# Patient Record
Sex: FEMALE | Race: ASIAN | Employment: FULL TIME | URBAN - METROPOLITAN AREA
[De-identification: names, ages, dates, MRNs, and addresses within clinical notes are randomized per-mention and may not be internally consistent; named-entity substitution may affect disease eponyms.]

---

## 2017-01-04 ENCOUNTER — TELEPHONE (OUTPATIENT)
Dept: FAMILY MEDICINE CLINIC | Facility: CLINIC | Age: 37
End: 2017-01-04

## 2017-01-04 NOTE — TELEPHONE ENCOUNTER
Tc to pt. She made appt for L and R arm pain on mychart. She reports 2 days ago noticed shooting pains in both of her arms. She states she feels like it was in her bones. More in elbow and shoulder area. Shoulder and neck soreness. Denies injury.  Denies a

## 2017-01-18 ENCOUNTER — OFFICE VISIT (OUTPATIENT)
Dept: FAMILY MEDICINE CLINIC | Facility: CLINIC | Age: 37
End: 2017-01-18

## 2017-01-18 VITALS
RESPIRATION RATE: 14 BRPM | SYSTOLIC BLOOD PRESSURE: 120 MMHG | HEART RATE: 68 BPM | DIASTOLIC BLOOD PRESSURE: 70 MMHG | WEIGHT: 129 LBS | BODY MASS INDEX: 22.02 KG/M2 | HEIGHT: 64 IN

## 2017-01-18 DIAGNOSIS — M54.9 CHRONIC BILATERAL BACK PAIN, UNSPECIFIED BACK LOCATION: Primary | ICD-10-CM

## 2017-01-18 DIAGNOSIS — M79.601 BILATERAL ARM PAIN: ICD-10-CM

## 2017-01-18 DIAGNOSIS — G89.29 CHRONIC BILATERAL BACK PAIN, UNSPECIFIED BACK LOCATION: Primary | ICD-10-CM

## 2017-01-18 DIAGNOSIS — M79.602 BILATERAL ARM PAIN: ICD-10-CM

## 2017-01-18 PROCEDURE — 99214 OFFICE O/P EST MOD 30 MIN: CPT | Performed by: FAMILY MEDICINE

## 2017-01-18 RX ORDER — PREDNISONE 20 MG/1
20 TABLET ORAL 2 TIMES DAILY
Qty: 10 TABLET | Refills: 1 | Status: SHIPPED | OUTPATIENT
Start: 2017-01-18 | End: 2017-01-30

## 2017-01-18 NOTE — PROGRESS NOTES
Glendy Olivia is a 43year old female. HPI:   Patient complains of bilateral arm discomfort on and off for the past 2 weeks. She states that the pain is sharp, needlelike that goes down her arm.   She states the pain feels that it is deep in her arm and GLUCOSE (URINE DIPSTICK) 0 Negative mg/dL   BILIRUBIN 0 Negative   KETONES (URINE DIPSTICK) 0 Negative mg/dL   SPECIFIC GRAVITY 1.015 1.005 - 1.030   OCCULT BLOOD small Negative   PH, URINE 7.0 4.5 - 8.0   PROTEIN (URINE DIPSTICK) 0 Negative/Trace mg/dL bilaterally    ASSESSMENT AND PLAN:   Chronic bilateral back pain, unspecified back location  (primary encounter diagnosis)  Bilateral arm pain    No orders of the defined types were placed in this encounter.        Meds & Refills for this Visit:  Signed Pr

## 2017-03-13 ENCOUNTER — OFFICE VISIT (OUTPATIENT)
Dept: FAMILY MEDICINE CLINIC | Facility: CLINIC | Age: 37
End: 2017-03-13

## 2017-03-13 VITALS
HEIGHT: 64 IN | DIASTOLIC BLOOD PRESSURE: 70 MMHG | SYSTOLIC BLOOD PRESSURE: 100 MMHG | HEART RATE: 78 BPM | WEIGHT: 127 LBS | BODY MASS INDEX: 21.68 KG/M2 | RESPIRATION RATE: 14 BRPM

## 2017-03-13 DIAGNOSIS — M24.20: ICD-10-CM

## 2017-03-13 DIAGNOSIS — G89.29 CHRONIC MIDLINE LOW BACK PAIN WITHOUT SCIATICA: Primary | ICD-10-CM

## 2017-03-13 DIAGNOSIS — M54.50 CHRONIC MIDLINE LOW BACK PAIN WITHOUT SCIATICA: Primary | ICD-10-CM

## 2017-03-13 DIAGNOSIS — M47.816 FACET HYPERTROPHY OF LUMBAR REGION: ICD-10-CM

## 2017-03-13 PROCEDURE — 99214 OFFICE O/P EST MOD 30 MIN: CPT | Performed by: FAMILY MEDICINE

## 2017-03-13 RX ORDER — TRAMADOL HYDROCHLORIDE 50 MG/1
100 TABLET ORAL 2 TIMES DAILY PRN
Qty: 120 TABLET | Refills: 1 | Status: SHIPPED | OUTPATIENT
Start: 2017-03-13 | End: 2017-06-13

## 2017-03-13 NOTE — PROGRESS NOTES
Иван Macias is a 43year old female. HPI:   Patient is here to follow-up on her back pain. She states she saw orthopedic which showed ligamentous hypertrophy and mild facet hypertrophy on her MRI at the level of L4-L5 and L5-S1.   Patient does in the s Result Value Ref Range   HPV Source Cervix    HPV DNA Probe, Thinprep Negative Negative   -URINE CULTURE, ROUTINE   Result Value Ref Range   Urine Culture 60,000 CFU/ML Gram positive aki        REVIEW OF SYSTEMS:   GENERAL: feels well otherwise  SKIN:

## 2017-04-25 ENCOUNTER — OFFICE VISIT (OUTPATIENT)
Dept: FAMILY MEDICINE CLINIC | Facility: CLINIC | Age: 37
End: 2017-04-25

## 2017-04-25 VITALS
WEIGHT: 126 LBS | HEART RATE: 84 BPM | SYSTOLIC BLOOD PRESSURE: 100 MMHG | HEIGHT: 64 IN | RESPIRATION RATE: 14 BRPM | DIASTOLIC BLOOD PRESSURE: 60 MMHG | BODY MASS INDEX: 21.51 KG/M2

## 2017-04-25 DIAGNOSIS — M25.511 ACUTE PAIN OF RIGHT SHOULDER: Primary | ICD-10-CM

## 2017-04-25 DIAGNOSIS — R00.2 HEART PALPITATIONS: ICD-10-CM

## 2017-04-25 DIAGNOSIS — Z86.79 HISTORY OF RHEUMATIC FEVER: ICD-10-CM

## 2017-04-25 DIAGNOSIS — M79.2 NEUROPATHIC PAIN: ICD-10-CM

## 2017-04-25 PROCEDURE — 99214 OFFICE O/P EST MOD 30 MIN: CPT | Performed by: FAMILY MEDICINE

## 2017-04-25 RX ORDER — HYDROCODONE BITARTRATE AND ACETAMINOPHEN 10; 325 MG/1; MG/1
1 TABLET ORAL 2 TIMES DAILY PRN
Qty: 60 TABLET | Refills: 0 | Status: SHIPPED | OUTPATIENT
Start: 2017-04-25 | End: 2017-11-01 | Stop reason: ALTCHOICE

## 2017-04-25 RX ORDER — DULOXETIN HYDROCHLORIDE 20 MG/1
20 CAPSULE, DELAYED RELEASE ORAL DAILY
Qty: 30 CAPSULE | Refills: 2 | Status: SHIPPED | OUTPATIENT
Start: 2017-04-25 | End: 2017-11-01 | Stop reason: ALTCHOICE

## 2017-04-25 NOTE — PROGRESS NOTES
Minal Ruvalcaba is a 43year old female. HPI:   Patient is here to follow-up on her back pain. She states she saw orthopedic which showed ligamentous hypertrophy and mild facet hypertrophy on her MRI at the level of L4-L5 and L5-S1.   Patient does in the s for prescription of oral contraceptives    • Routine Papanicolaou smear       Social History:    Smoking Status: Never Smoker                      Smokeless Status: Never Used                        Alcohol Use: No                   Results for orders plac normal limits    ASSESSMENT AND PLAN:   Acute pain of right shoulder  (primary encounter diagnosis)  Neuropathic pain  History of rheumatic fever  Heart palpitations    No orders of the defined types were placed in this encounter.        Meds & Refills for

## 2017-05-28 PROCEDURE — 87086 URINE CULTURE/COLONY COUNT: CPT | Performed by: EMERGENCY MEDICINE

## 2017-06-02 ENCOUNTER — TELEPHONE (OUTPATIENT)
Dept: FAMILY MEDICINE CLINIC | Facility: CLINIC | Age: 37
End: 2017-06-02

## 2017-06-02 ENCOUNTER — OFFICE VISIT (OUTPATIENT)
Dept: FAMILY MEDICINE CLINIC | Facility: CLINIC | Age: 37
End: 2017-06-02

## 2017-06-02 ENCOUNTER — HOSPITAL ENCOUNTER (OUTPATIENT)
Dept: CT IMAGING | Age: 37
Discharge: HOME | End: 2017-06-02
Attending: FAMILY MEDICINE
Payer: COMMERCIAL

## 2017-06-02 VITALS
RESPIRATION RATE: 16 BRPM | HEART RATE: 84 BPM | SYSTOLIC BLOOD PRESSURE: 110 MMHG | WEIGHT: 125 LBS | TEMPERATURE: 98 F | DIASTOLIC BLOOD PRESSURE: 70 MMHG | BODY MASS INDEX: 21.34 KG/M2 | HEIGHT: 64 IN

## 2017-06-02 DIAGNOSIS — M54.50 CHRONIC BILATERAL LOW BACK PAIN WITHOUT SCIATICA: ICD-10-CM

## 2017-06-02 DIAGNOSIS — N30.01 ACUTE CYSTITIS WITH HEMATURIA: Primary | ICD-10-CM

## 2017-06-02 DIAGNOSIS — R53.83 FATIGUE, UNSPECIFIED TYPE: ICD-10-CM

## 2017-06-02 DIAGNOSIS — R11.2 INTRACTABLE VOMITING WITH NAUSEA, UNSPECIFIED VOMITING TYPE: ICD-10-CM

## 2017-06-02 DIAGNOSIS — R10.31 RIGHT LOWER QUADRANT ABDOMINAL PAIN: ICD-10-CM

## 2017-06-02 DIAGNOSIS — R00.2 HEART PALPITATIONS: ICD-10-CM

## 2017-06-02 DIAGNOSIS — R53.1 WEAKNESS: ICD-10-CM

## 2017-06-02 DIAGNOSIS — R79.89 ABNORMAL CBC: ICD-10-CM

## 2017-06-02 DIAGNOSIS — G89.29 CHRONIC BILATERAL LOW BACK PAIN WITHOUT SCIATICA: ICD-10-CM

## 2017-06-02 DIAGNOSIS — Z86.79 HISTORY OF RHEUMATIC FEVER: ICD-10-CM

## 2017-06-02 PROCEDURE — 99215 OFFICE O/P EST HI 40 MIN: CPT | Performed by: FAMILY MEDICINE

## 2017-06-02 PROCEDURE — 74176 CT ABD & PELVIS W/O CONTRAST: CPT | Performed by: FAMILY MEDICINE

## 2017-06-02 RX ORDER — LEVOFLOXACIN 500 MG/1
500 TABLET, FILM COATED ORAL DAILY
Qty: 10 TABLET | Refills: 0 | Status: SHIPPED | OUTPATIENT
Start: 2017-06-02 | End: 2017-06-12 | Stop reason: ALTCHOICE

## 2017-06-02 NOTE — PROGRESS NOTES
Bessie Snow is a 43year old female. HPI:   Pt. Is here for follow up from immediate care. Patient states that she has not been feeling well for the past 2 weeks.   She started off with the pain on the right side of her head and it shot up into her tem Alcohol Use: No                   Results for orders placed or performed in visit on 05/28/17  -URINE MICROSCOPIC ONLY   Result Value Ref Range   WBC, UA 5-10/HPF (A) None seen - Rare   RBC UA 5-10/HPF (A) None seen - Rare   Epithelial abdominal pain, heartburn; N/V  : UTI  MUSCULOSKELETAL:  back pain  EXTREMITIES:  No pain or numbness    EXAM:   /70 mmHg  Pulse 84  Temp(Src) 98 °F (36.7 °C) (Oral)  Resp 16  Ht 64\"  Wt 125 lb  BMI 21.45 kg/m2  LMP 04/27/2017  GENERAL: well devel

## 2017-06-02 NOTE — TELEPHONE ENCOUNTER
The CT scan results were received from radiology. There is no acute appendicitis. There was mild patchy airspace opacity left lower lobe which could represent pneumonia. No acute process in the abdomen.   Patient recently was diagnosed with acute cystiti

## 2017-06-02 NOTE — TELEPHONE ENCOUNTER
Called to pt and notified of findings and dr recommendations. Pt voiced surprise and understanding and agreed to plan.

## 2017-06-04 DIAGNOSIS — R93.89 ABNORMAL CT SCAN: Primary | ICD-10-CM

## 2017-06-04 DIAGNOSIS — R53.1 WEAKNESS: ICD-10-CM

## 2017-06-06 ENCOUNTER — APPOINTMENT (OUTPATIENT)
Dept: LAB | Age: 37
End: 2017-06-06
Attending: FAMILY MEDICINE
Payer: COMMERCIAL

## 2017-06-06 ENCOUNTER — HOSPITAL ENCOUNTER (OUTPATIENT)
Dept: GENERAL RADIOLOGY | Age: 37
Discharge: HOME | End: 2017-06-06
Attending: FAMILY MEDICINE
Payer: COMMERCIAL

## 2017-06-06 DIAGNOSIS — R53.1 WEAKNESS: ICD-10-CM

## 2017-06-06 DIAGNOSIS — Z86.79 HISTORY OF RHEUMATIC FEVER: ICD-10-CM

## 2017-06-06 DIAGNOSIS — R79.89 ABNORMAL CBC: ICD-10-CM

## 2017-06-06 DIAGNOSIS — R93.89 ABNORMAL CT SCAN: ICD-10-CM

## 2017-06-06 DIAGNOSIS — R00.2 HEART PALPITATIONS: ICD-10-CM

## 2017-06-06 DIAGNOSIS — N30.01 ACUTE CYSTITIS WITH HEMATURIA: ICD-10-CM

## 2017-06-06 DIAGNOSIS — R11.2 INTRACTABLE VOMITING WITH NAUSEA, UNSPECIFIED VOMITING TYPE: ICD-10-CM

## 2017-06-06 PROCEDURE — 83013 H PYLORI (C-13) BREATH: CPT | Performed by: FAMILY MEDICINE

## 2017-06-06 PROCEDURE — 86665 EPSTEIN-BARR CAPSID VCA: CPT | Performed by: FAMILY MEDICINE

## 2017-06-06 PROCEDURE — 71020 XR CHEST PA + LAT CHEST (CPT=71020): CPT | Performed by: FAMILY MEDICINE

## 2017-06-06 PROCEDURE — 80074 ACUTE HEPATITIS PANEL: CPT | Performed by: FAMILY MEDICINE

## 2017-06-06 PROCEDURE — 36415 COLL VENOUS BLD VENIPUNCTURE: CPT | Performed by: FAMILY MEDICINE

## 2017-06-06 PROCEDURE — 86645 CMV ANTIBODY IGM: CPT | Performed by: FAMILY MEDICINE

## 2017-06-06 PROCEDURE — 86664 EPSTEIN-BARR NUCLEAR ANTIGEN: CPT | Performed by: FAMILY MEDICINE

## 2017-06-06 PROCEDURE — 86644 CMV ANTIBODY: CPT | Performed by: FAMILY MEDICINE

## 2017-06-12 ENCOUNTER — OFFICE VISIT (OUTPATIENT)
Dept: FAMILY MEDICINE CLINIC | Facility: CLINIC | Age: 37
End: 2017-06-12

## 2017-06-12 VITALS
DIASTOLIC BLOOD PRESSURE: 70 MMHG | TEMPERATURE: 98 F | RESPIRATION RATE: 14 BRPM | HEIGHT: 64 IN | HEART RATE: 72 BPM | WEIGHT: 125 LBS | SYSTOLIC BLOOD PRESSURE: 102 MMHG | BODY MASS INDEX: 21.34 KG/M2

## 2017-06-12 DIAGNOSIS — S46.911A RIGHT SHOULDER STRAIN, INITIAL ENCOUNTER: Primary | ICD-10-CM

## 2017-06-12 PROCEDURE — 99213 OFFICE O/P EST LOW 20 MIN: CPT | Performed by: FAMILY MEDICINE

## 2017-06-12 RX ORDER — DICLOFENAC SODIUM 75 MG/1
75 TABLET, DELAYED RELEASE ORAL 2 TIMES DAILY
Qty: 60 TABLET | Refills: 1 | Status: SHIPPED | OUTPATIENT
Start: 2017-06-12 | End: 2017-11-01 | Stop reason: ALTCHOICE

## 2017-06-12 NOTE — PROGRESS NOTES
Susan Dennis is a 43year old female. HPI:   Pt. States that her right shoulder was bothering her for the past 2-3 weeks. It did not bother her for some time and it flared recently. Pt. Would like something for pain. Pain is 6-7/10. Meds reviewed. Breath Test Negative Negative       REVIEW OF SYSTEMS:   GENERAL: feels well otherwise  SKIN: denies any unusual skin lesions  LUNGS: denies shortness of breath with exertion  CARDIOVASCULAR: denies chest pain on exertion  GI: denies abdominal pain,denies

## 2017-06-13 NOTE — TELEPHONE ENCOUNTER
From: Susan Dennis  To: Lonine Trujillo DO  Sent: 6/12/2017 8:16 PM CDT  Subject: Medication Renewal Request    Original authorizing provider: DO Susan Rebolledo would like a refill of the following medications:  TraMADol HCl 50 MG Oral Ta

## 2017-06-14 RX ORDER — TRAMADOL HYDROCHLORIDE 50 MG/1
TABLET ORAL
Qty: 90 TABLET | Refills: 1 | Status: SHIPPED | OUTPATIENT
Start: 2017-06-14 | End: 2017-07-28

## 2017-06-15 ENCOUNTER — PATIENT MESSAGE (OUTPATIENT)
Dept: FAMILY MEDICINE CLINIC | Facility: CLINIC | Age: 37
End: 2017-06-15

## 2017-06-16 NOTE — TELEPHONE ENCOUNTER
Outgoing call to patient concerning MyChart message. She states she felt very dizzy, weak, fatigued and had chills on yesterday, today has mild dizziness, feels much better than yesterday.   Please advise

## 2017-06-16 NOTE — TELEPHONE ENCOUNTER
Unfortunately, patient was recently diagnosed with  CMV and EBV which both can give her the symptoms she is dealing with. The key is to rest and drink plenty of fluids.   If she feels it is so bad that she cannot tolerate it, she needs to go to the [employer]

## 2017-06-16 NOTE — TELEPHONE ENCOUNTER
Outgoing call to patient, information and recommendations given to patient, understanding verbalized.

## 2017-06-20 ENCOUNTER — PATIENT MESSAGE (OUTPATIENT)
Dept: FAMILY MEDICINE CLINIC | Facility: CLINIC | Age: 37
End: 2017-06-20

## 2017-06-20 NOTE — TELEPHONE ENCOUNTER
From: Rona Kearns  To: Cecilia Santana DO  Sent: 6/20/2017 6:33 AM CDT  Subject: Visit Follow-up Question    Hi Dr Bhupinder Burnette,    Could you please write a note for my employer about my symptoms? It is hard to focus at times. Appreciate your help.      Cielo Fair

## 2017-07-01 ENCOUNTER — HOSPITAL ENCOUNTER (OUTPATIENT)
Dept: GENERAL RADIOLOGY | Age: 37
Discharge: HOME | End: 2017-07-01
Attending: FAMILY MEDICINE
Payer: COMMERCIAL

## 2017-07-01 DIAGNOSIS — M25.511 ACUTE PAIN OF RIGHT SHOULDER: ICD-10-CM

## 2017-07-01 PROCEDURE — 73030 X-RAY EXAM OF SHOULDER: CPT | Performed by: FAMILY MEDICINE

## 2017-08-02 ENCOUNTER — PATIENT MESSAGE (OUTPATIENT)
Dept: FAMILY MEDICINE CLINIC | Facility: CLINIC | Age: 37
End: 2017-08-02

## 2017-08-02 ENCOUNTER — OFFICE VISIT (OUTPATIENT)
Dept: FAMILY MEDICINE CLINIC | Facility: CLINIC | Age: 37
End: 2017-08-02

## 2017-08-02 ENCOUNTER — TELEPHONE (OUTPATIENT)
Dept: FAMILY MEDICINE CLINIC | Facility: CLINIC | Age: 37
End: 2017-08-02

## 2017-08-02 VITALS
BODY MASS INDEX: 21 KG/M2 | HEART RATE: 64 BPM | WEIGHT: 123 LBS | RESPIRATION RATE: 14 BRPM | SYSTOLIC BLOOD PRESSURE: 120 MMHG | DIASTOLIC BLOOD PRESSURE: 80 MMHG | HEIGHT: 64 IN

## 2017-08-02 DIAGNOSIS — B25.9 CYTOMEGALOVIRUS INFECTION, UNSPECIFIED CYTOMEGALOVIRAL INFECTION TYPE (HCC): Primary | ICD-10-CM

## 2017-08-02 DIAGNOSIS — M54.50 CHRONIC MIDLINE LOW BACK PAIN WITHOUT SCIATICA: ICD-10-CM

## 2017-08-02 DIAGNOSIS — G89.29 CHRONIC MIDLINE LOW BACK PAIN WITHOUT SCIATICA: ICD-10-CM

## 2017-08-02 DIAGNOSIS — G89.29 CHRONIC BILATERAL LOW BACK PAIN WITHOUT SCIATICA: Primary | ICD-10-CM

## 2017-08-02 DIAGNOSIS — R42 HEAD SPINNING: ICD-10-CM

## 2017-08-02 DIAGNOSIS — M54.50 CHRONIC BILATERAL LOW BACK PAIN WITHOUT SCIATICA: Primary | ICD-10-CM

## 2017-08-02 DIAGNOSIS — Z79.899 MEDICATION MANAGEMENT: ICD-10-CM

## 2017-08-02 DIAGNOSIS — R53.1 WEAKNESS: ICD-10-CM

## 2017-08-02 DIAGNOSIS — B27.90 EBV INFECTION: ICD-10-CM

## 2017-08-02 PROCEDURE — 99214 OFFICE O/P EST MOD 30 MIN: CPT | Performed by: FAMILY MEDICINE

## 2017-08-02 RX ORDER — TRAMADOL HYDROCHLORIDE 50 MG/1
TABLET ORAL
Qty: 60 TABLET | Refills: 0 | Status: CANCELLED
Start: 2017-08-02

## 2017-08-02 RX ORDER — TRAMADOL HYDROCHLORIDE 50 MG/1
50 TABLET ORAL DAILY PRN
Qty: 30 TABLET | Refills: 0
Start: 2017-08-02 | End: 2017-08-04 | Stop reason: SDUPTHER

## 2017-08-02 NOTE — PROGRESS NOTES
Rona Kearns is a 43year old female. HPI:   Pt. Complains of vertigo on and off sicne diagnosed with CMV and EBV and it comes all day when it does come. Last episodes were 2,5,6 days ago. Has to lay down. She cannot focus at work.  Feels it is affect Nonreactive    Hepatitis B Core Ab, IgM Nonreactive  Nonreactive    Hepatitis C Virus Nonreactive  Nonreactive    Hepatitis B Surface Antigen Nonreactive  Nonreactive    -HELICOBACTER PYLORI BREATH TEST, ADULT (>17)   Result Value Ref Range   H. pylori Caryl plan.  The patient is asked to return in 1-2 months.

## 2017-08-03 NOTE — TELEPHONE ENCOUNTER
Please call her [facility] and confirm if the patient has picked up 180 tablets of tramadol since June 14. Patient states she received 60 tablets and had no refills according to her pharmacy.   Patient states that she had asked for refill a couple day

## 2017-08-03 NOTE — TELEPHONE ENCOUNTER
S/w aramis at [employer].   She reports pt picked up #90 6/14 (orig rx was 90 plus 1)  Picked up the refill of #90 7/14  She states based on directions this is a 23 day supply     TraMADol HCl 50 MG Oral Tab (Discontinued) 90 tablet 1 6/14/2017 7/28/2017   Sig :

## 2017-08-03 NOTE — TELEPHONE ENCOUNTER
From: Иван Macias  To: Bartolome Morgan DO  Sent: 8/2/2017 7:01 PM CDT  Subject: Prescription Question    Hi,    I just contacted the pharmacy and they say there's no more refills left on tramadol. I'd appreciate if you can send another prescription.  I do

## 2017-08-04 ENCOUNTER — TELEPHONE (OUTPATIENT)
Dept: FAMILY MEDICINE CLINIC | Facility: CLINIC | Age: 37
End: 2017-08-04

## 2017-08-04 PROBLEM — G89.29 CHRONIC BILATERAL LOW BACK PAIN WITHOUT SCIATICA: Status: ACTIVE | Noted: 2017-08-04

## 2017-08-04 PROBLEM — M54.50 CHRONIC BILATERAL LOW BACK PAIN WITHOUT SCIATICA: Status: ACTIVE | Noted: 2017-08-04

## 2017-08-04 RX ORDER — TRAMADOL HYDROCHLORIDE 50 MG/1
50 TABLET ORAL DAILY PRN
Qty: 30 TABLET | Refills: 0 | Status: SHIPPED | OUTPATIENT
Start: 2017-08-04 | End: 2017-11-01 | Stop reason: ALTCHOICE

## 2017-08-04 NOTE — TELEPHONE ENCOUNTER
Pt states she was in to see Dr. Brittany Ortiz this past Wed for dizziness and sick-like symptoms and was told to call Dr. Brittany Ortiz if she was still experiencing symptoms.  Pt states that Wed into Thurs she has had a return of dizziness and last night it was really

## 2017-08-04 NOTE — TELEPHONE ENCOUNTER
Pt reports info as noted below. sts dizziness and nausea wed and all day thurs were worse than at time of ofc appt. Describes the \"fit\" she had as \"symptoms were just so bad, couldn't fall asleep, just couldn't take it any more\".   sts has no dizziness

## 2017-10-18 ENCOUNTER — MED REC SCAN ONLY (OUTPATIENT)
Dept: FAMILY MEDICINE CLINIC | Facility: CLINIC | Age: 37
End: 2017-10-18

## 2017-10-24 ENCOUNTER — OFFICE VISIT (OUTPATIENT)
Dept: FAMILY MEDICINE CLINIC | Facility: CLINIC | Age: 37
End: 2017-10-24

## 2017-10-24 ENCOUNTER — TELEPHONE (OUTPATIENT)
Dept: SURGERY | Facility: CLINIC | Age: 37
End: 2017-10-24

## 2017-10-24 VITALS
WEIGHT: 130 LBS | HEART RATE: 66 BPM | HEIGHT: 64 IN | SYSTOLIC BLOOD PRESSURE: 110 MMHG | BODY MASS INDEX: 22.2 KG/M2 | DIASTOLIC BLOOD PRESSURE: 70 MMHG | RESPIRATION RATE: 14 BRPM

## 2017-10-24 DIAGNOSIS — E55.9 VITAMIN D DEFICIENCY: ICD-10-CM

## 2017-10-24 DIAGNOSIS — Z23 NEED FOR VACCINATION: ICD-10-CM

## 2017-10-24 DIAGNOSIS — Z00.00 ROUTINE GENERAL MEDICAL EXAMINATION AT A HEALTH CARE FACILITY: Primary | ICD-10-CM

## 2017-10-24 DIAGNOSIS — B96.89 BV (BACTERIAL VAGINOSIS): ICD-10-CM

## 2017-10-24 DIAGNOSIS — M54.50 CHRONIC MIDLINE LOW BACK PAIN WITHOUT SCIATICA: ICD-10-CM

## 2017-10-24 DIAGNOSIS — R00.0 TACHYCARDIA: ICD-10-CM

## 2017-10-24 DIAGNOSIS — Z12.4 SCREENING FOR CERVICAL CANCER: ICD-10-CM

## 2017-10-24 DIAGNOSIS — N76.0 BV (BACTERIAL VAGINOSIS): ICD-10-CM

## 2017-10-24 DIAGNOSIS — G89.29 CHRONIC MIDLINE LOW BACK PAIN WITHOUT SCIATICA: ICD-10-CM

## 2017-10-24 DIAGNOSIS — Z00.00 LABORATORY EXAMINATION ORDERED AS PART OF A ROUTINE GENERAL MEDICAL EXAMINATION: ICD-10-CM

## 2017-10-24 DIAGNOSIS — Z13.89 SCREENING FOR GENITOURINARY CONDITION: ICD-10-CM

## 2017-10-24 PROCEDURE — 90686 IIV4 VACC NO PRSV 0.5 ML IM: CPT | Performed by: FAMILY MEDICINE

## 2017-10-24 PROCEDURE — 99395 PREV VISIT EST AGE 18-39: CPT | Performed by: FAMILY MEDICINE

## 2017-10-24 PROCEDURE — 88175 CYTOPATH C/V AUTO FLUID REDO: CPT | Performed by: FAMILY MEDICINE

## 2017-10-24 PROCEDURE — 87624 HPV HI-RISK TYP POOLED RSLT: CPT | Performed by: FAMILY MEDICINE

## 2017-10-24 PROCEDURE — 90471 IMMUNIZATION ADMIN: CPT | Performed by: FAMILY MEDICINE

## 2017-10-24 PROCEDURE — 81003 URINALYSIS AUTO W/O SCOPE: CPT | Performed by: FAMILY MEDICINE

## 2017-10-24 RX ORDER — IBUPROFEN 600 MG/1
600 TABLET ORAL EVERY 8 HOURS PRN
Qty: 90 TABLET | Refills: 0 | Status: SHIPPED | OUTPATIENT
Start: 2017-10-24 | End: 2017-11-01 | Stop reason: ALTCHOICE

## 2017-10-24 RX ORDER — METRONIDAZOLE 7.5 MG/G
1 GEL VAGINAL NIGHTLY
Qty: 1 TUBE | Refills: 0 | Status: SHIPPED | OUTPATIENT
Start: 2017-10-24 | End: 2017-12-13

## 2017-10-24 NOTE — H&P
CC: Annual Physical Exam    HPI:   Anna Fuentes is a 42year old female who presents for a complete physical exam. Symptoms: periods are regular. Patient complains of back pain recently -- ibuprofen gives her heartburn. Pain can go up to 6-7/ 10.   Lasts Disp: 30 capsule Rfl: 2   HYDROcodone-acetaminophen (NORCO)  MG Oral Tab Take 1 tablet by mouth 2 (two) times daily as needed for Pain.  Disp: 60 tablet Rfl: 0      No Known Allergies   Past Medical History:   Diagnosis Date   • General counseling for thyromegaly  CHEST: no chest tenderness  BREAST: no dominant or suspicious mass, no nipple discharge  LUNGS: clear to auscultation  CARDIO: RRR without murmur  GI: good BS's,no masses, HSM or tenderness  :introitus is normal,scant discharge,cervix is pin

## 2017-11-01 ENCOUNTER — TELEPHONE (OUTPATIENT)
Dept: FAMILY MEDICINE CLINIC | Facility: CLINIC | Age: 37
End: 2017-11-01

## 2017-11-01 ENCOUNTER — PATIENT MESSAGE (OUTPATIENT)
Dept: FAMILY MEDICINE CLINIC | Facility: CLINIC | Age: 37
End: 2017-11-01

## 2017-11-01 ENCOUNTER — OFFICE VISIT (OUTPATIENT)
Dept: SURGERY | Facility: CLINIC | Age: 37
End: 2017-11-01

## 2017-11-01 VITALS
WEIGHT: 127 LBS | DIASTOLIC BLOOD PRESSURE: 72 MMHG | BODY MASS INDEX: 21.68 KG/M2 | HEART RATE: 72 BPM | HEIGHT: 64 IN | SYSTOLIC BLOOD PRESSURE: 128 MMHG

## 2017-11-01 DIAGNOSIS — R31.9 HEMATURIA, UNSPECIFIED TYPE: Primary | ICD-10-CM

## 2017-11-01 DIAGNOSIS — M47.816 LUMBAR FACET ARTHROPATHY: Primary | ICD-10-CM

## 2017-11-01 PROCEDURE — 99204 OFFICE O/P NEW MOD 45 MIN: CPT | Performed by: ANESTHESIOLOGY

## 2017-11-01 NOTE — PATIENT INSTRUCTIONS
Refill policies:    • Allow 2-3 business days for refills; controlled substances may take longer.   • Contact your pharmacy at least 5 days prior to running out of medication and have them send an electronic request or submit request through the [facility] have a procedure or additional testing performed. [facility]) will contact your insurance carrier to obtain pre-certification or prior authorization.     Unfortunately, [facility] has seen an increase in denial of payment even though the p

## 2017-11-01 NOTE — PROGRESS NOTES
HPI:    Patient ID: Bessie Snow is a 42year old female. HPI    Review of Systems         No current outpatient prescriptions on file.   Allergies:No Known Allergies   PHYSICAL EXAM:   Physical Exam   Constitutional:                  ASSESSMENT/PLAN:

## 2017-11-02 NOTE — TELEPHONE ENCOUNTER
Pt states that she did not have a UA done at her physical 10/24/17. The test result from that day only has small blood, no other values are listed.   Please advise, thank you

## 2017-11-02 NOTE — TELEPHONE ENCOUNTER
From: Myles Allison  To: Fifi Hoang DO  Sent: 11/1/2017 2:03 PM CDT  Subject: Test Results Question    I have a question about Urinalysis w/o scope resulted on 10/24/17, 11:26 AM.    Hi,  No urine test was done on my physical.     Thanks,  FABIORFSAÚL

## 2017-11-02 NOTE — TELEPHONE ENCOUNTER
That is my fault. I did document the patient has chronic hematuria and so it resulted as a urine test results.

## 2017-11-02 NOTE — PROGRESS NOTES
Name: Anna Fuentes   : 1980   DOS: 2017     Chief complaint: Low back pain    History of present illness:  Anna Fuentes is a 42year old female complaining of  pain in the lower back for 7 years.  Pain started suddenly without any injury or fainting, head injury, LOC, weakness, tremor, headaches, seizures, speech disorders, loss of balance or any  other neurologic problems. Genitourinary:  Denies dysuria, hematuria. Musculoskeletal:  Negative for all musculoskeletal problems.   Vascular: Neg sounds positive. Neurologic:  Cranial nerves II through XII are grossly intact. Examination of the lower back: It reveals severe tenderness over the bilateral paravertebral muscle and  sacroiliac joint area.  Flexion of the spine makes the pain bett would be using local ointment over the lumbar paravertebral muscle to see if it makes the pain better. If her pain does not get better in 6 weeks she should come back for bilateral diagnostic lumbar facet joint injection.   Thank you very much for referrin

## 2017-11-06 ENCOUNTER — TELEPHONE (OUTPATIENT)
Dept: FAMILY MEDICINE CLINIC | Facility: CLINIC | Age: 37
End: 2017-11-06

## 2017-11-06 DIAGNOSIS — R00.2 PALPITATIONS: Primary | ICD-10-CM

## 2017-11-06 DIAGNOSIS — R07.9 CHEST PAIN, UNSPECIFIED TYPE: ICD-10-CM

## 2017-11-06 NOTE — TELEPHONE ENCOUNTER
Call from [facility]-sts pt is scheduled for stress treadmill tomorrow 3pm but dx is cystitis.   Record reviewed-no recent info re dx for this test. Advised will update dr Lucien Cardoso with this info and call back with corrected dx   Rj Love v

## 2017-11-06 NOTE — TELEPHONE ENCOUNTER
Call back to [facility]-advised of corrected dx from dr Jordy Carbajal. Micaela Scanlon will need new order placed-she will then link new order to pt's already scheduled testing.

## 2017-11-07 ENCOUNTER — HOSPITAL ENCOUNTER (OUTPATIENT)
Dept: CV DIAGNOSTICS | Facility: HOSPITAL | Age: 37
Discharge: HOME | End: 2017-11-07
Attending: FAMILY MEDICINE
Payer: COMMERCIAL

## 2017-11-07 DIAGNOSIS — R07.9 CHEST PAIN, UNSPECIFIED TYPE: ICD-10-CM

## 2017-11-07 DIAGNOSIS — R00.2 PALPITATIONS: ICD-10-CM

## 2017-11-07 PROCEDURE — 93017 CV STRESS TEST TRACING ONLY: CPT | Performed by: FAMILY MEDICINE

## 2017-11-07 PROCEDURE — 93018 CV STRESS TEST I&R ONLY: CPT | Performed by: FAMILY MEDICINE

## 2017-12-13 ENCOUNTER — OFFICE VISIT (OUTPATIENT)
Dept: FAMILY MEDICINE CLINIC | Facility: CLINIC | Age: 37
End: 2017-12-13

## 2017-12-13 VITALS
HEIGHT: 64 IN | WEIGHT: 133 LBS | HEART RATE: 64 BPM | BODY MASS INDEX: 22.71 KG/M2 | RESPIRATION RATE: 14 BRPM | SYSTOLIC BLOOD PRESSURE: 100 MMHG | DIASTOLIC BLOOD PRESSURE: 60 MMHG | TEMPERATURE: 98 F

## 2017-12-13 DIAGNOSIS — M54.50 CHRONIC MIDLINE LOW BACK PAIN WITHOUT SCIATICA: ICD-10-CM

## 2017-12-13 DIAGNOSIS — Z79.899 MEDICATION MANAGEMENT: ICD-10-CM

## 2017-12-13 DIAGNOSIS — N76.0 BV (BACTERIAL VAGINOSIS): ICD-10-CM

## 2017-12-13 DIAGNOSIS — B96.89 BV (BACTERIAL VAGINOSIS): ICD-10-CM

## 2017-12-13 DIAGNOSIS — G89.29 CHRONIC MIDLINE LOW BACK PAIN WITHOUT SCIATICA: ICD-10-CM

## 2017-12-13 DIAGNOSIS — B00.1 COLD SORE: Primary | ICD-10-CM

## 2017-12-13 PROCEDURE — 99214 OFFICE O/P EST MOD 30 MIN: CPT | Performed by: FAMILY MEDICINE

## 2017-12-13 RX ORDER — VALACYCLOVIR HYDROCHLORIDE 500 MG/1
2000 TABLET, FILM COATED ORAL 2 TIMES DAILY
Qty: 24 TABLET | Refills: 0 | Status: SHIPPED | OUTPATIENT
Start: 2017-12-13 | End: 2018-08-02 | Stop reason: ALTCHOICE

## 2017-12-13 RX ORDER — ACETAMINOPHEN AND CODEINE PHOSPHATE 300; 30 MG/1; MG/1
TABLET ORAL
Refills: 0 | COMMUNITY
Start: 2017-12-05 | End: 2018-02-10

## 2017-12-13 RX ORDER — CHLORHEXIDINE GLUCONATE 0.12 MG/ML
RINSE ORAL
Refills: 0 | COMMUNITY
Start: 2017-12-05 | End: 2018-08-02 | Stop reason: ALTCHOICE

## 2017-12-13 RX ORDER — TRAMADOL HYDROCHLORIDE 50 MG/1
50 TABLET ORAL DAILY PRN
Qty: 30 TABLET | Refills: 2 | Status: SHIPPED | OUTPATIENT
Start: 2017-12-13 | End: 2018-02-05

## 2017-12-13 RX ORDER — METRONIDAZOLE 7.5 MG/G
1 GEL VAGINAL NIGHTLY
Qty: 1 TUBE | Refills: 0 | Status: SHIPPED | OUTPATIENT
Start: 2017-12-13 | End: 2018-03-12

## 2017-12-14 NOTE — PROGRESS NOTES
Isela Barr is a 42year old female. HPI:   Patient complains of her chronic low back pain. She feels that the weather has been contributing to more irritation in her low back.   She is wondering if she can get something to help with the discomfort whe tobacco: Never Used                      Alcohol use:  No                   Results for orders placed or performed in visit on 10/24/17  -URINALYSIS, AUTO, W/O SCOPE   Result Value Ref Range   GLUCOSE (URINE DIPSTICK)  mg/dL   BILIRUBIN  Negative   KETONES negative results.  [facility] REMOVED]    Case Report Gynecologic Cytology                              Case: [id number]                                  Authorizing Provider:  Shaggy Gerard DO          Collected:           10/24/2017 02:19 PM edema    ASSESSMENT AND PLAN:   Cold sore  (primary encounter diagnosis)  Bv (bacterial vaginosis)  Chronic midline low back pain without sciatica  Medication management    No orders of the defined types were placed in this encounter.       Meds & Refills f

## 2018-02-08 RX ORDER — TRAMADOL HYDROCHLORIDE 50 MG/1
50 TABLET ORAL DAILY PRN
Qty: 30 TABLET | Refills: 0 | Status: SHIPPED | OUTPATIENT
Start: 2018-02-08 | End: 2018-02-10 | Stop reason: DRUGHIGH

## 2018-02-08 RX ORDER — TRAMADOL HYDROCHLORIDE 50 MG/1
TABLET ORAL
Qty: 30 TABLET | Refills: 1 | OUTPATIENT
Start: 2018-02-08

## 2018-02-08 NOTE — TELEPHONE ENCOUNTER
From: Rona Kearns  Sent: 2/5/2018 12:10 PM CST  Subject: Medication Renewal Request    Rona Kearns would like a refill of the following medications:     TraMADol HCl 50 MG Oral Tab Sharad Sic, DO]    Preferred pharmacy: [address] [place] [address]

## 2018-02-10 ENCOUNTER — OFFICE VISIT (OUTPATIENT)
Dept: FAMILY MEDICINE CLINIC | Facility: CLINIC | Age: 38
End: 2018-02-10

## 2018-02-10 VITALS
RESPIRATION RATE: 14 BRPM | WEIGHT: 127 LBS | SYSTOLIC BLOOD PRESSURE: 110 MMHG | HEIGHT: 64 IN | DIASTOLIC BLOOD PRESSURE: 70 MMHG | HEART RATE: 88 BPM | BODY MASS INDEX: 21.68 KG/M2

## 2018-02-10 DIAGNOSIS — G89.29 CHRONIC BILATERAL LOW BACK PAIN WITHOUT SCIATICA: Primary | ICD-10-CM

## 2018-02-10 DIAGNOSIS — M54.50 CHRONIC BILATERAL LOW BACK PAIN WITHOUT SCIATICA: Primary | ICD-10-CM

## 2018-02-10 PROCEDURE — 99214 OFFICE O/P EST MOD 30 MIN: CPT | Performed by: FAMILY MEDICINE

## 2018-02-10 RX ORDER — TRAMADOL HYDROCHLORIDE 50 MG/1
50 TABLET ORAL 2 TIMES DAILY PRN
Qty: 60 TABLET | Refills: 2 | Status: SHIPPED | OUTPATIENT
Start: 2018-02-23 | End: 2018-04-21

## 2018-02-10 NOTE — PROGRESS NOTES
Yaneli Millspool is a 42year old female. HPI:   Patient complains of her chronic low back pain. She feels that the weather has been contributing to more irritation in her low back.   She is wondering if she can get something to help with the discomfort w Result Value Ref Range   HPV High Risk Negative Negative   HPV Source Cervical/endocervical    -THINPREP PAP WITH HPV REFLEX REQUEST B   Result Value Ref Range   Thin Prep Pap AP TEST REFLEXED    -THINPREP PAP WITH HPV REFLEX REQUEST   Result Value Ref R REVIEW OF SYSTEMS:   GENERAL: feels well otherwise  SKIN: denies any unusual skin lesions  EYES:denies blurred vision or double vision  HEENT: denies nasal congestion, sinus pain or ST; sores in her mouth  LUNGS: denies shortness of breath with exert federally legal.    The patient indicates understanding of these issues and agrees to the plan. The patient is asked to return in 3 months.

## 2018-02-13 ENCOUNTER — TELEPHONE (OUTPATIENT)
Dept: FAMILY MEDICINE CLINIC | Facility: CLINIC | Age: 38
End: 2018-02-13

## 2018-02-13 NOTE — TELEPHONE ENCOUNTER
Call from [facility] pharmacy. sts pt brought in tramadol 50 mg daily 2/8 order from dr Fleming [place] but sts believes dr increased dose to 2 tablets a day. Asking for clarification.   Review of record shows 2/8 tramadol order as stated and a 2/10/18 ofc visit

## 2018-02-14 ENCOUNTER — TELEPHONE (OUTPATIENT)
Dept: FAMILY MEDICINE CLINIC | Facility: CLINIC | Age: 38
End: 2018-02-14

## 2018-02-14 NOTE — TELEPHONE ENCOUNTER
Received call from [facility]. She was asking if okay to fill pt's script written 2/10/18 with a fill date of 2/23/18 for early refill. Looking at note from 2/13/18 from [facility].   Madelin Cabot spoke with Dr Damion Lr about this issue an

## 2018-03-13 RX ORDER — METRONIDAZOLE 7.5 MG/G
GEL VAGINAL
Qty: 70 G | Refills: 0 | Status: SHIPPED | OUTPATIENT
Start: 2018-03-13 | End: 2018-06-23

## 2018-04-15 RX ORDER — TRAMADOL HYDROCHLORIDE 50 MG/1
50 TABLET ORAL 2 TIMES DAILY PRN
Qty: 60 TABLET | Refills: 2
Start: 2018-04-15

## 2018-04-23 NOTE — TELEPHONE ENCOUNTER
Not protocol medication. LOV : 2/10/18 chronic low back pain  Last labs done :2016  Next appointment :9/24/18   Please see pending medication. Refill if appropriate.    Last refill:    Date:02/23/18  Amount :60 tablets 2 refills   Medication: tramadol hcl

## 2018-04-24 RX ORDER — TRAMADOL HYDROCHLORIDE 50 MG/1
TABLET ORAL
Qty: 60 TABLET | Refills: 1 | Status: SHIPPED | OUTPATIENT
Start: 2018-04-24 | End: 2018-06-19

## 2018-04-26 NOTE — TELEPHONE ENCOUNTER
From: Bessie Snow  Sent: 4/19/2018 8:33 PM CDT  Subject: Medication Renewal Request    [facility] would like a refill of the following medications:     TraMADol HCl 50 MG Oral Tab Steve Horn DO]    Preferred pharmacy: [address] [place] [address]

## 2018-04-27 RX ORDER — TRAMADOL HYDROCHLORIDE 50 MG/1
50 TABLET ORAL 2 TIMES DAILY PRN
Qty: 60 TABLET | Refills: 2
Start: 2018-04-27

## 2018-05-04 ENCOUNTER — OFFICE VISIT (OUTPATIENT)
Dept: FAMILY MEDICINE CLINIC | Facility: CLINIC | Age: 38
End: 2018-05-04

## 2018-05-04 VITALS
OXYGEN SATURATION: 98 % | SYSTOLIC BLOOD PRESSURE: 108 MMHG | WEIGHT: 126 LBS | HEART RATE: 84 BPM | DIASTOLIC BLOOD PRESSURE: 68 MMHG | HEIGHT: 64 IN | RESPIRATION RATE: 18 BRPM | TEMPERATURE: 99 F | BODY MASS INDEX: 21.51 KG/M2

## 2018-05-04 DIAGNOSIS — R14.1 GAS PAIN: ICD-10-CM

## 2018-05-04 DIAGNOSIS — R10.9 ABDOMINAL PAIN, UNSPECIFIED ABDOMINAL LOCATION: ICD-10-CM

## 2018-05-04 DIAGNOSIS — Z86.19 HISTORY OF HERPES SIMPLEX INFECTION: ICD-10-CM

## 2018-05-04 DIAGNOSIS — R25.3 MUSCLE TWITCHING: Primary | ICD-10-CM

## 2018-05-04 PROCEDURE — 99214 OFFICE O/P EST MOD 30 MIN: CPT | Performed by: FAMILY MEDICINE

## 2018-05-04 NOTE — PROGRESS NOTES
Shantel Fang is a 42year old female. HPI:   Pt. complains of on and off twitching in digits 1 and 2 for the past few days. She has had cramping in her hands and toes for the past few months. It last seconds to minutres and resolves.   Pt. Complains of [employer] Expiration Date 8/31/18 Date   -HPV REFLEX PAP NEG NO GENOTYPE   Result Value Ref Range   HPV High Risk Negative Negative   HPV Source Cervical/endocervical    -THINPREP PAP WITH HPV REFLEX REQUEST B   Result Value Ref Range   Thin Prep Pap AP Screening Pap, Cervical/endocervical                                           REVIEW OF SYSTEMS:   GENERAL: feels well otherwise  SKIN: denies any unusual skin lesions  LUNGS: denies shortness of breath with exertion  CARDIOVASCULAR: denies chest pain on

## 2018-06-19 RX ORDER — TRAMADOL HYDROCHLORIDE 50 MG/1
TABLET ORAL
Qty: 60 TABLET | Refills: 1
Start: 2018-06-19

## 2018-06-19 NOTE — TELEPHONE ENCOUNTER
From: Long Medley  Sent: 6/16/2018 9:02 PM CDT  Subject: Medication Renewal Request    Long Medley would like a refill of the following medications:     TRAMADOL HCL 50 MG Oral Tab СВЕТЛАНА Olivia    Preferred pharmacy: [address] [place] [address]

## 2018-06-19 NOTE — TELEPHONE ENCOUNTER
Patient called asking why the original request for this medication was denied. Please advise. Thank you.

## 2018-06-20 RX ORDER — TRAMADOL HYDROCHLORIDE 50 MG/1
TABLET ORAL
Qty: 60 TABLET | Refills: 0 | Status: SHIPPED | OUTPATIENT
Start: 2018-06-26 | End: 2018-07-14

## 2018-06-20 NOTE — TELEPHONE ENCOUNTER
Not protocol medication. LOV :5/04/18 muscle twitching/back pain addressed at appointment. Last labs done :7/20/16  Next appointment :9/24/18  Please see pending medication. Refill if appropriate.    Last refill:    Date:4/24/18  Amount :60 tablets 1 refill   Medication: tramadol hcl 50 mg

## 2018-06-21 RX ORDER — TRAMADOL HYDROCHLORIDE 50 MG/1
TABLET ORAL
Qty: 60 TABLET | Refills: 0 | OUTPATIENT
Start: 2018-06-21

## 2018-06-25 NOTE — TELEPHONE ENCOUNTER
From: Cheyenne Moscoso  Sent: 6/23/2018 9:37 AM CDT  Subject: Medication Renewal Request    Cheyenne Moscoso would like a refill of the following medications:     METRONIDAZOLE 0.75 % Vaginal Gel Cale Butler, DO]    [id number]

## 2018-06-28 ENCOUNTER — OFFICE VISIT (OUTPATIENT)
Dept: FAMILY MEDICINE CLINIC | Facility: CLINIC | Age: 38
End: 2018-06-28

## 2018-06-28 VITALS
HEIGHT: 64 IN | BODY MASS INDEX: 21.17 KG/M2 | WEIGHT: 124 LBS | RESPIRATION RATE: 12 BRPM | HEART RATE: 64 BPM | SYSTOLIC BLOOD PRESSURE: 100 MMHG | DIASTOLIC BLOOD PRESSURE: 60 MMHG

## 2018-06-28 DIAGNOSIS — Z13.228 SCREENING FOR ENDOCRINE, METABOLIC AND IMMUNITY DISORDER: ICD-10-CM

## 2018-06-28 DIAGNOSIS — M25.50 ARTHRALGIA, UNSPECIFIED JOINT: Primary | ICD-10-CM

## 2018-06-28 DIAGNOSIS — Z13.29 SCREENING FOR ENDOCRINE, METABOLIC AND IMMUNITY DISORDER: ICD-10-CM

## 2018-06-28 DIAGNOSIS — Z00.00 LABORATORY EXAM ORDERED AS PART OF ROUTINE GENERAL MEDICAL EXAMINATION: ICD-10-CM

## 2018-06-28 DIAGNOSIS — M47.817 SPONDYLOSIS OF LUMBOSACRAL REGION, UNSPECIFIED SPINAL OSTEOARTHRITIS COMPLICATION STATUS: ICD-10-CM

## 2018-06-28 DIAGNOSIS — M79.2 NEUROPATHIC PAIN: ICD-10-CM

## 2018-06-28 DIAGNOSIS — Z13.0 SCREENING FOR ENDOCRINE, METABOLIC AND IMMUNITY DISORDER: ICD-10-CM

## 2018-06-28 PROCEDURE — 84550 ASSAY OF BLOOD/URIC ACID: CPT | Performed by: FAMILY MEDICINE

## 2018-06-28 PROCEDURE — 85652 RBC SED RATE AUTOMATED: CPT | Performed by: FAMILY MEDICINE

## 2018-06-28 PROCEDURE — 86200 CCP ANTIBODY: CPT | Performed by: FAMILY MEDICINE

## 2018-06-28 PROCEDURE — 86038 ANTINUCLEAR ANTIBODIES: CPT | Performed by: FAMILY MEDICINE

## 2018-06-28 PROCEDURE — 86235 NUCLEAR ANTIGEN ANTIBODY: CPT | Performed by: FAMILY MEDICINE

## 2018-06-28 PROCEDURE — 86431 RHEUMATOID FACTOR QUANT: CPT | Performed by: FAMILY MEDICINE

## 2018-06-28 PROCEDURE — 86140 C-REACTIVE PROTEIN: CPT | Performed by: FAMILY MEDICINE

## 2018-06-28 PROCEDURE — 86225 DNA ANTIBODY NATIVE: CPT | Performed by: FAMILY MEDICINE

## 2018-06-28 PROCEDURE — 86812 HLA TYPING A B OR C: CPT | Performed by: FAMILY MEDICINE

## 2018-06-28 PROCEDURE — 99214 OFFICE O/P EST MOD 30 MIN: CPT | Performed by: FAMILY MEDICINE

## 2018-06-28 RX ORDER — NABUMETONE 500 MG/1
500 TABLET, FILM COATED ORAL 2 TIMES DAILY
Qty: 60 TABLET | Refills: 1 | Status: SHIPPED | OUTPATIENT
Start: 2018-06-28 | End: 2018-08-02 | Stop reason: ALTCHOICE

## 2018-06-28 RX ORDER — PREDNISONE 20 MG/1
20 TABLET ORAL DAILY
Qty: 5 TABLET | Refills: 0 | Status: SHIPPED | OUTPATIENT
Start: 2018-06-28 | End: 2018-07-05

## 2018-06-28 RX ORDER — METRONIDAZOLE 7.5 MG/G
1 GEL VAGINAL NIGHTLY
Qty: 70 G | Refills: 0 | Status: SHIPPED
Start: 2018-06-28 | End: 2018-08-02 | Stop reason: ALTCHOICE

## 2018-06-28 NOTE — PROGRESS NOTES
Simeon Nj is a 42year old female. HPI:   Pt. Is here for follow up. She states she is dealing with chronic low back pain.   Pt. Complains of left shoulder, left elbow and left fingers, knees bilaterally and ankles bilaterally on and off for the past BILIRUBIN  Negative   KETONES (URINE DIPSTICK)  Negative mg/dL   SPECIFIC GRAVITY  1.005 - 1.030   OCCULT BLOOD small Negative   PH, URINE  4.5 - 8.0   PROTEIN (URINE DIPSTICK)  Negative/Trace mg/dL   UROBILINOGEN,SEMI-QN  0.0 - 1.9 mg/dL   NITRITE, URIN 10/24/2017 02:19 PM          Ordering Location:     [place] [address],      Received:            10/25/2017 10:41 AM                                Jessica Willson                                                      First Screen:          [place]- [facility]=Quant]      Rubella (Occitan Measles) Antibodies, IGG [Ed/[place]=Qual/ Quest=Quant]      VARICELLA ZOSTER, IGG [4439] [Q]      Rheumatoid Arthritis Factor      Uric Acid, Serum      Cyclic Citrullinate Peptide (CCP) antibodies      Sed Renetta Ye (

## 2018-07-03 ENCOUNTER — TELEPHONE (OUTPATIENT)
Dept: FAMILY MEDICINE CLINIC | Facility: CLINIC | Age: 38
End: 2018-07-03

## 2018-07-03 RX ORDER — HYDROCODONE BITARTRATE AND ACETAMINOPHEN 5; 325 MG/1; MG/1
1 TABLET ORAL
Qty: 30 TABLET | Refills: 0 | Status: SHIPPED | OUTPATIENT
Start: 2018-07-03 | End: 2018-08-02 | Stop reason: ALTCHOICE

## 2018-07-03 RX ORDER — MELOXICAM 15 MG/1
15 TABLET ORAL DAILY
Qty: 30 TABLET | Refills: 1 | Status: SHIPPED | OUTPATIENT
Start: 2018-07-03 | End: 2018-08-02 | Stop reason: ALTCHOICE

## 2018-07-19 NOTE — TELEPHONE ENCOUNTER
From: Mitra Junior  Sent: 7/14/2018 2:09 AM CDT  Subject: Medication Renewal Request    Mitra Junior would like a refill of the following medications:     TRAMADOL HCL 50 MG Oral Tab Lonnie Orozco DO]    Preferred pharmacy: [address], [address] [place] [address]  [phone] [phone], [phone]    Comment:

## 2018-07-19 NOTE — TELEPHONE ENCOUNTER
Tramadol 50 mg is pending for your approval.    The patient last OV was on 06/28/2018. Her last refill was on 06/26/2018 for 60/0 refills.      Rx is pending for your approval.    Please sign,    Thank you

## 2018-07-20 RX ORDER — TRAMADOL HYDROCHLORIDE 50 MG/1
50 TABLET ORAL 2 TIMES DAILY PRN
Qty: 60 TABLET | Refills: 0 | Status: SHIPPED | OUTPATIENT
Start: 2018-07-25 | End: 2018-08-16 | Stop reason: DRUGHIGH

## 2018-08-02 PROBLEM — R76.8 POSITIVE ANA (ANTINUCLEAR ANTIBODY): Status: ACTIVE | Noted: 2018-08-02

## 2018-08-02 PROCEDURE — 86038 ANTINUCLEAR ANTIBODIES: CPT | Performed by: INTERNAL MEDICINE

## 2018-08-02 PROCEDURE — 84156 ASSAY OF PROTEIN URINE: CPT | Performed by: INTERNAL MEDICINE

## 2018-08-02 PROCEDURE — 86235 NUCLEAR ANTIGEN ANTIBODY: CPT | Performed by: INTERNAL MEDICINE

## 2018-08-02 PROCEDURE — 86160 COMPLEMENT ANTIGEN: CPT | Performed by: INTERNAL MEDICINE

## 2018-08-02 PROCEDURE — 81001 URINALYSIS AUTO W/SCOPE: CPT | Performed by: INTERNAL MEDICINE

## 2018-08-02 PROCEDURE — 82570 ASSAY OF URINE CREATININE: CPT | Performed by: INTERNAL MEDICINE

## 2018-08-04 ENCOUNTER — HOSPITAL ENCOUNTER (OUTPATIENT)
Dept: GENERAL RADIOLOGY | Age: 38
Discharge: HOME | End: 2018-08-04
Attending: INTERNAL MEDICINE
Payer: COMMERCIAL

## 2018-08-04 DIAGNOSIS — R76.8 POSITIVE ANA (ANTINUCLEAR ANTIBODY): ICD-10-CM

## 2018-08-04 DIAGNOSIS — M25.50 POLYARTHRALGIA: ICD-10-CM

## 2018-08-04 DIAGNOSIS — M25.50 JOINT PAIN: ICD-10-CM

## 2018-08-04 PROCEDURE — 73130 X-RAY EXAM OF HAND: CPT | Performed by: INTERNAL MEDICINE

## 2018-08-04 PROCEDURE — 73030 X-RAY EXAM OF SHOULDER: CPT | Performed by: INTERNAL MEDICINE

## 2018-08-04 PROCEDURE — 73120 X-RAY EXAM OF HAND: CPT | Performed by: INTERNAL MEDICINE

## 2018-08-16 ENCOUNTER — OFFICE VISIT (OUTPATIENT)
Dept: FAMILY MEDICINE CLINIC | Facility: CLINIC | Age: 38
End: 2018-08-16
Payer: COMMERCIAL

## 2018-08-16 VITALS
DIASTOLIC BLOOD PRESSURE: 70 MMHG | HEART RATE: 72 BPM | SYSTOLIC BLOOD PRESSURE: 108 MMHG | RESPIRATION RATE: 15 BRPM | WEIGHT: 128 LBS | BODY MASS INDEX: 21.85 KG/M2 | HEIGHT: 64 IN

## 2018-08-16 DIAGNOSIS — G89.29 CHRONIC BILATERAL LOW BACK PAIN WITHOUT SCIATICA: Primary | ICD-10-CM

## 2018-08-16 DIAGNOSIS — R76.8 POSITIVE ANA (ANTINUCLEAR ANTIBODY): ICD-10-CM

## 2018-08-16 DIAGNOSIS — M47.817 SPONDYLOSIS OF LUMBOSACRAL REGION, UNSPECIFIED SPINAL OSTEOARTHRITIS COMPLICATION STATUS: ICD-10-CM

## 2018-08-16 DIAGNOSIS — R53.83 OTHER FATIGUE: ICD-10-CM

## 2018-08-16 DIAGNOSIS — R06.02 SHORTNESS OF BREATH: ICD-10-CM

## 2018-08-16 DIAGNOSIS — Z86.79 HISTORY OF RHEUMATIC FEVER: ICD-10-CM

## 2018-08-16 DIAGNOSIS — M54.50 CHRONIC BILATERAL LOW BACK PAIN WITHOUT SCIATICA: Primary | ICD-10-CM

## 2018-08-16 PROCEDURE — 99214 OFFICE O/P EST MOD 30 MIN: CPT | Performed by: FAMILY MEDICINE

## 2018-08-16 RX ORDER — TRAMADOL HYDROCHLORIDE 50 MG/1
100 TABLET ORAL 2 TIMES DAILY PRN
Qty: 120 TABLET | Refills: 1 | Status: SHIPPED | OUTPATIENT
Start: 2018-08-16 | End: 2018-10-10

## 2018-08-16 NOTE — PROGRESS NOTES
Yaneli Millspool is a 42year old female. HPI:   Pt. Is here for follow up on her chronic pain in her low back, left elbow, left fingers, bilateral knees and ankles.   Pt. Complains of more SOB when moving around and really noting when wanting to go up the s 91 units/mL   Anti-SSB 14 0 - 73 units/mL   Anti-Centromere 0 0 - 100 units/mL   Anti-Scl-70 2 0 - 32 units/mL   Anti_Chromatin 78 0 - 99 units/mL   -COMPLEMENT C3, SERUM   Result Value Ref Range   Complement C3 111.0 90.0 - 180.0 mg/dL   -COMPLEMENT C4, S (FABIO) ANTIBODY, IGG   Result Value Ref Range   Scleroderma (Scl-70) (FABIO) Antibody, IgG 0 0 - 40 AU/mL   -SMITH (FABIO) ANTIBODY, IGG   Result Value Ref Range   Pinedo (FABIO) Antibody, IgG 0 0 - 40 AU/mL   -RIBONUCLEIC PROTEIN (FABIO) ANTIBODY, IGG   Result Valu suspicious lesions  LUNGS: clear to auscultation  CARDIO: RRR without murmur  GI: soft, good BS's  EXTREMITIES: no cyanosis, clubbing or edema    ASSESSMENT AND PLAN:   Spondylosis of lumbosacral region, unspecified spinal osteoarthritis complication statu

## 2018-08-18 ENCOUNTER — APPOINTMENT (OUTPATIENT)
Dept: LAB | Age: 38
End: 2018-08-18
Attending: FAMILY MEDICINE
Payer: COMMERCIAL

## 2018-08-18 DIAGNOSIS — Z13.0 SCREENING FOR ENDOCRINE, METABOLIC AND IMMUNITY DISORDER: ICD-10-CM

## 2018-08-18 DIAGNOSIS — Z13.228 SCREENING FOR ENDOCRINE, METABOLIC AND IMMUNITY DISORDER: ICD-10-CM

## 2018-08-18 DIAGNOSIS — Z13.29 SCREENING FOR ENDOCRINE, METABOLIC AND IMMUNITY DISORDER: ICD-10-CM

## 2018-08-18 DIAGNOSIS — Z00.00 LABORATORY EXAM ORDERED AS PART OF ROUTINE GENERAL MEDICAL EXAMINATION: ICD-10-CM

## 2018-08-18 LAB
CHOLEST SMN-MCNC: 155 MG/DL (ref ?–200)
HDLC SERPL-MCNC: 54 MG/DL (ref 40–59)
LDLC SERPL CALC-MCNC: 83 MG/DL (ref ?–100)
NONHDLC SERPL-MCNC: 101 MG/DL (ref ?–130)
RUBV IGG SER QL: POSITIVE
RUBV IGG SER-ACNC: 301.3 IU/ML (ref 10–?)
TRIGL SERPL-MCNC: 92 MG/DL (ref 30–149)
TSI SER-ACNC: 2.44 MIU/ML (ref 0.35–5.5)
VLDLC SERPL CALC-MCNC: 18 MG/DL (ref 0–30)

## 2018-08-18 PROCEDURE — 84443 ASSAY THYROID STIM HORMONE: CPT

## 2018-08-18 PROCEDURE — 86765 RUBEOLA ANTIBODY: CPT

## 2018-08-18 PROCEDURE — 86762 RUBELLA ANTIBODY: CPT

## 2018-08-18 PROCEDURE — 80061 LIPID PANEL: CPT

## 2018-08-18 PROCEDURE — 86787 VARICELLA-ZOSTER ANTIBODY: CPT

## 2018-08-18 PROCEDURE — 86735 MUMPS ANTIBODY: CPT

## 2018-08-22 LAB
MEV IGG SER IA-ACNC: POSITIVE
MUV IGG SER IA-ACNC: POSITIVE
VZV IGG SER IA-ACNC: NEGATIVE

## 2018-08-23 PROCEDURE — 86225 DNA ANTIBODY NATIVE: CPT | Performed by: INTERNAL MEDICINE

## 2018-08-23 PROCEDURE — 36415 COLL VENOUS BLD VENIPUNCTURE: CPT | Performed by: INTERNAL MEDICINE

## 2018-08-31 ENCOUNTER — OFFICE VISIT (OUTPATIENT)
Dept: FAMILY MEDICINE CLINIC | Facility: CLINIC | Age: 38
End: 2018-08-31
Payer: COMMERCIAL

## 2018-08-31 VITALS
BODY MASS INDEX: 21.85 KG/M2 | SYSTOLIC BLOOD PRESSURE: 110 MMHG | HEIGHT: 64 IN | WEIGHT: 128 LBS | RESPIRATION RATE: 12 BRPM | HEART RATE: 72 BPM | DIASTOLIC BLOOD PRESSURE: 70 MMHG

## 2018-08-31 DIAGNOSIS — Z00.00 ROUTINE GENERAL MEDICAL EXAMINATION AT A HEALTH CARE FACILITY: Primary | ICD-10-CM

## 2018-08-31 DIAGNOSIS — Z12.4 SCREENING FOR CERVICAL CANCER: ICD-10-CM

## 2018-08-31 DIAGNOSIS — Z23 NEED FOR CHICKENPOX VACCINATION: ICD-10-CM

## 2018-08-31 PROCEDURE — 99395 PREV VISIT EST AGE 18-39: CPT | Performed by: FAMILY MEDICINE

## 2018-08-31 PROCEDURE — 90716 VAR VACCINE LIVE SUBQ: CPT | Performed by: FAMILY MEDICINE

## 2018-08-31 PROCEDURE — 90471 IMMUNIZATION ADMIN: CPT | Performed by: FAMILY MEDICINE

## 2018-08-31 PROCEDURE — 87624 HPV HI-RISK TYP POOLED RSLT: CPT | Performed by: FAMILY MEDICINE

## 2018-08-31 RX ORDER — HYDROCODONE BITARTRATE AND ACETAMINOPHEN 10; 325 MG/1; MG/1
1 TABLET ORAL DAILY PRN
Qty: 30 TABLET | Refills: 0 | Status: SHIPPED | OUTPATIENT
Start: 2018-08-31 | End: 2018-09-17

## 2018-08-31 NOTE — H&P
CC: Annual Physical Exam    HPI:   Jere Casey is a 42year old female who presents for a complete physical exam. Symptoms: periods are regular. Patient complains of back pain recently -- ibuprofen gives her heartburn. Pain can go up to 6-7/ 10.   Lasts Alcohol use: No              Occ: /student. : y. Children: 3.    Exercise: minimal.  Diet: watches fats closely, watches sugar closely and watches calories closely     REVIEW OF SYSTEMS:   GENERAL: feels well otherwise  SKIN: denies a hematuria. Last dental -- 2 months ago  Last eye exam -- 8/18  Given varicella today. Pt' s weight is Body mass index is 21.97 kg/m². , recommended low fat diet and aerobic exercise 30 minutes three times weekly.     The patient indicates understanding o

## 2018-09-06 LAB
HPV I/H RISK 1 DNA SPEC QL NAA+PROBE: NEGATIVE
LAST PAP RESULT: NORMAL
PAP HISTORY (OTHER THAN LAST PAP): NORMAL

## 2018-10-11 ENCOUNTER — TELEPHONE (OUTPATIENT)
Dept: FAMILY MEDICINE CLINIC | Facility: CLINIC | Age: 38
End: 2018-10-11

## 2018-10-11 NOTE — TELEPHONE ENCOUNTER
Patient received varicella vaccine 8/31/18. According to the CDC 2 doses of varicella vaccine 4-8 weeks apart. Please have patient schedule nurse visit to receive 2nd varicella vaccine.

## 2018-10-11 NOTE — TELEPHONE ENCOUNTER
Pt calling to schedule second varicella shot. She was unsure when to come back. Please advise. Thank you.

## 2018-10-11 NOTE — TELEPHONE ENCOUNTER
Please call pt to schedule Nurse visit for her 2 nd varicella. See note below from [facility]. Thanks.

## 2018-10-12 RX ORDER — TRAMADOL HYDROCHLORIDE 50 MG/1
TABLET ORAL
Qty: 120 TABLET | Refills: 0 | Status: SHIPPED | OUTPATIENT
Start: 2018-10-12 | End: 2018-11-02

## 2018-10-15 ENCOUNTER — NURSE ONLY (OUTPATIENT)
Dept: FAMILY MEDICINE CLINIC | Facility: CLINIC | Age: 38
End: 2018-10-15
Payer: COMMERCIAL

## 2018-10-15 DIAGNOSIS — Z23 NEED FOR CHICKENPOX VACCINATION: Primary | ICD-10-CM

## 2018-10-15 DIAGNOSIS — Z23 NEED FOR VACCINATION: ICD-10-CM

## 2018-10-15 PROCEDURE — 90472 IMMUNIZATION ADMIN EACH ADD: CPT | Performed by: FAMILY MEDICINE

## 2018-10-15 PROCEDURE — 90686 IIV4 VACC NO PRSV 0.5 ML IM: CPT | Performed by: FAMILY MEDICINE

## 2018-10-15 PROCEDURE — 90716 VAR VACCINE LIVE SUBQ: CPT | Performed by: FAMILY MEDICINE

## 2018-10-15 PROCEDURE — 90471 IMMUNIZATION ADMIN: CPT | Performed by: FAMILY MEDICINE

## 2018-10-18 RX ORDER — TRAMADOL HYDROCHLORIDE 50 MG/1
100 TABLET ORAL 2 TIMES DAILY PRN
Qty: 120 TABLET | Refills: 1 | OUTPATIENT
Start: 2018-10-18

## 2018-10-18 RX ORDER — TRAMADOL HYDROCHLORIDE 50 MG/1
TABLET ORAL
Qty: 120 TABLET | Refills: 0 | OUTPATIENT
Start: 2018-10-18

## 2018-11-02 ENCOUNTER — OFFICE VISIT (OUTPATIENT)
Dept: FAMILY MEDICINE CLINIC | Facility: CLINIC | Age: 38
End: 2018-11-02
Payer: COMMERCIAL

## 2018-11-02 VITALS
RESPIRATION RATE: 16 BRPM | TEMPERATURE: 98 F | HEIGHT: 64 IN | HEART RATE: 64 BPM | DIASTOLIC BLOOD PRESSURE: 74 MMHG | SYSTOLIC BLOOD PRESSURE: 106 MMHG | BODY MASS INDEX: 21.94 KG/M2 | WEIGHT: 128.5 LBS

## 2018-11-02 DIAGNOSIS — R76.8 POSITIVE ANA (ANTINUCLEAR ANTIBODY): ICD-10-CM

## 2018-11-02 DIAGNOSIS — M79.602 PAIN IN BOTH UPPER EXTREMITIES: ICD-10-CM

## 2018-11-02 DIAGNOSIS — M54.9 UPPER BACK PAIN: ICD-10-CM

## 2018-11-02 DIAGNOSIS — M25.50 PAIN IN JOINT, MULTIPLE SITES: ICD-10-CM

## 2018-11-02 DIAGNOSIS — M79.605 PAIN IN BOTH LOWER EXTREMITIES: ICD-10-CM

## 2018-11-02 DIAGNOSIS — M54.2 NECK PAIN: Primary | ICD-10-CM

## 2018-11-02 DIAGNOSIS — M79.604 PAIN IN BOTH LOWER EXTREMITIES: ICD-10-CM

## 2018-11-02 DIAGNOSIS — Z86.79 HISTORY OF RHEUMATIC FEVER: ICD-10-CM

## 2018-11-02 DIAGNOSIS — M79.601 PAIN IN BOTH UPPER EXTREMITIES: ICD-10-CM

## 2018-11-02 PROCEDURE — 99214 OFFICE O/P EST MOD 30 MIN: CPT | Performed by: FAMILY MEDICINE

## 2018-11-02 RX ORDER — TRAMADOL HYDROCHLORIDE 50 MG/1
TABLET ORAL
Qty: 120 TABLET | Refills: 1 | Status: SHIPPED | OUTPATIENT
Start: 2018-11-09 | End: 2019-01-02

## 2018-11-02 RX ORDER — BACLOFEN 10 MG/1
10 TABLET ORAL 3 TIMES DAILY PRN
Qty: 30 TABLET | Refills: 0 | Status: SHIPPED | OUTPATIENT
Start: 2018-11-02 | End: 2018-12-26 | Stop reason: ALTCHOICE

## 2018-11-02 NOTE — PROGRESS NOTES
Toyin Hussein is a 34year old female. HPI:   Pt. Is seeing rheumatology. Tramadol may help. Feels it mostly in her left arm and right calf recently. She feels it in her knuckles and toes as well.   The pain is always in her left shoulder joint and may by the [address] [place] [address] and a cytotechnologist.      Embedded Images      Procedure       Monolayers:  1      Clinical Information       Z12.4 Screening For Cervical Cancer.         Reason for testing Screening     LMP 8/24/18     Last PAP Result Nor bruits  LUNGS: clear to auscultation  CARDIO: RRR without murmur  GI: soft, good BS's  EXTREMITIES: no cyanosis, clubbing or edema  MUSCULOSKELETAL: Tight posterior neck muscles into her supraspinatal muscles–right side worse than left; no pain with palpat

## 2018-12-27 ENCOUNTER — OFFICE VISIT (OUTPATIENT)
Dept: FAMILY MEDICINE CLINIC | Facility: CLINIC | Age: 38
End: 2018-12-27
Payer: COMMERCIAL

## 2018-12-27 VITALS
OXYGEN SATURATION: 97 % | HEART RATE: 88 BPM | SYSTOLIC BLOOD PRESSURE: 110 MMHG | WEIGHT: 126 LBS | BODY MASS INDEX: 21.51 KG/M2 | RESPIRATION RATE: 16 BRPM | DIASTOLIC BLOOD PRESSURE: 68 MMHG | HEIGHT: 64 IN | TEMPERATURE: 98 F

## 2018-12-27 DIAGNOSIS — J22 ACUTE LOWER RESPIRATORY INFECTION: Primary | ICD-10-CM

## 2018-12-27 PROCEDURE — 99214 OFFICE O/P EST MOD 30 MIN: CPT | Performed by: FAMILY MEDICINE

## 2018-12-27 RX ORDER — AZITHROMYCIN 250 MG/1
TABLET, FILM COATED ORAL
Qty: 6 TABLET | Refills: 0 | Status: SHIPPED | OUTPATIENT
Start: 2018-12-27 | End: 2019-01-15

## 2018-12-27 NOTE — PROGRESS NOTES
HPI:   Minal Ruvalcaba is a 34year old female who presents for upper respiratory symptoms for  7  days. Patient reports congestion, dry cough, OTC cold meds have not been helping, denies fever, denies sinus pain.       Current Outpatient Medications:  Ergoc female who presents with Acute Lower Respiratory Infection. PLAN: Z-logan, take as directed. Saline Rinse. Tylenol or motrin as needed. Antihistamine prn. Fluids. Probiotics advised. Take abx with food. Side effects discussed.   The patient indicates u

## 2019-01-03 RX ORDER — TRAMADOL HYDROCHLORIDE 50 MG/1
TABLET ORAL
Qty: 120 TABLET | Refills: 0 | OUTPATIENT
Start: 2019-01-03

## 2019-01-03 RX ORDER — TRAMADOL HYDROCHLORIDE 50 MG/1
TABLET ORAL
Qty: 120 TABLET | Refills: 0 | Status: SHIPPED | OUTPATIENT
Start: 2019-01-03 | End: 2019-01-28

## 2019-01-03 NOTE — TELEPHONE ENCOUNTER
PLease see pended medication for approval.  Last ov 11/2/8, last RF 11/9/18 for 120 with 1 RF, next appt is scheduled for 1/28/19

## 2019-01-07 PROCEDURE — 86160 COMPLEMENT ANTIGEN: CPT | Performed by: INTERNAL MEDICINE

## 2019-01-07 PROCEDURE — 86225 DNA ANTIBODY NATIVE: CPT | Performed by: INTERNAL MEDICINE

## 2019-01-15 ENCOUNTER — OFFICE VISIT (OUTPATIENT)
Dept: OBGYN CLINIC | Facility: CLINIC | Age: 39
End: 2019-01-15
Payer: COMMERCIAL

## 2019-01-15 VITALS
WEIGHT: 128 LBS | SYSTOLIC BLOOD PRESSURE: 114 MMHG | HEART RATE: 96 BPM | BODY MASS INDEX: 21.85 KG/M2 | DIASTOLIC BLOOD PRESSURE: 66 MMHG | HEIGHT: 64 IN

## 2019-01-15 DIAGNOSIS — N83.292 COMPLEX CYST OF LEFT OVARY: Primary | ICD-10-CM

## 2019-01-15 PROCEDURE — 99212 OFFICE O/P EST SF 10 MIN: CPT | Performed by: OBSTETRICS & GYNECOLOGY

## 2019-01-15 NOTE — PROGRESS NOTES
She had an MRI for another reason is not having any pain. She is dates that it was a 3.8 cm complex cyst on one ovary. She is not having any pain she has the IUD. Probably benign findings. We will repeat her ultrasound in 2-3 months.

## 2019-01-28 ENCOUNTER — OFFICE VISIT (OUTPATIENT)
Dept: FAMILY MEDICINE CLINIC | Facility: CLINIC | Age: 39
End: 2019-01-28
Payer: COMMERCIAL

## 2019-01-28 VITALS
WEIGHT: 124 LBS | HEART RATE: 64 BPM | HEIGHT: 64 IN | BODY MASS INDEX: 21.17 KG/M2 | TEMPERATURE: 99 F | RESPIRATION RATE: 18 BRPM | SYSTOLIC BLOOD PRESSURE: 120 MMHG | DIASTOLIC BLOOD PRESSURE: 68 MMHG

## 2019-01-28 DIAGNOSIS — N83.201 CYSTS OF BOTH OVARIES: ICD-10-CM

## 2019-01-28 DIAGNOSIS — G89.29 CHRONIC SI JOINT PAIN: Primary | ICD-10-CM

## 2019-01-28 DIAGNOSIS — M25.50 PAIN IN JOINT, MULTIPLE SITES: ICD-10-CM

## 2019-01-28 DIAGNOSIS — M79.602 PAIN IN BOTH UPPER EXTREMITIES: ICD-10-CM

## 2019-01-28 DIAGNOSIS — N92.3 SPOTTING BETWEEN MENSES: ICD-10-CM

## 2019-01-28 DIAGNOSIS — M79.601 PAIN IN BOTH UPPER EXTREMITIES: ICD-10-CM

## 2019-01-28 DIAGNOSIS — M53.3 CHRONIC SI JOINT PAIN: Primary | ICD-10-CM

## 2019-01-28 DIAGNOSIS — N83.202 CYSTS OF BOTH OVARIES: ICD-10-CM

## 2019-01-28 DIAGNOSIS — Z12.31 ENCOUNTER FOR SCREENING MAMMOGRAM FOR MALIGNANT NEOPLASM OF BREAST: ICD-10-CM

## 2019-01-28 DIAGNOSIS — M79.605 PAIN IN BOTH LOWER EXTREMITIES: ICD-10-CM

## 2019-01-28 DIAGNOSIS — M54.50 CHRONIC BILATERAL LOW BACK PAIN WITHOUT SCIATICA: ICD-10-CM

## 2019-01-28 DIAGNOSIS — M79.604 PAIN IN BOTH LOWER EXTREMITIES: ICD-10-CM

## 2019-01-28 DIAGNOSIS — G89.29 CHRONIC BILATERAL LOW BACK PAIN WITHOUT SCIATICA: ICD-10-CM

## 2019-01-28 PROCEDURE — 99214 OFFICE O/P EST MOD 30 MIN: CPT | Performed by: FAMILY MEDICINE

## 2019-01-28 RX ORDER — TRAMADOL HYDROCHLORIDE 50 MG/1
TABLET ORAL
Qty: 120 TABLET | Refills: 2 | Status: SHIPPED | OUTPATIENT
Start: 2019-01-28 | End: 2019-04-17 | Stop reason: SDUPTHER

## 2019-01-28 RX ORDER — METRONIDAZOLE 7.5 MG/G
1 GEL VAGINAL
Qty: 1 TUBE | Refills: 5 | Status: SHIPPED | OUTPATIENT
Start: 2019-01-28 | End: 2019-02-27

## 2019-01-28 RX ORDER — HYDROCODONE BITARTRATE AND ACETAMINOPHEN 10; 325 MG/1; MG/1
1 TABLET ORAL DAILY PRN
Qty: 30 TABLET | Refills: 0 | Status: SHIPPED | OUTPATIENT
Start: 2019-01-28 | End: 2019-03-05

## 2019-01-28 NOTE — PROGRESS NOTES
Selwyn Hansen is a 34year old female. HPI:   Pt. Is seeing rheumatology. Found to have long head of biceps subluxation on the left and states the norco helps with the pain. She would like a refill. She wonders if she alternate with tramadol.   Testing Fasting? No     Glucose 116 (H) 70 - 99 mg/dL    Blood Urea Nitrogen 12 8 - 20 mg/dL    Creatinine 1.02 0.55 - 1.02 mg/dL    Sodium 139 136 - 144 mmol/L    Potassium 3.80 3.60 - 5.10 mmol/L    Chloride 102 101 - 111 mmol/L    Carbon Dioxide 29.3 22.0 - 32. GENERAL: well developed, well nourished,in no apparent distress  SKIN: no rashes,no suspicious lesions  NECK: supple,no adenopathy,no bruits  LUNGS: clear to auscultation  CARDIO: RRR without murmur  GI: soft, good BS's  EXTREMITIES: no cyanosis, clubbin

## 2019-02-01 ENCOUNTER — TELEPHONE (OUTPATIENT)
Dept: SURGERY | Facility: CLINIC | Age: 39
End: 2019-02-01

## 2019-03-05 RX ORDER — METRONIDAZOLE 7.5 MG/G
GEL VAGINAL NIGHTLY
COMMUNITY
End: 2020-02-11 | Stop reason: ALTCHOICE

## 2019-03-14 ENCOUNTER — ANESTHESIA EVENT (OUTPATIENT)
Dept: SURGERY | Facility: HOSPITAL | Age: 39
End: 2019-03-14
Payer: COMMERCIAL

## 2019-03-15 ENCOUNTER — ANESTHESIA (OUTPATIENT)
Dept: SURGERY | Facility: HOSPITAL | Age: 39
End: 2019-03-15
Payer: COMMERCIAL

## 2019-03-15 ENCOUNTER — HOSPITAL ENCOUNTER (OUTPATIENT)
Facility: HOSPITAL | Age: 39
Setting detail: HOSPITAL OUTPATIENT SURGERY
Discharge: HOME | End: 2019-03-15
Attending: ORTHOPAEDIC SURGERY | Admitting: ORTHOPAEDIC SURGERY
Payer: COMMERCIAL

## 2019-03-15 VITALS
HEART RATE: 94 BPM | DIASTOLIC BLOOD PRESSURE: 89 MMHG | RESPIRATION RATE: 16 BRPM | OXYGEN SATURATION: 100 % | HEIGHT: 64 IN | SYSTOLIC BLOOD PRESSURE: 116 MMHG | WEIGHT: 126 LBS | TEMPERATURE: 98 F | BODY MASS INDEX: 21.51 KG/M2

## 2019-03-15 DIAGNOSIS — S46.812A PARTIAL TEAR OF LEFT SUBSCAPULARIS TENDON, INITIAL ENCOUNTER: ICD-10-CM

## 2019-03-15 DIAGNOSIS — S46.119A SUBLUXATION OF TENDON OF LONG HEAD OF BICEPS: ICD-10-CM

## 2019-03-15 LAB
POCT LOT NUMBER: NORMAL
POCT URINE PREGNANCY: NEGATIVE

## 2019-03-15 PROCEDURE — 0LS44ZZ REPOSITION LEFT UPPER ARM TENDON, PERCUTANEOUS ENDOSCOPIC APPROACH: ICD-10-PCS | Performed by: ORTHOPAEDIC SURGERY

## 2019-03-15 PROCEDURE — 0LQ24ZZ REPAIR LEFT SHOULDER TENDON, PERCUTANEOUS ENDOSCOPIC APPROACH: ICD-10-PCS | Performed by: ORTHOPAEDIC SURGERY

## 2019-03-15 PROCEDURE — 81025 URINE PREGNANCY TEST: CPT | Performed by: ORTHOPAEDIC SURGERY

## 2019-03-15 PROCEDURE — 76942 ECHO GUIDE FOR BIOPSY: CPT | Performed by: ORTHOPAEDIC SURGERY

## 2019-03-15 PROCEDURE — 3E0T3BZ INTRODUCTION OF ANESTHETIC AGENT INTO PERIPHERAL NERVES AND PLEXI, PERCUTANEOUS APPROACH: ICD-10-PCS | Performed by: ANESTHESIOLOGY

## 2019-03-15 PROCEDURE — 0RNK4ZZ RELEASE LEFT SHOULDER JOINT, PERCUTANEOUS ENDOSCOPIC APPROACH: ICD-10-PCS | Performed by: ORTHOPAEDIC SURGERY

## 2019-03-15 DEVICE — ANCHOR SWIVELOCK AR-2324BCC: Type: IMPLANTABLE DEVICE | Site: SHOULDER | Status: FUNCTIONAL

## 2019-03-15 RX ORDER — CEFAZOLIN SODIUM/WATER 2 G/20 ML
SYRINGE (ML) INTRAVENOUS
Status: DISCONTINUED | OUTPATIENT
Start: 2019-03-15 | End: 2019-03-15 | Stop reason: HOSPADM

## 2019-03-15 RX ORDER — ONDANSETRON 2 MG/ML
4 INJECTION INTRAMUSCULAR; INTRAVENOUS AS NEEDED
Status: DISCONTINUED | OUTPATIENT
Start: 2019-03-15 | End: 2019-03-15

## 2019-03-15 RX ORDER — HYDROCODONE BITARTRATE AND ACETAMINOPHEN 5; 325 MG/1; MG/1
2 TABLET ORAL AS NEEDED
Status: DISCONTINUED | OUTPATIENT
Start: 2019-03-15 | End: 2019-03-15

## 2019-03-15 RX ORDER — HYDROCODONE BITARTRATE AND ACETAMINOPHEN 5; 325 MG/1; MG/1
1 TABLET ORAL AS NEEDED
Status: DISCONTINUED | OUTPATIENT
Start: 2019-03-15 | End: 2019-03-15

## 2019-03-15 RX ORDER — ACETAMINOPHEN 500 MG
1000 TABLET ORAL ONCE
Status: DISCONTINUED | OUTPATIENT
Start: 2019-03-15 | End: 2019-03-15 | Stop reason: HOSPADM

## 2019-03-15 RX ORDER — NALOXONE HYDROCHLORIDE 0.4 MG/ML
80 INJECTION, SOLUTION INTRAMUSCULAR; INTRAVENOUS; SUBCUTANEOUS AS NEEDED
Status: DISCONTINUED | OUTPATIENT
Start: 2019-03-15 | End: 2019-03-15

## 2019-03-15 RX ORDER — DEXAMETHASONE SODIUM PHOSPHATE 4 MG/ML
8 VIAL (ML) INJECTION AS NEEDED
Status: DISCONTINUED | OUTPATIENT
Start: 2019-03-15 | End: 2019-03-15

## 2019-03-15 RX ORDER — MIDAZOLAM HYDROCHLORIDE 1 MG/ML
1 INJECTION INTRAMUSCULAR; INTRAVENOUS EVERY 5 MIN PRN
Status: DISCONTINUED | OUTPATIENT
Start: 2019-03-15 | End: 2019-03-15

## 2019-03-15 RX ORDER — SODIUM CHLORIDE, SODIUM LACTATE, POTASSIUM CHLORIDE, CALCIUM CHLORIDE 600; 310; 30; 20 MG/100ML; MG/100ML; MG/100ML; MG/100ML
INJECTION, SOLUTION INTRAVENOUS CONTINUOUS
Status: DISCONTINUED | OUTPATIENT
Start: 2019-03-15 | End: 2019-03-15

## 2019-03-15 RX ORDER — MEPERIDINE HYDROCHLORIDE 25 MG/ML
INJECTION INTRAMUSCULAR; INTRAVENOUS; SUBCUTANEOUS
Status: COMPLETED
Start: 2019-03-15 | End: 2019-03-15

## 2019-03-15 RX ORDER — MAGNESIUM HYDROXIDE 1200 MG/15ML
LIQUID ORAL CONTINUOUS PRN
Status: COMPLETED | OUTPATIENT
Start: 2019-03-15 | End: 2019-03-15

## 2019-03-15 RX ORDER — MEPERIDINE HYDROCHLORIDE 25 MG/ML
12.5 INJECTION INTRAMUSCULAR; INTRAVENOUS; SUBCUTANEOUS AS NEEDED
Status: COMPLETED | OUTPATIENT
Start: 2019-03-15 | End: 2019-03-15

## 2019-03-15 RX ORDER — METOCLOPRAMIDE HYDROCHLORIDE 5 MG/ML
10 INJECTION INTRAMUSCULAR; INTRAVENOUS AS NEEDED
Status: DISCONTINUED | OUTPATIENT
Start: 2019-03-15 | End: 2019-03-15

## 2019-03-15 RX ORDER — HYDROMORPHONE HYDROCHLORIDE 1 MG/ML
0.4 INJECTION, SOLUTION INTRAMUSCULAR; INTRAVENOUS; SUBCUTANEOUS EVERY 5 MIN PRN
Status: DISCONTINUED | OUTPATIENT
Start: 2019-03-15 | End: 2019-03-15

## 2019-03-15 NOTE — BRIEF OP NOTE
Pre-Operative Diagnosis: Subluxation of tendon of long head of biceps [S46.119A]  Partial tear of left subscapularis tendon, initial encounter [S43.82XA]     Post-Operative Diagnosis: Subluxation of tendon of long head of biceps [S46.119A]Partial tear of l

## 2019-03-15 NOTE — H&P
HPI (3/6/19): Long Medley presents for preoperative evaluation for her left shoulder. Still has primarily anterior shoulder pain worse with certain ranges of motion.   Has failed an intra-articular injection and medication.           Past medical histo sort of inflammatory or autoimmune component of her pain in the shoulder. In terms of the left shoulder surgery, I discussed the biceps tendon subluxing is a mechanical issue which needs to be addressed.   I discussed the risks of surgery including but not

## 2019-03-15 NOTE — ANESTHESIA PREPROCEDURE EVALUATION
PRE-OP EVALUATION    Patient Name: Adalgisa Thornton    Pre-op Diagnosis: Subluxation of tendon of long head of biceps [N89.194E]  Partial tear of left subscapularis tendon, initial encounter [H09.31IG]    Procedure(s):  LEFT SHOULDER ARTHROSCOPY, BICEPS TENO lumbosacral region     Positive SHANA (antinuclear antibody)          Past Surgical History:   Procedure Laterality Date   • COLONOSCOPY     • OTHER SURGICAL HISTORY  04/08/2014    cystoscopy- Dr. Capri Hooper History    Tobacco Use      Smoking status: Ne intrinsic risks including failed block, neuropraxia, hematoma, anesthetic toxicity and other risks as described. All questions were answered.    A desire to proceed with regional anesthesia for post operative analgesia has been demonstrated with an Carolyn

## 2019-03-18 NOTE — OPERATIVE REPORT
[facility]    PATIENT'S NAME: Ericka Ovalles   ATTENDING PHYSICIAN: Rashaad Zelaya MD   OPERATING PHYSICIAN: Rashaad Zelaya MD   PATIENT ACCOUNT#:   [id number]    LOCATION:  [address] ED[address] 10  MEDICAL RECORD #:   [id number]       DATE OF ALVIN persistent pain, Vitaliy deformity or biceps cramping, need for further surgery, blood clots, stiffness, and problems with anesthesia.   I also discussed the postoperative rehab process that would change if rotator cuff debridement versus repair was performe partial-thickness tear of the subscapularis, which was peeled up partially off of the lesser tuberosity. There was also an intrasubstance tear.   Due to the nature of this tear, which likely contributed to the biceps instability, we decided that this warra from the posterior portal.  There was a significant amount of subacromial bursitis that was noted. We did perform a lateral portal using spinal needle localization and performed a soft tissue bursectomy.   There was a very small, downsloping tear of the an general anesthesia without complications, taken to the PACU in stable condition.     Dictated By Radha Kent MD  d: 03/17/2019 19:38:46  t: 03/18/2019 01:51:12  [facility] [id number]  FF/

## 2019-03-19 PROBLEM — Z47.89 ORTHOPEDIC AFTERCARE: Status: ACTIVE | Noted: 2019-03-19

## 2019-04-17 ENCOUNTER — TELEPHONE (OUTPATIENT)
Dept: FAMILY MEDICINE CLINIC | Facility: CLINIC | Age: 39
End: 2019-04-17

## 2019-04-17 PROBLEM — M25.512 CHRONIC LEFT SHOULDER PAIN: Status: ACTIVE | Noted: 2019-04-17

## 2019-04-17 PROBLEM — G89.29 CHRONIC LEFT SHOULDER PAIN: Status: ACTIVE | Noted: 2019-04-17

## 2019-04-17 RX ORDER — TRAMADOL HYDROCHLORIDE 50 MG/1
100 TABLET ORAL 2 TIMES DAILY PRN
Qty: 120 TABLET | Refills: 2 | Status: SHIPPED | OUTPATIENT
Start: 2019-04-17 | End: 2019-06-18

## 2019-05-13 NOTE — ANESTHESIA POSTPROCEDURE EVALUATION
Alisha Ayon 117 Patient Status:  Hospital Outpatient Surgery   Age/Gender 34year old female MRN [id number]   [facility] SURGERY Attending Wendi Canchola MD   Hosp Day # 0 PCP Erica Hicks DO       Anesthesia Post-op Note DATE OF DISCHARGE:     05/13/2019



DATE OF DISCHARGE:  05/13/2019.



ADMITTING DIAGNOSES:

1.  Abdominal distention secondary to ileus and fecal retention.

2.  History of chronic constipation with previous admissions for ileus.



SECONDARY DIAGNOSES:  Include history of acid reflux disease, history of

dysphagia with previous PEG placement, history of arthritis, history of

osteoporosis, profound mental disability.



DISCHARGE DIAGNOSES:  

Abdominal distention secondary to ileus with fecal stasis - clinically improved.



CONSULTANTS:  Dr. Lee, GI.



MAJOR PROCEDURES:  On 05/10/2019, the patient underwent a CT of the pelvis

showing severe distention of small bowel and large bowel loops with air fluid

levels. Moderate amount of fecal content throughout the colon.  The colon was

severely dilated.  Gastrostomy tube is noted in the stomach.  

KUB done on 05/10/2019 shows severe generalized gaseous distended loops of bowel
, 

which may be due to severe ileus.  Moderate stool is noted, greatest within the 
right

colon and transverse colon.  

Repeat KUB on 05/13/2019 shows distended stool filled rectum consistent with 
changes of fecal impaction.



BRIEF HOSPITAL COURSE:  This is a 33-year-old male with history significant for

chronic constipation, ileus, who has been admitted to this facility in the past

for fecal retention and impaction, presented to the ER with a 2-day history of

constipation and discomfort.  A KUB was done on an outpatient basis showing

ileus and fecal stasis.  Therefore, the patient was referred to the ER for

further management and care.  The patient was admitted to the Medical Surgical

floor, placed on a bowel regimen including oil enema twice a day (GI recs).   

GI consult was also asked for further management and care.  The patient received

initially a Fleet enema and was placed on stool softeners and was made

n.p.o. initially.  The patient had a large bowel movement by hospital day #2 and

again another bowel movement a day later with improvement of his distention. 

Initially, his abdomen was mildly distended, becoming less distended. 
Objectively, 

the patient has remained stable throughout his hospital stay including vital 
signs 

and labs. Patient was started on a soft/puree diet and his regular medications

were resumed via his G tube (GT is use for medication administration purpses).

Both diet and meds have been tolerated well.



DISCHARGE MEDICATIONS:  Mineral oil enema 30 mL via G-tube b.i.d.,

Tylenol 650 q. 4 hours p.r.n. for pain, Fosamax 70 mg every Saturday, ascorbic

acid 500 mg every day, calcium carbonate 500 mg daily, Culturelle via G-tube

daily, milk of magnesia p.r.n., meloxicam 7.5 b.i.d., multivitamins with

minerals daily, nitroglycerin p.r.n. for chest pain, Protonix 40 mg b.i.d.,

MiraLax 17 grams b.i.d., Dulcolax suppository 10 mg every day p.r.n. for

constipation, docusate sodium 200 mg q. 24 hours, fleet enema every day p.r.n.

for severe constipation.



DISPOSITION:  The patient was discharged back to [facility] under

the care of his primary care doctor (Dr. Mc).





DD: 05/13/2019 09:35

DT: 05/13/2019 16:38

JOB# [id number]  [id number]

MTDD

## 2019-06-10 ENCOUNTER — OFFICE VISIT (OUTPATIENT)
Dept: FAMILY MEDICINE CLINIC | Facility: CLINIC | Age: 39
End: 2019-06-10
Payer: COMMERCIAL

## 2019-06-10 VITALS
HEIGHT: 64 IN | WEIGHT: 128 LBS | BODY MASS INDEX: 21.85 KG/M2 | HEART RATE: 78 BPM | DIASTOLIC BLOOD PRESSURE: 70 MMHG | RESPIRATION RATE: 14 BRPM | SYSTOLIC BLOOD PRESSURE: 120 MMHG

## 2019-06-10 DIAGNOSIS — G89.29 CHRONIC BILATERAL LOW BACK PAIN WITHOUT SCIATICA: Primary | ICD-10-CM

## 2019-06-10 DIAGNOSIS — G89.29 CHRONIC LEFT SHOULDER PAIN: ICD-10-CM

## 2019-06-10 DIAGNOSIS — M54.50 CHRONIC BILATERAL LOW BACK PAIN WITHOUT SCIATICA: Primary | ICD-10-CM

## 2019-06-10 DIAGNOSIS — Z98.890 S/P SHOULDER SURGERY: ICD-10-CM

## 2019-06-10 DIAGNOSIS — R76.8 POSITIVE ANA (ANTINUCLEAR ANTIBODY): ICD-10-CM

## 2019-06-10 DIAGNOSIS — M25.512 CHRONIC LEFT SHOULDER PAIN: ICD-10-CM

## 2019-06-10 PROCEDURE — 99213 OFFICE O/P EST LOW 20 MIN: CPT | Performed by: FAMILY MEDICINE

## 2019-06-10 RX ORDER — TRAMADOL HYDROCHLORIDE 50 MG/1
100 TABLET ORAL 3 TIMES DAILY PRN
Qty: 180 TABLET | Refills: 0 | Status: SHIPPED | OUTPATIENT
Start: 2019-06-14 | End: 2019-07-03

## 2019-06-10 NOTE — PROGRESS NOTES
Liyah Souza is a 34year old female. HPI:   Pt. Complains of left shoulder and upper back pain. Dr. Eleni Deal found it unusualy that she still is in pain for the past 3 months. Sx was March 15.   Dry needling was on Giselle 3 and it radiated pain to L3-4 in h URINE   Result Value Ref Range    POCT urine pregnancy Negative     POCT LOT NUMBER [id number]     Procedure Control OK     EXPIRATION DATE 2,020/5        REVIEW OF SYSTEMS:   GENERAL: feels well otherwise  SKIN: denies any unusual skin lesions  EYES:denies understanding of these issues and agrees to the plan. Return in about 3 months (around 9/10/2019) for physical.  .

## 2019-06-19 ENCOUNTER — APPOINTMENT (OUTPATIENT)
Dept: OCCUPATIONAL MEDICINE | Age: 39
End: 2019-06-19

## 2019-06-19 ENCOUNTER — OFFICE VISIT (OUTPATIENT)
Dept: OCCUPATIONAL MEDICINE | Age: 39
End: 2019-06-19

## 2019-06-19 VITALS
HEART RATE: 80 BPM | HEIGHT: 64 IN | SYSTOLIC BLOOD PRESSURE: 122 MMHG | DIASTOLIC BLOOD PRESSURE: 64 MMHG | WEIGHT: 127 LBS | BODY MASS INDEX: 21.68 KG/M2 | RESPIRATION RATE: 14 BRPM

## 2019-06-19 DIAGNOSIS — Z02.89 VISIT FOR OCCUPATIONAL HEALTH EXAMINATION: ICD-10-CM

## 2019-06-19 DIAGNOSIS — Z02.89 ENCOUNTER FOR OCCUPATIONAL HEALTH EXAMINATION: Primary | ICD-10-CM

## 2019-06-19 PROCEDURE — OH044 DRUG SCREEN HAIR COLLECTION ONLY: Performed by: PHYSICIAN ASSISTANT

## 2019-06-19 PROCEDURE — 36415 COLL VENOUS BLD VENIPUNCTURE: CPT | Performed by: PHYSICIAN ASSISTANT

## 2019-06-19 PROCEDURE — OH021 PRE PLACEMENT PHYSICAL EXAM: Performed by: PHYSICIAN ASSISTANT

## 2019-06-19 PROCEDURE — OH138 COMPREHENSIVE EYE EXAM: Performed by: PHYSICIAN ASSISTANT

## 2019-06-26 ENCOUNTER — HOSPITAL ENCOUNTER (OUTPATIENT)
Dept: ULTRASOUND IMAGING | Facility: HOSPITAL | Age: 39
Discharge: HOME | End: 2019-06-26
Attending: ORTHOPAEDIC SURGERY
Payer: COMMERCIAL

## 2019-06-26 ENCOUNTER — TELEPHONE (OUTPATIENT)
Dept: OCCUPATIONAL MEDICINE | Age: 39
End: 2019-06-26

## 2019-06-26 ENCOUNTER — HOSPITAL ENCOUNTER (OUTPATIENT)
Dept: GENERAL RADIOLOGY | Facility: HOSPITAL | Age: 39
Discharge: HOME | End: 2019-06-26
Attending: ORTHOPAEDIC SURGERY
Payer: COMMERCIAL

## 2019-06-26 DIAGNOSIS — R52 PAIN: ICD-10-CM

## 2019-06-26 DIAGNOSIS — M75.02 SECONDARY ADHESIVE CAPSULITIS OF SHOULDER, LEFT: ICD-10-CM

## 2019-06-26 DIAGNOSIS — S46.812D PARTIAL TEAR OF LEFT SUBSCAPULARIS TENDON, SUBSEQUENT ENCOUNTER: ICD-10-CM

## 2019-06-26 DIAGNOSIS — S46.812D PARTIAL TEAR OF SUBSCAPULARIS TENDON, LEFT, SUBSEQUENT ENCOUNTER: ICD-10-CM

## 2019-06-26 PROCEDURE — 77002 NEEDLE LOCALIZATION BY XRAY: CPT | Performed by: ORTHOPAEDIC SURGERY

## 2019-06-26 PROCEDURE — 20610 DRAIN/INJ JOINT/BURSA W/O US: CPT | Performed by: ORTHOPAEDIC SURGERY

## 2019-06-26 RX ORDER — METHYLPREDNISOLONE ACETATE 80 MG/ML
INJECTION, SUSPENSION INTRA-ARTICULAR; INTRALESIONAL; INTRAMUSCULAR; SOFT TISSUE
Status: COMPLETED
Start: 2019-06-26 | End: 2019-06-26

## 2019-06-26 NOTE — PROCEDURES
PROCEDURE: XR ASPIR/INJ MAJOR JOINT W/FLUORO LT(ZVC=53238/61436)    COMPARISON: None. INDICATIONS: Shoulder pain, clinical concern for adhesive capsulitis. Prior history of rotator cuff surgery and biceps tenodesis.     PATIENT STATED HISTORY: (As trans

## 2019-06-28 ENCOUNTER — HOSPITAL ENCOUNTER (OUTPATIENT)
Dept: ULTRASOUND IMAGING | Facility: HOSPITAL | Age: 39
Discharge: HOME | End: 2019-06-28
Attending: INTERNAL MEDICINE
Payer: COMMERCIAL

## 2019-06-28 DIAGNOSIS — M25.522 LEFT ELBOW PAIN: ICD-10-CM

## 2019-06-28 DIAGNOSIS — M79.642 LEFT HAND PAIN: ICD-10-CM

## 2019-06-28 DIAGNOSIS — M79.642 PAIN OF LEFT HAND: ICD-10-CM

## 2019-06-28 PROCEDURE — 76882 US LMTD JT/FCL EVL NVASC XTR: CPT | Performed by: INTERNAL MEDICINE

## 2019-06-28 PROCEDURE — 76881 US COMPL JOINT R-T W/IMG: CPT | Performed by: INTERNAL MEDICINE

## 2019-07-02 NOTE — TELEPHONE ENCOUNTER
Spoke to patient. She is at work now. I asked if she could go to private area and she did. I explained to her that the symptoms she is mentioning to the other staff members could and most likely are withdrawal symptoms. She was asking how could this be.  I Chonodrocutaneous Helical Advancement Flap Text: The defect edges were debeveled with a #15 scalpel blade.  Given the location of the defect and the proximity to free margins a chondrocutaneous helical advancement flap was deemed most appropriate.  Using a sterile surgical marker, the appropriate advancement flap was drawn incorporating the defect and placing the expected incisions within the relaxed skin tension lines where possible.    The area thus outlined was incised deep to adipose tissue with a #15 scalpel blade.  The skin margins were undermined to an appropriate distance in all directions utilizing iris scissors.

## 2019-07-03 ENCOUNTER — OFFICE VISIT (OUTPATIENT)
Dept: FAMILY MEDICINE CLINIC | Facility: CLINIC | Age: 39
End: 2019-07-03
Payer: COMMERCIAL

## 2019-07-03 ENCOUNTER — TELEPHONE (OUTPATIENT)
Dept: FAMILY MEDICINE CLINIC | Facility: CLINIC | Age: 39
End: 2019-07-03

## 2019-07-03 VITALS
HEIGHT: 64 IN | DIASTOLIC BLOOD PRESSURE: 68 MMHG | WEIGHT: 128 LBS | RESPIRATION RATE: 18 BRPM | HEART RATE: 88 BPM | BODY MASS INDEX: 21.85 KG/M2 | SYSTOLIC BLOOD PRESSURE: 110 MMHG

## 2019-07-03 DIAGNOSIS — M54.50 CHRONIC BILATERAL LOW BACK PAIN WITHOUT SCIATICA: Primary | ICD-10-CM

## 2019-07-03 DIAGNOSIS — Z98.890 HISTORY OF SHOULDER SURGERY: ICD-10-CM

## 2019-07-03 DIAGNOSIS — M25.512 CHRONIC LEFT SHOULDER PAIN: ICD-10-CM

## 2019-07-03 DIAGNOSIS — G89.29 CHRONIC LEFT SHOULDER PAIN: ICD-10-CM

## 2019-07-03 DIAGNOSIS — M99.04 SOMATIC DYSFUNCTION OF SPINE, SACRAL: ICD-10-CM

## 2019-07-03 DIAGNOSIS — M25.50 ARTHRALGIA, UNSPECIFIED JOINT: ICD-10-CM

## 2019-07-03 DIAGNOSIS — G89.29 CHRONIC BILATERAL LOW BACK PAIN WITHOUT SCIATICA: Primary | ICD-10-CM

## 2019-07-03 PROCEDURE — 99214 OFFICE O/P EST MOD 30 MIN: CPT | Performed by: FAMILY MEDICINE

## 2019-07-03 RX ORDER — TRAMADOL HYDROCHLORIDE 50 MG/1
100 TABLET ORAL 3 TIMES DAILY PRN
Qty: 180 TABLET | Refills: 1 | Status: SHIPPED | OUTPATIENT
Start: 2019-07-03 | End: 2019-08-28

## 2019-07-03 RX ORDER — CYCLOBENZAPRINE HCL 10 MG
10 TABLET ORAL 3 TIMES DAILY
Qty: 30 TABLET | Refills: 0 | Status: SHIPPED | OUTPATIENT
Start: 2019-07-03 | End: 2019-07-23

## 2019-07-03 RX ORDER — HYDROCODONE BITARTRATE AND ACETAMINOPHEN 10; 325 MG/1; MG/1
1 TABLET ORAL DAILY PRN
Qty: 30 TABLET | Refills: 0 | Status: SHIPPED | OUTPATIENT
Start: 2019-07-03 | End: 2019-07-30 | Stop reason: ALTCHOICE

## 2019-07-03 NOTE — PROGRESS NOTES
Marlyn Anthony is a 34year old female. HPI:   Patient wakes up with low back pain daily but states that she continues to move throughout the day it goes away but recently she woke up yesterday morning and the pain has been constant.   Patient states that placed or performed during the hospital encounter of 03/15/19   POCT PREGNANCY, URINE   Result Value Ref Range    POCT urine pregnancy Negative     POCT LOT NUMBER [id number]     Procedure Control OK     EXPIRATION DATE 2,020/5        REVIEW OF SYSTEMS:   G mouth 3 (three) times daily as needed for Pain. Imaging & Consults:  OP REFERRAL TO [facility]  RHEUMATOLOGY - INTERNAL  ORTHOPEDIC - INTERNAL  XR LUMBAR SPINE (MIN 2 VIEWS) (CPT=72100)   Advised stretches. Heat. Monitor.   Tanna Zhou

## 2019-07-03 NOTE — TELEPHONE ENCOUNTER
Received request for a PA to be completed for pt's Hydrocodone-Accetaminophen 10-325mg. Form completed and faxed to plan.

## 2019-07-03 NOTE — TELEPHONE ENCOUNTER
Pt states that she has had continuously back pain since yesterday, no injury to it. Pt was offered appt today but stated she could not come in at the available times. Wanted to talk to a nurse. Please advise.

## 2019-07-18 NOTE — TELEPHONE ENCOUNTER
Received letter of determination from [employer], pt's Hydrocodone-Acetaminophen 10-325mg granted 7/3/19-10/3/19 up to 30 tabs per month. Case #: [id number]    Called to pharmacy, not open yet.   Left message on vm for pharmacist with information an

## 2019-07-22 ENCOUNTER — MED REC SCAN ONLY (OUTPATIENT)
Dept: FAMILY MEDICINE CLINIC | Facility: CLINIC | Age: 39
End: 2019-07-22

## 2019-07-30 ENCOUNTER — TELEPHONE (OUTPATIENT)
Dept: FAMILY MEDICINE CLINIC | Facility: CLINIC | Age: 39
End: 2019-07-30

## 2019-07-30 DIAGNOSIS — M75.02 ADHESIVE CAPSULITIS OF LEFT SHOULDER: ICD-10-CM

## 2019-07-30 DIAGNOSIS — S46.812A PARTIAL TEAR OF LEFT SUBSCAPULARIS TENDON, INITIAL ENCOUNTER: Primary | ICD-10-CM

## 2019-07-30 NOTE — TELEPHONE ENCOUNTER
Referral to [facility] (EXTERNAL)    Reason for the order/referral: [facility]   PCP:  Dr. Nurys Tanner   Refer to Provide:  [facility]   Specialty: Physical Therapy   Patient Insurance: Payor: [facility] AGATHA/ELIESER / Plan

## 2019-07-31 RX ORDER — VALACYCLOVIR HYDROCHLORIDE 1 G/1
2 TABLET, FILM COATED ORAL EVERY 12 HOURS SCHEDULED
Qty: 4 TABLET | Refills: 1 | Status: SHIPPED | OUTPATIENT
Start: 2019-07-31 | End: 2019-08-01

## 2019-08-06 NOTE — TELEPHONE ENCOUNTER
Message   Received: Yesterday   Message Contents   [place], [address] [facility]; P [facility] Central Referral Pool             PATIENT IS ASSIGNED TO [place] [id number] AND NEEDS TO RECEIVE SERVICES WITHIN THE [facility].

## 2019-08-15 ENCOUNTER — OFFICE VISIT (OUTPATIENT)
Dept: FAMILY MEDICINE CLINIC | Facility: CLINIC | Age: 39
End: 2019-08-15

## 2019-08-15 ENCOUNTER — TELEPHONE (OUTPATIENT)
Dept: FAMILY MEDICINE CLINIC | Facility: CLINIC | Age: 39
End: 2019-08-15

## 2019-08-15 VITALS
RESPIRATION RATE: 14 BRPM | WEIGHT: 127 LBS | HEIGHT: 64 IN | BODY MASS INDEX: 21.68 KG/M2 | DIASTOLIC BLOOD PRESSURE: 60 MMHG | SYSTOLIC BLOOD PRESSURE: 104 MMHG | HEART RATE: 66 BPM

## 2019-08-15 DIAGNOSIS — M99.04 SOMATIC DYSFUNCTION OF SPINE, SACRAL: ICD-10-CM

## 2019-08-15 DIAGNOSIS — M99.03 SOMATIC DYSFUNCTION OF SPINE, LUMBAR: ICD-10-CM

## 2019-08-15 DIAGNOSIS — M54.50 ACUTE MIDLINE LOW BACK PAIN WITHOUT SCIATICA: Primary | ICD-10-CM

## 2019-08-15 PROCEDURE — 99213 OFFICE O/P EST LOW 20 MIN: CPT | Performed by: FAMILY MEDICINE

## 2019-08-15 PROCEDURE — 98925 OSTEOPATH MANJ 1-2 REGIONS: CPT | Performed by: FAMILY MEDICINE

## 2019-08-15 RX ORDER — METHYLPREDNISOLONE 4 MG/1
TABLET ORAL
Qty: 1 KIT | Refills: 0 | Status: SHIPPED | OUTPATIENT
Start: 2019-08-15 | End: 2019-09-12 | Stop reason: ALTCHOICE

## 2019-08-15 RX ORDER — HYDROCODONE BITARTRATE AND ACETAMINOPHEN 10; 325 MG/1; MG/1
1 TABLET ORAL DAILY PRN
Qty: 30 TABLET | Refills: 0 | Status: SHIPPED | OUTPATIENT
Start: 2019-08-15 | End: 2019-09-12 | Stop reason: ALTCHOICE

## 2019-08-15 NOTE — TELEPHONE ENCOUNTER
Pt called. She states Dr Luli Garces is aware of her low back pain that is intermittent. However pt states that for the last 3 days her back pain has been continuous and it's making it difficult for the pt to walk. Please advise.

## 2019-08-15 NOTE — PROGRESS NOTES
Yaneli Millspool is a 34year old female. HPI:   PT. Complains of low back pain since Monday. She states that on Tuesday she had to drive 3 hours to [place] to go to a meeting.   Unfortunate the pain was very uncomfortable and made it difficult for her to POCT PREGNANCY, URINE   Result Value Ref Range    POCT urine pregnancy Negative     POCT LOT NUMBER [id number]     Procedure Control OK     EXPIRATION DATE 2,020/5        REVIEW OF SYSTEMS:   GENERAL: feels well otherwise  LUNGS: denies shortness of breat

## 2019-08-17 ENCOUNTER — LAB ENCOUNTER (OUTPATIENT)
Dept: LAB | Age: 39
End: 2019-08-17
Attending: INTERNAL MEDICINE
Payer: COMMERCIAL

## 2019-08-17 DIAGNOSIS — M35.01 SJOGREN'S SYNDROME WITH KERATOCONJUNCTIVITIS SICCA (HCC): ICD-10-CM

## 2019-08-17 LAB
ALBUMIN SERPL-MCNC: 3.7 G/DL (ref 3.4–5)
ALBUMIN/GLOB SERPL: 0.9 {RATIO} (ref 1–2)
ALP LIVER SERPL-CCNC: 59 U/L (ref 37–98)
ALT SERPL-CCNC: 17 U/L (ref 13–56)
ANION GAP SERPL CALC-SCNC: 4 MMOL/L (ref 0–18)
AST SERPL-CCNC: 21 U/L (ref 15–37)
BASOPHILS # BLD AUTO: 0.06 X10(3) UL (ref 0–0.2)
BASOPHILS NFR BLD AUTO: 0.8 %
BILIRUB SERPL-MCNC: 0.3 MG/DL (ref 0.1–2)
BUN BLD-MCNC: 7 MG/DL (ref 7–18)
BUN/CREAT SERPL: 6.4 (ref 10–20)
CALCIUM BLD-MCNC: 9.1 MG/DL (ref 8.5–10.1)
CHLORIDE SERPL-SCNC: 102 MMOL/L (ref 98–112)
CK SERPL-CCNC: 74 U/L (ref 26–192)
CO2 SERPL-SCNC: 31 MMOL/L (ref 21–32)
CREAT BLD-MCNC: 1.09 MG/DL (ref 0.55–1.02)
CRP SERPL-MCNC: 2.43 MG/DL (ref ?–0.3)
DEPRECATED RDW RBC AUTO: 40.2 FL (ref 35.1–46.3)
EOSINOPHIL # BLD AUTO: 0.57 X10(3) UL (ref 0–0.7)
EOSINOPHIL NFR BLD AUTO: 7.2 %
ERYTHROCYTE [DISTWIDTH] IN BLOOD BY AUTOMATED COUNT: 12.3 % (ref 11–15)
GLOBULIN PLAS-MCNC: 4.3 G/DL (ref 2.8–4.4)
GLUCOSE BLD-MCNC: 79 MG/DL (ref 70–99)
HCT VFR BLD AUTO: 41.3 % (ref 35–48)
HGB BLD-MCNC: 13.6 G/DL (ref 12–16)
IMM GRANULOCYTES # BLD AUTO: 0.02 X10(3) UL (ref 0–1)
IMM GRANULOCYTES NFR BLD: 0.3 %
LYMPHOCYTES # BLD AUTO: 1 X10(3) UL (ref 1–4)
LYMPHOCYTES NFR BLD AUTO: 12.6 %
M PROTEIN MFR SERPL ELPH: 8 G/DL (ref 6.4–8.2)
MCH RBC QN AUTO: 29.6 PG (ref 26–34)
MCHC RBC AUTO-ENTMCNC: 32.9 G/DL (ref 31–37)
MCV RBC AUTO: 89.8 FL (ref 80–100)
MONOCYTES # BLD AUTO: 0.87 X10(3) UL (ref 0.1–1)
MONOCYTES NFR BLD AUTO: 10.9 %
NEUTROPHILS # BLD AUTO: 5.44 X10 (3) UL (ref 1.5–7.7)
NEUTROPHILS # BLD AUTO: 5.44 X10(3) UL (ref 1.5–7.7)
NEUTROPHILS NFR BLD AUTO: 68.2 %
OSMOLALITY SERPL CALC.SUM OF ELEC: 281 MOSM/KG (ref 275–295)
PLATELET # BLD AUTO: 292 10(3)UL (ref 150–450)
POTASSIUM SERPL-SCNC: 3.8 MMOL/L (ref 3.5–5.1)
RBC # BLD AUTO: 4.6 X10(6)UL (ref 3.8–5.3)
SED RATE-ML: 22 MM/HR (ref 0–25)
SODIUM SERPL-SCNC: 137 MMOL/L (ref 136–145)
WBC # BLD AUTO: 8 X10(3) UL (ref 4–11)

## 2019-08-17 PROCEDURE — 86140 C-REACTIVE PROTEIN: CPT

## 2019-08-17 PROCEDURE — 85652 RBC SED RATE AUTOMATED: CPT

## 2019-08-17 PROCEDURE — 85025 COMPLETE CBC W/AUTO DIFF WBC: CPT

## 2019-08-17 PROCEDURE — 36415 COLL VENOUS BLD VENIPUNCTURE: CPT

## 2019-08-17 PROCEDURE — 80053 COMPREHEN METABOLIC PANEL: CPT

## 2019-08-17 PROCEDURE — 82550 ASSAY OF CK (CPK): CPT

## 2019-08-28 ENCOUNTER — OFFICE VISIT (OUTPATIENT)
Dept: FAMILY MEDICINE CLINIC | Facility: CLINIC | Age: 39
End: 2019-08-28

## 2019-08-28 VITALS
SYSTOLIC BLOOD PRESSURE: 110 MMHG | BODY MASS INDEX: 21.68 KG/M2 | WEIGHT: 127 LBS | HEIGHT: 64 IN | HEART RATE: 76 BPM | DIASTOLIC BLOOD PRESSURE: 80 MMHG | RESPIRATION RATE: 16 BRPM

## 2019-08-28 DIAGNOSIS — G89.29 CHRONIC NECK PAIN: ICD-10-CM

## 2019-08-28 DIAGNOSIS — Z00.00 LABORATORY EXAMINATION ORDERED AS PART OF A ROUTINE GENERAL MEDICAL EXAMINATION: ICD-10-CM

## 2019-08-28 DIAGNOSIS — Z12.31 ENCOUNTER FOR SCREENING MAMMOGRAM FOR MALIGNANT NEOPLASM OF BREAST: ICD-10-CM

## 2019-08-28 DIAGNOSIS — G89.29 CHRONIC LEFT SHOULDER PAIN: ICD-10-CM

## 2019-08-28 DIAGNOSIS — M79.601 RIGHT ARM PAIN: Primary | ICD-10-CM

## 2019-08-28 DIAGNOSIS — R76.8 POSITIVE ANA (ANTINUCLEAR ANTIBODY): ICD-10-CM

## 2019-08-28 DIAGNOSIS — M54.2 CHRONIC NECK PAIN: ICD-10-CM

## 2019-08-28 DIAGNOSIS — M25.512 CHRONIC LEFT SHOULDER PAIN: ICD-10-CM

## 2019-08-28 DIAGNOSIS — M54.50 CHRONIC BILATERAL LOW BACK PAIN WITHOUT SCIATICA: ICD-10-CM

## 2019-08-28 DIAGNOSIS — G89.29 CHRONIC BILATERAL LOW BACK PAIN WITHOUT SCIATICA: ICD-10-CM

## 2019-08-28 PROCEDURE — 99214 OFFICE O/P EST MOD 30 MIN: CPT | Performed by: FAMILY MEDICINE

## 2019-08-28 RX ORDER — TRAMADOL HYDROCHLORIDE 50 MG/1
100 TABLET ORAL 3 TIMES DAILY PRN
Qty: 180 TABLET | Refills: 2 | Status: SHIPPED | OUTPATIENT
Start: 2019-08-28 | End: 2019-11-12 | Stop reason: ALTCHOICE

## 2019-08-28 RX ORDER — DULOXETIN HYDROCHLORIDE 20 MG/1
20 CAPSULE, DELAYED RELEASE ORAL DAILY
Qty: 90 CAPSULE | Refills: 0 | Status: SHIPPED | OUTPATIENT
Start: 2019-08-28 | End: 2020-02-11 | Stop reason: ALTCHOICE

## 2019-08-28 NOTE — PROGRESS NOTES
Myrna Newby is a 34year old female. HPI:   Patient is here to follow-up on her back pain. She states she has better from her recent flare but continues to have problems in her low back. She states she feels the pain over the spinal region.   She also Past Medical History:   Diagnosis Date   • Encounter for insertion of ParaGard IUD 11/27/2015    Lot# [id number] Exp 01/2022   • General counseling for prescription of oral contraceptives    • Other screening mammogram    • Routine Papanicolaou smear       S Absolute 1.00 1.00 - 4.00 x10(3) uL    Monocyte Absolute 0.87 0.10 - 1.00 x10(3) uL    Eosinophil Absolute 0.57 0.00 - 0.70 x10(3) uL    Basophil Absolute 0.06 0.00 - 0.20 x10(3) uL    Immature Granulocyte Absolute 0.02 0.00 - 1.00 x10(3) uL    Neutrophil capsule (20 mg total) by mouth daily. • traMADol HCl 50 MG Oral Tab 180 tablet 2     Sig: Take 2 tablets (100 mg total) by mouth 3 (three) times daily as needed for Pain.        Imaging & Consults:  OP REFERRAL TO [facility]  DANIELLE TAN

## 2019-08-29 ENCOUNTER — HOSPITAL ENCOUNTER (OUTPATIENT)
Dept: PHYSICAL THERAPY | Facility: HOSPITAL | Age: 39
Setting detail: THERAPIES SERIES
Discharge: HOME | End: 2019-08-29
Attending: FAMILY MEDICINE
Payer: COMMERCIAL

## 2019-08-29 DIAGNOSIS — Z98.890 HISTORY OF SHOULDER SURGERY: ICD-10-CM

## 2019-08-29 DIAGNOSIS — M54.50 CHRONIC BILATERAL LOW BACK PAIN WITHOUT SCIATICA: ICD-10-CM

## 2019-08-29 DIAGNOSIS — G89.29 CHRONIC BILATERAL LOW BACK PAIN WITHOUT SCIATICA: ICD-10-CM

## 2019-08-29 DIAGNOSIS — M25.512 CHRONIC LEFT SHOULDER PAIN: ICD-10-CM

## 2019-08-29 DIAGNOSIS — M75.02 ADHESIVE CAPSULITIS OF LEFT SHOULDER: ICD-10-CM

## 2019-08-29 DIAGNOSIS — S46.812A PARTIAL TEAR OF LEFT SUBSCAPULARIS TENDON, INITIAL ENCOUNTER: ICD-10-CM

## 2019-08-29 DIAGNOSIS — G89.29 CHRONIC LEFT SHOULDER PAIN: ICD-10-CM

## 2019-08-29 PROCEDURE — 97110 THERAPEUTIC EXERCISES: CPT | Performed by: PHYSICAL THERAPIST

## 2019-08-29 PROCEDURE — 97162 PT EVAL MOD COMPLEX 30 MIN: CPT | Performed by: PHYSICAL THERAPIST

## 2019-08-29 NOTE — PROGRESS NOTES
SHOULDER EVALUATION:   Referring Physician: Dr. Lenn Buerger  Diagnosis: Chronic left shoulder pain (M25.512,G89.29)  Chronic neck pain (M54.2,G89.29)           Date of Service: 8/29/2019     PATIENT SUMMARY   Lilia Booker is a 34year old female who present tender to the touch and functionally limited with her left UE. Patelrichie [place] The results of the objective tests and measures show sig. loss of functional ROM , strength, .   Functional deficits include but are not limited to limited with most ADL, michael with reaching, t recommendations for activity modifications, possible soreness after evaluation, modalities as needed [ice/heat], postural corrections, pain science education , detrimental fear avoidance behaviors and importance of remaining active.   Patient was instructed instruction and Modalities to include: Electrical stimulation (unattended)    Education or treatment limitation: None  Rehab Potential:good    FOTO: 43/100    Patient/Family/Caregiver was advised of these findings, precautions, and treatment options and ha

## 2019-09-03 ENCOUNTER — HOSPITAL ENCOUNTER (OUTPATIENT)
Dept: PHYSICAL THERAPY | Facility: HOSPITAL | Age: 39
Setting detail: THERAPIES SERIES
Discharge: HOME | End: 2019-09-03
Attending: FAMILY MEDICINE
Payer: COMMERCIAL

## 2019-09-03 PROCEDURE — 97140 MANUAL THERAPY 1/> REGIONS: CPT | Performed by: PHYSICAL THERAPIST

## 2019-09-03 PROCEDURE — 97014 ELECTRIC STIMULATION THERAPY: CPT | Performed by: PHYSICAL THERAPIST

## 2019-09-03 PROCEDURE — 97110 THERAPEUTIC EXERCISES: CPT | Performed by: PHYSICAL THERAPIST

## 2019-09-03 NOTE — PROGRESS NOTES
Dx: Right arm pain (M79.601)  Chronic bilateral low back pain without sciatica (M54.5,G89.29)  Chronic left shoulder pain (M25.512,G89.29)  Chronic neck pain (M54.2,G89.29)         Insurance (Authorized # of Visits):  6           Authorizing Physician:

## 2019-09-05 ENCOUNTER — HOSPITAL ENCOUNTER (OUTPATIENT)
Dept: PHYSICAL THERAPY | Facility: HOSPITAL | Age: 39
Setting detail: THERAPIES SERIES
Discharge: HOME | End: 2019-09-05
Attending: FAMILY MEDICINE
Payer: COMMERCIAL

## 2019-09-05 PROCEDURE — 97140 MANUAL THERAPY 1/> REGIONS: CPT | Performed by: PHYSICAL THERAPIST

## 2019-09-05 PROCEDURE — 97014 ELECTRIC STIMULATION THERAPY: CPT | Performed by: PHYSICAL THERAPIST

## 2019-09-05 PROCEDURE — 97110 THERAPEUTIC EXERCISES: CPT | Performed by: PHYSICAL THERAPIST

## 2019-09-07 ENCOUNTER — LAB ENCOUNTER (OUTPATIENT)
Dept: LAB | Age: 39
End: 2019-09-07
Attending: FAMILY MEDICINE
Payer: COMMERCIAL

## 2019-09-07 DIAGNOSIS — M25.522 LEFT ELBOW PAIN: ICD-10-CM

## 2019-09-07 DIAGNOSIS — M35.01 SJOGREN'S SYNDROME WITH KERATOCONJUNCTIVITIS SICCA (HCC): ICD-10-CM

## 2019-09-07 DIAGNOSIS — M79.642 PAIN OF LEFT HAND: ICD-10-CM

## 2019-09-07 DIAGNOSIS — Z00.00 LABORATORY EXAMINATION ORDERED AS PART OF A ROUTINE GENERAL MEDICAL EXAMINATION: ICD-10-CM

## 2019-09-07 LAB
BILIRUB UR QL STRIP.AUTO: NEGATIVE
CHOLEST SMN-MCNC: 155 MG/DL (ref ?–200)
CK SERPL-CCNC: 90 U/L (ref 26–192)
CLARITY UR REFRACT.AUTO: CLEAR
COLOR UR AUTO: YELLOW
CREAT UR-SCNC: 82.6 MG/DL
CRP SERPL-MCNC: <0.29 MG/DL (ref ?–0.3)
GLUCOSE UR STRIP.AUTO-MCNC: NEGATIVE MG/DL
HDLC SERPL-MCNC: 52 MG/DL (ref 40–59)
KETONES UR STRIP.AUTO-MCNC: NEGATIVE MG/DL
LDLC SERPL CALC-MCNC: 82 MG/DL (ref ?–100)
LEUKOCYTE ESTERASE UR QL STRIP.AUTO: NEGATIVE
NITRITE UR QL STRIP.AUTO: NEGATIVE
NONHDLC SERPL-MCNC: 103 MG/DL (ref ?–130)
PH UR STRIP.AUTO: 6 [PH] (ref 4.5–8)
PROT UR STRIP.AUTO-MCNC: NEGATIVE MG/DL
PROT UR-MCNC: 10.1 MG/DL
SED RATE-ML: 20 MM/HR (ref 0–25)
SP GR UR STRIP.AUTO: 1.01 (ref 1–1.03)
TRIGL SERPL-MCNC: 105 MG/DL (ref 30–149)
TSI SER-ACNC: 1.96 MIU/ML (ref 0.36–3.74)
UROBILINOGEN UR STRIP.AUTO-MCNC: <2 MG/DL
VIT D+METAB SERPL-MCNC: 29.7 NG/ML (ref 30–100)
VLDLC SERPL CALC-MCNC: 21 MG/DL (ref 0–30)

## 2019-09-07 PROCEDURE — 85652 RBC SED RATE AUTOMATED: CPT

## 2019-09-07 PROCEDURE — 84156 ASSAY OF PROTEIN URINE: CPT

## 2019-09-07 PROCEDURE — 82306 VITAMIN D 25 HYDROXY: CPT

## 2019-09-07 PROCEDURE — 81001 URINALYSIS AUTO W/SCOPE: CPT

## 2019-09-07 PROCEDURE — 86140 C-REACTIVE PROTEIN: CPT

## 2019-09-07 PROCEDURE — 82570 ASSAY OF URINE CREATININE: CPT

## 2019-09-07 PROCEDURE — 84443 ASSAY THYROID STIM HORMONE: CPT

## 2019-09-07 PROCEDURE — 82550 ASSAY OF CK (CPK): CPT

## 2019-09-07 PROCEDURE — 36415 COLL VENOUS BLD VENIPUNCTURE: CPT

## 2019-09-07 PROCEDURE — 80061 LIPID PANEL: CPT

## 2019-09-09 ENCOUNTER — HOSPITAL ENCOUNTER (OUTPATIENT)
Dept: PHYSICAL THERAPY | Facility: HOSPITAL | Age: 39
Setting detail: THERAPIES SERIES
Discharge: HOME | End: 2019-09-09
Attending: FAMILY MEDICINE
Payer: COMMERCIAL

## 2019-09-09 PROCEDURE — 97140 MANUAL THERAPY 1/> REGIONS: CPT | Performed by: PHYSICAL THERAPIST

## 2019-09-09 PROCEDURE — 97110 THERAPEUTIC EXERCISES: CPT | Performed by: PHYSICAL THERAPIST

## 2019-09-09 PROCEDURE — 97014 ELECTRIC STIMULATION THERAPY: CPT | Performed by: PHYSICAL THERAPIST

## 2019-09-10 PROBLEM — M67.922 BICEPS TENDINOPATHY, LEFT: Status: ACTIVE | Noted: 2019-09-10

## 2019-09-12 ENCOUNTER — OFFICE VISIT (OUTPATIENT)
Dept: FAMILY MEDICINE CLINIC | Facility: CLINIC | Age: 39
End: 2019-09-12

## 2019-09-12 ENCOUNTER — HOSPITAL ENCOUNTER (OUTPATIENT)
Dept: PHYSICAL THERAPY | Facility: HOSPITAL | Age: 39
Setting detail: THERAPIES SERIES
Discharge: HOME | End: 2019-09-12
Attending: FAMILY MEDICINE
Payer: COMMERCIAL

## 2019-09-12 VITALS
DIASTOLIC BLOOD PRESSURE: 60 MMHG | HEIGHT: 64 IN | HEART RATE: 82 BPM | RESPIRATION RATE: 14 BRPM | BODY MASS INDEX: 22.02 KG/M2 | SYSTOLIC BLOOD PRESSURE: 100 MMHG | WEIGHT: 129 LBS

## 2019-09-12 DIAGNOSIS — Z23 NEED FOR VACCINATION: ICD-10-CM

## 2019-09-12 DIAGNOSIS — Z00.00 ANNUAL PHYSICAL EXAM: ICD-10-CM

## 2019-09-12 DIAGNOSIS — R31.9 HEMATURIA, UNSPECIFIED TYPE: ICD-10-CM

## 2019-09-12 DIAGNOSIS — Z12.4 SCREENING FOR CERVICAL CANCER: Primary | ICD-10-CM

## 2019-09-12 DIAGNOSIS — R82.90 ABNORMAL URINE: ICD-10-CM

## 2019-09-12 LAB
APPEARANCE: CLEAR
BILIRUB UR QL STRIP.AUTO: NEGATIVE
CLARITY UR REFRACT.AUTO: CLEAR
COLOR UR AUTO: YELLOW
GLUCOSE UR STRIP.AUTO-MCNC: NEGATIVE MG/DL
KETONES UR STRIP.AUTO-MCNC: NEGATIVE MG/DL
MULTISTIX LOT#: NORMAL NUMERIC
NITRITE UR QL STRIP.AUTO: NEGATIVE
PH UR STRIP.AUTO: 7 [PH] (ref 4.5–8)
PH, URINE: 7 (ref 4.5–8)
PROT UR STRIP.AUTO-MCNC: NEGATIVE MG/DL
SP GR UR STRIP.AUTO: 1.01 (ref 1–1.03)
SPECIFIC GRAVITY: 1.01 (ref 1–1.03)
URINE-COLOR: YELLOW
UROBILINOGEN UR STRIP.AUTO-MCNC: <2 MG/DL
UROBILINOGEN,SEMI-QN: 0.2 MG/DL (ref 0–1.9)

## 2019-09-12 PROCEDURE — 87624 HPV HI-RISK TYP POOLED RSLT: CPT | Performed by: FAMILY MEDICINE

## 2019-09-12 PROCEDURE — 88175 CYTOPATH C/V AUTO FLUID REDO: CPT | Performed by: FAMILY MEDICINE

## 2019-09-12 PROCEDURE — 99396 PREV VISIT EST AGE 40-64: CPT | Performed by: FAMILY MEDICINE

## 2019-09-12 PROCEDURE — 90472 IMMUNIZATION ADMIN EACH ADD: CPT | Performed by: FAMILY MEDICINE

## 2019-09-12 PROCEDURE — 97140 MANUAL THERAPY 1/> REGIONS: CPT | Performed by: PHYSICAL THERAPIST

## 2019-09-12 PROCEDURE — 81001 URINALYSIS AUTO W/SCOPE: CPT | Performed by: FAMILY MEDICINE

## 2019-09-12 PROCEDURE — 97035 APP MDLTY 1+ULTRASOUND EA 15: CPT | Performed by: PHYSICAL THERAPIST

## 2019-09-12 PROCEDURE — 90715 TDAP VACCINE 7 YRS/> IM: CPT | Performed by: FAMILY MEDICINE

## 2019-09-12 PROCEDURE — 81003 URINALYSIS AUTO W/O SCOPE: CPT | Performed by: FAMILY MEDICINE

## 2019-09-12 PROCEDURE — 97110 THERAPEUTIC EXERCISES: CPT | Performed by: PHYSICAL THERAPIST

## 2019-09-12 PROCEDURE — 97014 ELECTRIC STIMULATION THERAPY: CPT | Performed by: PHYSICAL THERAPIST

## 2019-09-12 PROCEDURE — 90471 IMMUNIZATION ADMIN: CPT | Performed by: FAMILY MEDICINE

## 2019-09-12 PROCEDURE — 90686 IIV4 VACC NO PRSV 0.5 ML IM: CPT | Performed by: FAMILY MEDICINE

## 2019-09-12 RX ORDER — HYDROCODONE BITARTRATE AND ACETAMINOPHEN 10; 325 MG/1; MG/1
1 TABLET ORAL DAILY PRN
Qty: 30 TABLET | Refills: 0 | Status: SHIPPED | OUTPATIENT
Start: 2019-09-12 | End: 2019-11-01

## 2019-09-12 NOTE — H&P
CC: Annual Physical Exam    HPI:   Adalgisa Thornton is a 34year old female who presents for a complete physical exam. Symptoms: periods are regular. Patient complains of left shoulder and back pain.   Would like norco before and after when does PT for her le Other screening mammogram    • Routine Papanicolaou smear       Past Surgical History:   Procedure Laterality Date   • COLONOSCOPY     • OTHER SURGICAL HISTORY  04/08/2014    cystoscopy- Dr. Isis Walter   • SHOULDER ARTHROSCOPY ROTATOR CUFF REPAIR Left 3/15/2019 HSM or tenderness  :introitus is normal,scant discharge,cervix is pink,no adnexal masses or tenderness, PAP was done ; IUD in place  MUSCULOSKELETAL: back is not tender,FROM of the back  EXTREMITIES: no cyanosis, clubbing or edema  NEURO: Oriented times

## 2019-09-12 NOTE — PROGRESS NOTES
Dx: Right arm pain (M79.601)  Chronic bilateral low back pain without sciatica (M54.5,G89.29)  Chronic left shoulder pain (M25.512,G89.29)  Chronic neck pain (M54.2,G89.29)         Insurance (Authorized # of Visits):  6           Authorizing Physician:  1 manual 1 ifc 1US       Total Timed Treatment: 40 min  Total Treatment Time: 60 min

## 2019-09-16 ENCOUNTER — HOSPITAL ENCOUNTER (OUTPATIENT)
Dept: PHYSICAL THERAPY | Facility: HOSPITAL | Age: 39
Setting detail: THERAPIES SERIES
Discharge: HOME | End: 2019-09-16
Attending: FAMILY MEDICINE
Payer: COMMERCIAL

## 2019-09-16 PROCEDURE — 97110 THERAPEUTIC EXERCISES: CPT | Performed by: PHYSICAL THERAPIST

## 2019-09-16 PROCEDURE — 97035 APP MDLTY 1+ULTRASOUND EA 15: CPT | Performed by: PHYSICAL THERAPIST

## 2019-09-16 PROCEDURE — 97140 MANUAL THERAPY 1/> REGIONS: CPT | Performed by: PHYSICAL THERAPIST

## 2019-09-16 NOTE — PROGRESS NOTES
Dx: Right arm pain (M79.601)  Chronic bilateral low back pain without sciatica (M54.5,G89.29)  Chronic left shoulder pain (M25.512,G89.29)  Chronic neck pain (M54.2,G89.29)         Insurance (Authorized # of Visits):  6           Authorizing Physician:  9/9/19    TX#: 4/8 Date:         9/12/19      TX#: 5/8 Date:    Tx#: 6/   UBE x4' (2,2)    UBE x5' (2,3) UBE x5' (2,3) UBE x5' (2,3) UBE x5' (2,3)   TB:  Red   Rows  x20  Ext x15  IR x15  ER  x10 * pain TB:  Red   Rows  x20  Ext x15  IR x15  ER  x10 * pain

## 2019-09-18 ENCOUNTER — HOSPITAL ENCOUNTER (OUTPATIENT)
Dept: ULTRASOUND IMAGING | Facility: HOSPITAL | Age: 39
Discharge: HOME | End: 2019-09-18
Attending: ORTHOPAEDIC SURGERY
Payer: COMMERCIAL

## 2019-09-18 DIAGNOSIS — M67.922 BICEPS TENDINOPATHY, LEFT: ICD-10-CM

## 2019-09-18 LAB — HPV I/H RISK 1 DNA SPEC QL NAA+PROBE: NEGATIVE

## 2019-09-18 PROCEDURE — 20611 DRAIN/INJ JOINT/BURSA W/US: CPT | Performed by: ORTHOPAEDIC SURGERY

## 2019-09-18 RX ORDER — METHYLPREDNISOLONE ACETATE 80 MG/ML
INJECTION, SUSPENSION INTRA-ARTICULAR; INTRALESIONAL; INTRAMUSCULAR; SOFT TISSUE
Status: DISPENSED
Start: 2019-09-18 | End: 2019-09-18

## 2019-09-19 ENCOUNTER — APPOINTMENT (OUTPATIENT)
Dept: PHYSICAL THERAPY | Facility: HOSPITAL | Age: 39
End: 2019-09-19
Payer: COMMERCIAL

## 2019-09-21 ENCOUNTER — HOSPITAL ENCOUNTER (OUTPATIENT)
Dept: MAMMOGRAPHY | Age: 39
Discharge: HOME | End: 2019-09-21
Attending: FAMILY MEDICINE
Payer: COMMERCIAL

## 2019-09-21 DIAGNOSIS — Z12.31 ENCOUNTER FOR SCREENING MAMMOGRAM FOR MALIGNANT NEOPLASM OF BREAST: ICD-10-CM

## 2019-09-21 PROCEDURE — 77067 SCR MAMMO BI INCL CAD: CPT | Performed by: FAMILY MEDICINE

## 2019-09-21 PROCEDURE — 77063 BREAST TOMOSYNTHESIS BI: CPT | Performed by: FAMILY MEDICINE

## 2019-09-25 ENCOUNTER — APPOINTMENT (OUTPATIENT)
Dept: PHYSICAL THERAPY | Facility: HOSPITAL | Age: 39
End: 2019-09-25
Attending: FAMILY MEDICINE
Payer: COMMERCIAL

## 2019-10-02 ENCOUNTER — APPOINTMENT (OUTPATIENT)
Dept: PHYSICAL THERAPY | Facility: HOSPITAL | Age: 39
End: 2019-10-02
Attending: FAMILY MEDICINE
Payer: COMMERCIAL

## 2019-10-02 PROBLEM — M75.52 BURSITIS OF SHOULDER, LEFT: Status: ACTIVE | Noted: 2019-10-02

## 2019-10-14 ENCOUNTER — APPOINTMENT (OUTPATIENT)
Dept: PHYSICAL THERAPY | Facility: HOSPITAL | Age: 39
End: 2019-10-14
Attending: FAMILY MEDICINE
Payer: COMMERCIAL

## 2019-10-17 ENCOUNTER — APPOINTMENT (OUTPATIENT)
Dept: PHYSICAL THERAPY | Facility: HOSPITAL | Age: 39
End: 2019-10-17
Attending: INTERNAL MEDICINE
Payer: COMMERCIAL

## 2019-10-30 ENCOUNTER — OFFICE VISIT (OUTPATIENT)
Dept: NEPHROLOGY | Facility: CLINIC | Age: 39
End: 2019-10-30

## 2019-10-30 VITALS — DIASTOLIC BLOOD PRESSURE: 78 MMHG | BODY MASS INDEX: 22 KG/M2 | SYSTOLIC BLOOD PRESSURE: 120 MMHG | WEIGHT: 127 LBS

## 2019-10-30 DIAGNOSIS — R31.29 MICROSCOPIC HEMATURIA: Primary | ICD-10-CM

## 2019-10-30 PROCEDURE — 99243 OFF/OP CNSLTJ NEW/EST LOW 30: CPT | Performed by: INTERNAL MEDICINE

## 2019-10-30 NOTE — PROGRESS NOTES
Nephrology Consult Note    REASON FOR CONSULT: Microscopic hematuria    ASSESSMENT/PLAN:        1) Microscopic hematuria- per pt has been dipstick positive for at least 10 years; interestingly, only the last several urinalyses include a microscopic analysi living in [facility], she was thought to have rheumatic fever and was on antibiotics for some time. Denies any history of acute or chronic renal failure gross hematuria proteinuria kidney stones or infections.   No history of cardiac pulmonary or hepatic History    Socioeconomic History      Marital status:       Spouse name: Not on file      Number of children: Not on file      Years of education: Not on file      Highest education level: Not on file    Tobacco Use      Smoking status: Never Smoker

## 2019-11-12 ENCOUNTER — TELEPHONE (OUTPATIENT)
Dept: FAMILY MEDICINE CLINIC | Facility: CLINIC | Age: 39
End: 2019-11-12

## 2019-11-12 ENCOUNTER — OFFICE VISIT (OUTPATIENT)
Dept: FAMILY MEDICINE CLINIC | Facility: CLINIC | Age: 39
End: 2019-11-12

## 2019-11-12 VITALS
HEART RATE: 76 BPM | SYSTOLIC BLOOD PRESSURE: 120 MMHG | BODY MASS INDEX: 22.23 KG/M2 | RESPIRATION RATE: 16 BRPM | DIASTOLIC BLOOD PRESSURE: 70 MMHG | WEIGHT: 127 LBS | OXYGEN SATURATION: 97 % | HEIGHT: 63.5 IN | TEMPERATURE: 98 F

## 2019-11-12 DIAGNOSIS — G89.29 CHRONIC NECK PAIN: Primary | ICD-10-CM

## 2019-11-12 DIAGNOSIS — M54.2 NECK PAIN: ICD-10-CM

## 2019-11-12 DIAGNOSIS — G89.29 CHRONIC BILATERAL LOW BACK PAIN WITHOUT SCIATICA: ICD-10-CM

## 2019-11-12 DIAGNOSIS — Z79.899 MEDICATION MANAGEMENT: ICD-10-CM

## 2019-11-12 DIAGNOSIS — G89.29 CHRONIC LEFT SHOULDER PAIN: ICD-10-CM

## 2019-11-12 DIAGNOSIS — R76.8 POSITIVE ANA (ANTINUCLEAR ANTIBODY): ICD-10-CM

## 2019-11-12 DIAGNOSIS — M25.512 CHRONIC LEFT SHOULDER PAIN: ICD-10-CM

## 2019-11-12 DIAGNOSIS — M54.2 CHRONIC NECK PAIN: Primary | ICD-10-CM

## 2019-11-12 DIAGNOSIS — M54.50 CHRONIC BILATERAL LOW BACK PAIN WITHOUT SCIATICA: ICD-10-CM

## 2019-11-12 PROCEDURE — 99214 OFFICE O/P EST MOD 30 MIN: CPT | Performed by: FAMILY MEDICINE

## 2019-11-12 RX ORDER — HYDROCODONE BITARTRATE AND ACETAMINOPHEN 10; 325 MG/1; MG/1
1 TABLET ORAL DAILY PRN
Qty: 30 TABLET | Refills: 0 | Status: SHIPPED | OUTPATIENT
Start: 2019-11-12 | End: 2020-01-08 | Stop reason: DRUGHIGH

## 2019-11-12 RX ORDER — TRAMADOL HYDROCHLORIDE 50 MG/1
100 TABLET ORAL EVERY 8 HOURS PRN
Qty: 180 TABLET | Refills: 2 | Status: SHIPPED | OUTPATIENT
Start: 2019-11-28 | End: 2020-02-21

## 2019-11-12 RX ORDER — TRAMADOL HYDROCHLORIDE 100 MG/1
1 CAPSULE ORAL DAILY PRN
Qty: 30 CAPSULE | Refills: 2 | Status: SHIPPED | OUTPATIENT
Start: 2019-11-28 | End: 2019-11-12

## 2019-11-12 NOTE — TELEPHONE ENCOUNTER
Insurance will not cover traMADol HCl  MG Oral Capsule SR 24 Hr. They will cover regular Tramadol. Please send.

## 2019-11-12 NOTE — PROGRESS NOTES
Isela Barr is a 37year old female. HPI:   Patient is here to follow-up on her back pain and left shoulder pain. Dr. Marlon Varela wants to attaches the tendon in a different spot and see if that will help her pain. Pain is 7/10.   Today she notes pain in contraceptives    • Other screening mammogram    • Routine Papanicolaou smear       Social History:  Social History    Tobacco Use      Smoking status: Never Smoker      Smokeless tobacco: Never Used    Alcohol use: No    Drug use: No       Results for ord intraepithelial lesion or malignancy Negative for intraepithelial lesion or malignancy    Specimen Adequacy       Satisfactory for evaluation.  Endocervical or metaplastic cells present    General Categorization Negative for intraepithelial lesion or malign SpO2 97%   BMI 22.14 kg/m²   GENERAL: well developed, well nourished,in no apparent distress  SKIN: no rashes,no suspicious lesions  HEENT: atraumatic, normocephalic,ears and throat are clear  NECK: supple,no adenopathy,no bruits  LUNGS: clear to auscultat

## 2019-11-14 ENCOUNTER — APPOINTMENT (OUTPATIENT)
Dept: LAB | Age: 39
End: 2019-11-14
Attending: INTERNAL MEDICINE
Payer: COMMERCIAL

## 2019-11-14 ENCOUNTER — HOSPITAL ENCOUNTER (OUTPATIENT)
Dept: GENERAL RADIOLOGY | Age: 39
Discharge: HOME | End: 2019-11-14
Attending: FAMILY MEDICINE
Payer: COMMERCIAL

## 2019-11-14 DIAGNOSIS — R31.21 ASYMPTOMATIC MICROSCOPIC HEMATURIA: ICD-10-CM

## 2019-11-14 DIAGNOSIS — M54.2 NECK PAIN: ICD-10-CM

## 2019-11-14 PROCEDURE — 82565 ASSAY OF CREATININE: CPT

## 2019-11-14 PROCEDURE — 72050 X-RAY EXAM NECK SPINE 4/5VWS: CPT | Performed by: FAMILY MEDICINE

## 2019-11-14 PROCEDURE — 36415 COLL VENOUS BLD VENIPUNCTURE: CPT

## 2019-11-19 ENCOUNTER — HOSPITAL ENCOUNTER (OUTPATIENT)
Dept: ULTRASOUND IMAGING | Age: 39
Discharge: HOME | End: 2019-11-19
Attending: OBSTETRICS & GYNECOLOGY
Payer: COMMERCIAL

## 2019-11-19 DIAGNOSIS — N83.292 COMPLEX CYST OF LEFT OVARY: ICD-10-CM

## 2019-11-19 PROCEDURE — 76856 US EXAM PELVIC COMPLETE: CPT | Performed by: OBSTETRICS & GYNECOLOGY

## 2019-11-19 PROCEDURE — 76830 TRANSVAGINAL US NON-OB: CPT | Performed by: OBSTETRICS & GYNECOLOGY

## 2019-11-21 DIAGNOSIS — N83.201 CYST OF RIGHT OVARY: Primary | ICD-10-CM

## 2019-11-21 NOTE — PROGRESS NOTES
Patient aware of ultrasound results and recommendation to repeat in 2 months. Order placed. Patient may call central scheduling at [phone] at her convenience. Understanding verbalized.

## 2020-01-13 ENCOUNTER — OFFICE VISIT (OUTPATIENT)
Dept: FAMILY MEDICINE CLINIC | Facility: CLINIC | Age: 40
End: 2020-01-13

## 2020-01-13 VITALS
RESPIRATION RATE: 14 BRPM | HEIGHT: 64 IN | WEIGHT: 125 LBS | HEART RATE: 72 BPM | DIASTOLIC BLOOD PRESSURE: 60 MMHG | BODY MASS INDEX: 21.34 KG/M2 | SYSTOLIC BLOOD PRESSURE: 100 MMHG

## 2020-01-13 DIAGNOSIS — M54.2 NECK PAIN: Primary | ICD-10-CM

## 2020-01-13 DIAGNOSIS — M62.838 MUSCLE SPASM: ICD-10-CM

## 2020-01-13 DIAGNOSIS — M79.2 NEURALGIA: ICD-10-CM

## 2020-01-13 PROCEDURE — 99213 OFFICE O/P EST LOW 20 MIN: CPT | Performed by: FAMILY MEDICINE

## 2020-01-13 RX ORDER — METAXALONE 800 MG/1
800 TABLET ORAL 3 TIMES DAILY
Qty: 30 TABLET | Refills: 1 | Status: SHIPPED | OUTPATIENT
Start: 2020-01-13 | End: 2020-02-02

## 2020-01-13 NOTE — PROGRESS NOTES
Myrna Newby is a 37year old female. HPI:   Patient complains of neck pain for the past several months.   She states that since having left shoulder surgery she has noted more discomfort in the left side of her neck but she was at a conference this week tobacco: Never Used    Alcohol use: No    Drug use: No       Results for orders placed or performed in visit on 11/14/19   CREATININE, SERUM   Result Value Ref Range    Creatinine 0.98 0.55 - 1.02 mg/dL    GFR, Non- 72 >=60    GFR, - 2/24/2020) for neck pain.

## 2020-01-16 ENCOUNTER — OFFICE VISIT (OUTPATIENT)
Dept: PHYSICAL THERAPY | Facility: HOSPITAL | Age: 40
End: 2020-01-16
Attending: FAMILY MEDICINE
Payer: COMMERCIAL

## 2020-01-16 DIAGNOSIS — M54.2 NECK PAIN: ICD-10-CM

## 2020-01-16 DIAGNOSIS — M62.838 MUSCLE SPASM: ICD-10-CM

## 2020-01-16 DIAGNOSIS — M79.2 NEURALGIA: ICD-10-CM

## 2020-01-16 PROCEDURE — 97140 MANUAL THERAPY 1/> REGIONS: CPT

## 2020-01-16 PROCEDURE — 97162 PT EVAL MOD COMPLEX 30 MIN: CPT

## 2020-01-17 ENCOUNTER — HOSPITAL ENCOUNTER (OUTPATIENT)
Dept: ULTRASOUND IMAGING | Facility: HOSPITAL | Age: 40
Discharge: HOME | End: 2020-01-17
Attending: OBSTETRICS & GYNECOLOGY
Payer: COMMERCIAL

## 2020-01-17 DIAGNOSIS — N83.201 CYST OF RIGHT OVARY: ICD-10-CM

## 2020-01-17 PROCEDURE — 76856 US EXAM PELVIC COMPLETE: CPT | Performed by: OBSTETRICS & GYNECOLOGY

## 2020-01-17 PROCEDURE — 76830 TRANSVAGINAL US NON-OB: CPT | Performed by: OBSTETRICS & GYNECOLOGY

## 2020-01-17 NOTE — PROGRESS NOTES
POST-OP SHOULDER EVALUATION:   Referring Physician: Dr. Raman Herrera  Diagnosis: Neck Pain, S/P biceps tenodesis revision 12/5/2019     Date of Service: 1/16/2020     PATIENT SUMMARY   Jillian Naranjo is a 37year old female who presents to therapy today s/p L history of adhesive capsulitis. Pt goals include to be pain free. Past medical history was reviewed with [id number].  Significant findings include neuropathic pain, carpal tunnel syndrome, eczema, lateral epicondylitis, chronic bilateral low back pain without lateral shoulder, no radiating paresthesias into the upper extremity    AROM: (* denotes performed with pain)  Shoulder (elbow flexed on L) Elbow   Flexion: R 165; L 110*  Abduction: R 165; L 90*  ER: R 80; L 65*  IR: R L4; L L4 Flexion: R full; L full  Ex changing pain levels.   PLAN OF CARE:    Goals: (To be met in 8 visits)   · Pt will report improved ability to sleep without waking due to shoulder pain  · Pt will improve shoulder flexion AROM to >120 degrees to be able to reach into overhead cabinets with

## 2020-01-22 ENCOUNTER — OFFICE VISIT (OUTPATIENT)
Dept: PHYSICAL THERAPY | Facility: HOSPITAL | Age: 40
End: 2020-01-22
Attending: ORTHOPAEDIC SURGERY
Payer: COMMERCIAL

## 2020-01-22 PROCEDURE — 97110 THERAPEUTIC EXERCISES: CPT

## 2020-01-22 PROCEDURE — 97140 MANUAL THERAPY 1/> REGIONS: CPT

## 2020-01-22 NOTE — PROGRESS NOTES
Dx: Neck Pain, S/P biceps tenodesis revision 12/5/2019         Insurance (Authorized # of Visits):  8 (HMO)  Authorizing Physician: Dr. Rahat Frost  Next MD visit: none scheduled  Fall Risk: standard         Precautions: n/a             Subjective:  The patient desensitization  Date: 1/22/2020  TX#: 2/8 Date:                 TX#: 3/ Date:                 TX#: 4/ Date:                 TX#: 5/ Date:    Tx#: 6/   There ex  Shoulder AAROM into flexion hands together 10x  Swiss ball rolls on table 10x for shoulder flex

## 2020-01-24 ENCOUNTER — OFFICE VISIT (OUTPATIENT)
Dept: PHYSICAL THERAPY | Facility: HOSPITAL | Age: 40
End: 2020-01-24
Attending: ORTHOPAEDIC SURGERY
Payer: COMMERCIAL

## 2020-01-24 PROCEDURE — 97140 MANUAL THERAPY 1/> REGIONS: CPT

## 2020-01-24 PROCEDURE — 97110 THERAPEUTIC EXERCISES: CPT

## 2020-01-24 NOTE — PROGRESS NOTES
Dx: Neck Pain, S/P biceps tenodesis revision 12/5/2019         Insurance (Authorized # of Visits):  8 (HMO)  Authorizing Physician: Dr. Dionicio Minor  Next MD visit: none scheduled  Fall Risk: standard         Precautions: n/a             Subjective:  The patient TX#: 5/ Date:    Tx#: 6/   There ex  Shoulder AAROM into flexion hands together 10x  Swiss ball rolls on table 10x for shoulder flexion  Shoulder stretching ER with dowel 2x5  Levator scapulae stretching 3x10 sec There ex  Pulleys flexion/scaption/ER 1

## 2020-01-27 ENCOUNTER — TELEPHONE (OUTPATIENT)
Dept: OBGYN CLINIC | Facility: CLINIC | Age: 40
End: 2020-01-27

## 2020-01-27 DIAGNOSIS — N83.201 CYST OF RIGHT OVARY: Primary | ICD-10-CM

## 2020-01-27 NOTE — TELEPHONE ENCOUNTER
Patient called in requesting to speak to nurse regarding US results from 1/17/20. Please call back to discuss.

## 2020-01-28 ENCOUNTER — LAB ENCOUNTER (OUTPATIENT)
Dept: LAB | Age: 40
End: 2020-01-28
Attending: OBSTETRICS & GYNECOLOGY
Payer: COMMERCIAL

## 2020-01-28 DIAGNOSIS — N83.201 CYST OF RIGHT OVARY: ICD-10-CM

## 2020-01-28 PROBLEM — M54.2 NECK PAIN: Status: ACTIVE | Noted: 2020-01-28

## 2020-01-28 LAB — CANCER AG125 SERPL-ACNC: 49.8 U/ML (ref ?–35)

## 2020-01-28 PROCEDURE — 36415 COLL VENOUS BLD VENIPUNCTURE: CPT

## 2020-01-28 PROCEDURE — 86304 IMMUNOASSAY TUMOR CA 125: CPT

## 2020-01-28 NOTE — PROGRESS NOTES
Patient aware of US results and recommendations. Order for blood work placed. Appointment schedule.  Patient verbalizes understanding

## 2020-01-29 ENCOUNTER — OFFICE VISIT (OUTPATIENT)
Dept: PHYSICAL THERAPY | Facility: HOSPITAL | Age: 40
End: 2020-01-29
Attending: ORTHOPAEDIC SURGERY
Payer: COMMERCIAL

## 2020-01-29 PROCEDURE — 97140 MANUAL THERAPY 1/> REGIONS: CPT

## 2020-01-29 PROCEDURE — 97110 THERAPEUTIC EXERCISES: CPT

## 2020-01-29 NOTE — PROGRESS NOTES
Dx: Neck Pain, S/P biceps tenodesis revision 12/5/2019         Insurance (Authorized # of Visits):  8 (HMO)  Authorizing Physician: Dr. Kilpatrick Gamaliel  Next MD visit: End of Feb for ortho, Feb 10 for physiatry  Fall Risk: standard         Precautions: MD Order: 2 or restriction  · Pt will improve shoulder abduction AROM to >120 degrees to improve ability to don deodorant, don/doff shirts, and wash hair   · Pt will improve shoulder strength throughout to > 4/5 to improve function with ADLs including household chores

## 2020-01-30 ENCOUNTER — OFFICE VISIT (OUTPATIENT)
Dept: OBGYN CLINIC | Facility: CLINIC | Age: 40
End: 2020-01-30

## 2020-01-30 VITALS
WEIGHT: 127 LBS | HEART RATE: 89 BPM | DIASTOLIC BLOOD PRESSURE: 70 MMHG | RESPIRATION RATE: 16 BRPM | TEMPERATURE: 98 F | BODY MASS INDEX: 21.68 KG/M2 | HEIGHT: 64 IN | SYSTOLIC BLOOD PRESSURE: 110 MMHG

## 2020-01-30 DIAGNOSIS — N83.209 CYST OF OVARY, UNSPECIFIED LATERALITY: Primary | ICD-10-CM

## 2020-01-30 PROCEDURE — 99213 OFFICE O/P EST LOW 20 MIN: CPT | Performed by: OBSTETRICS & GYNECOLOGY

## 2020-01-30 NOTE — PROGRESS NOTES
Patient and her  were in the room. Ultrasound is no decrease in the size of the complex cyst after 2 months. Her Ca1 25 was elevated. She is having pain. She is having nausea. 3 children is not considering another one at this time.   Laparoscopy

## 2020-01-31 ENCOUNTER — OFFICE VISIT (OUTPATIENT)
Dept: PHYSICAL THERAPY | Facility: HOSPITAL | Age: 40
End: 2020-01-31
Attending: ORTHOPAEDIC SURGERY
Payer: COMMERCIAL

## 2020-01-31 DIAGNOSIS — N83.201 CYST OF RIGHT OVARY: Primary | ICD-10-CM

## 2020-01-31 PROCEDURE — 97110 THERAPEUTIC EXERCISES: CPT

## 2020-01-31 PROCEDURE — 97140 MANUAL THERAPY 1/> REGIONS: CPT

## 2020-01-31 NOTE — PROGRESS NOTES
Patient aware of results, informed during appointment with Dr. Jenna Beach. Surgery schedule on 2/19/2020 at 0730 am. Patient aware and verbalizes understanding.

## 2020-01-31 NOTE — PROGRESS NOTES
Dx: Neck Pain, S/P biceps tenodesis revision 12/5/2019         Insurance (Authorized # of Visits):  8 (HMO)  Authorizing Physician: Dr. Bernarda Fortune  Next MD visit: End of Feb for ortho, Feb 10 for physiatry  Fall Risk: standard         Precautions: MD Order: 2 cooking meals  · Pt will be independent and compliant with comprehensive HEP to maintain progress achieved in PT     Plan: progress strength/ROM as able  Date: 1/22/2020  TX#: 2/8 Date: 1/24/2020             TX#: 3/8 Date: 1/29/2020             TX#: 4/8 Da tucks, s/l ER 2#, 2x8-12    Charges: man there x2, there ex x1       Total Timed Treatment: 45 min  Total Treatment Time: 45 min

## 2020-02-03 ENCOUNTER — OFFICE VISIT (OUTPATIENT)
Dept: PHYSICAL THERAPY | Facility: HOSPITAL | Age: 40
End: 2020-02-03
Attending: ORTHOPAEDIC SURGERY
Payer: COMMERCIAL

## 2020-02-03 PROCEDURE — 97110 THERAPEUTIC EXERCISES: CPT

## 2020-02-03 PROCEDURE — 97140 MANUAL THERAPY 1/> REGIONS: CPT

## 2020-02-03 NOTE — PROGRESS NOTES
Dx: Neck Pain, S/P biceps tenodesis revision 12/5/2019         Insurance (Authorized # of Visits):  8 (HMO)  Authorizing Physician: Dr. Vipin Montano  Next MD visit: End of Feb for ortho, Feb 10 for physiatry  Fall Risk: standard         Precautions: MD Order: 2 flexion AROM to >120 degrees to be able to reach into overhead cabinets without pain or restriction  · Pt will improve shoulder abduction AROM to >120 degrees to improve ability to don deodorant, don/doff shirts, and wash hair   · Pt will improve shoulder min  Shoulder PROM into flexion, scaption, ER 12 min  Desensitization to distal aspect of upper arm, 4 min Man there  P-A mobs grade II-III in upper thoracic spine 7 min  Shoulder PROM into flexion, scaption, ER 12 min  Desensitization to distal aspect of

## 2020-02-06 ENCOUNTER — OFFICE VISIT (OUTPATIENT)
Dept: PHYSICAL THERAPY | Facility: HOSPITAL | Age: 40
End: 2020-02-06
Attending: ORTHOPAEDIC SURGERY
Payer: COMMERCIAL

## 2020-02-06 PROCEDURE — 97110 THERAPEUTIC EXERCISES: CPT

## 2020-02-06 PROCEDURE — 97140 MANUAL THERAPY 1/> REGIONS: CPT

## 2020-02-06 NOTE — PROGRESS NOTES
Progress Summary  Pt has attended 7 visits in Physical Therapy.      Dx: Neck Pain, S/P biceps tenodesis revision 12/5/2019         Insurance (Authorized # of Visits):  8 (HMO)  Authorizing Physician: Dr. Georgi Higgins  Next MD visit: End of Feb for ortho, Feb 1 throughout to > 4+/5 to improve function with ADLs including household chores and cooking meals PROGRESSING  · Pt will be independent and compliant with comprehensive HEP to maintain progress achieved in PT PROGRESSING    FOTO: 44    Plan: Continue skilled TB ER, 12  Red TB, 12  scaption 1#, 10  2#, 3x6    Supine red TB horizontal abduction, 2x10  S/l shoulder flexion w/ wand and scapular assist, 20 There ex  Yellow TB ER, 2x10  Yellow TB IR, 2x10  Biceps curl yellow TB 2x12  Shoulder scaption 2# weight 5x,

## 2020-02-12 ENCOUNTER — APPOINTMENT (OUTPATIENT)
Dept: PHYSICAL THERAPY | Facility: HOSPITAL | Age: 40
End: 2020-02-12
Attending: ORTHOPAEDIC SURGERY
Payer: COMMERCIAL

## 2020-02-14 ENCOUNTER — OFFICE VISIT (OUTPATIENT)
Dept: PHYSICAL THERAPY | Facility: HOSPITAL | Age: 40
End: 2020-02-14
Attending: ORTHOPAEDIC SURGERY
Payer: COMMERCIAL

## 2020-02-14 PROCEDURE — 97140 MANUAL THERAPY 1/> REGIONS: CPT

## 2020-02-14 PROCEDURE — 97110 THERAPEUTIC EXERCISES: CPT

## 2020-02-14 NOTE — PROGRESS NOTES
Dx: Neck Pain, S/P biceps tenodesis revision 12/5/2019         Insurance (Authorized # of Visits):  12 through 3/31 [facility])  Authorizing Physician: Dr. Fatimah Lazo  Next MD visit: End of Feb for ortho, Feb 10 for physiatry  Fall Risk: standard         Precaution deodorant, don/doff shirts, and wash hair MET  · Pt will improve shoulder strength throughout to > 4+/5 to improve function with ADLs including household chores and cooking meals PROGRESSING  · Pt will be independent and compliant with comprehensive HEP to 2# weight 2x8 There ex  UBE 2 min forward, 2 min retro, lv 3  Wall walks, 3 laps  W/ yellow TB, 3 laps  Yellow TB ER, 12  Red TB, 12  scaption 1#, 10  2#, 3x6    Supine red TB horizontal abduction, 2x10  S/l shoulder flexion w/ wand and scapular assist, 20 min  Total Treatment Time: 45 min

## 2020-02-15 ENCOUNTER — APPOINTMENT (OUTPATIENT)
Dept: LAB | Age: 40
End: 2020-02-15
Payer: COMMERCIAL

## 2020-02-15 DIAGNOSIS — N28.1 CYST OF RIGHT KIDNEY: ICD-10-CM

## 2020-02-15 LAB
ANION GAP SERPL CALC-SCNC: 3 MMOL/L (ref 0–18)
BUN BLD-MCNC: 8 MG/DL (ref 7–18)
BUN/CREAT SERPL: 8.4 (ref 10–20)
CALCIUM BLD-MCNC: 8.8 MG/DL (ref 8.5–10.1)
CHLORIDE SERPL-SCNC: 106 MMOL/L (ref 98–112)
CO2 SERPL-SCNC: 30 MMOL/L (ref 21–32)
CREAT BLD-MCNC: 0.95 MG/DL (ref 0.55–1.02)
DEPRECATED RDW RBC AUTO: 38.6 FL (ref 35.1–46.3)
ERYTHROCYTE [DISTWIDTH] IN BLOOD BY AUTOMATED COUNT: 11.6 % (ref 11–15)
GLUCOSE BLD-MCNC: 97 MG/DL (ref 70–99)
HCT VFR BLD AUTO: 39.3 % (ref 35–48)
HGB BLD-MCNC: 13 G/DL (ref 12–16)
MCH RBC QN AUTO: 30.1 PG (ref 26–34)
MCHC RBC AUTO-ENTMCNC: 33.1 G/DL (ref 31–37)
MCV RBC AUTO: 91 FL (ref 80–100)
OSMOLALITY SERPL CALC.SUM OF ELEC: 286 MOSM/KG (ref 275–295)
PLATELET # BLD AUTO: 267 10(3)UL (ref 150–450)
POTASSIUM SERPL-SCNC: 3.8 MMOL/L (ref 3.5–5.1)
RBC # BLD AUTO: 4.32 X10(6)UL (ref 3.8–5.3)
SODIUM SERPL-SCNC: 139 MMOL/L (ref 136–145)
WBC # BLD AUTO: 6.9 X10(3) UL (ref 4–11)

## 2020-02-15 PROCEDURE — 80048 BASIC METABOLIC PNL TOTAL CA: CPT

## 2020-02-15 PROCEDURE — 36415 COLL VENOUS BLD VENIPUNCTURE: CPT

## 2020-02-15 PROCEDURE — 85027 COMPLETE CBC AUTOMATED: CPT

## 2020-02-19 ENCOUNTER — ANESTHESIA (OUTPATIENT)
Dept: SURGERY | Facility: HOSPITAL | Age: 40
End: 2020-02-19
Payer: COMMERCIAL

## 2020-02-19 ENCOUNTER — HOSPITAL ENCOUNTER (OUTPATIENT)
Facility: HOSPITAL | Age: 40
Setting detail: HOSPITAL OUTPATIENT SURGERY
Discharge: HOME | End: 2020-02-19
Attending: OBSTETRICS & GYNECOLOGY | Admitting: OBSTETRICS & GYNECOLOGY
Payer: COMMERCIAL

## 2020-02-19 ENCOUNTER — ANESTHESIA EVENT (OUTPATIENT)
Dept: SURGERY | Facility: HOSPITAL | Age: 40
End: 2020-02-19
Payer: COMMERCIAL

## 2020-02-19 VITALS
OXYGEN SATURATION: 99 % | BODY MASS INDEX: 21.68 KG/M2 | DIASTOLIC BLOOD PRESSURE: 73 MMHG | TEMPERATURE: 98 F | SYSTOLIC BLOOD PRESSURE: 101 MMHG | RESPIRATION RATE: 16 BRPM | WEIGHT: 127 LBS | HEART RATE: 85 BPM | HEIGHT: 64 IN

## 2020-02-19 DIAGNOSIS — N28.1 CYST OF RIGHT KIDNEY: Primary | ICD-10-CM

## 2020-02-19 DIAGNOSIS — N83.201 CYST OF RIGHT OVARY: ICD-10-CM

## 2020-02-19 DIAGNOSIS — N80.1 CHOCOLATE CYST OF OVARY: ICD-10-CM

## 2020-02-19 LAB
POCT LOT NUMBER: NORMAL
POCT URINE PREGNANCY: NEGATIVE

## 2020-02-19 PROCEDURE — 0UT04ZZ RESECTION OF RIGHT OVARY, PERCUTANEOUS ENDOSCOPIC APPROACH: ICD-10-PCS | Performed by: OBSTETRICS & GYNECOLOGY

## 2020-02-19 PROCEDURE — 58661 LAPAROSCOPY REMOVE ADNEXA: CPT | Performed by: OBSTETRICS & GYNECOLOGY

## 2020-02-19 PROCEDURE — 0UT54ZZ RESECTION OF RIGHT FALLOPIAN TUBE, PERCUTANEOUS ENDOSCOPIC APPROACH: ICD-10-PCS | Performed by: OBSTETRICS & GYNECOLOGY

## 2020-02-19 RX ORDER — MIDAZOLAM HYDROCHLORIDE 1 MG/ML
1 INJECTION INTRAMUSCULAR; INTRAVENOUS EVERY 5 MIN PRN
Status: DISCONTINUED | OUTPATIENT
Start: 2020-02-19 | End: 2020-02-19

## 2020-02-19 RX ORDER — ACETAMINOPHEN 500 MG
1000 TABLET ORAL ONCE
COMMUNITY
End: 2020-05-06

## 2020-02-19 RX ORDER — ONDANSETRON 2 MG/ML
4 INJECTION INTRAMUSCULAR; INTRAVENOUS AS NEEDED
Status: DISCONTINUED | OUTPATIENT
Start: 2020-02-19 | End: 2020-02-19

## 2020-02-19 RX ORDER — SODIUM CHLORIDE, SODIUM LACTATE, POTASSIUM CHLORIDE, CALCIUM CHLORIDE 600; 310; 30; 20 MG/100ML; MG/100ML; MG/100ML; MG/100ML
INJECTION, SOLUTION INTRAVENOUS CONTINUOUS
Status: DISCONTINUED | OUTPATIENT
Start: 2020-02-19 | End: 2020-02-19

## 2020-02-19 RX ORDER — CEFAZOLIN SODIUM/WATER 2 G/20 ML
2 SYRINGE (ML) INTRAVENOUS ONCE
Status: COMPLETED | OUTPATIENT
Start: 2020-02-19 | End: 2020-02-19

## 2020-02-19 RX ORDER — DEXAMETHASONE SODIUM PHOSPHATE 4 MG/ML
VIAL (ML) INJECTION AS NEEDED
Status: DISCONTINUED | OUTPATIENT
Start: 2020-02-19 | End: 2020-02-19 | Stop reason: SURG

## 2020-02-19 RX ORDER — ACETAMINOPHEN 500 MG
1000 TABLET ORAL ONCE
Status: DISCONTINUED | OUTPATIENT
Start: 2020-02-19 | End: 2020-02-19 | Stop reason: HOSPADM

## 2020-02-19 RX ORDER — MEPERIDINE HYDROCHLORIDE 25 MG/ML
12.5 INJECTION INTRAMUSCULAR; INTRAVENOUS; SUBCUTANEOUS AS NEEDED
Status: COMPLETED | OUTPATIENT
Start: 2020-02-19 | End: 2020-02-19

## 2020-02-19 RX ORDER — KETOROLAC TROMETHAMINE 30 MG/ML
INJECTION, SOLUTION INTRAMUSCULAR; INTRAVENOUS AS NEEDED
Status: DISCONTINUED | OUTPATIENT
Start: 2020-02-19 | End: 2020-02-19 | Stop reason: SURG

## 2020-02-19 RX ORDER — ACETAMINOPHEN 500 MG
1000 TABLET ORAL ONCE AS NEEDED
Status: DISCONTINUED | OUTPATIENT
Start: 2020-02-19 | End: 2020-02-19

## 2020-02-19 RX ORDER — HYDROCODONE BITARTRATE AND ACETAMINOPHEN 5; 325 MG/1; MG/1
1 TABLET ORAL AS NEEDED
Status: COMPLETED | OUTPATIENT
Start: 2020-02-19 | End: 2020-02-19

## 2020-02-19 RX ORDER — MEPERIDINE HYDROCHLORIDE 25 MG/ML
INJECTION INTRAMUSCULAR; INTRAVENOUS; SUBCUTANEOUS
Status: COMPLETED
Start: 2020-02-19 | End: 2020-02-19

## 2020-02-19 RX ORDER — ROCURONIUM BROMIDE 10 MG/ML
INJECTION, SOLUTION INTRAVENOUS AS NEEDED
Status: DISCONTINUED | OUTPATIENT
Start: 2020-02-19 | End: 2020-02-19 | Stop reason: SURG

## 2020-02-19 RX ORDER — BUPIVACAINE HYDROCHLORIDE 5 MG/ML
INJECTION, SOLUTION EPIDURAL; INTRACAUDAL AS NEEDED
Status: DISCONTINUED | OUTPATIENT
Start: 2020-02-19 | End: 2020-02-19 | Stop reason: HOSPADM

## 2020-02-19 RX ORDER — HYDROMORPHONE HYDROCHLORIDE 1 MG/ML
0.4 INJECTION, SOLUTION INTRAMUSCULAR; INTRAVENOUS; SUBCUTANEOUS EVERY 5 MIN PRN
Status: DISCONTINUED | OUTPATIENT
Start: 2020-02-19 | End: 2020-02-19

## 2020-02-19 RX ORDER — HYDROCODONE BITARTRATE AND ACETAMINOPHEN 5; 325 MG/1; MG/1
2 TABLET ORAL AS NEEDED
Status: COMPLETED | OUTPATIENT
Start: 2020-02-19 | End: 2020-02-19

## 2020-02-19 RX ORDER — HYDROCODONE BITARTRATE AND ACETAMINOPHEN 5; 325 MG/1; MG/1
1-2 TABLET ORAL EVERY 4 HOURS PRN
Qty: 20 TABLET | Refills: 0 | Status: SHIPPED | OUTPATIENT
Start: 2020-02-19 | End: 2020-02-24

## 2020-02-19 RX ORDER — NALOXONE HYDROCHLORIDE 0.4 MG/ML
80 INJECTION, SOLUTION INTRAMUSCULAR; INTRAVENOUS; SUBCUTANEOUS AS NEEDED
Status: DISCONTINUED | OUTPATIENT
Start: 2020-02-19 | End: 2020-02-19

## 2020-02-19 RX ORDER — ONDANSETRON 2 MG/ML
INJECTION INTRAMUSCULAR; INTRAVENOUS AS NEEDED
Status: DISCONTINUED | OUTPATIENT
Start: 2020-02-19 | End: 2020-02-19 | Stop reason: SURG

## 2020-02-19 RX ORDER — NEOSTIGMINE METHYLSULFATE 1 MG/ML
INJECTION INTRAVENOUS AS NEEDED
Status: DISCONTINUED | OUTPATIENT
Start: 2020-02-19 | End: 2020-02-19 | Stop reason: SURG

## 2020-02-19 RX ORDER — GLYCOPYRROLATE 0.2 MG/ML
INJECTION, SOLUTION INTRAMUSCULAR; INTRAVENOUS AS NEEDED
Status: DISCONTINUED | OUTPATIENT
Start: 2020-02-19 | End: 2020-02-19 | Stop reason: SURG

## 2020-02-19 RX ADMIN — KETOROLAC TROMETHAMINE 30 MG: 30 INJECTION, SOLUTION INTRAMUSCULAR; INTRAVENOUS at 08:32:00

## 2020-02-19 RX ADMIN — DEXAMETHASONE SODIUM PHOSPHATE 8 MG: 4 MG/ML VIAL (ML) INJECTION at 07:40:00

## 2020-02-19 RX ADMIN — SODIUM CHLORIDE, SODIUM LACTATE, POTASSIUM CHLORIDE, CALCIUM CHLORIDE: 600; 310; 30; 20 INJECTION, SOLUTION INTRAVENOUS at 08:50:00

## 2020-02-19 RX ADMIN — ROCURONIUM BROMIDE 5 MG: 10 INJECTION, SOLUTION INTRAVENOUS at 08:13:00

## 2020-02-19 RX ADMIN — CEFAZOLIN SODIUM/WATER 2 G: 2 G/20 ML SYRINGE (ML) INTRAVENOUS at 07:45:00

## 2020-02-19 RX ADMIN — ROCURONIUM BROMIDE 30 MG: 10 INJECTION, SOLUTION INTRAVENOUS at 07:37:00

## 2020-02-19 RX ADMIN — ONDANSETRON 4 MG: 2 INJECTION INTRAMUSCULAR; INTRAVENOUS at 08:32:00

## 2020-02-19 RX ADMIN — NEOSTIGMINE METHYLSULFATE 3.5 MG: 1 INJECTION INTRAVENOUS at 08:32:00

## 2020-02-19 RX ADMIN — SODIUM CHLORIDE, SODIUM LACTATE, POTASSIUM CHLORIDE, CALCIUM CHLORIDE: 600; 310; 30; 20 INJECTION, SOLUTION INTRAVENOUS at 07:33:00

## 2020-02-19 RX ADMIN — GLYCOPYRROLATE 0.4 MG: 0.2 INJECTION, SOLUTION INTRAMUSCULAR; INTRAVENOUS at 08:32:00

## 2020-02-19 NOTE — ANESTHESIA POSTPROCEDURE EVALUATION
Alisha Ayon 117 Patient Status:  Hospital Outpatient Surgery   Age/Gender 37year old female MRN [id number]   Location [address] Attending Jerel Shelton MD   Hosp Day # 0 PCP DO Jeaneth Unger

## 2020-02-19 NOTE — PROGRESS NOTES
[facility]  Brief Op Note    [facility] Location: OR   [facility] [id number] MRN [id number]   Admission Date 2/19/2020 Operation Date 2/19/2020   Attending Physician Oliverio Harvey MD Operating Physician Gm Lim MD     Pre-Operative Diagnosis: Cyst of

## 2020-02-19 NOTE — OPERATIVE REPORT
[address]    PATIENT'S NAME: Adnatasha Zena   ATTENDING PHYSICIAN: Yony Rivas M.D. OPERATING PHYSICIAN: Yony Rivas M.D.    PATIENT ACCOUNT#:   [de-identified]    LOCATION:  OR  OR [place] ROOMS 2 EDWP 10  MEDICAL RECORD #:   [id number]       DATE

## 2020-02-19 NOTE — H&P
[address]    PATIENT'S NAME: Maxime Anshul   ATTENDING PHYSICIAN: Rere Escobar M.D.    PATIENT ACCOUNT#:   [de-identified]    [facility]  MEDICAL RECORD #:   [id number]       YOB: 1980  ADMISSION DATE:

## 2020-02-19 NOTE — ANESTHESIA PROCEDURE NOTES
Airway  Urgency: elective      General Information and Staff    Patient location during procedure: OR  Anesthesiologist: Lucille Cortes MD  Resident/CRNA: Bernard Cardenas CRNA  Performed: CRNA     Indications and Patient Condition  Indications for airwa

## 2020-02-20 ENCOUNTER — TELEPHONE (OUTPATIENT)
Dept: FAMILY MEDICINE CLINIC | Facility: CLINIC | Age: 40
End: 2020-02-20

## 2020-02-20 DIAGNOSIS — Z79.899 LONG-TERM USE OF PLAQUENIL: Primary | ICD-10-CM

## 2020-02-20 RX ORDER — TRAMADOL HYDROCHLORIDE 50 MG/1
100 TABLET ORAL EVERY 8 HOURS PRN
Qty: 180 TABLET | Refills: 2 | OUTPATIENT
Start: 2020-02-20

## 2020-02-20 NOTE — TELEPHONE ENCOUNTER
Per pt    Rheum wants an eye check since   On Plaquenil (long term now)   No sx, just for monitoring  Since HMO  Referral needs to come from PCP    Order pended     Pls send back to triage   Thank you

## 2020-02-20 NOTE — TELEPHONE ENCOUNTER
Patient saw her Rheumatologist.  They recommended that she get a referral for an Opthamologist.  Please advise. Verified Mobile number on file. Thank you.

## 2020-02-21 RX ORDER — TRAMADOL HYDROCHLORIDE 50 MG/1
100 TABLET ORAL EVERY 8 HOURS PRN
Qty: 180 TABLET | Refills: 0 | OUTPATIENT
Start: 2020-02-21

## 2020-02-21 RX ORDER — TRAMADOL HYDROCHLORIDE 50 MG/1
TABLET ORAL
Qty: 180 TABLET | Refills: 0 | Status: SHIPPED | OUTPATIENT
Start: 2020-02-21 | End: 2020-03-13

## 2020-02-21 NOTE — TELEPHONE ENCOUNTER
LOV: 1/13/2020 neck pain  Next appt: 2/24/2020    Tramadol  Last refill: 11/28/2019, 180 tabs 2 refills    Please advise correct sig? Says 2 tabs every 8 hours and 2 tabs daily    Please refill if appropriate. Thank you.

## 2020-02-22 ENCOUNTER — TELEPHONE (OUTPATIENT)
Dept: OBGYN CLINIC | Facility: CLINIC | Age: 40
End: 2020-02-22

## 2020-02-22 NOTE — TELEPHONE ENCOUNTER
Patient had VH last week. Having pain 4/10. This is normal for recent post op. Bleeding more today however not heavy. Like the start of a menses. To E/R if heavy or pain not relief by pain meds.  No fever

## 2020-02-24 ENCOUNTER — TELEPHONE (OUTPATIENT)
Dept: FAMILY MEDICINE CLINIC | Facility: CLINIC | Age: 40
End: 2020-02-24

## 2020-02-24 ENCOUNTER — OFFICE VISIT (OUTPATIENT)
Dept: FAMILY MEDICINE CLINIC | Facility: CLINIC | Age: 40
End: 2020-02-24

## 2020-02-24 VITALS
SYSTOLIC BLOOD PRESSURE: 110 MMHG | BODY MASS INDEX: 21.85 KG/M2 | HEIGHT: 64 IN | RESPIRATION RATE: 16 BRPM | WEIGHT: 128 LBS | DIASTOLIC BLOOD PRESSURE: 70 MMHG | HEART RATE: 80 BPM

## 2020-02-24 DIAGNOSIS — R20.2 PARESTHESIA: ICD-10-CM

## 2020-02-24 DIAGNOSIS — T88.7XXA MEDICATION SIDE EFFECT: ICD-10-CM

## 2020-02-24 DIAGNOSIS — M54.2 NECK PAIN: Primary | ICD-10-CM

## 2020-02-24 DIAGNOSIS — G89.29 CHRONIC LEFT SHOULDER PAIN: ICD-10-CM

## 2020-02-24 DIAGNOSIS — M25.512 CHRONIC LEFT SHOULDER PAIN: ICD-10-CM

## 2020-02-24 DIAGNOSIS — G89.4 CHRONIC PAIN SYNDROME: ICD-10-CM

## 2020-02-24 PROCEDURE — 99214 OFFICE O/P EST MOD 30 MIN: CPT | Performed by: FAMILY MEDICINE

## 2020-02-24 RX ORDER — HYDROCODONE BITARTRATE AND ACETAMINOPHEN 10; 325 MG/1; MG/1
1 TABLET ORAL 2 TIMES DAILY PRN
Qty: 60 TABLET | Refills: 0 | Status: SHIPPED | OUTPATIENT
Start: 2020-02-24 | End: 2020-05-06

## 2020-02-24 RX ORDER — HYDROCODONE BITARTRATE AND ACETAMINOPHEN 5; 325 MG/1; MG/1
1 TABLET ORAL 2 TIMES DAILY PRN
Qty: 60 TABLET | Refills: 0 | Status: SHIPPED | OUTPATIENT
Start: 2020-02-24 | End: 2020-03-03

## 2020-02-24 NOTE — TELEPHONE ENCOUNTER
I called [facility] [place] and instructed them to cancel the norco prescription for the 5-325 mg. They were informed a new prescription was sent over or the Norco  mg instead. Pharm tech verbalized understanding.

## 2020-02-24 NOTE — TELEPHONE ENCOUNTER
Patient states that she requested  of Hydrocodone not HYDROcodone-acetaminophen 5-325 MG Oral Tab. Please advise. Thank you.

## 2020-02-24 NOTE — TELEPHONE ENCOUNTER
Pt requested the 10/325 mg of norco  and the 5/325 mg was sent to the pharmacy.   She would like to see if you are willing to change the prescription to the stronger dose as that is what she took in the past.

## 2020-02-24 NOTE — PROGRESS NOTES
Shantel Fang is a 37year old female. HPI:   PT. Is here for follow up. Saw physiatrist and taking gabapentin. She thinks she has complex regional pain syndrome. She wants her to see a pain Dr. [place] Zacarias. Her neck pain is really bad today. Other screening mammogram    • Routine Papanicolaou smear    • Visual impairment     glasses      Social History:  Social History    Tobacco Use      Smoking status: Never Smoker      Smokeless tobacco: Never Used    Alcohol use: No    Drug use: No       R cysts are smooth-walled and filled with thin, tan, and translucent to hemorrhagic material. The inner lining of the disrupted cyst is red-brown. No excrescences are identified.   The fallopian tube has a tan-purple and intact external surface with an ident Monitor. Cont. With Dr. Lucrecia Jacques. Advised MRI to r/o neurological issue. Meds refilled. Advised of risk of being on norco and tramadol. Discussed side effects of gabapentin.     The patient indicates understanding of these issues and agrees to th

## 2020-02-26 ENCOUNTER — TELEPHONE (OUTPATIENT)
Dept: OBGYN CLINIC | Facility: CLINIC | Age: 40
End: 2020-02-26

## 2020-02-26 NOTE — TELEPHONE ENCOUNTER
Spoke with patient about Dr Nandini Coelho recommendation for [facility] and pathology results. Went over side effects. Patient verbalized understanding.

## 2020-02-26 NOTE — PROGRESS NOTES
Patient aware of results and recommendations. Dimas Curry ordered and routed. Patient verbalized understanding.

## 2020-02-28 ENCOUNTER — HOSPITAL ENCOUNTER (OUTPATIENT)
Dept: MRI IMAGING | Age: 40
Discharge: HOME | End: 2020-02-28
Attending: FAMILY MEDICINE
Payer: COMMERCIAL

## 2020-02-28 ENCOUNTER — TELEPHONE (OUTPATIENT)
Dept: OBGYN CLINIC | Facility: CLINIC | Age: 40
End: 2020-02-28

## 2020-02-28 DIAGNOSIS — R20.2 PARESTHESIA: ICD-10-CM

## 2020-02-28 DIAGNOSIS — M54.2 NECK PAIN: ICD-10-CM

## 2020-02-28 DIAGNOSIS — G89.4 CHRONIC PAIN SYNDROME: ICD-10-CM

## 2020-02-28 PROCEDURE — 72141 MRI NECK SPINE W/O DYE: CPT | Performed by: FAMILY MEDICINE

## 2020-03-02 NOTE — TELEPHONE ENCOUNTER
Patient has been experiencing off and on spotting since her surgery. She states her stomach is bloated and is having cramping. Still taking norco for pain, bowel movements regular but hard. Instructed to drink more fluids.  No frequency or urgency with urin

## 2020-03-04 ENCOUNTER — OFFICE VISIT (OUTPATIENT)
Dept: OBGYN CLINIC | Facility: CLINIC | Age: 40
End: 2020-03-04

## 2020-03-04 ENCOUNTER — TELEPHONE (OUTPATIENT)
Dept: FAMILY MEDICINE CLINIC | Facility: CLINIC | Age: 40
End: 2020-03-04

## 2020-03-04 VITALS
WEIGHT: 130 LBS | HEART RATE: 90 BPM | DIASTOLIC BLOOD PRESSURE: 80 MMHG | BODY MASS INDEX: 22.2 KG/M2 | TEMPERATURE: 98 F | SYSTOLIC BLOOD PRESSURE: 100 MMHG | HEIGHT: 64 IN

## 2020-03-04 DIAGNOSIS — G89.29 CHRONIC LEFT SHOULDER PAIN: ICD-10-CM

## 2020-03-04 DIAGNOSIS — M79.601 RIGHT ARM PAIN: ICD-10-CM

## 2020-03-04 DIAGNOSIS — M25.50 PAIN IN JOINT, MULTIPLE SITES: ICD-10-CM

## 2020-03-04 DIAGNOSIS — M79.2 NEURALGIA: ICD-10-CM

## 2020-03-04 DIAGNOSIS — N39.0 URINARY TRACT INFECTION WITHOUT HEMATURIA, SITE UNSPECIFIED: Primary | ICD-10-CM

## 2020-03-04 DIAGNOSIS — R76.8 POSITIVE ANA (ANTINUCLEAR ANTIBODY): ICD-10-CM

## 2020-03-04 DIAGNOSIS — M25.512 CHRONIC LEFT SHOULDER PAIN: ICD-10-CM

## 2020-03-04 DIAGNOSIS — M54.2 NECK PAIN: Primary | ICD-10-CM

## 2020-03-04 PROCEDURE — 99024 POSTOP FOLLOW-UP VISIT: CPT | Performed by: OBSTETRICS & GYNECOLOGY

## 2020-03-04 NOTE — PROGRESS NOTES
She had a repeat surgery on February 19 she is eating well occasionally has trouble urinating bowel movements are okay no fever she feels bloating. Endometriosis was discussed options were discussed and she wishes to try Orilissa 200.   Incisions are heali

## 2020-03-04 NOTE — TELEPHONE ENCOUNTER
Referral to Ximena Jay Rheumatologist (INTERNAL)    Reason for the order/referral:RHEUMATOLOGY/F/UP   PCP: Bonita Estrada   Refer to Provider:  Sunitha Zavala   Patient Insurance: Payor: I[employer] BL/ADVCAP Plan: [id number] / Product Type: HMO

## 2020-03-05 ENCOUNTER — TELEPHONE (OUTPATIENT)
Dept: OBGYN CLINIC | Facility: CLINIC | Age: 40
End: 2020-03-05

## 2020-03-05 NOTE — TELEPHONE ENCOUNTER
Spoke to [place] at (phone # [phone]) number I was provided to call  from conversation  this morning  regarding clarification of order for Orilissa. Informed that Dr Adela Luque was consulted and patient is to take 200 mg daily. Verbalized understanding and

## 2020-03-05 NOTE — TELEPHONE ENCOUNTER
PC with [employer], spoke orin Mai. He stated that the dosage for the medication Joanna Sene that was sent yesterday is not correct. Usually for 200 MG it is 2 tablets daily, it was sent for 1 tablet daily. Please advise.

## 2020-03-06 ENCOUNTER — TELEPHONE (OUTPATIENT)
Dept: OBGYN CLINIC | Facility: CLINIC | Age: 40
End: 2020-03-06

## 2020-03-06 NOTE — TELEPHONE ENCOUNTER
----- Message from Natty Barrios sent at 3/6/2020  1:37 PM CST -----  Nesha Cason 24 calling back in regards to medication for orlissa, and would like to confirm amount to take per day.  Please call at [phone]

## 2020-03-06 NOTE — TELEPHONE ENCOUNTER
Pharmacy called and told that we received a call yesterday wanting confirmation of dosage for Orillisa. .  Dr Pj Hannah was consulted on 3/5/20 and wants dose to be 200 gm daily not twice a day.  Verbalized understanding

## 2020-03-09 PROBLEM — M79.18 MYOFASCIAL PAIN: Status: ACTIVE | Noted: 2020-03-09

## 2020-03-10 ENCOUNTER — TELEPHONE (OUTPATIENT)
Dept: OBGYN CLINIC | Facility: CLINIC | Age: 40
End: 2020-03-10

## 2020-03-12 NOTE — TELEPHONE ENCOUNTER
LOV: 2/24/2020 neck pain  Next appt: 5/6/2020    Tramadol  Last refill: 2/21/2020 180 tabs    Please refill if appropriate. Thank you.

## 2020-03-13 RX ORDER — TRAMADOL HYDROCHLORIDE 50 MG/1
TABLET ORAL
Qty: 180 TABLET | Refills: 0 | OUTPATIENT
Start: 2020-03-13

## 2020-03-14 RX ORDER — TRAMADOL HYDROCHLORIDE 50 MG/1
TABLET ORAL
Qty: 180 TABLET | Refills: 0 | OUTPATIENT
Start: 2020-03-14

## 2020-03-14 RX ORDER — TRAMADOL HYDROCHLORIDE 50 MG/1
100 TABLET ORAL EVERY 8 HOURS PRN
Qty: 180 TABLET | Refills: 0 | Status: SHIPPED | OUTPATIENT
Start: 2020-03-20 | End: 2020-04-13

## 2020-03-20 ENCOUNTER — TELEPHONE (OUTPATIENT)
Dept: OBGYN CLINIC | Facility: CLINIC | Age: 40
End: 2020-03-20

## 2020-03-26 ENCOUNTER — TELEPHONE (OUTPATIENT)
Dept: OBGYN CLINIC | Facility: CLINIC | Age: 40
End: 2020-03-26

## 2020-03-26 NOTE — TELEPHONE ENCOUNTER
Patient informed that PA for [place]le [place] was denied. [address] [facility] to be filled out but some of the questions relate to recent labs ordered but not done yet.  Patient states that she will wait to go have them drawn when \"stay order\" is

## 2020-03-27 ENCOUNTER — TELEPHONE (OUTPATIENT)
Dept: FAMILY MEDICINE CLINIC | Facility: CLINIC | Age: 40
End: 2020-03-27

## 2020-03-27 NOTE — TELEPHONE ENCOUNTER
Called to pt who stated that she is not taking on a regular basis, but she would like the refill for a \"just incase\". Did advise pt about [employer] website and coupons.   Advised that I would do a PA for the medication but if it is not covered that this cou

## 2020-03-27 NOTE — TELEPHONE ENCOUNTER
Received request for a PA to be completed for pt's Metaxalone. The prescription states in the directions from last fill on 1/13/2020 that pt was to take it for 20 days. Please advise if pt is still taking and should PA be completed?   Thank you

## 2020-03-30 ENCOUNTER — TELEPHONE (OUTPATIENT)
Dept: OBGYN CLINIC | Facility: CLINIC | Age: 40
End: 2020-03-30

## 2020-03-30 DIAGNOSIS — N89.8 VAGINAL ODOR: ICD-10-CM

## 2020-03-30 DIAGNOSIS — N89.8 VAGINAL DISCHARGE: Primary | ICD-10-CM

## 2020-03-30 RX ORDER — METRONIDAZOLE 500 MG/1
500 TABLET ORAL 2 TIMES DAILY
Qty: 14 TABLET | Refills: 0 | Status: SHIPPED | OUTPATIENT
Start: 2020-03-30 | End: 2020-05-06

## 2020-03-30 RX ORDER — CYCLOBENZAPRINE HCL 10 MG
10 TABLET ORAL DAILY PRN
Qty: 30 TABLET | Refills: 0 | Status: SHIPPED | OUTPATIENT
Start: 2020-03-30 | End: 2020-04-19

## 2020-03-30 NOTE — TELEPHONE ENCOUNTER
Patient had a laparoscopic ovarian cystectomy on 2/19/20. Has been seen since then but yesterday she started her second period for this month. The discharge is blood mixed with discharge that smells like old blood. Having cramping, no fever.

## 2020-03-30 NOTE — TELEPHONE ENCOUNTER
After speaking with Dr Gonzalo Piña instructed patient that Ovarian cystectomies can definitely cause issues with abnormal bleeds - more frequent bleeds. That is ok. Patient is concerned with discharge so  Flagyl 500 mg PO BID x 7 days.  Patient verbalized u

## 2020-03-30 NOTE — TELEPHONE ENCOUNTER
Letter of determination received from [employer], Metaxalone 800 mg was denied. Pt has to have tried and failed at least 2 formulary alternatives. Alternatives are Cyclobenzaprine tablets, methocarbamol tablets, or baclofen tablets.     Please adv

## 2020-04-13 RX ORDER — TRAMADOL HYDROCHLORIDE 50 MG/1
100 TABLET ORAL EVERY 8 HOURS PRN
Qty: 180 TABLET | Refills: 1 | Status: SHIPPED | OUTPATIENT
Start: 2020-04-13 | End: 2020-06-10

## 2020-05-06 ENCOUNTER — VIRTUAL PHONE E/M (OUTPATIENT)
Dept: FAMILY MEDICINE CLINIC | Facility: CLINIC | Age: 40
End: 2020-05-06

## 2020-05-06 DIAGNOSIS — M25.512 CHRONIC LEFT SHOULDER PAIN: Primary | ICD-10-CM

## 2020-05-06 DIAGNOSIS — G89.29 CHRONIC RIGHT SHOULDER PAIN: ICD-10-CM

## 2020-05-06 DIAGNOSIS — Z98.890 HISTORY OF SHOULDER SURGERY: ICD-10-CM

## 2020-05-06 DIAGNOSIS — M54.50 CHRONIC BILATERAL LOW BACK PAIN WITHOUT SCIATICA: ICD-10-CM

## 2020-05-06 DIAGNOSIS — M79.2 NEURALGIA: ICD-10-CM

## 2020-05-06 DIAGNOSIS — M54.2 CHRONIC NECK PAIN: ICD-10-CM

## 2020-05-06 DIAGNOSIS — Z79.891 LONG TERM (CURRENT) USE OF OPIATE ANALGESIC: ICD-10-CM

## 2020-05-06 DIAGNOSIS — G89.29 CHRONIC LEFT SHOULDER PAIN: Primary | ICD-10-CM

## 2020-05-06 DIAGNOSIS — G89.29 CHRONIC BILATERAL LOW BACK PAIN WITHOUT SCIATICA: ICD-10-CM

## 2020-05-06 DIAGNOSIS — Z79.899 MEDICATION MANAGEMENT: ICD-10-CM

## 2020-05-06 DIAGNOSIS — M25.511 CHRONIC RIGHT SHOULDER PAIN: ICD-10-CM

## 2020-05-06 DIAGNOSIS — G89.29 CHRONIC NECK PAIN: ICD-10-CM

## 2020-05-06 PROCEDURE — 99214 OFFICE O/P EST MOD 30 MIN: CPT | Performed by: FAMILY MEDICINE

## 2020-05-06 RX ORDER — HYDROCODONE BITARTRATE AND ACETAMINOPHEN 10; 325 MG/1; MG/1
1 TABLET ORAL 2 TIMES DAILY PRN
Qty: 60 TABLET | Refills: 0 | Status: SHIPPED | OUTPATIENT
Start: 2020-05-06 | End: 2020-06-23 | Stop reason: ALTCHOICE

## 2020-05-06 NOTE — PROGRESS NOTES
Virtual Telephone Check-In    Selwyn Hansen verbally consents to a Virtual/Telephone Check-In visit on 05/06/20. Patient understands and accepts financial responsibility for any deductible, co-insurance and/or co-pays associated with this service.     D Allergies   Past Medical History:   Diagnosis Date   • Encounter for insertion of ParaGard IUD 11/27/2015    Lot# [id number] Exp 01/2022   • General counseling for prescription of oral contraceptives    • Other screening mammogram    • Routine Papanicolaou sme and focally hemorrhagic. Sectioning reveals a 2.5 x 2.0 x 1.5 cm cyst cavity that opens into the previously described defect, and multiple cysts ranging from 0.5 to 0.6 cm in greatest dimension.   The cysts are smooth-walled and filled with thin, tan, and tramadol. Imaging & Consults:  None  The patient indicates understanding of these issues and agrees to the plan. Please note that the following visit was completed using 2 way real-time interactive telephone communication.   This has been done in g

## 2020-06-10 RX ORDER — TRAMADOL HYDROCHLORIDE 50 MG/1
100 TABLET ORAL EVERY 8 HOURS PRN
Qty: 180 TABLET | Refills: 1 | Status: SHIPPED | OUTPATIENT
Start: 2020-06-10 | End: 2020-08-04

## 2020-08-04 ENCOUNTER — OFFICE VISIT (OUTPATIENT)
Dept: FAMILY MEDICINE CLINIC | Facility: CLINIC | Age: 40
End: 2020-08-04

## 2020-08-04 VITALS
HEIGHT: 64 IN | TEMPERATURE: 99 F | HEART RATE: 72 BPM | SYSTOLIC BLOOD PRESSURE: 99 MMHG | DIASTOLIC BLOOD PRESSURE: 58 MMHG | BODY MASS INDEX: 22.36 KG/M2 | WEIGHT: 131 LBS | RESPIRATION RATE: 18 BRPM

## 2020-08-04 DIAGNOSIS — M25.511 CHRONIC RIGHT SHOULDER PAIN: ICD-10-CM

## 2020-08-04 DIAGNOSIS — N80.9 ENDOMETRIOSIS: ICD-10-CM

## 2020-08-04 DIAGNOSIS — N64.4 BREAST PAIN: ICD-10-CM

## 2020-08-04 DIAGNOSIS — Z79.899 MEDICATION MANAGEMENT: ICD-10-CM

## 2020-08-04 DIAGNOSIS — G89.29 CHRONIC BILATERAL LOW BACK PAIN WITHOUT SCIATICA: ICD-10-CM

## 2020-08-04 DIAGNOSIS — M54.50 CHRONIC BILATERAL LOW BACK PAIN WITHOUT SCIATICA: ICD-10-CM

## 2020-08-04 DIAGNOSIS — M25.512 CHRONIC LEFT SHOULDER PAIN: ICD-10-CM

## 2020-08-04 DIAGNOSIS — G89.29 CHRONIC LEFT SHOULDER PAIN: ICD-10-CM

## 2020-08-04 DIAGNOSIS — Z90.721 HISTORY OF RIGHT OOPHORECTOMY: ICD-10-CM

## 2020-08-04 DIAGNOSIS — Z79.891 LONG TERM (CURRENT) USE OF OPIATE ANALGESIC: ICD-10-CM

## 2020-08-04 DIAGNOSIS — R10.2 PELVIC PAIN: ICD-10-CM

## 2020-08-04 DIAGNOSIS — Z00.00 LABORATORY EXAMINATION ORDERED AS PART OF A ROUTINE GENERAL MEDICAL EXAMINATION: ICD-10-CM

## 2020-08-04 DIAGNOSIS — S96.911A: ICD-10-CM

## 2020-08-04 DIAGNOSIS — R63.5 WEIGHT GAIN: ICD-10-CM

## 2020-08-04 DIAGNOSIS — Z87.42 HISTORY OF OVARIAN CYSTECTOMY: Primary | ICD-10-CM

## 2020-08-04 DIAGNOSIS — M79.2 NEURALGIA: ICD-10-CM

## 2020-08-04 DIAGNOSIS — G89.29 CHRONIC RIGHT SHOULDER PAIN: ICD-10-CM

## 2020-08-04 DIAGNOSIS — E55.9 VITAMIN D DEFICIENCY: ICD-10-CM

## 2020-08-04 DIAGNOSIS — G89.4 CHRONIC PAIN SYNDROME: ICD-10-CM

## 2020-08-04 DIAGNOSIS — Z79.899 LONG-TERM USE OF PLAQUENIL: ICD-10-CM

## 2020-08-04 DIAGNOSIS — Z98.890 HISTORY OF OVARIAN CYSTECTOMY: Primary | ICD-10-CM

## 2020-08-04 PROCEDURE — 3074F SYST BP LT 130 MM HG: CPT | Performed by: FAMILY MEDICINE

## 2020-08-04 PROCEDURE — 3078F DIAST BP <80 MM HG: CPT | Performed by: FAMILY MEDICINE

## 2020-08-04 PROCEDURE — 99214 OFFICE O/P EST MOD 30 MIN: CPT | Performed by: FAMILY MEDICINE

## 2020-08-04 PROCEDURE — 3008F BODY MASS INDEX DOCD: CPT | Performed by: FAMILY MEDICINE

## 2020-08-04 RX ORDER — TRAMADOL HYDROCHLORIDE 50 MG/1
100 TABLET ORAL EVERY 8 HOURS PRN
Qty: 180 TABLET | Refills: 2 | Status: SHIPPED | OUTPATIENT
Start: 2020-08-04 | End: 2020-10-26

## 2020-08-04 RX ORDER — HYDROCODONE BITARTRATE AND ACETAMINOPHEN 10; 325 MG/1; MG/1
1 TABLET ORAL 2 TIMES DAILY PRN
Qty: 60 TABLET | Refills: 0 | Status: SHIPPED | OUTPATIENT
Start: 2020-08-04 | End: 2020-12-02

## 2020-08-04 NOTE — PROGRESS NOTES
Long Medley is a 37year old female. HPI:   Pt.is here for follow up on her chronic back pain and right and  left shoulder pain. Pt. Continues with joint pain. Has not seen Dr. Mic Rivers recently.   She did EMG and found nerve damage -- joint is still placed or performed during the hospital encounter of 02/19/20   POCT PREGNANCY, URINE   Result Value Ref Range    POCT urine pregnancy Negative     POCT LOT NUMBER [id number]     Procedure Control ok     EXPIRATION DATE 7/31/21    SURGICAL PATHOLOGY TISSUE end.  No paratubal cysts are identified, and sectioning demonstrates a lumen diameter of 0.3 cm. No masses or lesions are identified.     Representative sections are submitted as follows:    A1-A4 - representative ovary with cysts  A5 - representative fallo Signed Prescriptions Disp Refills   • traMADol HCl 50 MG Oral Tab 180 tablet 2     Sig: Take 2 tablets (100 mg total) by mouth every 8 (eight) hours as needed for Pain.    • HYDROcodone-acetaminophen (NORCO)  MG Oral Tab 60 tablet 0     Sig: Take

## 2020-08-20 ENCOUNTER — TELEPHONE (OUTPATIENT)
Dept: FAMILY MEDICINE CLINIC | Facility: CLINIC | Age: 40
End: 2020-08-20

## 2020-08-20 ENCOUNTER — LAB ENCOUNTER (OUTPATIENT)
Dept: LAB | Age: 40
End: 2020-08-20
Attending: FAMILY MEDICINE
Payer: COMMERCIAL

## 2020-08-20 ENCOUNTER — HOSPITAL ENCOUNTER (OUTPATIENT)
Dept: ULTRASOUND IMAGING | Age: 40
Discharge: HOME | End: 2020-08-20
Attending: FAMILY MEDICINE
Payer: COMMERCIAL

## 2020-08-20 DIAGNOSIS — Z90.721 HISTORY OF RIGHT OOPHORECTOMY: ICD-10-CM

## 2020-08-20 DIAGNOSIS — M35.01 SJOGREN'S SYNDROME WITH KERATOCONJUNCTIVITIS SICCA (HCC): ICD-10-CM

## 2020-08-20 DIAGNOSIS — R10.2 PELVIC PAIN: ICD-10-CM

## 2020-08-20 DIAGNOSIS — Z98.890 HISTORY OF OVARIAN CYSTECTOMY: Primary | ICD-10-CM

## 2020-08-20 DIAGNOSIS — G89.29 CHRONIC RIGHT SHOULDER PAIN: ICD-10-CM

## 2020-08-20 DIAGNOSIS — Z00.00 LABORATORY EXAMINATION ORDERED AS PART OF A ROUTINE GENERAL MEDICAL EXAMINATION: ICD-10-CM

## 2020-08-20 DIAGNOSIS — E55.9 VITAMIN D DEFICIENCY: ICD-10-CM

## 2020-08-20 DIAGNOSIS — Z98.890 HISTORY OF OVARIAN CYSTECTOMY: ICD-10-CM

## 2020-08-20 DIAGNOSIS — Z87.42 HISTORY OF OVARIAN CYSTECTOMY: ICD-10-CM

## 2020-08-20 DIAGNOSIS — M25.511 CHRONIC RIGHT SHOULDER PAIN: ICD-10-CM

## 2020-08-20 DIAGNOSIS — Z87.42 HISTORY OF OVARIAN CYSTECTOMY: Primary | ICD-10-CM

## 2020-08-20 LAB
ALBUMIN SERPL-MCNC: 4 G/DL (ref 3.4–5)
ALBUMIN/GLOB SERPL: 0.9 {RATIO} (ref 1–2)
ALP LIVER SERPL-CCNC: 48 U/L (ref 37–98)
ALT SERPL-CCNC: 21 U/L (ref 13–56)
ANION GAP SERPL CALC-SCNC: 5 MMOL/L (ref 0–18)
AST SERPL-CCNC: 26 U/L (ref 15–37)
BASOPHILS # BLD AUTO: 0.07 X10(3) UL (ref 0–0.2)
BASOPHILS NFR BLD AUTO: 0.7 %
BILIRUB SERPL-MCNC: 0.3 MG/DL (ref 0.1–2)
BILIRUB UR QL STRIP.AUTO: NEGATIVE
BUN BLD-MCNC: 9 MG/DL (ref 7–18)
BUN/CREAT SERPL: 8.7 (ref 10–20)
C3 SERPL-MCNC: 120 MG/DL (ref 90–180)
C4 SERPL-MCNC: 34.3 MG/DL (ref 10–40)
CALCIUM BLD-MCNC: 9.2 MG/DL (ref 8.5–10.1)
CHLORIDE SERPL-SCNC: 105 MMOL/L (ref 98–112)
CHOLEST SMN-MCNC: 156 MG/DL (ref ?–200)
CO2 SERPL-SCNC: 26 MMOL/L (ref 21–32)
COLOR UR AUTO: YELLOW
CREAT BLD-MCNC: 1.04 MG/DL (ref 0.55–1.02)
CREAT UR-SCNC: 196 MG/DL
CRP SERPL-MCNC: <0.29 MG/DL (ref ?–0.3)
DEPRECATED RDW RBC AUTO: 40.8 FL (ref 35.1–46.3)
EOSINOPHIL # BLD AUTO: 0.44 X10(3) UL (ref 0–0.7)
EOSINOPHIL NFR BLD AUTO: 4.7 %
ERYTHROCYTE [DISTWIDTH] IN BLOOD BY AUTOMATED COUNT: 12.2 % (ref 11–15)
GLOBULIN PLAS-MCNC: 4.4 G/DL (ref 2.8–4.4)
GLUCOSE BLD-MCNC: 101 MG/DL (ref 70–99)
GLUCOSE UR STRIP.AUTO-MCNC: NEGATIVE MG/DL
HCT VFR BLD AUTO: 44.9 % (ref 35–48)
HDLC SERPL-MCNC: 57 MG/DL (ref 40–59)
HGB BLD-MCNC: 14.5 G/DL (ref 12–16)
IMM GRANULOCYTES # BLD AUTO: 0.02 X10(3) UL (ref 0–1)
IMM GRANULOCYTES NFR BLD: 0.2 %
KETONES UR STRIP.AUTO-MCNC: NEGATIVE MG/DL
LDLC SERPL CALC-MCNC: 71 MG/DL (ref ?–100)
LYMPHOCYTES # BLD AUTO: 2.28 X10(3) UL (ref 1–4)
LYMPHOCYTES NFR BLD AUTO: 24.3 %
M PROTEIN MFR SERPL ELPH: 8.4 G/DL (ref 6.4–8.2)
MCH RBC QN AUTO: 29.5 PG (ref 26–34)
MCHC RBC AUTO-ENTMCNC: 32.3 G/DL (ref 31–37)
MCV RBC AUTO: 91.4 FL (ref 80–100)
MONOCYTES # BLD AUTO: 0.75 X10(3) UL (ref 0.1–1)
MONOCYTES NFR BLD AUTO: 8 %
NEUTROPHILS # BLD AUTO: 5.83 X10 (3) UL (ref 1.5–7.7)
NEUTROPHILS # BLD AUTO: 5.83 X10(3) UL (ref 1.5–7.7)
NEUTROPHILS NFR BLD AUTO: 62.1 %
NITRITE UR QL STRIP.AUTO: NEGATIVE
NONHDLC SERPL-MCNC: 99 MG/DL (ref ?–130)
OSMOLALITY SERPL CALC.SUM OF ELEC: 281 MOSM/KG (ref 275–295)
PATIENT FASTING Y/N/NP: YES
PATIENT FASTING Y/N/NP: YES
PH UR STRIP.AUTO: 6 [PH] (ref 4.5–8)
PLATELET # BLD AUTO: 322 10(3)UL (ref 150–450)
POTASSIUM SERPL-SCNC: 3.9 MMOL/L (ref 3.5–5.1)
PROT UR STRIP.AUTO-MCNC: NEGATIVE MG/DL
PROT UR-MCNC: 20.5 MG/DL
RBC # BLD AUTO: 4.91 X10(6)UL (ref 3.8–5.3)
RBC #/AREA URNS AUTO: >10 /HPF
SED RATE-ML: 9 MM/HR (ref 0–25)
SODIUM SERPL-SCNC: 136 MMOL/L (ref 136–145)
SP GR UR STRIP.AUTO: 1.02 (ref 1–1.03)
TRIGL SERPL-MCNC: 142 MG/DL (ref 30–149)
TSI SER-ACNC: 2.2 MIU/ML (ref 0.36–3.74)
UROBILINOGEN UR STRIP.AUTO-MCNC: <2 MG/DL
VIT D+METAB SERPL-MCNC: 63 NG/ML (ref 30–100)
VLDLC SERPL CALC-MCNC: 28 MG/DL (ref 0–30)
WBC # BLD AUTO: 9.4 X10(3) UL (ref 4–11)

## 2020-08-20 PROCEDURE — 85652 RBC SED RATE AUTOMATED: CPT

## 2020-08-20 PROCEDURE — 85025 COMPLETE CBC W/AUTO DIFF WBC: CPT

## 2020-08-20 PROCEDURE — 80053 COMPREHEN METABOLIC PANEL: CPT

## 2020-08-20 PROCEDURE — 82306 VITAMIN D 25 HYDROXY: CPT

## 2020-08-20 PROCEDURE — 80061 LIPID PANEL: CPT

## 2020-08-20 PROCEDURE — 86160 COMPLEMENT ANTIGEN: CPT

## 2020-08-20 PROCEDURE — 86140 C-REACTIVE PROTEIN: CPT

## 2020-08-20 PROCEDURE — 76856 US EXAM PELVIC COMPLETE: CPT | Performed by: FAMILY MEDICINE

## 2020-08-20 PROCEDURE — 76830 TRANSVAGINAL US NON-OB: CPT | Performed by: FAMILY MEDICINE

## 2020-08-20 PROCEDURE — 84156 ASSAY OF PROTEIN URINE: CPT

## 2020-08-20 PROCEDURE — 82570 ASSAY OF URINE CREATININE: CPT

## 2020-08-20 PROCEDURE — 84443 ASSAY THYROID STIM HORMONE: CPT

## 2020-08-20 PROCEDURE — 81001 URINALYSIS AUTO W/SCOPE: CPT

## 2020-08-20 PROCEDURE — 36415 COLL VENOUS BLD VENIPUNCTURE: CPT

## 2020-08-27 ENCOUNTER — HOSPITAL ENCOUNTER (OUTPATIENT)
Dept: CT IMAGING | Age: 40
Discharge: HOME | End: 2020-08-27
Attending: FAMILY MEDICINE
Payer: COMMERCIAL

## 2020-08-27 DIAGNOSIS — R10.2 PELVIC PAIN: ICD-10-CM

## 2020-08-27 DIAGNOSIS — R93.89 ABNORMAL FINDINGS ON IMAGING TEST: ICD-10-CM

## 2020-08-27 PROCEDURE — 72193 CT PELVIS W/DYE: CPT | Performed by: FAMILY MEDICINE

## 2020-09-28 ENCOUNTER — OFFICE VISIT (OUTPATIENT)
Dept: FAMILY MEDICINE CLINIC | Facility: CLINIC | Age: 40
End: 2020-09-28

## 2020-09-28 ENCOUNTER — LAB ENCOUNTER (OUTPATIENT)
Dept: LAB | Age: 40
End: 2020-09-28
Attending: FAMILY MEDICINE
Payer: COMMERCIAL

## 2020-09-28 VITALS
RESPIRATION RATE: 20 BRPM | OXYGEN SATURATION: 100 % | SYSTOLIC BLOOD PRESSURE: 120 MMHG | WEIGHT: 129 LBS | HEART RATE: 93 BPM | BODY MASS INDEX: 22.02 KG/M2 | DIASTOLIC BLOOD PRESSURE: 72 MMHG | TEMPERATURE: 98 F | HEIGHT: 64 IN

## 2020-09-28 DIAGNOSIS — R06.02 SHORTNESS OF BREATH: ICD-10-CM

## 2020-09-28 DIAGNOSIS — G89.4 CHRONIC PAIN SYNDROME: Primary | ICD-10-CM

## 2020-09-28 DIAGNOSIS — M79.2 NEUROPATHIC PAIN: ICD-10-CM

## 2020-09-28 DIAGNOSIS — R10.2 PELVIC PAIN: ICD-10-CM

## 2020-09-28 DIAGNOSIS — Z12.31 ENCOUNTER FOR SCREENING MAMMOGRAM FOR MALIGNANT NEOPLASM OF BREAST: ICD-10-CM

## 2020-09-28 DIAGNOSIS — Z23 NEED FOR VACCINATION: ICD-10-CM

## 2020-09-28 LAB — D-DIMER: 0.29 UG/ML FEU (ref ?–0.5)

## 2020-09-28 PROCEDURE — 93000 ELECTROCARDIOGRAM COMPLETE: CPT | Performed by: FAMILY MEDICINE

## 2020-09-28 PROCEDURE — 36415 COLL VENOUS BLD VENIPUNCTURE: CPT

## 2020-09-28 PROCEDURE — 3074F SYST BP LT 130 MM HG: CPT | Performed by: FAMILY MEDICINE

## 2020-09-28 PROCEDURE — 3008F BODY MASS INDEX DOCD: CPT | Performed by: FAMILY MEDICINE

## 2020-09-28 PROCEDURE — 90686 IIV4 VACC NO PRSV 0.5 ML IM: CPT | Performed by: FAMILY MEDICINE

## 2020-09-28 PROCEDURE — 99215 OFFICE O/P EST HI 40 MIN: CPT | Performed by: FAMILY MEDICINE

## 2020-09-28 PROCEDURE — 90471 IMMUNIZATION ADMIN: CPT | Performed by: FAMILY MEDICINE

## 2020-09-28 PROCEDURE — 3078F DIAST BP <80 MM HG: CPT | Performed by: FAMILY MEDICINE

## 2020-09-28 PROCEDURE — 85379 FIBRIN DEGRADATION QUANT: CPT

## 2020-09-28 RX ORDER — HYDROCODONE BITARTRATE AND ACETAMINOPHEN 10; 325 MG/1; MG/1
1 TABLET ORAL 2 TIMES DAILY
Qty: 60 TABLET | Refills: 0 | Status: SHIPPED | OUTPATIENT
Start: 2020-09-28 | End: 2020-11-02

## 2020-09-28 NOTE — PROGRESS NOTES
Jere Casey is a 41year old female. HPI:   Patient complains of mild shortness of breath that she has noted since yesterday. She states she has had 2 episodes yesterday that last between 15 to 30 minutes.   She states she has one episode currently whi status: Never Smoker      Smokeless tobacco: Never Used    Alcohol use: No    Drug use: No       Results for orders placed or performed in visit on 08/20/20   TSH W REFLEX TO FREE T4   Result Value Ref Range    TSH 2.200 0.358 - 3.740 mIU/mL   COMP METABOL Negative    WBC Urine 1-4 <5 /HPF    RBC URINE >10 (A) 0 - 2 /HPF    Bacteria Urine None Seen None Seen    Squamous Epi.  Cells Large (A) Small /LPF    Renal Tubular Epithelial Cells None Seen Small /LPF    Transitional Cells None Seen Small /LPF    Mucous Location: Left arm, Patient Position: Sitting, Cuff Size: adult)   Pulse 93   Temp 98 °F (36.7 °C) (Skin)   Resp 20   Ht 64\"   Wt 129 lb (58.5 kg)   LMP 09/15/2020 (Exact Date)   SpO2 100%   Breastfeeding No   BMI 22.14 kg/m²   GENERAL: well developed, we

## 2020-09-29 ENCOUNTER — MED REC SCAN ONLY (OUTPATIENT)
Dept: FAMILY MEDICINE CLINIC | Facility: CLINIC | Age: 40
End: 2020-09-29

## 2020-10-26 RX ORDER — TRAMADOL HYDROCHLORIDE 50 MG/1
100 TABLET ORAL EVERY 8 HOURS PRN
Qty: 180 TABLET | Refills: 2 | Status: SHIPPED | OUTPATIENT
Start: 2020-10-26 | End: 2021-01-12 | Stop reason: SDUPTHER

## 2020-10-26 NOTE — TELEPHONE ENCOUNTER
Last refill: 8/4/2020 180 tabs 2 refills  Next appt: 11/2/2020    Please refill if appropriate. Thank you.

## 2020-11-02 ENCOUNTER — OFFICE VISIT (OUTPATIENT)
Dept: FAMILY MEDICINE CLINIC | Facility: CLINIC | Age: 40
End: 2020-11-02

## 2020-11-02 VITALS
TEMPERATURE: 97 F | WEIGHT: 129 LBS | SYSTOLIC BLOOD PRESSURE: 120 MMHG | DIASTOLIC BLOOD PRESSURE: 70 MMHG | HEART RATE: 78 BPM | HEIGHT: 64 IN | BODY MASS INDEX: 22.02 KG/M2

## 2020-11-02 DIAGNOSIS — G89.29 CHRONIC BILATERAL LOW BACK PAIN WITHOUT SCIATICA: ICD-10-CM

## 2020-11-02 DIAGNOSIS — M54.50 CHRONIC BILATERAL LOW BACK PAIN WITHOUT SCIATICA: ICD-10-CM

## 2020-11-02 DIAGNOSIS — B96.89 BV (BACTERIAL VAGINOSIS): Primary | ICD-10-CM

## 2020-11-02 DIAGNOSIS — R10.2 PELVIC PAIN: ICD-10-CM

## 2020-11-02 DIAGNOSIS — M99.04 SOMATIC DYSFUNCTION OF SPINE, SACRAL: ICD-10-CM

## 2020-11-02 DIAGNOSIS — G89.4 CHRONIC PAIN SYNDROME: ICD-10-CM

## 2020-11-02 DIAGNOSIS — N80.9 ENDOMETRIOSIS: ICD-10-CM

## 2020-11-02 DIAGNOSIS — N76.0 BV (BACTERIAL VAGINOSIS): Primary | ICD-10-CM

## 2020-11-02 DIAGNOSIS — M99.03 SOMATIC DYSFUNCTION OF SPINE, LUMBAR: ICD-10-CM

## 2020-11-02 DIAGNOSIS — M99.02 SOMATIC DYSFUNCTION OF SPINE, THORACIC: ICD-10-CM

## 2020-11-02 DIAGNOSIS — M26.622 TMJ TENDERNESS, LEFT: ICD-10-CM

## 2020-11-02 PROCEDURE — 3078F DIAST BP <80 MM HG: CPT | Performed by: FAMILY MEDICINE

## 2020-11-02 PROCEDURE — 3074F SYST BP LT 130 MM HG: CPT | Performed by: FAMILY MEDICINE

## 2020-11-02 PROCEDURE — 99214 OFFICE O/P EST MOD 30 MIN: CPT | Performed by: FAMILY MEDICINE

## 2020-11-02 PROCEDURE — 98926 OSTEOPATH MANJ 3-4 REGIONS: CPT | Performed by: FAMILY MEDICINE

## 2020-11-02 PROCEDURE — 3008F BODY MASS INDEX DOCD: CPT | Performed by: FAMILY MEDICINE

## 2020-11-02 RX ORDER — HYDROCODONE BITARTRATE AND ACETAMINOPHEN 10; 325 MG/1; MG/1
1 TABLET ORAL 2 TIMES DAILY
Qty: 60 TABLET | Refills: 0 | Status: SHIPPED | OUTPATIENT
Start: 2020-11-02 | End: 2021-01-05

## 2020-11-02 RX ORDER — METRONIDAZOLE 7.5 MG/G
1 GEL VAGINAL
Qty: 70 G | Refills: 2 | Status: SHIPPED | OUTPATIENT
Start: 2020-11-02 | End: 2021-03-03 | Stop reason: CLARIF

## 2020-11-02 NOTE — PROGRESS NOTES
Veronica Nova is a 41year old female. HPI:   Pt. Complains of back pain. She is bed most of the time which makes it worse. Today is a good day. Had massage with Derrick Harmon on Friday. She did some accupuncture. Patient complains of left jaw pain.   Patient well otherwise  SKIN: denies any unusual skin lesions  HEENT: left jaw pain  LUNGS: denies shortness of breath with exertion  CARDIOVASCULAR: denies chest pain on exertion  GI: denies abdominal pain,denies heartburn  MUSCULOSKELETAL:  back pain  EXTREMITIE home physical therapy does not work. Manip x 3 . Advised to continue with massage and accupuncture. Advised urogynecology regarding endometriosis/pelvic pain/chronic BV.   Advised to speak to a dentist regarding possible mouthguard because the patient d

## 2020-11-27 ENCOUNTER — HOSPITAL ENCOUNTER (OUTPATIENT)
Dept: MAMMOGRAPHY | Age: 40
Discharge: HOME | End: 2020-11-27
Attending: FAMILY MEDICINE
Payer: COMMERCIAL

## 2020-11-27 DIAGNOSIS — Z12.31 ENCOUNTER FOR SCREENING MAMMOGRAM FOR MALIGNANT NEOPLASM OF BREAST: ICD-10-CM

## 2020-11-27 PROCEDURE — 77063 BREAST TOMOSYNTHESIS BI: CPT | Performed by: FAMILY MEDICINE

## 2020-11-27 PROCEDURE — 77067 SCR MAMMO BI INCL CAD: CPT | Performed by: FAMILY MEDICINE

## 2020-12-02 ENCOUNTER — OFFICE VISIT (OUTPATIENT)
Dept: FAMILY MEDICINE CLINIC | Facility: CLINIC | Age: 40
End: 2020-12-02

## 2020-12-02 VITALS
HEIGHT: 64 IN | HEART RATE: 72 BPM | DIASTOLIC BLOOD PRESSURE: 70 MMHG | BODY MASS INDEX: 22.02 KG/M2 | RESPIRATION RATE: 16 BRPM | SYSTOLIC BLOOD PRESSURE: 120 MMHG | WEIGHT: 129 LBS

## 2020-12-02 DIAGNOSIS — Z00.00 LABORATORY EXAMINATION ORDERED AS PART OF A ROUTINE GENERAL MEDICAL EXAMINATION: ICD-10-CM

## 2020-12-02 DIAGNOSIS — Z00.00 ROUTINE GENERAL MEDICAL EXAMINATION AT A HEALTH CARE FACILITY: Primary | ICD-10-CM

## 2020-12-02 DIAGNOSIS — G89.29 CHRONIC PAIN OF BOTH SHOULDERS: ICD-10-CM

## 2020-12-02 DIAGNOSIS — Z12.4 SCREENING FOR CERVICAL CANCER: ICD-10-CM

## 2020-12-02 DIAGNOSIS — M54.2 NECK PAIN: ICD-10-CM

## 2020-12-02 DIAGNOSIS — M25.511 CHRONIC PAIN OF BOTH SHOULDERS: ICD-10-CM

## 2020-12-02 DIAGNOSIS — M25.512 CHRONIC PAIN OF BOTH SHOULDERS: ICD-10-CM

## 2020-12-02 PROCEDURE — 88175 CYTOPATH C/V AUTO FLUID REDO: CPT | Performed by: FAMILY MEDICINE

## 2020-12-02 PROCEDURE — 3008F BODY MASS INDEX DOCD: CPT | Performed by: FAMILY MEDICINE

## 2020-12-02 PROCEDURE — 99396 PREV VISIT EST AGE 40-64: CPT | Performed by: FAMILY MEDICINE

## 2020-12-02 PROCEDURE — 3078F DIAST BP <80 MM HG: CPT | Performed by: FAMILY MEDICINE

## 2020-12-02 PROCEDURE — 3074F SYST BP LT 130 MM HG: CPT | Performed by: FAMILY MEDICINE

## 2020-12-02 RX ORDER — HYDROCODONE BITARTRATE AND ACETAMINOPHEN 10; 325 MG/1; MG/1
1 TABLET ORAL 2 TIMES DAILY PRN
Qty: 60 TABLET | Refills: 0 | Status: SHIPPED | OUTPATIENT
Start: 2020-12-02 | End: 2021-01-05

## 2020-12-02 NOTE — H&P
CC: Annual Physical Exam    HPI:   Ahmet Cody is a 41year old female who presents for a complete physical exam. Symptoms: periods are regular. Patient complains of right shoulder pain for at least 6 months. Did PT for it.   Pt. Complains of neck pain a by Tony Yadav MD at [facility] MAIN OR   • OTHER SURGICAL HISTORY  04/08/2014    cystoscopy- Dr. Adri River   • SHOULDER ARTHROSCOPY Left 12/05/2019    Dr Enzo Jacobo   • Olya Dadds Left 3/15/2019    Performed by Dustin Freeman MD at [facility] MAIN breast  LUNGS: clear to auscultation  CARDIO: RRR without murmur  GI: good BS's,no masses, HSM or tenderness  :introitus is normal,scant discharge,cervix is pink,no adnexal masses or tenderness, PAP was done ; IUD in place  MUSCULOSKELETAL: back is not t pain.

## 2020-12-04 ENCOUNTER — TELEPHONE (OUTPATIENT)
Dept: FAMILY MEDICINE CLINIC | Facility: CLINIC | Age: 40
End: 2020-12-04

## 2020-12-04 DIAGNOSIS — N80.9 ENDOMETRIOSIS: Primary | ICD-10-CM

## 2020-12-07 ENCOUNTER — TELEMEDICINE (OUTPATIENT)
Dept: FAMILY MEDICINE CLINIC | Facility: CLINIC | Age: 40
End: 2020-12-07

## 2020-12-07 ENCOUNTER — LAB ENCOUNTER (OUTPATIENT)
Dept: LAB | Age: 40
End: 2020-12-07
Attending: FAMILY MEDICINE
Payer: COMMERCIAL

## 2020-12-07 ENCOUNTER — TELEPHONE (OUTPATIENT)
Dept: FAMILY MEDICINE CLINIC | Facility: CLINIC | Age: 40
End: 2020-12-07

## 2020-12-07 DIAGNOSIS — Z20.822 EXPOSURE TO COVID-19 VIRUS: ICD-10-CM

## 2020-12-07 DIAGNOSIS — R51.9 ACUTE NONINTRACTABLE HEADACHE, UNSPECIFIED HEADACHE TYPE: ICD-10-CM

## 2020-12-07 DIAGNOSIS — J02.9 SORE THROAT: ICD-10-CM

## 2020-12-07 DIAGNOSIS — Z20.822 EXPOSURE TO COVID-19 VIRUS: Primary | ICD-10-CM

## 2020-12-07 PROCEDURE — 99213 OFFICE O/P EST LOW 20 MIN: CPT | Performed by: FAMILY MEDICINE

## 2020-12-07 NOTE — PROGRESS NOTES
Maria Luz Gandy consents to a virtual check in-service on 12/7/2020. Patient understands and accepts the financial responsibility for any deductible, coinsurance, and/or co-pays associated with the service.     Lucien Conner is a 41year old female for intraepithelial lesion or malignancy Negative for intraepithelial lesion or malignancy    Specimen Adequacy       Satisfactory for evaluation.  Endocervical or metaplastic cells present    General Categorization Negative for intraepithelial lesion or ma assess vitals during video visit    GENERAL: AAO x 3; pleasant; NAD; well developed; well nourished    ASSESSMENT AND PLAN:   Acute nonintractable headache, unspecified headache type  Sore throat  Exposure to covid-19 virus  (primary encounter diagnosis)

## 2020-12-07 NOTE — TELEPHONE ENCOUNTER
Pt child tested positive for COVID so pt wuld like to be tested she does have headache and sore throat. Thank you.

## 2020-12-11 ENCOUNTER — TELEPHONE (OUTPATIENT)
Dept: FAMILY MEDICINE CLINIC | Facility: CLINIC | Age: 40
End: 2020-12-11

## 2020-12-11 NOTE — TELEPHONE ENCOUNTER
· How are you feeling? · About the same   · Running any fever? No  · Any continuing cough? None  · Any SOB or trouble breathing (with exertion or movement)?  Feels fatigued, mild SOB sats 98% HR 70-80, denies chest pain nor tightness  · Do you feel you ar

## 2020-12-14 RX ORDER — ONDANSETRON 4 MG/1
4 TABLET, FILM COATED ORAL EVERY 8 HOURS PRN
Qty: 21 TABLET | Refills: 0 | Status: SHIPPED | OUTPATIENT
Start: 2020-12-14 | End: 2021-01-05

## 2020-12-14 NOTE — TELEPHONE ENCOUNTER
Call to pt's cell reaches identified voice mail. Per hipaa consent, left vmm req call back to triage nurse asap today for new dr instructions. Provided ofc phone hours.

## 2020-12-14 NOTE — TELEPHONE ENCOUNTER
[facility]. Call with update in 2 days.

## 2020-12-14 NOTE — TELEPHONE ENCOUNTER
Call to pt for coivd monitoring. Tested covid+ 12/7/2020-reported symptom onset 12/5. Condition update as follows:      • How are you feeling? Getting better  • Fever? No  • Headache?  Intermittent-max 7/10-relieved w tylenol alternating w ibuprofen  • Body

## 2020-12-16 NOTE — TELEPHONE ENCOUNTER
• How are you feeling? sts \"feeling OK, feeling I'm getting better\"  • Running any fever? no  • Any continuing cough? no  • Any SOB or trouble breathing (with exertion or movement)? no  • Do you feel you are improving?  yes  • Are you self-isolating and s

## 2020-12-28 ENCOUNTER — HOSPITAL ENCOUNTER (OUTPATIENT)
Dept: MRI IMAGING | Age: 40
Discharge: HOME | End: 2020-12-28
Attending: INTERNAL MEDICINE
Payer: COMMERCIAL

## 2020-12-28 DIAGNOSIS — M25.511 CHRONIC RIGHT SHOULDER PAIN: ICD-10-CM

## 2020-12-28 DIAGNOSIS — G89.29 CHRONIC RIGHT SHOULDER PAIN: ICD-10-CM

## 2020-12-28 PROCEDURE — A9575 INJ GADOTERATE MEGLUMI 0.1ML: HCPCS | Performed by: INTERNAL MEDICINE

## 2020-12-28 PROCEDURE — 73223 MRI JOINT UPR EXTR W/O&W/DYE: CPT | Performed by: INTERNAL MEDICINE

## 2021-01-06 ENCOUNTER — TELEPHONE (OUTPATIENT)
Dept: FAMILY MEDICINE CLINIC | Facility: CLINIC | Age: 41
End: 2021-01-06

## 2021-01-06 NOTE — TELEPHONE ENCOUNTER
Record shows shoulder MRI done 12/28/2020 ordered by dr Mateo Rodriguez. Record also shows pt had appt w dr Mateo Rodriguez yesterday-see those recommendations. Call to pt-sts updated dr Mateo Rodriguez re increased shoulder pain.  Has started new anti-inflammatory med and plan is or

## 2021-01-06 NOTE — TELEPHONE ENCOUNTER
Patient states she saw her shoulder MRI results are abnormal and she increased shoulder pain. Please call pt back.

## 2021-01-07 NOTE — TELEPHONE ENCOUNTER
pls schedule appt to discuss since there is no clear cut answer to her chronic condition or we can speak on 2/1/ 2021

## 2021-01-07 NOTE — TELEPHONE ENCOUNTER
I called pt.she was scheduled to come in and see Dr. Richard Amato on 01/12/2021 to discuss results further.

## 2021-01-12 ENCOUNTER — OFFICE VISIT (OUTPATIENT)
Dept: FAMILY MEDICINE CLINIC | Facility: CLINIC | Age: 41
End: 2021-01-12

## 2021-01-12 VITALS
HEART RATE: 72 BPM | BODY MASS INDEX: 22.36 KG/M2 | RESPIRATION RATE: 18 BRPM | WEIGHT: 131 LBS | SYSTOLIC BLOOD PRESSURE: 120 MMHG | DIASTOLIC BLOOD PRESSURE: 72 MMHG | HEIGHT: 64 IN

## 2021-01-12 DIAGNOSIS — R93.89 ABNORMAL MRI: ICD-10-CM

## 2021-01-12 DIAGNOSIS — R10.2 PELVIC PAIN: ICD-10-CM

## 2021-01-12 DIAGNOSIS — M25.511 CHRONIC RIGHT SHOULDER PAIN: Primary | ICD-10-CM

## 2021-01-12 DIAGNOSIS — Z79.899 MEDICATION MANAGEMENT: ICD-10-CM

## 2021-01-12 DIAGNOSIS — G89.29 CHRONIC RIGHT SHOULDER PAIN: Primary | ICD-10-CM

## 2021-01-12 DIAGNOSIS — N80.9 ENDOMETRIOSIS: ICD-10-CM

## 2021-01-12 PROCEDURE — 3008F BODY MASS INDEX DOCD: CPT | Performed by: FAMILY MEDICINE

## 2021-01-12 PROCEDURE — 3078F DIAST BP <80 MM HG: CPT | Performed by: FAMILY MEDICINE

## 2021-01-12 PROCEDURE — 3074F SYST BP LT 130 MM HG: CPT | Performed by: FAMILY MEDICINE

## 2021-01-12 PROCEDURE — 99214 OFFICE O/P EST MOD 30 MIN: CPT | Performed by: FAMILY MEDICINE

## 2021-01-12 RX ORDER — HYDROCODONE BITARTRATE AND ACETAMINOPHEN 10; 325 MG/1; MG/1
1 TABLET ORAL 2 TIMES DAILY PRN
Qty: 60 TABLET | Refills: 0 | Status: SHIPPED | OUTPATIENT
Start: 2021-01-12 | End: 2021-02-11

## 2021-01-12 RX ORDER — TRAMADOL HYDROCHLORIDE 50 MG/1
100 TABLET ORAL 2 TIMES DAILY PRN
Qty: 180 TABLET | Refills: 2 | Status: SHIPPED | OUTPATIENT
Start: 2021-01-26 | End: 2021-02-16 | Stop reason: ALTCHOICE

## 2021-01-12 NOTE — PROGRESS NOTES
Adalgisa Thornton is a 41year old female. HPI:   Patient is here for follow-up on the MRI of her right shoulder. She recently saw Dr. Dorcas Munoz who suggested that she see Ortho for her shoulder pain.   Patient did physical therapy for her right shoulder last yea (COVID-19) Detected (A) Not Detected       REVIEW OF SYSTEMS:   GENERAL: feels well otherwise  SKIN: denies any unusual skin lesions  LUNGS: denies shortness of breath with exertion  CARDIOVASCULAR: denies chest pain on exertion  GI: denies abdominal pain,

## 2021-01-18 ENCOUNTER — OFFICE VISIT (OUTPATIENT)
Dept: ORTHOPEDICS CLINIC | Facility: CLINIC | Age: 41
End: 2021-01-18

## 2021-01-18 ENCOUNTER — OFFICE VISIT (OUTPATIENT)
Dept: OBGYN CLINIC | Facility: CLINIC | Age: 41
End: 2021-01-18

## 2021-01-18 VITALS
DIASTOLIC BLOOD PRESSURE: 66 MMHG | HEART RATE: 79 BPM | WEIGHT: 135.38 LBS | HEIGHT: 64 IN | BODY MASS INDEX: 23.11 KG/M2 | SYSTOLIC BLOOD PRESSURE: 116 MMHG

## 2021-01-18 VITALS — HEART RATE: 82 BPM | OXYGEN SATURATION: 100 %

## 2021-01-18 DIAGNOSIS — M75.41 SUBACROMIAL IMPINGEMENT OF RIGHT SHOULDER: ICD-10-CM

## 2021-01-18 DIAGNOSIS — M67.922 BICEPS TENDINOPATHY, LEFT: Primary | ICD-10-CM

## 2021-01-18 DIAGNOSIS — R10.2 PELVIC PAIN: Primary | ICD-10-CM

## 2021-01-18 PROCEDURE — 99213 OFFICE O/P EST LOW 20 MIN: CPT | Performed by: OBSTETRICS & GYNECOLOGY

## 2021-01-18 PROCEDURE — 99215 OFFICE O/P EST HI 40 MIN: CPT | Performed by: ORTHOPAEDIC SURGERY

## 2021-01-18 PROCEDURE — 3074F SYST BP LT 130 MM HG: CPT | Performed by: OBSTETRICS & GYNECOLOGY

## 2021-01-18 PROCEDURE — 3078F DIAST BP <80 MM HG: CPT | Performed by: OBSTETRICS & GYNECOLOGY

## 2021-01-18 PROCEDURE — 20610 DRAIN/INJ JOINT/BURSA W/O US: CPT | Performed by: ORTHOPAEDIC SURGERY

## 2021-01-18 PROCEDURE — 3008F BODY MASS INDEX DOCD: CPT | Performed by: OBSTETRICS & GYNECOLOGY

## 2021-01-18 RX ORDER — KETOROLAC TROMETHAMINE 30 MG/ML
30 INJECTION, SOLUTION INTRAMUSCULAR; INTRAVENOUS ONCE
Status: COMPLETED | OUTPATIENT
Start: 2021-01-18 | End: 2021-01-18

## 2021-01-18 RX ORDER — TRIAMCINOLONE ACETONIDE 40 MG/ML
40 INJECTION, SUSPENSION INTRA-ARTICULAR; INTRAMUSCULAR ONCE
Status: COMPLETED | OUTPATIENT
Start: 2021-01-18 | End: 2021-01-18

## 2021-01-18 RX ADMIN — TRIAMCINOLONE ACETONIDE 40 MG: 40 INJECTION, SUSPENSION INTRA-ARTICULAR; INTRAMUSCULAR at 16:20:00

## 2021-01-18 RX ADMIN — KETOROLAC TROMETHAMINE 30 MG: 30 INJECTION, SOLUTION INTRAMUSCULAR; INTRAVENOUS at 16:20:00

## 2021-01-18 NOTE — PROGRESS NOTES
Patient is 42 y/o ,  x 3  No problems getting pregnant  S/P right oophorectomy , (Dr. Pj Hannah)  She has always dysmenorrhea. But her pelvic pain started about two years ago. Using Paragard.   Lower abdominal pain, has some irregular light bleedin

## 2021-01-18 NOTE — PROCEDURES
Right Shoulder Subacromial Space Injection    Name: Ritu Mendenhall   MRN: [id number]  Date: 1/18/2021     Clinical Indications:   Subacromial impingement with symptoms refractory to conservative measures.      After informed consent, the injection site was

## 2021-01-18 NOTE — H&P
[facility]  [facility] [address] [id number]  [phone]     NEW PATIENT VISIT - HISTORY AND PHYSICAL EXAMINATION      Name: Sofia Scruggs   MRN: [id number]  Date: 1/18/2021     CC: Lisa The left shoulder had an interesting story which underwent initial subscapularis rotator cuff repair with arthroscopic top of the groove biceps tenodesis in March 2019 with Dr. Nomi Rivas.   Subsequently, she had persistent biceps pain and was evaluated by • HYDROcodone-acetaminophen  MG Oral Tab Take 1 tablet by mouth 2 (two) times daily as needed for Pain. 60 tablet 0   • [START ON 1/26/2021] traMADol HCl 50 MG Oral Tab Take 2 tablets (100 mg total) by mouth 2 (two) times daily as needed for Pain.  25 Physical Exam  Constitutional:       Appearance: Normal appearance. HENT:      Head: Normocephalic and atraumatic. Eyes:      Extraocular Movements: Extraocular movements intact. Neck:      Musculoskeletal: Normal range of motion and neck supple.    C Additionally, there is sharp tenderness in the biceps groove and a strongly positive [place] sign of the left upper extremity.     Radiographic Examination/Diagnostics:    MRI of the right shoulder personally viewed, independently interpreted and radiology PROCEDURE:  MRI SHOULDER (W+WO), RIGHT (CPT=73223)  COMPARISON:  REY VASQUEZ, XR SHOULDER, COMPLETE (MIN 2 VIEWS), RIGHT (CPT=73030), 7/01/2017, 12:34 PM.  INDICATIONS:  G89.29 Chronic right shoulder pain M25.511 Chronic right shoulder pain  TECHNIQUE: appreciated. CONCLUSION:  1. There is no MR evidence of inflammatory arthropathy or osteoarthritis of the glenohumeral joint. No marginal erosive changes are noted. No joint effusion or synovitis appreciated.  2. There is however mild AC joint I discussed with her in detail that she will likely benefit from subacromial injection to the right shoulder to help alleviate her symptomology and pain, I would like to further investigate her left shoulder pathology with a repeat MRI and discuss bo

## 2021-01-19 ENCOUNTER — TELEPHONE (OUTPATIENT)
Dept: ORTHOPEDICS CLINIC | Facility: CLINIC | Age: 41
End: 2021-01-19

## 2021-01-19 NOTE — TELEPHONE ENCOUNTER
Patient had an injection in her right shoulder yesterday and states she had an allergic reaction to the medication. She had a swollen right eye.  Please advise

## 2021-01-19 NOTE — TELEPHONE ENCOUNTER
Scot Hansen at [facility] took a call from patient to scheduled her left shoulder MRI. The MRI order is not in Epic. Please put in order and let Scot Hansen know so she can call patient back. Thanks.

## 2021-01-20 NOTE — TELEPHONE ENCOUNTER
I had a detailed discussion with her that this was a transient reaction that resolved with Benadryl.   She has no other further concerns but wanted to notify the team that this may have been a reaction to either the steroid or the ketorolac and the injectio

## 2021-01-22 ENCOUNTER — HOSPITAL ENCOUNTER (OUTPATIENT)
Dept: MRI IMAGING | Facility: HOSPITAL | Age: 41
Discharge: HOME | End: 2021-01-22
Attending: ORTHOPAEDIC SURGERY
Payer: COMMERCIAL

## 2021-01-22 DIAGNOSIS — M67.922 BICEPS TENDINOPATHY, LEFT: ICD-10-CM

## 2021-01-22 PROCEDURE — 73221 MRI JOINT UPR EXTREM W/O DYE: CPT | Performed by: ORTHOPAEDIC SURGERY

## 2021-02-01 NOTE — PROGRESS NOTES
Nikia Lucia is a 41year old female. HPI:   Pt. States that the right shoulder is doing better after steroid injection 1/18/2021. Now he is taking a look at her left shoulder.   Period start 1/18 and started with spotting, became a regular and the resu Never Smoker      Smokeless tobacco: Never Used    Alcohol use: No    Drug use: No       Results for orders placed or performed in visit on 12/07/20   SARS-COV-2 BY PCR ()    Specimen: Nares;  Other   Result Value Ref Range    SARS-CoV-2 (COVID-19) Det visit/chartin minutes      The patient indicates understanding of these issues and agrees to the plan. Return in about 5 weeks (around 3/8/2021) for med wean off.

## 2021-02-02 ENCOUNTER — OFFICE VISIT (OUTPATIENT)
Dept: ORTHOPEDICS CLINIC | Facility: CLINIC | Age: 41
End: 2021-02-02

## 2021-02-02 VITALS — OXYGEN SATURATION: 99 % | HEIGHT: 64 IN | RESPIRATION RATE: 16 BRPM | BODY MASS INDEX: 23 KG/M2 | HEART RATE: 70 BPM

## 2021-02-02 DIAGNOSIS — M75.21 BICEPS TENDINITIS OF RIGHT UPPER EXTREMITY: ICD-10-CM

## 2021-02-02 DIAGNOSIS — M75.02 ADHESIVE CAPSULITIS OF LEFT SHOULDER: Primary | ICD-10-CM

## 2021-02-02 PROCEDURE — 99214 OFFICE O/P EST MOD 30 MIN: CPT | Performed by: ORTHOPAEDIC SURGERY

## 2021-02-02 PROCEDURE — 20610 DRAIN/INJ JOINT/BURSA W/O US: CPT | Performed by: ORTHOPAEDIC SURGERY

## 2021-02-02 RX ORDER — TRIAMCINOLONE ACETONIDE 40 MG/ML
40 INJECTION, SUSPENSION INTRA-ARTICULAR; INTRAMUSCULAR ONCE
Status: COMPLETED | OUTPATIENT
Start: 2021-02-02 | End: 2021-02-02

## 2021-02-02 RX ADMIN — TRIAMCINOLONE ACETONIDE 40 MG: 40 INJECTION, SUSPENSION INTRA-ARTICULAR; INTRAMUSCULAR at 15:55:00

## 2021-02-02 NOTE — PROGRESS NOTES
[facility]  [address] [place] [address]       Name: Michelle Peralta   MRN: [id number]  Date: 2/2/2021     REASON FOR VISIT: Follow-up evaluation after obtaining a left shoulder Examination of the left shoulder demonstrates limited terminal range of motion the final 10 to 15 degrees in all planes. Good rotator cuff strength    Pain suggestive of adhesive capsulitis.     The contralateral upper extremity is without limitation in ra PROCEDURE:  MRI SHOULDER, LEFT (CPT=73221)  COMPARISON:  None. INDICATIONS:  Patient presents with a history of chronic left shoulder pain. Prior history of rotator cuff disease and biceps tendinopathy with prior rotator cuff repair and biceps tenodesis. CONCLUSION:  1. Patient is status post rotator cuff repair and biceps tenodesis. There is mild rotator cuff tendinosis primarily along the distal insertional fibers of the rotator cuff. No high-grade tendinopathy or rotator cuff tear is identified.  2. No

## 2021-02-02 NOTE — PROCEDURES
Bilateral Shoulder Glenohumeral Joint Injection    Name: Judson Carmona   MRN: [id number]  Date: 2/2/2021     Clinical Indications:   Adhesive Capsulitis. On left and biceps tendinitis on the right.     After informed consent, the injection site was marked

## 2021-02-16 ENCOUNTER — TELEPHONE (OUTPATIENT)
Dept: FAMILY MEDICINE CLINIC | Facility: CLINIC | Age: 41
End: 2021-02-16

## 2021-02-16 ENCOUNTER — MED REC SCAN ONLY (OUTPATIENT)
Dept: FAMILY MEDICINE CLINIC | Facility: CLINIC | Age: 41
End: 2021-02-16

## 2021-02-16 ENCOUNTER — OFFICE VISIT (OUTPATIENT)
Dept: FAMILY MEDICINE CLINIC | Facility: CLINIC | Age: 41
End: 2021-02-16

## 2021-02-16 VITALS
BODY MASS INDEX: 21.85 KG/M2 | RESPIRATION RATE: 18 BRPM | DIASTOLIC BLOOD PRESSURE: 76 MMHG | HEART RATE: 88 BPM | SYSTOLIC BLOOD PRESSURE: 108 MMHG | WEIGHT: 128 LBS | HEIGHT: 64 IN

## 2021-02-16 DIAGNOSIS — N83.202 CYST OF LEFT OVARY: ICD-10-CM

## 2021-02-16 DIAGNOSIS — R93.5 ABNORMAL CT OF THE ABDOMEN: ICD-10-CM

## 2021-02-16 DIAGNOSIS — K52.9 COLITIS: Primary | ICD-10-CM

## 2021-02-16 DIAGNOSIS — Z09 HOSPITAL DISCHARGE FOLLOW-UP: ICD-10-CM

## 2021-02-16 PROCEDURE — 3008F BODY MASS INDEX DOCD: CPT | Performed by: FAMILY MEDICINE

## 2021-02-16 PROCEDURE — 3078F DIAST BP <80 MM HG: CPT | Performed by: FAMILY MEDICINE

## 2021-02-16 PROCEDURE — 3074F SYST BP LT 130 MM HG: CPT | Performed by: FAMILY MEDICINE

## 2021-02-16 PROCEDURE — 99214 OFFICE O/P EST MOD 30 MIN: CPT | Performed by: FAMILY MEDICINE

## 2021-02-16 RX ORDER — CIPROFLOXACIN 250 MG/1
TABLET, FILM COATED ORAL
Status: ON HOLD | COMMUNITY
Start: 2021-02-14 | End: 2021-03-02

## 2021-02-16 RX ORDER — HYDROCODONE BITARTRATE AND ACETAMINOPHEN 5; 325 MG/1; MG/1
TABLET ORAL
COMMUNITY
Start: 2021-02-14 | End: 2021-02-17

## 2021-02-16 RX ORDER — DICYCLOMINE HYDROCHLORIDE 10 MG/1
CAPSULE ORAL
Status: ON HOLD | COMMUNITY
Start: 2021-02-14 | End: 2021-03-02

## 2021-02-16 RX ORDER — HYDROCODONE BITARTRATE AND ACETAMINOPHEN 10; 325 MG/1; MG/1
1 TABLET ORAL EVERY 8 HOURS PRN
Qty: 60 TABLET | Refills: 0 | Status: ON HOLD | OUTPATIENT
Start: 2021-02-16 | End: 2021-03-02

## 2021-02-16 RX ORDER — BIOTIN 10000 MCG
CAPSULE ORAL
COMMUNITY

## 2021-02-16 NOTE — TELEPHONE ENCOUNTER
Can we try to call Dr. Yvette Beltran office and find out if we can get her in sooner because she was hospitalized for colitis and she needs follow-up.

## 2021-02-16 NOTE — TELEPHONE ENCOUNTER
Pt was referred to gastro today. They can not see her until April. She would like to know if we can refer her to someone else. Please advise.  Thank you

## 2021-02-16 NOTE — PROGRESS NOTES
Liliam Nunez is a 41year old female. HPI:   Pt. Here for follow up on colitis. She was there for 4 days. Pain was 20/10 when went to ER. Pain  Today 5/10. Last vomit was on Sunday at home.   Start on clear liquid diet and then started with solid an placed or performed in visit on 12/07/20   SARS-COV-2 BY PCR ()    Specimen: Nares;  Other   Result Value Ref Range    SARS-CoV-2 (COVID-19) Detected (A) Not Detected       REVIEW OF SYSTEMS:   GENERAL: feels well otherwise  SKIN: denies any unusual sk

## 2021-02-18 PROBLEM — Z01.818 PRE-OP TESTING: Status: ACTIVE | Noted: 2021-02-18

## 2021-02-21 ENCOUNTER — IMMUNIZATION (OUTPATIENT)
Dept: LAB | Age: 41
End: 2021-02-21

## 2021-02-21 DIAGNOSIS — Z23 NEED FOR VACCINATION: Primary | ICD-10-CM

## 2021-02-21 PROCEDURE — 91300 COVID 19 PFIZER-BIONTECH: CPT

## 2021-02-21 PROCEDURE — 0001A COVID 19 PFIZER-BIONTECH: CPT

## 2021-02-26 ENCOUNTER — HOSPITAL ENCOUNTER (OUTPATIENT)
Facility: HOSPITAL | Age: 41
Setting detail: OBSERVATION
Discharge: HOME | End: 2021-03-02
Attending: EMERGENCY MEDICINE | Admitting: HOSPITALIST
Payer: COMMERCIAL

## 2021-02-26 DIAGNOSIS — R10.9 ABDOMINAL PAIN, ACUTE: Primary | ICD-10-CM

## 2021-02-26 DIAGNOSIS — R10.13 EPIGASTRIC PAIN: ICD-10-CM

## 2021-02-26 DIAGNOSIS — K52.9 COLITIS: ICD-10-CM

## 2021-02-26 DIAGNOSIS — R11.2 NAUSEA AND VOMITING IN ADULT: ICD-10-CM

## 2021-02-26 DIAGNOSIS — K52.9 ENTERITIS: ICD-10-CM

## 2021-02-26 LAB
ALBUMIN SERPL-MCNC: 3.6 G/DL (ref 3.4–5)
ALBUMIN/GLOB SERPL: 0.7 {RATIO} (ref 1–2)
ALP LIVER SERPL-CCNC: 73 U/L
ALT SERPL-CCNC: 25 U/L
ANION GAP SERPL CALC-SCNC: 4 MMOL/L (ref 0–18)
AST SERPL-CCNC: 14 U/L (ref 15–37)
BASOPHILS # BLD AUTO: 0.07 X10(3) UL (ref 0–0.2)
BASOPHILS NFR BLD AUTO: 0.6 %
BILIRUB SERPL-MCNC: 0.2 MG/DL (ref 0.1–2)
BUN BLD-MCNC: 7 MG/DL (ref 7–18)
BUN/CREAT SERPL: 9.7 (ref 10–20)
CALCIUM BLD-MCNC: 9.5 MG/DL (ref 8.5–10.1)
CHLORIDE SERPL-SCNC: 105 MMOL/L (ref 98–112)
CO2 SERPL-SCNC: 30 MMOL/L (ref 21–32)
CREAT BLD-MCNC: 0.72 MG/DL
DEPRECATED RDW RBC AUTO: 41.2 FL (ref 35.1–46.3)
EOSINOPHIL # BLD AUTO: 0.1 X10(3) UL (ref 0–0.7)
EOSINOPHIL NFR BLD AUTO: 0.8 %
ERYTHROCYTE [DISTWIDTH] IN BLOOD BY AUTOMATED COUNT: 12.6 % (ref 11–15)
GLOBULIN PLAS-MCNC: 5 G/DL (ref 2.8–4.4)
GLUCOSE BLD-MCNC: 128 MG/DL (ref 70–99)
HCT VFR BLD AUTO: 43 %
HGB BLD-MCNC: 14.6 G/DL
IMM GRANULOCYTES # BLD AUTO: 0.04 X10(3) UL (ref 0–1)
IMM GRANULOCYTES NFR BLD: 0.3 %
LIPASE SERPL-CCNC: 263 U/L (ref 73–393)
LYMPHOCYTES # BLD AUTO: 2.31 X10(3) UL (ref 1–4)
LYMPHOCYTES NFR BLD AUTO: 19.3 %
M PROTEIN MFR SERPL ELPH: 8.6 G/DL (ref 6.4–8.2)
MCH RBC QN AUTO: 30.3 PG (ref 26–34)
MCHC RBC AUTO-ENTMCNC: 34 G/DL (ref 31–37)
MCV RBC AUTO: 89.2 FL
MONOCYTES # BLD AUTO: 0.64 X10(3) UL (ref 0.1–1)
MONOCYTES NFR BLD AUTO: 5.4 %
NEUTROPHILS # BLD AUTO: 8.79 X10 (3) UL (ref 1.5–7.7)
NEUTROPHILS # BLD AUTO: 8.79 X10(3) UL (ref 1.5–7.7)
NEUTROPHILS NFR BLD AUTO: 73.6 %
OSMOLALITY SERPL CALC.SUM OF ELEC: 288 MOSM/KG (ref 275–295)
PLATELET # BLD AUTO: 376 10(3)UL (ref 150–450)
POTASSIUM SERPL-SCNC: 3.5 MMOL/L (ref 3.5–5.1)
RBC # BLD AUTO: 4.82 X10(6)UL
SODIUM SERPL-SCNC: 139 MMOL/L (ref 136–145)
WBC # BLD AUTO: 12 X10(3) UL (ref 4–11)

## 2021-02-26 RX ORDER — FAMOTIDINE 10 MG/ML
20 INJECTION, SOLUTION INTRAVENOUS ONCE
Status: COMPLETED | OUTPATIENT
Start: 2021-02-26 | End: 2021-02-26

## 2021-02-26 RX ORDER — ONDANSETRON 2 MG/ML
4 INJECTION INTRAMUSCULAR; INTRAVENOUS ONCE
Status: COMPLETED | OUTPATIENT
Start: 2021-02-26 | End: 2021-02-26

## 2021-02-26 RX ORDER — DICYCLOMINE HYDROCHLORIDE 10 MG/ML
20 INJECTION INTRAMUSCULAR ONCE
Status: COMPLETED | OUTPATIENT
Start: 2021-02-26 | End: 2021-02-26

## 2021-02-26 RX ORDER — HYDROMORPHONE HYDROCHLORIDE 1 MG/ML
0.5 INJECTION, SOLUTION INTRAMUSCULAR; INTRAVENOUS; SUBCUTANEOUS ONCE
Status: COMPLETED | OUTPATIENT
Start: 2021-02-26 | End: 2021-02-26

## 2021-02-27 ENCOUNTER — APPOINTMENT (OUTPATIENT)
Dept: CT IMAGING | Facility: HOSPITAL | Age: 41
End: 2021-02-27
Attending: EMERGENCY MEDICINE
Payer: COMMERCIAL

## 2021-02-27 PROBLEM — K52.9 ENTERITIS: Status: ACTIVE | Noted: 2021-02-27

## 2021-02-27 PROBLEM — R10.9 ABDOMINAL PAIN, ACUTE: Status: ACTIVE | Noted: 2021-02-27

## 2021-02-27 PROBLEM — R11.2 NAUSEA AND VOMITING IN ADULT: Status: ACTIVE | Noted: 2021-02-27

## 2021-02-27 LAB
ADENOVIRUS F 40/41 PCR: NEGATIVE
ASTROVIRUS PCR: NEGATIVE
BILIRUB UR QL STRIP.AUTO: NEGATIVE
C CAYETANENSIS DNA SPEC QL NAA+PROBE: NEGATIVE
CAMPY SP DNA.DIARRHEA STL QL NAA+PROBE: NEGATIVE
COLOR UR AUTO: YELLOW
CRP SERPL-MCNC: 0.73 MG/DL (ref ?–0.3)
CRYPTOSP DNA SPEC QL NAA+PROBE: NEGATIVE
EAEC PAA PLAS AGGR+AATA ST NAA+NON-PRB: NEGATIVE
EC STX1+STX2 + H7 FLIC SPEC NAA+PROBE: NEGATIVE
ENTAMOEBA HISTOLYTICA PCR: NEGATIVE
EPEC EAE GENE STL QL NAA+NON-PROBE: NEGATIVE
ETEC LTA+ST1A+ST1B TOX ST NAA+NON-PROBE: NEGATIVE
GIARDIA LAMBLIA PCR: NEGATIVE
GLUCOSE UR STRIP.AUTO-MCNC: NEGATIVE MG/DL
KETONES UR STRIP.AUTO-MCNC: NEGATIVE MG/DL
LACTATE SERPL-SCNC: 1.2 MMOL/L (ref 0.4–2)
LEUKOCYTE ESTERASE UR QL STRIP.AUTO: NEGATIVE
NITRITE UR QL STRIP.AUTO: NEGATIVE
NOROVIRUS GI/GII PCR: NEGATIVE
P SHIGELLOIDES DNA STL QL NAA+PROBE: NEGATIVE
PH UR STRIP.AUTO: 6 [PH] (ref 5–8)
POCT LOT NUMBER: NORMAL
POCT URINE PREGNANCY: NEGATIVE
PROT UR STRIP.AUTO-MCNC: NEGATIVE MG/DL
RBC #/AREA URNS AUTO: >10 /HPF
ROTAVIRUS A PCR: NEGATIVE
SALMONELLA DNA SPEC QL NAA+PROBE: NEGATIVE
SAPOVIRUS PCR: NEGATIVE
SARS-COV-2 RNA RESP QL NAA+PROBE: NOT DETECTED
SED RATE-ML: 24 MM/HR
SHIGELLA SP+EIEC IPAH ST NAA+NON-PROBE: NEGATIVE
SP GR UR STRIP.AUTO: 1.02 (ref 1–1.03)
UROBILINOGEN UR STRIP.AUTO-MCNC: <2 MG/DL
V CHOLERAE DNA SPEC QL NAA+PROBE: NEGATIVE
VIBRIO DNA SPEC NAA+PROBE: NEGATIVE
YERSINIA DNA SPEC NAA+PROBE: NEGATIVE

## 2021-02-27 PROCEDURE — 74177 CT ABD & PELVIS W/CONTRAST: CPT | Performed by: EMERGENCY MEDICINE

## 2021-02-27 PROCEDURE — 99220 INITIAL OBSERVATION CARE,LEVL III: CPT | Performed by: HOSPITALIST

## 2021-02-27 RX ORDER — HYDROMORPHONE HYDROCHLORIDE 1 MG/ML
0.4 INJECTION, SOLUTION INTRAMUSCULAR; INTRAVENOUS; SUBCUTANEOUS EVERY 2 HOUR PRN
Status: DISCONTINUED | OUTPATIENT
Start: 2021-02-27 | End: 2021-03-02

## 2021-02-27 RX ORDER — HYDROMORPHONE HYDROCHLORIDE 1 MG/ML
0.2 INJECTION, SOLUTION INTRAMUSCULAR; INTRAVENOUS; SUBCUTANEOUS EVERY 2 HOUR PRN
Status: DISCONTINUED | OUTPATIENT
Start: 2021-02-27 | End: 2021-03-02

## 2021-02-27 RX ORDER — PANTOPRAZOLE SODIUM 40 MG/1
40 TABLET, DELAYED RELEASE ORAL
Refills: 0 | Status: DISCONTINUED | OUTPATIENT
Start: 2021-02-27 | End: 2021-02-27

## 2021-02-27 RX ORDER — HYDROCODONE BITARTRATE AND ACETAMINOPHEN 10; 325 MG/1; MG/1
1 TABLET ORAL EVERY 6 HOURS PRN
Status: DISCONTINUED | OUTPATIENT
Start: 2021-02-27 | End: 2021-03-02

## 2021-02-27 RX ORDER — METOCLOPRAMIDE HYDROCHLORIDE 5 MG/ML
10 INJECTION INTRAMUSCULAR; INTRAVENOUS ONCE
Status: COMPLETED | OUTPATIENT
Start: 2021-02-27 | End: 2021-02-27

## 2021-02-27 RX ORDER — DICYCLOMINE HYDROCHLORIDE 10 MG/1
20 CAPSULE ORAL 3 TIMES DAILY
Status: DISCONTINUED | OUTPATIENT
Start: 2021-02-27 | End: 2021-03-02

## 2021-02-27 RX ORDER — ONDANSETRON 2 MG/ML
4 INJECTION INTRAMUSCULAR; INTRAVENOUS EVERY 6 HOURS PRN
Status: DISCONTINUED | OUTPATIENT
Start: 2021-02-27 | End: 2021-03-02

## 2021-02-27 RX ORDER — HYDROMORPHONE HYDROCHLORIDE 1 MG/ML
0.5 INJECTION, SOLUTION INTRAMUSCULAR; INTRAVENOUS; SUBCUTANEOUS ONCE
Status: COMPLETED | OUTPATIENT
Start: 2021-02-27 | End: 2021-02-27

## 2021-02-27 RX ORDER — ACETAMINOPHEN 325 MG/1
650 TABLET ORAL EVERY 6 HOURS PRN
Status: DISCONTINUED | OUTPATIENT
Start: 2021-02-27 | End: 2021-03-02

## 2021-02-27 RX ORDER — SODIUM CHLORIDE 9 MG/ML
INJECTION, SOLUTION INTRAVENOUS CONTINUOUS
Status: DISCONTINUED | OUTPATIENT
Start: 2021-02-27 | End: 2021-03-02

## 2021-02-27 RX ORDER — HYDROMORPHONE HYDROCHLORIDE 1 MG/ML
0.8 INJECTION, SOLUTION INTRAMUSCULAR; INTRAVENOUS; SUBCUTANEOUS EVERY 2 HOUR PRN
Status: DISCONTINUED | OUTPATIENT
Start: 2021-02-27 | End: 2021-03-02

## 2021-02-27 NOTE — PROGRESS NOTES
NURSING ADMISSION NOTE      Patient admitted via Cart  Oriented to room. Safety precautions initiated. Bed in low position. Call light in reach. Patient arrived to the unit, drowsy arouses to voice. C/O intermittent pain. VSS. IVF initiated.  Bed

## 2021-02-27 NOTE — PLAN OF CARE
Patient A&Ox4, c/o intermittent pain prn medication administrated with some relief. IVF started. Patient DTV, stool sample needs to be collected to r/o cdiff, contact isolation placed in the meantime.  Clear lq diet, patient unable to tolerate, IV zofran gi physical deficits and behaviors that affect risk of falls.   - [place] fall precautions as indicated by assessment.  - Educate pt/family on patient safety including physical limitations  - Instruct pt to call for assistance with activity based on assessme

## 2021-02-27 NOTE — ED INITIAL ASSESSMENT (HPI)
PT recently discharged from [facility] for colitis. PT followed up with GI and has a colonoscopy scheduled for March 4th. PT reports having mid abdominal pain and diarrhea starting a few hours ago. Denies fevers.

## 2021-02-27 NOTE — H&P
[facility]  History and Physical     Argelia Lockett Patient Status:  Observation    1980 MRN [id number]   [facility] 3NW-A Attending Antoni Lacey MD   Hosp Day # 0 PCP Jacklyn Vaughn DO     Chief Complaint: Abdominal pain, d Wendi Canchola MD at [facility] MAIN OR       Social History:  reports that she has never smoked. She has never used smokeless tobacco. She reports that she does not drink alcohol or use drugs.     Family History:   Family History   Problem Relation Age of Onset   • No   BMI 23.16 kg/m²   General: No acute distress. Alert and oriented x 3. HEENT: Normocephalic atraumatic. Moist mucous membranes. EOM-I. PERRLA. Anicteric. Neck: No lymphadenopathy. No JVD. No carotid bruits.   Respiratory: Clear to auscultation bilater

## 2021-02-27 NOTE — ED PROVIDER NOTES
Patient Seen in: [facility] Emergency Department      History   Patient presents with:  Abdomen/Flank Pain    Stated Complaint: pt reports abd pain.  reports recent hospitalization for similar symptoms and wa*    HPI/Subjective:   HPI    25-year-old Performed by Lus Bloch, MD at [facility] MAIN OR                Social History    Tobacco Use      Smoking status: Never Smoker      Smokeless tobacco: Never Used    Alcohol use: No    Drug use:  No             Review of Systems    Positive for stated complaint: Notable for the following components:       Result Value    Glucose 128 (*)     BUN/CREA Ratio 9.7 (*)     AST 14 (*)     Total Protein 8.6 (*)     Globulin  5.0 (*)     A/G Ratio 0.7 (*)     All other components within normal limits   URINALYSIS WITH CULT appropriate uterus and ovaries.  -Small amount of pelvic free fluid may be physiologic. No pneumoperitoneum. MDM      40 yo fm with c/o abdominal pain/n/v/d.   Stable arrival patient peers nontoxic on examination abdomen with epigastric abdominal

## 2021-02-27 NOTE — PROGRESS NOTES
[facility]  Progress Note     Isela Barr Patient Status:  Observation    1980 MRN [id number]   [facility] 3NW-A Attending Mortimer Bullocks, DO   Hosp Day # 0 PCP Peggy Sands DO     Chief Complaint: Abdominal pain, diarrhea for input(s): PTP, INR in the last 168 hours. No results for input(s): TROP, CK in the last 168 hours. Imaging: Imaging data reviewed in Epic.     Medications:   • Dicyclomine HCl  20 mg Oral TID   • Pantoprazole Sodium  40 mg Oral QAM AC     ASSESSM

## 2021-02-27 NOTE — CONSULTS
[facility]                       Gastroenterology Consultation-[facility]    Maya Diaz Reid Patient Status:  Observation    1980 MRN [id number]   [facility] 3NW-A Attending Delia Edward,    Hosp D ROTATOR CUFF REPAIR Left 3/15/2019    Performed by Naya Tamez MD at [address] [place] [address] MAIN OR       Medications:   •  [COMPLETED] iohexol (OMNIPAQUE) 350 MG/ML injection 68 mL, 68 mL, Intravenous, ONCE PRN    •  [COMPLETED] Metoclopramide HCl (REGLAN) injection 10 mg, self-limited over the course of 24 hours.     SocHx: Social History    Socioeconomic History      Marital status:       Spouse name: Not on file      Number of children: Not on file      Years of education: Not on file      Highest education level: N Self-Exams: Not Asked    Social History Narrative      Not on file    Family History   Problem Relation Age of Onset   • Diabetes Father    • Hypertension Mother        ROS:  In addition to the pertinent positives described above:             Infectious Dis or rales    Abdomen: Soft, non-tender, non-distended with the presence of bowel sounds; No hepatosplenomegaly; no rebound or guarding;  No ascites is clinically apparent; no tympany to percussion    Ext: No peripheral edema or cyanosis    Skin: Warm and dry

## 2021-02-27 NOTE — PLAN OF CARE
Patient  A/O X 4, VSS, IVF infusing per orders. Medicated with dilaudid PRN for pain. Patient attempted to take in clear liquids this am, but vomited and had increased pain.      Problem: Patient/Family Goals  Goal: Patient/Family Long Term Goal  Descriptio including physical limitations  - Instruct pt to call for assistance with activity based on assessment  - Modify environment to reduce risk of injury  - Provide assistive devices as appropriate  - Consider OT/PT consult to assist with strengthening/mobilit

## 2021-02-28 DIAGNOSIS — R10.13 EPIGASTRIC PAIN: ICD-10-CM

## 2021-02-28 DIAGNOSIS — K52.9 COLITIS: Primary | ICD-10-CM

## 2021-02-28 LAB
BASOPHILS # BLD AUTO: 0.06 X10(3) UL (ref 0–0.2)
BASOPHILS NFR BLD AUTO: 0.7 %
C DIFF TOX B STL QL: NEGATIVE
CALPROTECTIN STL-MCNT: 37 ΜG/G (ref ?–50)
DEPRECATED RDW RBC AUTO: 41.2 FL (ref 35.1–46.3)
EOSINOPHIL # BLD AUTO: 0.61 X10(3) UL (ref 0–0.7)
EOSINOPHIL NFR BLD AUTO: 7 %
ERYTHROCYTE [DISTWIDTH] IN BLOOD BY AUTOMATED COUNT: 12.6 % (ref 11–15)
HCT VFR BLD AUTO: 37 %
HGB BLD-MCNC: 12.4 G/DL
IMM GRANULOCYTES # BLD AUTO: 0.03 X10(3) UL (ref 0–1)
IMM GRANULOCYTES NFR BLD: 0.3 %
LYMPHOCYTES # BLD AUTO: 1.88 X10(3) UL (ref 1–4)
LYMPHOCYTES NFR BLD AUTO: 21.6 %
MCH RBC QN AUTO: 30 PG (ref 26–34)
MCHC RBC AUTO-ENTMCNC: 33.5 G/DL (ref 31–37)
MCV RBC AUTO: 89.4 FL
MONOCYTES # BLD AUTO: 0.5 X10(3) UL (ref 0.1–1)
MONOCYTES NFR BLD AUTO: 5.8 %
NEUTROPHILS # BLD AUTO: 5.61 X10 (3) UL (ref 1.5–7.7)
NEUTROPHILS # BLD AUTO: 5.61 X10(3) UL (ref 1.5–7.7)
NEUTROPHILS NFR BLD AUTO: 64.6 %
PLATELET # BLD AUTO: 299 10(3)UL (ref 150–450)
RBC # BLD AUTO: 4.14 X10(6)UL
WBC # BLD AUTO: 8.7 X10(3) UL (ref 4–11)

## 2021-02-28 PROCEDURE — 99225 SUBSEQUENT OBSERVATION CARE: CPT | Performed by: INTERNAL MEDICINE

## 2021-02-28 NOTE — PLAN OF CARE
Problem: Patient/Family Goals  Goal: Patient/Family Long Term Goal  Description: Patient's Long Term Goal: Discharge     Interventions:  - tolerate diet  - See additional Care Plan goals for specific interventions  Outcome: Progressing  Goal: Patient/Fam appropriate  strengthening/mobility  - Encourage toileting schedule  Outcome: Progressing     Problem: DISCHARGE PLANNING  Goal: Discharge to home or other facility with appropriate resources  Description: INTERVENTIONS:  - Identify barriers to discharge w

## 2021-02-28 NOTE — PROGRESS NOTES
[facility]  Progress Note     Cheyenne Moscoso Patient Status:  Observation    1980 MRN [id number]   [facility] 3NW-A Attending Alcon Gutiérrez DO   Hosp Day # 0 PCP Cale Butler DO     Chief Complaint: Abdominal pain, diarrhea last 168 hours. Imaging: Imaging data reviewed in Epic. Medications:   • Dicyclomine HCl  20 mg Oral TID   • pantoprazole (PROTONIX) IV push  40 mg Intravenous Q12H     ASSESSMENT / PLAN:     1. Enteritis/colitis, infectious vs inflammatory.  Patient

## 2021-02-28 NOTE — PLAN OF CARE
Patient A/O X 4, VSS, IVF infusing per orders, patient receiving IV dilaudid PRN pain, zofran PRN nausea. Having abdominal pain with clear liquids. Ambulating in room.       Problem: Patient/Family Goals  Goal: Patient/Family Long Term Goal  Description: Pa including physical limitations  - Instruct pt to call for assistance with activity based on assessment  - Modify environment to reduce risk of injury  - Provide assistive devices as appropriate  - Consider OT/PT consult to assist with strengthening/mobilit

## 2021-02-28 NOTE — PROGRESS NOTES
[facility] Gastroenterology Progress Note    CC: nausea, vomiting, abd pain, diarrhea     S: Pt with stable abd pain today and no further emesis; diarrhea still presenting    O: /81 (BP Location: Left arm)   Pulse 75   Temp 98.2 °F (36.8 °C) (Or

## 2021-03-01 ENCOUNTER — ANESTHESIA (OUTPATIENT)
Dept: ENDOSCOPY | Facility: HOSPITAL | Age: 41
End: 2021-03-01
Payer: COMMERCIAL

## 2021-03-01 ENCOUNTER — ANESTHESIA EVENT (OUTPATIENT)
Dept: ENDOSCOPY | Facility: HOSPITAL | Age: 41
End: 2021-03-01
Payer: COMMERCIAL

## 2021-03-01 PROCEDURE — 0DB68ZX EXCISION OF STOMACH, VIA NATURAL OR ARTIFICIAL OPENING ENDOSCOPIC, DIAGNOSTIC: ICD-10-PCS | Performed by: STUDENT IN AN ORGANIZED HEALTH CARE EDUCATION/TRAINING PROGRAM

## 2021-03-01 PROCEDURE — 99225 SUBSEQUENT OBSERVATION CARE: CPT | Performed by: INTERNAL MEDICINE

## 2021-03-01 PROCEDURE — 0DB88ZX EXCISION OF SMALL INTESTINE, VIA NATURAL OR ARTIFICIAL OPENING ENDOSCOPIC, DIAGNOSTIC: ICD-10-PCS | Performed by: STUDENT IN AN ORGANIZED HEALTH CARE EDUCATION/TRAINING PROGRAM

## 2021-03-01 PROCEDURE — 0DBB8ZX EXCISION OF ILEUM, VIA NATURAL OR ARTIFICIAL OPENING ENDOSCOPIC, DIAGNOSTIC: ICD-10-PCS | Performed by: STUDENT IN AN ORGANIZED HEALTH CARE EDUCATION/TRAINING PROGRAM

## 2021-03-01 PROCEDURE — 0DB98ZX EXCISION OF DUODENUM, VIA NATURAL OR ARTIFICIAL OPENING ENDOSCOPIC, DIAGNOSTIC: ICD-10-PCS | Performed by: STUDENT IN AN ORGANIZED HEALTH CARE EDUCATION/TRAINING PROGRAM

## 2021-03-01 RX ORDER — MELATONIN
3 NIGHTLY PRN
Status: DISCONTINUED | OUTPATIENT
Start: 2021-03-01 | End: 2021-03-02

## 2021-03-01 RX ORDER — NALOXONE HYDROCHLORIDE 0.4 MG/ML
80 INJECTION, SOLUTION INTRAMUSCULAR; INTRAVENOUS; SUBCUTANEOUS AS NEEDED
Status: CANCELLED | OUTPATIENT
Start: 2021-03-01 | End: 2021-03-01

## 2021-03-01 RX ORDER — SODIUM CHLORIDE, SODIUM LACTATE, POTASSIUM CHLORIDE, CALCIUM CHLORIDE 600; 310; 30; 20 MG/100ML; MG/100ML; MG/100ML; MG/100ML
INJECTION, SOLUTION INTRAVENOUS CONTINUOUS
Status: CANCELLED | OUTPATIENT
Start: 2021-03-01

## 2021-03-01 RX ORDER — LIDOCAINE HYDROCHLORIDE 10 MG/ML
INJECTION, SOLUTION EPIDURAL; INFILTRATION; INTRACAUDAL; PERINEURAL AS NEEDED
Status: DISCONTINUED | OUTPATIENT
Start: 2021-03-01 | End: 2021-03-01 | Stop reason: SURG

## 2021-03-01 RX ADMIN — SODIUM CHLORIDE: 9 INJECTION, SOLUTION INTRAVENOUS at 14:08:00

## 2021-03-01 RX ADMIN — LIDOCAINE HYDROCHLORIDE 50 MG: 10 INJECTION, SOLUTION EPIDURAL; INFILTRATION; INTRACAUDAL; PERINEURAL at 13:31:00

## 2021-03-01 NOTE — OPERATIVE REPORT
EGD Operative Report  Patient Name: Toyin Hussein  YOB: 1980  MRN: [id number]  Procedure: Esophagogastroduodenoscopy (EGD)    Pre-operative Diagnosis & Indication: abdominal pain, diarrhea  Post-operative Diagnosis: normal endoscopic evaluat tip of an Olympus video upper endoscope was inserted through the oropharynx and gently manipulated through the esophagus into the stomach and the second portion of the duodenum.  Upon withdrawal of the endoscope, careful visualization of the mucosa was perf

## 2021-03-01 NOTE — PLAN OF CARE
Pt left SCU via cart. To Endo lab for EGD and colonoscopy. Belongings in closet and bedside table, room door closed.

## 2021-03-01 NOTE — PLAN OF CARE
Problem: Patient/Family Goals  Goal: Patient/Family Long Term Goal  Description: Patient's Long Term Goal: Discharge     Interventions:  - tolerate diet  - See additional Care Plan goals for specific interventions  Outcome: Progressing  Goal: Patient/Fam

## 2021-03-01 NOTE — ANESTHESIA POSTPROCEDURE EVALUATION
Alisha Ayon 117 Patient Status:  Observation   Age/Gender 41year old female MRN [id number]   Location [address] [facility]. Attending Emani Patel, [address] [facility] Day # 0 PCP Charo Rdz DO       Anesthesia Post-op Note    Procedure(s):

## 2021-03-01 NOTE — ANESTHESIA PREPROCEDURE EVALUATION
PRE-OP EVALUATION    Patient Name: Robin Emery    Pre-op Diagnosis: Colitis [K52.9]  Epigastric pain [R10.13]    Procedure(s):  ESOPHAGOGASTRODUODENOSCOPY (EGD), COLONOSCOPY      Surgeon(s) and Role:     Mick Duenas MD - Primary    Pre-op vitals rev injection 0.5 mg, 0.5 mg, Intravenous, Once    •  [COMPLETED] famoTIDine (PEPCID) injection 20 mg, 20 mg, Intravenous, Once    •  [COMPLETED] sodium chloride 0.9% IV bolus 1,000 mL, 1,000 mL, Intravenous, Once        Outpatient Medications:     •  Na Sulfa COLONOSCOPY     • LAPAROSCOPIC OVARIAN CYSTECTOMY N/A 2/19/2020    Performed by Alexys Harvey MD at [facility] MAIN OR   • OTHER SURGICAL HISTORY  04/08/2014    cystoscopy- Dr. Heaven Van   • SHOULDER ARTHROSCOPY Left 12/05/2019    Dr Yg George   • SHOULDER ARTHROSCOPY RO

## 2021-03-01 NOTE — PLAN OF CARE
Pt is back from Endo lab, s/p EGD and colonoscopy. Up to the bathroom and was able to void without issues. A little unsteady and dizzy, this RN remained with pt. VSS once back to bed. Started on water and clear liquids, will advance as tolerated.  After the

## 2021-03-01 NOTE — PROGRESS NOTES
[facility]  Progress Note     Isela Barr Patient Status:  Observation    1980 MRN [id number]   [facility] 3NW-A Attending Mortimer Bullocks, DO   Hosp Day # 0 PCP Peggy Sands DO     Chief Complaint: Abdominal pain, diarrhea Epic.    Medications:   • Dicyclomine HCl  20 mg Oral TID   • pantoprazole (PROTONIX) IV push  40 mg Intravenous Q12H     ASSESSMENT / PLAN:     1. Enteritis/colitis, infectious vs inflammatory. Patient had a course of cipro for her symptoms recently.  Rachel Crane

## 2021-03-01 NOTE — H&P
GI Update Note    Plan for EGD/Colonoscopy. Evaluate abdominal pain, diarrhea. Abnormal CT.   abd soft, NTND. CTAB. RRR.      The planned procedure(s), the explanation of the procedure, its expected benefits, the potential complications and risks and possib

## 2021-03-01 NOTE — PROGRESS NOTES
Pt resting in bed, easy non labored breathing on ra. Vs wnl. Up ad oleksandr. Steady gait. Iv fluids infusing without difficulty. Pt prepping for colonoscopy/egd. Tolerating prep. Voids adequate amount. Bilateral scd's on. Pm meds admin plan of care discussed.  A

## 2021-03-01 NOTE — OPERATIVE REPORT
Colonoscopy Operative Report  Patient Name: Argelia Lockett  YOB: 1980  MRN: [id number]  Procedure: Colonoscopy   Pre-operative Diagnosis & Indication: Diarrhea  Post-operative Diagnosis: Mild ileitis, hemorrhoids;     Attending Endoscopist: Fany the rectum and advanced to the terminal ileum. The appendiceal orifice and ileocecal valve were clearly and distinctly visualized, thus verifying the cecum. The terminal ileum was intubated.   The endoscope was then carefully withdrawn from the patient w reactions. - High fiber diet  - Follow-up pathology results; follow-up colonoscopy given bowel prep, can be completed as an outpatient  - Avoid non-aspirin NSAIDs  - CLD, ADAT    GI will signoff.  Patient will need outpatient GI clinic follow-up in 3-4 we

## 2021-03-02 VITALS
HEIGHT: 64 IN | DIASTOLIC BLOOD PRESSURE: 74 MMHG | OXYGEN SATURATION: 97 % | BODY MASS INDEX: 23.03 KG/M2 | RESPIRATION RATE: 20 BRPM | SYSTOLIC BLOOD PRESSURE: 137 MMHG | WEIGHT: 134.88 LBS | HEART RATE: 68 BPM | TEMPERATURE: 98 F

## 2021-03-02 PROCEDURE — 99217 OBSERVATION CARE DISCHARGE: CPT | Performed by: INTERNAL MEDICINE

## 2021-03-02 RX ORDER — DICYCLOMINE HYDROCHLORIDE 10 MG/1
20 CAPSULE ORAL 3 TIMES DAILY
Qty: 30 CAPSULE | Refills: 0 | Status: SHIPPED | OUTPATIENT
Start: 2021-03-02 | End: 2021-03-04 | Stop reason: ALTCHOICE

## 2021-03-02 NOTE — PROGRESS NOTES
Pt resting in bed, easy non labored breathing on ra. Vs wnl. Up ad oleksandr. Steady gait. Iv fluids infusing without difficulty. voids adequate amount. Pm meds admin. Plan of care discussed. All questions answered. Instructed to call if any needs arise.  Call lig

## 2021-03-02 NOTE — PROGRESS NOTES
[facility]  Progress Note     Minal Ruvalcaba Patient Status:  Observation    1980 MRN [id number]   [facility] 3NW-A Attending Regine Bailey DO   Hosp Day # 0 PCP Esha Green DO     Chief Complaint: Abdominal pain, diarrhea pantoprazole (PROTONIX) IV push  40 mg Intravenous Q12H     ASSESSMENT / PLAN:     1. Enteritis/colitis. Infectious workup negative. Patient also had a course of cipro for her symptoms recently  1. GI on consult  2. PPI daily  3. Pain meds, bentyl  4.  ESR

## 2021-03-02 NOTE — PROGRESS NOTES
[address]  TRANSITIONAL CARE CLINIC      HISTORY   CHIEF COMPLAINT: Abd pain/Enteritis/Colitis  HPI: Lucien Conner is a 41year old female here today for hospital follow up for Abd pain/Enteritis/Colitis.   Lucien Conner was discharged f , Intravenous, Continuous, Rosi Oliva MD, Stopped at 03/02/21 1045    •  acetaminophen (TYLENOL) tab 650 mg, 650 mg, Oral, Q6H PRN, Rosi Oliva MD, 650 mg at 03/01/21 1125    •  HYDROmorphone HCl (DILAUDID) 1 MG/ML injection 0.2 mg, 0.2 mg, Intraven 3/15/2019    Performed by Mike Gtz MD at [facility] MAIN OR      Family History   Problem Relation Age of Onset   • Diabetes Father    • Hypertension Mother       Social History    Tobacco Use      Smoking status: Never Smoker      Smokeless tobacco: Never Us     Collected:           03/01/2021 01:35 PM           Ordering Location:     [facility] Endoscopy  Received:            03/01/2021 03:29 PM           Pathologist:           Lori Cervantes MD                                                               02/03/2010   • TDAP 09/12/2019   • Varicella Vaccine 08/31/2018, 10/15/2018       ROS:  GENERAL: weight stable, energy stable, denies fever, +weakness  SKIN: denies any skin changes, rashes  EYES: denies blurred vision or double vision, eye pain, vision lo cyanosis, clubbing or edema  NEURO: CN II-XII intact, motor and sensory are grossly intact, oriented x3  PSYCHIATRIC: appropriate affect    ASSESSMENT/ PLAN:   1.  Enteritis/Colitis/Abdominal pain, acute  · Soft diet, advance as tolerated  · STOP Bentyl 20 patient was made within 2 business days of discharge on date above   Medical Decision Making- Based on service period of discharge to 30 days:   · Number of Possible Diagnoses and/or Management Options: moderate  · Amount and/or Complexity of Data to Be Re

## 2021-03-02 NOTE — DISCHARGE SUMMARY
[facility] HOSPITALIST  DISCHARGE SUMMARY     Faye Hickman Patient Status:  Observation    1980 MRN [id number]   [facility] 3NW-A Attending Hitesh Layton DO   Hosp Day # 0 PCP Rhett Soares DO     Date of Admission: 2021  Date of D recommended to advance her diet slowly at home. She will be on full liquids today and then advance as tolerated. Patient agreeable with plan.     Lace+ Score: 30  59-90 High Risk  29-58 Medium Risk  0-28   Low Risk  Patient was referred to the [facility] prescription for each of these medications  · Dicyclomine HCl 10 MG Caps         [place] substances prescribed on discharge.     Follow-up appointment:   DO David Hsieh [address] Maryann Whalen.  [phone]    I

## 2021-03-02 NOTE — PLAN OF CARE
A & O x 4. Room Air. Pain in abdomen, Norco given with relief. C/o nausea when swallowing pills; resolved. Encouraged to ambulate more. Passing gas, belching. Tolerating full liquid diet. Abdomen soft, tender.      Reviewed discharge instructions with patie guidelines  Outcome: Adequate for Discharge     Problem: SAFETY ADULT - FALL  Goal: Free from fall injury  Description: INTERVENTIONS:  - Assess pt frequently for physical needs  - Identify cognitive and physical deficits and behaviors that affect risk of

## 2021-03-03 ENCOUNTER — OFFICE VISIT (OUTPATIENT)
Dept: INTERNAL MEDICINE CLINIC | Facility: CLINIC | Age: 41
End: 2021-03-03
Payer: COMMERCIAL

## 2021-03-03 ENCOUNTER — PATIENT OUTREACH (OUTPATIENT)
Dept: CASE MANAGEMENT | Age: 41
End: 2021-03-03

## 2021-03-03 VITALS
TEMPERATURE: 97 F | BODY MASS INDEX: 21.85 KG/M2 | HEIGHT: 64 IN | SYSTOLIC BLOOD PRESSURE: 108 MMHG | WEIGHT: 128 LBS | HEART RATE: 104 BPM | DIASTOLIC BLOOD PRESSURE: 66 MMHG | RESPIRATION RATE: 16 BRPM | OXYGEN SATURATION: 99 %

## 2021-03-03 DIAGNOSIS — Z02.9 ENCOUNTERS FOR UNSPECIFIED ADMINISTRATIVE PURPOSE: ICD-10-CM

## 2021-03-03 DIAGNOSIS — R10.9 ABDOMINAL PAIN, ACUTE: ICD-10-CM

## 2021-03-03 DIAGNOSIS — K52.9 ENTERITIS: Primary | ICD-10-CM

## 2021-03-03 PROCEDURE — 99495 TRANSJ CARE MGMT MOD F2F 14D: CPT | Performed by: NURSE PRACTITIONER

## 2021-03-03 PROCEDURE — 3008F BODY MASS INDEX DOCD: CPT | Performed by: NURSE PRACTITIONER

## 2021-03-03 PROCEDURE — 3074F SYST BP LT 130 MM HG: CPT | Performed by: NURSE PRACTITIONER

## 2021-03-03 PROCEDURE — 3078F DIAST BP <80 MM HG: CPT | Performed by: NURSE PRACTITIONER

## 2021-03-03 RX ORDER — HYOSCYAMINE SULFATE 0.125 MG
125 TABLET ORAL EVERY 8 HOURS PRN
Qty: 42 TABLET | Refills: 0 | Status: SHIPPED | OUTPATIENT
Start: 2021-03-03 | End: 2021-04-12

## 2021-03-03 RX ORDER — ONDANSETRON 8 MG/1
8 TABLET, ORALLY DISINTEGRATING ORAL EVERY 8 HOURS PRN
Qty: 10 TABLET | Refills: 0 | Status: SHIPPED | OUTPATIENT
Start: 2021-03-03 | End: 2021-10-19

## 2021-03-03 NOTE — PROGRESS NOTES
TRANSITIONAL CARE CLINIC PHARMACIST MEDICATION RECONCILIATION        Argelia Lockett MRN [id number]    1980 PCP Giovanna Morales DO       Comments: Medication history completed by the [address] [place] [address] Pharmacist with the patient in the office. Elijah Lambert, PharmD, 3/3/2021, 2:30 PM  [address] [facility]

## 2021-03-03 NOTE — PATIENT INSTRUCTIONS
PATIENT INSTRUCTIONS:    1. Hysoscyamine may not be covered by insurance. The [employer] website has a coupon available for approximately $10.00 copay at [place]. You will need to look it up and give the coupon information to the pharmacy.

## 2021-03-03 NOTE — PROGRESS NOTES
Patient completed Hospital Follow Up appt on 3-3-21 in [facility], which is within 2 business days of discharge. NCM closing encounter.

## 2021-03-04 ENCOUNTER — LAB ENCOUNTER (OUTPATIENT)
Dept: LAB | Age: 41
End: 2021-03-04
Attending: FAMILY MEDICINE
Payer: COMMERCIAL

## 2021-03-04 ENCOUNTER — OFFICE VISIT (OUTPATIENT)
Dept: FAMILY MEDICINE CLINIC | Facility: CLINIC | Age: 41
End: 2021-03-04

## 2021-03-04 VITALS
SYSTOLIC BLOOD PRESSURE: 110 MMHG | WEIGHT: 129 LBS | DIASTOLIC BLOOD PRESSURE: 80 MMHG | RESPIRATION RATE: 12 BRPM | HEIGHT: 64 IN | BODY MASS INDEX: 22.02 KG/M2 | HEART RATE: 72 BPM

## 2021-03-04 DIAGNOSIS — R19.7 DIARRHEA, UNSPECIFIED TYPE: ICD-10-CM

## 2021-03-04 DIAGNOSIS — Z09 HOSPITAL DISCHARGE FOLLOW-UP: Primary | ICD-10-CM

## 2021-03-04 DIAGNOSIS — R06.02 SHORTNESS OF BREATH: ICD-10-CM

## 2021-03-04 DIAGNOSIS — Z09 HOSPITAL DISCHARGE FOLLOW-UP: ICD-10-CM

## 2021-03-04 DIAGNOSIS — R10.84 GENERALIZED ABDOMINAL PAIN: ICD-10-CM

## 2021-03-04 LAB — D-DIMER: 0.57 UG/ML FEU (ref ?–0.5)

## 2021-03-04 PROCEDURE — 36415 COLL VENOUS BLD VENIPUNCTURE: CPT

## 2021-03-04 PROCEDURE — 3079F DIAST BP 80-89 MM HG: CPT | Performed by: FAMILY MEDICINE

## 2021-03-04 PROCEDURE — 3074F SYST BP LT 130 MM HG: CPT | Performed by: FAMILY MEDICINE

## 2021-03-04 PROCEDURE — 3008F BODY MASS INDEX DOCD: CPT | Performed by: FAMILY MEDICINE

## 2021-03-04 PROCEDURE — 99214 OFFICE O/P EST MOD 30 MIN: CPT | Performed by: FAMILY MEDICINE

## 2021-03-04 PROCEDURE — 85379 FIBRIN DEGRADATION QUANT: CPT

## 2021-03-04 RX ORDER — OXYCODONE AND ACETAMINOPHEN 7.5; 325 MG/1; MG/1
1 TABLET ORAL EVERY 8 HOURS PRN
Qty: 60 TABLET | Refills: 0 | Status: SHIPPED | OUTPATIENT
Start: 2021-03-04 | End: 2021-04-06

## 2021-03-04 RX ORDER — ONDANSETRON HYDROCHLORIDE 8 MG/1
8 TABLET, FILM COATED ORAL EVERY 8 HOURS PRN
Qty: 30 TABLET | Refills: 1 | Status: SHIPPED | OUTPATIENT
Start: 2021-03-04 | End: 2021-04-06

## 2021-03-04 NOTE — PROGRESS NOTES
Cheyenne Moscoso is a 41year old female. HPI:   Pt. Was discharged on 3/2/2021. Saw Dr. Yg Montejo and Dr. Joyce Lima. Had endoscopy and colonoscopy while in the hospital.  Saw TCM yesterday. Bentyl was not doing anything and switched to hyoscyamine.   Stomach f mammogram    • Pain in joints    • Routine Papanicolaou smear    • Visual impairment     glasses   • Vomiting       Social History:  Social History    Tobacco Use      Smoking status: Never Smoker      Smokeless tobacco: Never Used    Alcohol use: No    Dr Value Ref Range    Lipase 263 73 - 393 U/L   LACTIC ACID, PLASMA   Result Value Ref Range    Lactic Acid 1.2 0.4 - 2.0 mmol/L   SED RATE, WESTERGREN (AUTOMATED)   Result Value Ref Range    Sed Rate 24 0 - 25 mm/Hr   C-REACTIVE PROTEIN   Result Value Ref Ra pathologic change.  -No evidence of microscopic colitis. Embedded Images      Clinical Information       K52.9 Colitis. R10.13 Epigastric Pain.          Gross Description       A- Labeled with the patient's name and medical record number, small intest Panel Comment:       Please Note: This test no longer includes C.difficile toxin. If clinically indicated order separate test for C.difficile toxin.     Campylobacter Pcr Negative Negative    Plesiomonas Shigelloides Pcr Negative Negative    Salmonella Pcr Monocyte Absolute 0.64 0.10 - 1.00 x10(3) uL    Eosinophil Absolute 0.10 0.00 - 0.70 x10(3) uL    Basophil Absolute 0.07 0.00 - 0.20 x10(3) uL    Immature Granulocyte Absolute 0.04 0.00 - 1.00 x10(3) uL    Neutrophil % 73.6 %    Lymphocyte % 19.3 %    M cyanosis, clubbing or edema    ASSESSMENT AND PLAN:   Shortness of breath  Hospital discharge follow-up  (primary encounter diagnosis)  Diarrhea, unspecified type  Generalized abdominal pain    Orders Placed This Encounter      d-dimer      Meds & Refills

## 2021-03-05 ENCOUNTER — TELEPHONE (OUTPATIENT)
Dept: FAMILY MEDICINE CLINIC | Facility: CLINIC | Age: 41
End: 2021-03-05

## 2021-03-05 ENCOUNTER — HOSPITAL ENCOUNTER (OUTPATIENT)
Dept: CT IMAGING | Facility: HOSPITAL | Age: 41
Discharge: HOME | End: 2021-03-05
Attending: FAMILY MEDICINE
Payer: COMMERCIAL

## 2021-03-05 DIAGNOSIS — R06.00 DYSPNEA ON EXERTION: ICD-10-CM

## 2021-03-05 DIAGNOSIS — R59.1 LAD (LYMPHADENOPATHY): ICD-10-CM

## 2021-03-05 DIAGNOSIS — R06.00 DYSPNEA ON EXERTION: Primary | ICD-10-CM

## 2021-03-05 PROBLEM — R06.09 DYSPNEA ON EXERTION: Status: ACTIVE | Noted: 2021-03-05

## 2021-03-05 PROCEDURE — 71275 CT ANGIOGRAPHY CHEST: CPT | Performed by: FAMILY MEDICINE

## 2021-03-08 ENCOUNTER — TELEPHONE (OUTPATIENT)
Dept: INTERNAL MEDICINE CLINIC | Facility: CLINIC | Age: 41
End: 2021-03-08

## 2021-03-08 NOTE — TELEPHONE ENCOUNTER
Per Tonia Gregory, called pt to see how she is feeling on new medication.     Left VM for patient to call back

## 2021-03-11 NOTE — PROGRESS NOTES
Cirilo Arceo,    You recently had an upper endoscopy (EGD) procedure and colonoscopy completed with biopsies of the stomach and small intestine, and colon. The biopsies of the the stomach show some non-specific changes in the lining of your stomach.  Your

## 2021-03-14 ENCOUNTER — IMMUNIZATION (OUTPATIENT)
Dept: LAB | Age: 41
End: 2021-03-14
Attending: HOSPITALIST

## 2021-03-14 DIAGNOSIS — Z23 NEED FOR VACCINATION: Primary | ICD-10-CM

## 2021-03-14 PROCEDURE — 0002A COVID 19 PFIZER-BIONTECH: CPT

## 2021-03-14 PROCEDURE — 91300 COVID 19 PFIZER-BIONTECH: CPT

## 2021-04-12 ENCOUNTER — OFFICE VISIT (OUTPATIENT)
Dept: FAMILY MEDICINE CLINIC | Facility: CLINIC | Age: 41
End: 2021-04-12

## 2021-04-12 ENCOUNTER — LAB ENCOUNTER (OUTPATIENT)
Dept: LAB | Age: 41
End: 2021-04-12
Attending: FAMILY MEDICINE
Payer: COMMERCIAL

## 2021-04-12 ENCOUNTER — TELEPHONE (OUTPATIENT)
Dept: FAMILY MEDICINE CLINIC | Facility: CLINIC | Age: 41
End: 2021-04-12

## 2021-04-12 VITALS
RESPIRATION RATE: 14 BRPM | BODY MASS INDEX: 22.36 KG/M2 | HEART RATE: 80 BPM | DIASTOLIC BLOOD PRESSURE: 78 MMHG | HEIGHT: 64 IN | OXYGEN SATURATION: 100 % | WEIGHT: 131 LBS | SYSTOLIC BLOOD PRESSURE: 124 MMHG

## 2021-04-12 DIAGNOSIS — R53.83 FATIGUE, UNSPECIFIED TYPE: ICD-10-CM

## 2021-04-12 DIAGNOSIS — R77.9 ELEVATED BLOOD PROTEIN: ICD-10-CM

## 2021-04-12 DIAGNOSIS — Z86.16 HISTORY OF COVID-19: ICD-10-CM

## 2021-04-12 DIAGNOSIS — N91.2 AMENORRHEA: ICD-10-CM

## 2021-04-12 DIAGNOSIS — R06.02 SHORTNESS OF BREATH: ICD-10-CM

## 2021-04-12 DIAGNOSIS — R06.02 SHORTNESS OF BREATH: Primary | ICD-10-CM

## 2021-04-12 PROCEDURE — 85025 COMPLETE CBC W/AUTO DIFF WBC: CPT

## 2021-04-12 PROCEDURE — 36415 COLL VENOUS BLD VENIPUNCTURE: CPT

## 2021-04-12 PROCEDURE — 82306 VITAMIN D 25 HYDROXY: CPT

## 2021-04-12 PROCEDURE — 84165 PROTEIN E-PHORESIS SERUM: CPT

## 2021-04-12 PROCEDURE — 86334 IMMUNOFIX E-PHORESIS SERUM: CPT

## 2021-04-12 PROCEDURE — 83883 ASSAY NEPHELOMETRY NOT SPEC: CPT

## 2021-04-12 PROCEDURE — 3074F SYST BP LT 130 MM HG: CPT | Performed by: FAMILY MEDICINE

## 2021-04-12 PROCEDURE — 82607 VITAMIN B-12: CPT

## 2021-04-12 PROCEDURE — 3008F BODY MASS INDEX DOCD: CPT | Performed by: FAMILY MEDICINE

## 2021-04-12 PROCEDURE — 81025 URINE PREGNANCY TEST: CPT | Performed by: FAMILY MEDICINE

## 2021-04-12 PROCEDURE — 84702 CHORIONIC GONADOTROPIN TEST: CPT

## 2021-04-12 PROCEDURE — 84443 ASSAY THYROID STIM HORMONE: CPT

## 2021-04-12 PROCEDURE — 80053 COMPREHEN METABOLIC PANEL: CPT

## 2021-04-12 PROCEDURE — 3078F DIAST BP <80 MM HG: CPT | Performed by: FAMILY MEDICINE

## 2021-04-12 PROCEDURE — 99214 OFFICE O/P EST MOD 30 MIN: CPT | Performed by: FAMILY MEDICINE

## 2021-04-12 NOTE — TELEPHONE ENCOUNTER
Dr Cheryle Laster at 2318 last night pt scheduled [employer] appt  for today 0930 for shortness of breath. sts pt has Covid+ hx 12/2020 and visit last wk w GI for colitis. Requests call to pt now for additional symptom info/determine if appropriate for ofc.

## 2021-04-12 NOTE — PROGRESS NOTES
Shantel Fang is a 41year old female. HPI:   Pt. Complains of tired when going up stairs and when laying down. Few times on Apple watch 107-111 on rate. Feels very tired. Going up stairs and SOB lasts few minutes and HR NSR on watch.   Laying down a f contraceptives    • Nausea    • Other screening mammogram    • Pain in joints    • Routine Papanicolaou smear    • Visual impairment     glasses   • Vomiting       Social History:  Social History    Tobacco Use      Smoking status: Never Smoker      Smokel exercise slowly. We will check quantitative beta-hCG in the blood. Urine pregnancy is negative currently. For the fatigue will check blood work. Follow-up if symptoms worsen. The patient indicates understanding of these issues and agrees to the plan.

## 2021-04-12 NOTE — TELEPHONE ENCOUNTER
Patient is calling -on her way here. Her apple watch usually shows sinus rhythm, but occasionally it shows tachy. This is when she feels tired and mildly SOB. Told her to continue coming here. Denies any other symptoms.

## 2021-04-18 ENCOUNTER — LAB ENCOUNTER (OUTPATIENT)
Dept: LAB | Facility: HOSPITAL | Age: 41
End: 2021-04-18
Attending: STUDENT IN AN ORGANIZED HEALTH CARE EDUCATION/TRAINING PROGRAM
Payer: COMMERCIAL

## 2021-04-18 DIAGNOSIS — Z01.818 PRE-OP TESTING: ICD-10-CM

## 2021-04-21 PROBLEM — Z12.11 SPECIAL SCREENING FOR MALIGNANT NEOPLASM OF COLON: Status: ACTIVE | Noted: 2021-04-21

## 2021-04-21 PROBLEM — K52.9 CHRONIC COLITIS: Status: ACTIVE | Noted: 2021-04-21

## 2021-04-21 PROCEDURE — 88305 TISSUE EXAM BY PATHOLOGIST: CPT | Performed by: STUDENT IN AN ORGANIZED HEALTH CARE EDUCATION/TRAINING PROGRAM

## 2021-04-26 ENCOUNTER — OFFICE VISIT (OUTPATIENT)
Dept: ORTHOPEDICS CLINIC | Facility: CLINIC | Age: 41
End: 2021-04-26

## 2021-04-26 VITALS — HEART RATE: 80 BPM | OXYGEN SATURATION: 98 %

## 2021-04-26 DIAGNOSIS — M25.512 ACUTE PAIN OF LEFT SHOULDER: Primary | ICD-10-CM

## 2021-04-26 PROCEDURE — 99213 OFFICE O/P EST LOW 20 MIN: CPT | Performed by: ORTHOPAEDIC SURGERY

## 2021-04-26 NOTE — PROGRESS NOTES
[facility] - [facility]OPED[facility]  [address] [facility] [address]       Name: Guanako Burgess   MRN: [id number]  Date: 4/26/2021     REASON FOR VISIT: Follow-up evaluation for left shoulder pain    INTER General: There is no distension. Skin:     General: Skin is warm. Capillary Refill: Capillary refill takes less than 2 seconds. Findings: No bruising. Neurological:      General: No focal deficit present. Mental Status: She is alert.    P changes and repeat pain. PLAN:   I would like to obtain a second opinion from one of my senior colleagues Dr. Verona Martinez or Dr. May Hernadez. I value their opinion and experience that may help to provide a solution or treatment for [facility].      Sh

## 2021-05-07 ENCOUNTER — OFFICE VISIT (OUTPATIENT)
Dept: FAMILY MEDICINE CLINIC | Facility: CLINIC | Age: 41
End: 2021-05-07

## 2021-05-07 VITALS
HEIGHT: 64 IN | RESPIRATION RATE: 16 BRPM | WEIGHT: 128 LBS | DIASTOLIC BLOOD PRESSURE: 74 MMHG | BODY MASS INDEX: 21.85 KG/M2 | HEART RATE: 80 BPM | SYSTOLIC BLOOD PRESSURE: 110 MMHG

## 2021-05-07 DIAGNOSIS — M25.512 CHRONIC LEFT SHOULDER PAIN: ICD-10-CM

## 2021-05-07 DIAGNOSIS — B35.1 TINEA UNGUIUM: Primary | ICD-10-CM

## 2021-05-07 DIAGNOSIS — G89.4 CHRONIC PAIN SYNDROME: ICD-10-CM

## 2021-05-07 DIAGNOSIS — Z79.899 MEDICATION MANAGEMENT: ICD-10-CM

## 2021-05-07 DIAGNOSIS — G89.29 CHRONIC LEFT SHOULDER PAIN: ICD-10-CM

## 2021-05-07 PROCEDURE — 99214 OFFICE O/P EST MOD 30 MIN: CPT | Performed by: FAMILY MEDICINE

## 2021-05-07 PROCEDURE — 3078F DIAST BP <80 MM HG: CPT | Performed by: FAMILY MEDICINE

## 2021-05-07 PROCEDURE — 3074F SYST BP LT 130 MM HG: CPT | Performed by: FAMILY MEDICINE

## 2021-05-07 PROCEDURE — 3008F BODY MASS INDEX DOCD: CPT | Performed by: FAMILY MEDICINE

## 2021-05-07 RX ORDER — TRAMADOL HYDROCHLORIDE 50 MG/1
100 TABLET ORAL 2 TIMES DAILY PRN
COMMUNITY
Start: 2021-04-16 | End: 2021-06-17 | Stop reason: SDUPTHER

## 2021-05-07 RX ORDER — HYDROCODONE BITARTRATE AND ACETAMINOPHEN 10; 325 MG/1; MG/1
1 TABLET ORAL 2 TIMES DAILY PRN
Qty: 60 TABLET | Refills: 0 | Status: SHIPPED | OUTPATIENT
Start: 2021-05-07 | End: 2021-06-02

## 2021-05-07 RX ORDER — TERBINAFINE HYDROCHLORIDE 250 MG/1
250 TABLET ORAL DAILY
Qty: 30 TABLET | Refills: 2 | Status: SHIPPED | OUTPATIENT
Start: 2021-05-07 | End: 2021-06-17 | Stop reason: ALTCHOICE

## 2021-05-07 NOTE — PROGRESS NOTES
Susan Dennis is a 41year old female. HPI:   Pt. States saw ortho for her left shoulder -- had an injection and it came back. Dr. Mayelin Sutherland wants her to see another specialist.  She is feeling ok right now. Her back issue is back.   She is not taking trama Case: [id number]                                   Authorizing Provider:  Clayton Oglesby MD           Collected:           04/21/2021 03:19 PM          Ordering Location:     [facility]          Received:            04/21/2021 03:19 PM arm, Patient Position: Sitting, Cuff Size: adult)   Pulse 80   Resp 16   Ht 5' 4\" (1.626 m)   Wt 128 lb (58.1 kg)   LMP 04/13/2021 (Exact Date)   BMI 21.97 kg/m²   GENERAL: well developed, well nourished,in no apparent distress  SKIN: no rashes,no suspici

## 2021-06-02 RX ORDER — HYDROCODONE BITARTRATE AND ACETAMINOPHEN 10; 325 MG/1; MG/1
1 TABLET ORAL 2 TIMES DAILY PRN
Qty: 60 TABLET | Refills: 0 | Status: SHIPPED | OUTPATIENT
Start: 2021-06-02 | End: 2021-07-02

## 2021-06-14 ENCOUNTER — OFFICE VISIT (OUTPATIENT)
Dept: ORTHOPEDICS CLINIC | Facility: CLINIC | Age: 41
End: 2021-06-14

## 2021-06-14 VITALS — WEIGHT: 128 LBS | BODY MASS INDEX: 21.85 KG/M2 | HEIGHT: 64 IN

## 2021-06-14 DIAGNOSIS — M19.012 PRIMARY OSTEOARTHRITIS OF LEFT SHOULDER: ICD-10-CM

## 2021-06-14 DIAGNOSIS — Z96.9 RETAINED ORTHOPEDIC HARDWARE: ICD-10-CM

## 2021-06-14 DIAGNOSIS — M75.02 ADHESIVE CAPSULITIS OF LEFT SHOULDER: Primary | ICD-10-CM

## 2021-06-14 PROCEDURE — 3008F BODY MASS INDEX DOCD: CPT | Performed by: ORTHOPAEDIC SURGERY

## 2021-06-14 PROCEDURE — 99213 OFFICE O/P EST LOW 20 MIN: CPT | Performed by: ORTHOPAEDIC SURGERY

## 2021-06-14 NOTE — PROGRESS NOTES
[facility] New Patient Note    CC: Patient presents with:  Arm or Hand Injury: left shoulder 2nd opinion      HPI: The patient is a 41year old female who presents today the request of Dr. Rickey Bailey for another opinion regarding a chronically painf Refill   • HYDROcodone-acetaminophen (NORCO)  MG Oral Tab Take 1 tablet by mouth 2 (two) times daily as needed for Pain. 60 tablet 0   • Biotin 10 MG Oral Cap Take by mouth. • Ergocalciferol (VITAMIN D OR) Take 4,000 Units by mouth daily.        • complaint on extension or rotation. Shoulder girdles are symmetrical with no significant tenderness over the superior AC joint capsules, clavicle or lateral acromion.   She is apprehensive through the impingement zone on forward elevation though she can pu bioabsorbable implants in the proximal humerus which typically do not create issues or reaction but in this patient's case, it may be worth monitoring to ensure x-rays remain stable.  All questions were answered and she expressed understanding and appreciat

## 2021-06-17 ENCOUNTER — OFFICE VISIT (OUTPATIENT)
Dept: FAMILY MEDICINE CLINIC | Facility: CLINIC | Age: 41
End: 2021-06-17

## 2021-06-17 VITALS
HEART RATE: 66 BPM | WEIGHT: 135 LBS | DIASTOLIC BLOOD PRESSURE: 80 MMHG | SYSTOLIC BLOOD PRESSURE: 130 MMHG | RESPIRATION RATE: 16 BRPM | HEIGHT: 64 IN | TEMPERATURE: 98 F | BODY MASS INDEX: 23.05 KG/M2

## 2021-06-17 DIAGNOSIS — M25.512 CHRONIC LEFT SHOULDER PAIN: ICD-10-CM

## 2021-06-17 DIAGNOSIS — G89.29 CHRONIC BILATERAL LOW BACK PAIN WITHOUT SCIATICA: ICD-10-CM

## 2021-06-17 DIAGNOSIS — K63.89 SMALL INTESTINAL BACTERIAL OVERGROWTH (SIBO): Primary | ICD-10-CM

## 2021-06-17 DIAGNOSIS — M54.50 CHRONIC BILATERAL LOW BACK PAIN WITHOUT SCIATICA: ICD-10-CM

## 2021-06-17 DIAGNOSIS — G89.4 CHRONIC PAIN SYNDROME: ICD-10-CM

## 2021-06-17 DIAGNOSIS — G89.29 CHRONIC LEFT SHOULDER PAIN: ICD-10-CM

## 2021-06-17 DIAGNOSIS — R10.2 PELVIC PAIN: ICD-10-CM

## 2021-06-17 PROCEDURE — 3075F SYST BP GE 130 - 139MM HG: CPT | Performed by: FAMILY MEDICINE

## 2021-06-17 PROCEDURE — 99214 OFFICE O/P EST MOD 30 MIN: CPT | Performed by: FAMILY MEDICINE

## 2021-06-17 PROCEDURE — 3079F DIAST BP 80-89 MM HG: CPT | Performed by: FAMILY MEDICINE

## 2021-06-17 PROCEDURE — 3008F BODY MASS INDEX DOCD: CPT | Performed by: FAMILY MEDICINE

## 2021-06-17 RX ORDER — RIFAXIMIN 550 MG/1
550 TABLET ORAL 3 TIMES DAILY
COMMUNITY
Start: 2021-06-10 | End: 2021-07-19 | Stop reason: ALTCHOICE

## 2021-06-17 RX ORDER — ONDANSETRON HYDROCHLORIDE 8 MG/1
8 TABLET, FILM COATED ORAL EVERY 8 HOURS PRN
COMMUNITY
Start: 2021-06-03 | End: 2021-06-17

## 2021-06-17 RX ORDER — TRAMADOL HYDROCHLORIDE 50 MG/1
100 TABLET ORAL EVERY 8 HOURS PRN
Qty: 180 TABLET | Refills: 0 | Status: SHIPPED | OUTPATIENT
Start: 2021-06-17 | End: 2021-07-14

## 2021-06-17 NOTE — PROGRESS NOTES
Barbara Flores is a 41year old female. HPI:   Pt. Is seeing dr. Giovana Baeza for second opinion on left shoulder. He feels it may the connective tissue, but does not have a definite answer. Opal Morales it is a good sign if stable. Her back is bothering her.   Saw  screening mammogram    • Pain in joints    • Routine Papanicolaou smear    • Visual impairment     glasses   • Vomiting       Social History:  Social History    Tobacco Use      Smoking status: Never Smoker      Smokeless tobacco: Never Used    Vaping Use pieces of yellow-tan mucosa ranging from 0.2 to 0.3 cm in greatest dimension.   The specimen is submitted in toto in cassette B.   ZQ/tjf           REVIEW OF SYSTEMS:   GENERAL: feels well otherwise  SKIN: denies any unusual skin lesions  LUNGS: denies shor last one was 3 years ago advised PT to eval treat and consider pain management again for further evaluation due to chronic condition    The patient indicates understanding of these issues and agrees to the plan.   Return in about 6 months (around 12/17/2021

## 2021-06-21 ENCOUNTER — HOSPITAL ENCOUNTER (OUTPATIENT)
Dept: GENERAL RADIOLOGY | Age: 41
Discharge: HOME | End: 2021-06-21
Attending: FAMILY MEDICINE
Payer: COMMERCIAL

## 2021-06-21 DIAGNOSIS — M54.50 CHRONIC BILATERAL LOW BACK PAIN WITHOUT SCIATICA: ICD-10-CM

## 2021-06-21 DIAGNOSIS — G89.4 CHRONIC PAIN SYNDROME: ICD-10-CM

## 2021-06-21 DIAGNOSIS — G89.29 CHRONIC BILATERAL LOW BACK PAIN WITHOUT SCIATICA: ICD-10-CM

## 2021-06-21 PROCEDURE — 72110 X-RAY EXAM L-2 SPINE 4/>VWS: CPT | Performed by: FAMILY MEDICINE

## 2021-06-29 ENCOUNTER — TELEPHONE (OUTPATIENT)
Dept: FAMILY MEDICINE CLINIC | Facility: CLINIC | Age: 41
End: 2021-06-29

## 2021-06-29 RX ORDER — VALACYCLOVIR HYDROCHLORIDE 1 G/1
2 TABLET, FILM COATED ORAL DAILY
Qty: 2 TABLET | Refills: 0 | Status: SHIPPED | OUTPATIENT
Start: 2021-06-29 | End: 2021-06-30

## 2021-07-02 RX ORDER — HYDROCODONE BITARTRATE AND ACETAMINOPHEN 10; 325 MG/1; MG/1
1 TABLET ORAL 2 TIMES DAILY PRN
Qty: 60 TABLET | Refills: 0 | Status: SHIPPED | OUTPATIENT
Start: 2021-07-02 | End: 2021-08-02

## 2021-07-05 ENCOUNTER — HOSPITAL ENCOUNTER (OUTPATIENT)
Dept: ULTRASOUND IMAGING | Age: 41
Discharge: HOME | End: 2021-07-05
Attending: FAMILY MEDICINE
Payer: COMMERCIAL

## 2021-07-05 DIAGNOSIS — N83.202 CYST OF LEFT OVARY: ICD-10-CM

## 2021-07-05 DIAGNOSIS — R93.5 ABNORMAL CT OF THE ABDOMEN: ICD-10-CM

## 2021-07-05 PROCEDURE — 76830 TRANSVAGINAL US NON-OB: CPT | Performed by: FAMILY MEDICINE

## 2021-07-05 PROCEDURE — 76775 US EXAM ABDO BACK WALL LIM: CPT | Performed by: FAMILY MEDICINE

## 2021-07-05 PROCEDURE — 76856 US EXAM PELVIC COMPLETE: CPT | Performed by: FAMILY MEDICINE

## 2021-07-12 ENCOUNTER — OFFICE VISIT (OUTPATIENT)
Dept: FAMILY MEDICINE CLINIC | Facility: CLINIC | Age: 41
End: 2021-07-12

## 2021-07-12 VITALS
OXYGEN SATURATION: 99 % | SYSTOLIC BLOOD PRESSURE: 118 MMHG | TEMPERATURE: 98 F | HEART RATE: 100 BPM | RESPIRATION RATE: 18 BRPM | DIASTOLIC BLOOD PRESSURE: 70 MMHG

## 2021-07-12 DIAGNOSIS — T78.2XXA ANAPHYLAXIS, INITIAL ENCOUNTER: Primary | ICD-10-CM

## 2021-07-12 DIAGNOSIS — T78.40XA ALLERGIC REACTION, INITIAL ENCOUNTER: ICD-10-CM

## 2021-07-12 PROCEDURE — 3074F SYST BP LT 130 MM HG: CPT | Performed by: FAMILY MEDICINE

## 2021-07-12 PROCEDURE — 99214 OFFICE O/P EST MOD 30 MIN: CPT | Performed by: FAMILY MEDICINE

## 2021-07-12 PROCEDURE — 3078F DIAST BP <80 MM HG: CPT | Performed by: FAMILY MEDICINE

## 2021-07-12 RX ORDER — METHYLPREDNISOLONE 4 MG/1
TABLET ORAL
Qty: 1 EACH | Refills: 0 | Status: SHIPPED | OUTPATIENT
Start: 2021-07-12 | End: 2021-08-02 | Stop reason: ALTCHOICE

## 2021-07-12 RX ORDER — IBUPROFEN 200 MG
600 TABLET ORAL EVERY 6 HOURS PRN
COMMUNITY
End: 2021-07-19 | Stop reason: ALTCHOICE

## 2021-07-12 RX ORDER — EPINEPHRINE 0.3 MG/.3ML
0.3 INJECTION SUBCUTANEOUS ONCE
Qty: 1 EACH | Refills: 0 | Status: SHIPPED | OUTPATIENT
Start: 2021-07-12 | End: 2021-07-12

## 2021-07-12 NOTE — PROGRESS NOTES
Jillian Naranjo is a 41year old female. HPI:   Patient states her back was really hurting her yesterday and she took Norco earlier in the day and she took ibuprofen 600 mg prior to going to bed.   She woke up in the middle the evening and found her face an over the course of 24 hours.    Past Medical History:   Diagnosis Date   • Abdominal pain    • Arthritis    • Back pain    • Blood in urine    • Encounter for insertion of ParaGard IUD 11/27/2015    Lot# [id number] Exp 01/2022   • General counseling for prescri received in formalin:  Specimen consists of three pieces of yellow-tan mucosa ranging from 0.1 to 0.3 cm in greatest dimension.   The specimen is submitted in toto in cassette A.     B- Labeled with the patient's name, medical record number, random colon bi Had a discussion that anything could have caused the anaphylaxis but there is a concern that her 2 medications, most likely the ibuprofen could be the source of the anaphylaxis. Also discussed that Ltanya Fernando can be the cause as well.   Discussed that this

## 2021-07-13 ENCOUNTER — APPOINTMENT (OUTPATIENT)
Dept: CT IMAGING | Facility: HOSPITAL | Age: 41
End: 2021-07-13
Attending: EMERGENCY MEDICINE
Payer: COMMERCIAL

## 2021-07-13 ENCOUNTER — HOSPITAL ENCOUNTER (EMERGENCY)
Facility: HOSPITAL | Age: 41
Discharge: HOME | End: 2021-07-13
Attending: EMERGENCY MEDICINE
Payer: COMMERCIAL

## 2021-07-13 VITALS
WEIGHT: 128 LBS | DIASTOLIC BLOOD PRESSURE: 74 MMHG | BODY MASS INDEX: 21.85 KG/M2 | HEIGHT: 64 IN | RESPIRATION RATE: 16 BRPM | TEMPERATURE: 98 F | HEART RATE: 97 BPM | OXYGEN SATURATION: 97 % | SYSTOLIC BLOOD PRESSURE: 122 MMHG

## 2021-07-13 DIAGNOSIS — R11.2 NAUSEA VOMITING AND DIARRHEA: ICD-10-CM

## 2021-07-13 DIAGNOSIS — R19.7 NAUSEA VOMITING AND DIARRHEA: ICD-10-CM

## 2021-07-13 DIAGNOSIS — R10.9 ABDOMINAL PAIN OF UNKNOWN ETIOLOGY: Primary | ICD-10-CM

## 2021-07-13 LAB
ALBUMIN SERPL-MCNC: 3.8 G/DL (ref 3.4–5)
ALBUMIN/GLOB SERPL: 0.7 {RATIO} (ref 1–2)
ALP LIVER SERPL-CCNC: 71 U/L
ALT SERPL-CCNC: 34 U/L
ANION GAP SERPL CALC-SCNC: 6 MMOL/L (ref 0–18)
AST SERPL-CCNC: 28 U/L (ref 15–37)
B-HCG SERPL-ACNC: <1 MIU/ML
BASOPHILS # BLD AUTO: 0.05 X10(3) UL (ref 0–0.2)
BASOPHILS NFR BLD AUTO: 0.4 %
BILIRUB SERPL-MCNC: 0.2 MG/DL (ref 0.1–2)
BUN BLD-MCNC: 12 MG/DL (ref 7–18)
BUN/CREAT SERPL: 13 (ref 10–20)
CALCIUM BLD-MCNC: 9.8 MG/DL (ref 8.5–10.1)
CHLORIDE SERPL-SCNC: 104 MMOL/L (ref 98–112)
CO2 SERPL-SCNC: 27 MMOL/L (ref 21–32)
CREAT BLD-MCNC: 0.92 MG/DL
DEPRECATED RDW RBC AUTO: 39.3 FL (ref 35.1–46.3)
EOSINOPHIL # BLD AUTO: 0.01 X10(3) UL (ref 0–0.7)
EOSINOPHIL NFR BLD AUTO: 0.1 %
ERYTHROCYTE [DISTWIDTH] IN BLOOD BY AUTOMATED COUNT: 12.6 % (ref 11–15)
GLOBULIN PLAS-MCNC: 5.4 G/DL (ref 2.8–4.4)
GLUCOSE BLD-MCNC: 120 MG/DL (ref 70–99)
HCT VFR BLD AUTO: 43.4 %
HGB BLD-MCNC: 15 G/DL
IMM GRANULOCYTES # BLD AUTO: 0.06 X10(3) UL (ref 0–1)
IMM GRANULOCYTES NFR BLD: 0.5 %
LIPASE SERPL-CCNC: 159 U/L (ref 73–393)
LYMPHOCYTES # BLD AUTO: 0.99 X10(3) UL (ref 1–4)
LYMPHOCYTES NFR BLD AUTO: 8 %
M PROTEIN MFR SERPL ELPH: 9.2 G/DL (ref 6.4–8.2)
MCH RBC QN AUTO: 29.7 PG (ref 26–34)
MCHC RBC AUTO-ENTMCNC: 34.6 G/DL (ref 31–37)
MCV RBC AUTO: 85.9 FL
MONOCYTES # BLD AUTO: 0.38 X10(3) UL (ref 0.1–1)
MONOCYTES NFR BLD AUTO: 3.1 %
NEUTROPHILS # BLD AUTO: 10.84 X10 (3) UL (ref 1.5–7.7)
NEUTROPHILS # BLD AUTO: 10.84 X10(3) UL (ref 1.5–7.7)
NEUTROPHILS NFR BLD AUTO: 87.9 %
OSMOLALITY SERPL CALC.SUM OF ELEC: 285 MOSM/KG (ref 275–295)
PLATELET # BLD AUTO: 350 10(3)UL (ref 150–450)
POTASSIUM SERPL-SCNC: 3.8 MMOL/L (ref 3.5–5.1)
RBC # BLD AUTO: 5.05 X10(6)UL
SODIUM SERPL-SCNC: 137 MMOL/L (ref 136–145)
WBC # BLD AUTO: 12.3 X10(3) UL (ref 4–11)

## 2021-07-13 PROCEDURE — 93005 ELECTROCARDIOGRAM TRACING: CPT

## 2021-07-13 PROCEDURE — 96374 THER/PROPH/DIAG INJ IV PUSH: CPT

## 2021-07-13 PROCEDURE — 96375 TX/PRO/DX INJ NEW DRUG ADDON: CPT

## 2021-07-13 PROCEDURE — 80053 COMPREHEN METABOLIC PANEL: CPT | Performed by: EMERGENCY MEDICINE

## 2021-07-13 PROCEDURE — 85025 COMPLETE CBC W/AUTO DIFF WBC: CPT | Performed by: EMERGENCY MEDICINE

## 2021-07-13 PROCEDURE — 99285 EMERGENCY DEPT VISIT HI MDM: CPT

## 2021-07-13 PROCEDURE — 93010 ELECTROCARDIOGRAM REPORT: CPT

## 2021-07-13 PROCEDURE — S0028 INJECTION, FAMOTIDINE, 20 MG: HCPCS | Performed by: EMERGENCY MEDICINE

## 2021-07-13 PROCEDURE — 74177 CT ABD & PELVIS W/CONTRAST: CPT | Performed by: EMERGENCY MEDICINE

## 2021-07-13 PROCEDURE — 99284 EMERGENCY DEPT VISIT MOD MDM: CPT

## 2021-07-13 PROCEDURE — 96361 HYDRATE IV INFUSION ADD-ON: CPT

## 2021-07-13 PROCEDURE — 84702 CHORIONIC GONADOTROPIN TEST: CPT | Performed by: EMERGENCY MEDICINE

## 2021-07-13 PROCEDURE — 83690 ASSAY OF LIPASE: CPT | Performed by: EMERGENCY MEDICINE

## 2021-07-13 RX ORDER — ONDANSETRON 4 MG/1
4 TABLET, ORALLY DISINTEGRATING ORAL EVERY 4 HOURS PRN
Qty: 10 TABLET | Refills: 0 | Status: SHIPPED | OUTPATIENT
Start: 2021-07-13 | End: 2021-07-20

## 2021-07-13 RX ORDER — LORAZEPAM 2 MG/ML
1 INJECTION INTRAMUSCULAR ONCE
Status: COMPLETED | OUTPATIENT
Start: 2021-07-13 | End: 2021-07-13

## 2021-07-13 RX ORDER — DICYCLOMINE HCL 20 MG
20 TABLET ORAL 4 TIMES DAILY PRN
Qty: 30 TABLET | Refills: 0 | Status: SHIPPED | OUTPATIENT
Start: 2021-07-13 | End: 2021-08-12

## 2021-07-13 RX ORDER — ONDANSETRON 2 MG/ML
INJECTION INTRAMUSCULAR; INTRAVENOUS
Status: DISCONTINUED
Start: 2021-07-13 | End: 2021-07-13

## 2021-07-13 RX ORDER — DIPHENHYDRAMINE HYDROCHLORIDE 50 MG/ML
25 INJECTION INTRAMUSCULAR; INTRAVENOUS ONCE
Status: COMPLETED | OUTPATIENT
Start: 2021-07-13 | End: 2021-07-13

## 2021-07-13 RX ORDER — ONDANSETRON 4 MG/1
4 TABLET, ORALLY DISINTEGRATING ORAL ONCE
Status: COMPLETED | OUTPATIENT
Start: 2021-07-13 | End: 2021-07-13

## 2021-07-13 RX ORDER — METOCLOPRAMIDE HYDROCHLORIDE 5 MG/ML
10 INJECTION INTRAMUSCULAR; INTRAVENOUS ONCE
Status: COMPLETED | OUTPATIENT
Start: 2021-07-13 | End: 2021-07-13

## 2021-07-13 RX ORDER — MORPHINE SULFATE 4 MG/ML
4 INJECTION, SOLUTION INTRAMUSCULAR; INTRAVENOUS EVERY 30 MIN PRN
Status: DISCONTINUED | OUTPATIENT
Start: 2021-07-13 | End: 2021-07-13

## 2021-07-13 RX ORDER — FAMOTIDINE 10 MG/ML
20 INJECTION, SOLUTION INTRAVENOUS ONCE
Status: COMPLETED | OUTPATIENT
Start: 2021-07-13 | End: 2021-07-13

## 2021-07-13 RX ORDER — ONDANSETRON 2 MG/ML
4 INJECTION INTRAMUSCULAR; INTRAVENOUS ONCE
Status: COMPLETED | OUTPATIENT
Start: 2021-07-13 | End: 2021-07-13

## 2021-07-13 RX ORDER — PANTOPRAZOLE SODIUM 40 MG/1
40 TABLET, DELAYED RELEASE ORAL DAILY
Qty: 30 TABLET | Refills: 0 | Status: SHIPPED | OUTPATIENT
Start: 2021-07-13 | End: 2021-07-20

## 2021-07-13 NOTE — ED PROVIDER NOTES
Patient Seen in: [facility] Emergency Department      History   Patient presents with:  Nausea/Vomiting/Diarrhea    Stated Complaint: vomiting, diarrhea diaphoretic starting today. afebrile. hx colitis.      HPI/Subjective:   HPI  This is a 40-year-o reviewed. All other systems reviewed and negative except as noted above.     Physical Exam     ED Triage Vitals   BP 07/13/21 1426 139/86   Pulse 07/13/21 1426 111   Resp 07/13/21 1426 22   Temp 07/13/21 1426 97.6 °F (36.4 °C)   Temp src 07/13/21 1426 HCG, BETA SUBUNIT (QUANT PREGNANCY TEST) - Normal   CBC WITH DIFFERENTIAL WITH PLATELET    Narrative: The following orders were created for panel order CBC With Differential With Platelet.   Procedure                               Abnormality significant facet arthropathy. There are a few minimal  low thoracic endplate spurs. The IUD is no longer visualized. CONCLUSION:  Stable mild rotoscoliosis.      Dictated by (CST): Shira Morocho MD on 6/21/2021 at 10:51 AM     Finalized by (CST ovary measures 4.8 x 4.3 x 3.8 cm   There is a 2.8 x 2.4 x 2.2 cm complex hemorrhagic cyst in the left ovary. There is a 2.7 x 3.4 x 2.0 cm simple cyst CUL-DE-SAC:  No free fluid in the cul de sac.              CONCLUSION:  1.4 cm left fundal intramural fi scattered fluid in the colon. The overall findings are concerning for nonspecific enterocolitis, with Crohn's disease not excluded. Clinical correlation recommended. No significant free air. ABDOMINAL WALL:  Unremarkable.  PELVIC ORGANS:  Unremarkable ur seeing them  in a short period of time. I discussed with them that there is always a possibility that things can change and a need reevaluation with their primary care physician as soon as possible.   I've also discussed with them that if the pain gets wor

## 2021-07-14 ENCOUNTER — LAB ENCOUNTER (OUTPATIENT)
Dept: LAB | Age: 41
End: 2021-07-14
Attending: EMERGENCY MEDICINE
Payer: COMMERCIAL

## 2021-07-14 DIAGNOSIS — R10.9 ABDOMINAL PAIN OF UNKNOWN ETIOLOGY: ICD-10-CM

## 2021-07-14 PROCEDURE — 87046 STOOL CULTR AEROBIC BACT EA: CPT

## 2021-07-14 PROCEDURE — 87493 C DIFF AMPLIFIED PROBE: CPT

## 2021-07-14 PROCEDURE — 87045 FECES CULTURE AEROBIC BACT: CPT

## 2021-07-14 PROCEDURE — 87427 SHIGA-LIKE TOXIN AG IA: CPT

## 2021-07-14 RX ORDER — TRAMADOL HYDROCHLORIDE 50 MG/1
100 TABLET ORAL EVERY 8 HOURS PRN
Qty: 30 TABLET | Refills: 0 | Status: SHIPPED | OUTPATIENT
Start: 2021-07-14 | End: 2021-07-19 | Stop reason: SDUPTHER

## 2021-07-15 LAB
ATRIAL RATE: 100 BPM
C DIFF TOX B STL QL: NEGATIVE
P AXIS: 52 DEGREES
P-R INTERVAL: 156 MS
Q-T INTERVAL: 358 MS
QRS DURATION: 68 MS
QTC CALCULATION (BEZET): 461 MS
R AXIS: 61 DEGREES
T AXIS: 34 DEGREES
VENTRICULAR RATE: 100 BPM

## 2021-07-19 ENCOUNTER — OFFICE VISIT (OUTPATIENT)
Dept: FAMILY MEDICINE CLINIC | Facility: CLINIC | Age: 41
End: 2021-07-19

## 2021-07-19 VITALS
SYSTOLIC BLOOD PRESSURE: 120 MMHG | HEART RATE: 100 BPM | RESPIRATION RATE: 16 BRPM | DIASTOLIC BLOOD PRESSURE: 70 MMHG | BODY MASS INDEX: 22.53 KG/M2 | OXYGEN SATURATION: 98 % | WEIGHT: 132 LBS | HEIGHT: 64 IN

## 2021-07-19 DIAGNOSIS — Z09 HOSPITAL DISCHARGE FOLLOW-UP: Primary | ICD-10-CM

## 2021-07-19 DIAGNOSIS — R11.0 NAUSEA: ICD-10-CM

## 2021-07-19 DIAGNOSIS — K52.9 ENTEROCOLITIS: ICD-10-CM

## 2021-07-19 DIAGNOSIS — R10.84 GENERALIZED ABDOMINAL PAIN: ICD-10-CM

## 2021-07-19 PROCEDURE — 3074F SYST BP LT 130 MM HG: CPT | Performed by: FAMILY MEDICINE

## 2021-07-19 PROCEDURE — 99214 OFFICE O/P EST MOD 30 MIN: CPT | Performed by: FAMILY MEDICINE

## 2021-07-19 PROCEDURE — 3078F DIAST BP <80 MM HG: CPT | Performed by: FAMILY MEDICINE

## 2021-07-19 PROCEDURE — 3008F BODY MASS INDEX DOCD: CPT | Performed by: FAMILY MEDICINE

## 2021-07-19 RX ORDER — EPINEPHRINE 0.3 MG/.3ML
INJECTION SUBCUTANEOUS
COMMUNITY
Start: 2021-07-12

## 2021-07-19 RX ORDER — TRAMADOL HYDROCHLORIDE 50 MG/1
100 TABLET ORAL EVERY 8 HOURS PRN
Qty: 180 TABLET | Refills: 2 | Status: SHIPPED | OUTPATIENT
Start: 2021-07-19 | End: 2021-10-11

## 2021-07-19 NOTE — PROGRESS NOTES
Veronica Nova is a 41year old female. HPI:   Pt. Is here for follow up from the ER. Stinging when she eats mid abdomen. Pain 6/10. It lasts for 10-15 minutes. Now, no pain. Has not lost weight. Dicyclomine helps but not enough.   Some nausea -- zof Lot# [id number] Exp 01/2022   • General counseling for prescription of oral contraceptives    • Nausea    • Other screening mammogram    • Pain in joints    • Routine Papanicolaou smear    • Visual impairment     glasses   • Vomiting       Social History: Disp Refills   • traMADol HCl 50 MG Oral Tab 180 tablet 2     Sig: Take 2 tablets (100 mg total) by mouth every 8 (eight) hours as needed for Pain. Imaging & Consults:  None     Advised FODMAP diet. Advised to see GI.   Sent message to Dr. Susie Cuevas to be

## 2021-08-02 ENCOUNTER — OFFICE VISIT (OUTPATIENT)
Dept: FAMILY MEDICINE CLINIC | Facility: CLINIC | Age: 41
End: 2021-08-02
Payer: COMMERCIAL

## 2021-08-02 ENCOUNTER — TELEPHONE (OUTPATIENT)
Dept: FAMILY MEDICINE CLINIC | Facility: CLINIC | Age: 41
End: 2021-08-02

## 2021-08-02 VITALS
DIASTOLIC BLOOD PRESSURE: 70 MMHG | WEIGHT: 133 LBS | HEIGHT: 63.75 IN | HEART RATE: 88 BPM | SYSTOLIC BLOOD PRESSURE: 116 MMHG | RESPIRATION RATE: 16 BRPM | BODY MASS INDEX: 22.99 KG/M2

## 2021-08-02 DIAGNOSIS — A09 INFECTIOUS COLITIS: ICD-10-CM

## 2021-08-02 DIAGNOSIS — G89.29 CHRONIC BILATERAL LOW BACK PAIN WITHOUT SCIATICA: ICD-10-CM

## 2021-08-02 DIAGNOSIS — Z12.4 SCREENING FOR CERVICAL CANCER: ICD-10-CM

## 2021-08-02 DIAGNOSIS — R11.2 NAUSEA AND VOMITING IN ADULT: Primary | ICD-10-CM

## 2021-08-02 DIAGNOSIS — N39.3 STRESS INCONTINENCE: ICD-10-CM

## 2021-08-02 DIAGNOSIS — M54.50 CHRONIC BILATERAL LOW BACK PAIN WITHOUT SCIATICA: ICD-10-CM

## 2021-08-02 PROCEDURE — 3008F BODY MASS INDEX DOCD: CPT | Performed by: FAMILY MEDICINE

## 2021-08-02 PROCEDURE — 3078F DIAST BP <80 MM HG: CPT | Performed by: FAMILY MEDICINE

## 2021-08-02 PROCEDURE — 99214 OFFICE O/P EST MOD 30 MIN: CPT | Performed by: FAMILY MEDICINE

## 2021-08-02 PROCEDURE — 3074F SYST BP LT 130 MM HG: CPT | Performed by: FAMILY MEDICINE

## 2021-08-02 RX ORDER — ONDANSETRON 4 MG/1
4 TABLET, FILM COATED ORAL EVERY 8 HOURS PRN
Qty: 60 TABLET | Refills: 2 | Status: SHIPPED | OUTPATIENT
Start: 2021-08-02

## 2021-08-02 RX ORDER — HYDROCODONE BITARTRATE AND ACETAMINOPHEN 10; 325 MG/1; MG/1
1 TABLET ORAL 2 TIMES DAILY PRN
Qty: 60 TABLET | Refills: 0 | Status: SHIPPED | OUTPATIENT
Start: 2021-08-02 | End: 2021-08-03

## 2021-08-02 NOTE — TELEPHONE ENCOUNTER
Star Trejo RN: Zara Dandy with [facility] is NOT filling Rx    Dr. Alexandr Herndon to advise

## 2021-08-02 NOTE — TELEPHONE ENCOUNTER
I called [facility], spoke with Wilder Calov, pharmacist---she states that she did inform the patient. Wilder Calvo states that she has the right, by law, to refuse to fill Rxs.  She states that she did bring issue to , lead states that whatever

## 2021-08-02 NOTE — PROGRESS NOTES
Shantel Fang is a 41year old female. HPI:   PT. Is here for follow up on back pain. Most days it is bothering her -- 6/10. PT does not help in the past in 2019. Marek Wakefield --tried acupuncture with minimal relief. It helped her shoulder more.   Chiro self-limited over the course of 24 hours.    Past Medical History:   Diagnosis Date   • Abdominal pain    • Arthritis    • Back pain    • Blood in urine    • Encounter for insertion of ParaGard IUD 11/27/2015    Lot# [id number] Exp 01/2022   • General counseli colitis  Chronic bilateral low back pain without sciatica  Stress incontinence    No orders of the defined types were placed in this encounter.       Meds & Refills for this Visit:  Requested Prescriptions     Signed Prescriptions Disp Refills   • [email or web address]

## 2021-08-02 NOTE — TELEPHONE ENCOUNTER
Benoit Minaya at . [address] calling very concerned that patient may have addiction issue.    7/15/21 Tramadol #30 pr , then 7/20/21 #180 + 2  Last refill of Norco 7/2/21 #60  Today Norco Rx sent and she is not comfortable filling as this and Tramadol are b

## 2021-08-03 DIAGNOSIS — M25.512 CHRONIC LEFT SHOULDER PAIN: ICD-10-CM

## 2021-08-03 DIAGNOSIS — G89.29 CHRONIC BILATERAL LOW BACK PAIN WITHOUT SCIATICA: ICD-10-CM

## 2021-08-03 DIAGNOSIS — G89.29 CHRONIC LEFT SHOULDER PAIN: ICD-10-CM

## 2021-08-03 DIAGNOSIS — M25.50 PAIN IN JOINT, MULTIPLE SITES: Primary | ICD-10-CM

## 2021-08-03 DIAGNOSIS — M54.50 CHRONIC BILATERAL LOW BACK PAIN WITHOUT SCIATICA: ICD-10-CM

## 2021-08-03 RX ORDER — HYDROCODONE BITARTRATE AND ACETAMINOPHEN 10; 325 MG/1; MG/1
1 TABLET ORAL 2 TIMES DAILY PRN
Qty: 60 TABLET | Refills: 0 | Status: SHIPPED | OUTPATIENT
Start: 2021-08-03 | End: 2021-09-08

## 2021-08-03 NOTE — TELEPHONE ENCOUNTER
PT WOULD LIKE REFILL OF HYDROcodone-acetaminophen (NORCO)  MG Oral Tab SENT TO   [id number] - [place], [place] - [address] AT [address] [facility], [phone], [phone]    PLEASE ADVISE.  Ghassan Collins

## 2021-08-03 NOTE — TELEPHONE ENCOUNTER
Please remind the patient that she should not be taking 2 Norco in  6 tramadol per day simultaneously and she really needs to try to wean herself off the Norco.  I am getting concerned that she is on too many narcotics and we have had this discussion in th

## 2021-08-03 NOTE — TELEPHONE ENCOUNTER
S/w Mb[facility] pharmacist.  [facility] script will not be filled at [facility] r/t concerns of addiction. Advised to cancel script. Pt requesting refill be sent to alternate pharmacy.   [id number] - [place], [address]

## 2021-08-03 NOTE — TELEPHONE ENCOUNTER
Call to pt-advised refill sent as requested but reinforced info from dr Viviane Garces noted below and importance of weaning off norco.   Patient voices understanding/agrees with plan/no further questions.

## 2021-09-08 DIAGNOSIS — M25.50 PAIN IN JOINT, MULTIPLE SITES: ICD-10-CM

## 2021-09-08 DIAGNOSIS — G89.29 CHRONIC BILATERAL LOW BACK PAIN WITHOUT SCIATICA: ICD-10-CM

## 2021-09-08 DIAGNOSIS — G89.29 CHRONIC LEFT SHOULDER PAIN: ICD-10-CM

## 2021-09-08 DIAGNOSIS — M54.50 CHRONIC BILATERAL LOW BACK PAIN WITHOUT SCIATICA: ICD-10-CM

## 2021-09-08 DIAGNOSIS — M25.512 CHRONIC LEFT SHOULDER PAIN: ICD-10-CM

## 2021-09-08 RX ORDER — HYDROCODONE BITARTRATE AND ACETAMINOPHEN 10; 325 MG/1; MG/1
1 TABLET ORAL 2 TIMES DAILY PRN
Qty: 30 TABLET | Refills: 0 | Status: SHIPPED | OUTPATIENT
Start: 2021-09-08 | End: 2021-10-07

## 2021-09-20 ENCOUNTER — HOSPITAL ENCOUNTER (EMERGENCY)
Facility: HOSPITAL | Age: 41
Discharge: LEFT BEFORE DISCHARGE | End: 2021-09-21
Payer: COMMERCIAL

## 2021-09-20 VITALS
DIASTOLIC BLOOD PRESSURE: 89 MMHG | HEIGHT: 64 IN | TEMPERATURE: 97 F | WEIGHT: 128 LBS | BODY MASS INDEX: 21.85 KG/M2 | HEART RATE: 73 BPM | RESPIRATION RATE: 18 BRPM | OXYGEN SATURATION: 100 % | SYSTOLIC BLOOD PRESSURE: 135 MMHG

## 2021-09-20 RX ORDER — ACETAMINOPHEN 500 MG
TABLET ORAL
Status: COMPLETED
Start: 2021-09-20 | End: 2021-09-20

## 2021-09-21 ENCOUNTER — TELEPHONE (OUTPATIENT)
Dept: FAMILY MEDICINE CLINIC | Facility: CLINIC | Age: 41
End: 2021-09-21

## 2021-09-21 RX ORDER — ACETAMINOPHEN 500 MG
1000 TABLET ORAL ONCE
Status: COMPLETED | OUTPATIENT
Start: 2021-09-20 | End: 2021-09-20

## 2021-09-21 NOTE — TELEPHONE ENCOUNTER
Patient went to ED for HA and vomiting x3 days. Denies any contact with known + Covid.     Date of ONSET: 9/17/2021    CURRENTLY:    Cough: denies    SpO2: 95% now    SOB/Chest tightness/Chest pain: denies    Headache: dullheadache started on 9/17/21-->desc

## 2021-09-21 NOTE — TELEPHONE ENCOUNTER
Pt went to ER last night for severe headache and vomiting. She was waiting four hours in waiting room. Triage nurse saw her and gave her two regular tylenols while she was waiting to be seen. Tylenol helped for a little.   She still has a slight headache

## 2021-10-01 RX ORDER — TRAMADOL HYDROCHLORIDE 50 MG/1
100 TABLET ORAL EVERY 8 HOURS PRN
Qty: 180 TABLET | Refills: 2 | OUTPATIENT
Start: 2021-10-01

## 2021-10-01 NOTE — TELEPHONE ENCOUNTER
Last Refill: 7/19/2021  Amount: 180+2  LOV: 8/2/2021 acute visit  Asked to Return: 11/2/2021  NOV: 10/19/2021 physical

## 2021-10-07 DIAGNOSIS — G89.29 CHRONIC BILATERAL LOW BACK PAIN WITHOUT SCIATICA: ICD-10-CM

## 2021-10-07 DIAGNOSIS — M25.50 PAIN IN JOINT, MULTIPLE SITES: ICD-10-CM

## 2021-10-07 DIAGNOSIS — G89.29 CHRONIC LEFT SHOULDER PAIN: ICD-10-CM

## 2021-10-07 DIAGNOSIS — M54.50 CHRONIC BILATERAL LOW BACK PAIN WITHOUT SCIATICA: ICD-10-CM

## 2021-10-07 DIAGNOSIS — M25.512 CHRONIC LEFT SHOULDER PAIN: ICD-10-CM

## 2021-10-07 RX ORDER — HYDROCODONE BITARTRATE AND ACETAMINOPHEN 10; 325 MG/1; MG/1
1 TABLET ORAL DAILY PRN
Qty: 15 TABLET | Refills: 0 | Status: SHIPPED | OUTPATIENT
Start: 2021-10-07 | End: 2021-10-29

## 2021-10-11 RX ORDER — TRAMADOL HYDROCHLORIDE 50 MG/1
100 TABLET ORAL EVERY 8 HOURS PRN
Qty: 180 TABLET | Refills: 2 | Status: SHIPPED | OUTPATIENT
Start: 2021-10-11 | End: 2021-12-30

## 2021-10-11 NOTE — TELEPHONE ENCOUNTER
traMADol 50 MG Oral Tab    Non protocol medication. Please see pended medications. Please sign if appropriate. Thank you      Last OV: 08/02/2021    Last refill:  07/19/2021 for 180/2 refills. Upcoming appt is scheduled for 10/19/2021.

## 2021-10-19 ENCOUNTER — OFFICE VISIT (OUTPATIENT)
Dept: FAMILY MEDICINE CLINIC | Facility: CLINIC | Age: 41
End: 2021-10-19
Payer: COMMERCIAL

## 2021-10-19 VITALS
DIASTOLIC BLOOD PRESSURE: 66 MMHG | RESPIRATION RATE: 16 BRPM | HEART RATE: 76 BPM | TEMPERATURE: 99 F | BODY MASS INDEX: 23.89 KG/M2 | HEIGHT: 63.58 IN | OXYGEN SATURATION: 99 % | SYSTOLIC BLOOD PRESSURE: 118 MMHG | WEIGHT: 136.5 LBS

## 2021-10-19 DIAGNOSIS — Z13.89 SCREENING FOR GENITOURINARY CONDITION: ICD-10-CM

## 2021-10-19 DIAGNOSIS — Z12.4 SCREENING FOR CERVICAL CANCER: ICD-10-CM

## 2021-10-19 DIAGNOSIS — Z23 NEED FOR VACCINATION: ICD-10-CM

## 2021-10-19 DIAGNOSIS — Z00.00 LABORATORY EXAMINATION ORDERED AS PART OF A ROUTINE GENERAL MEDICAL EXAMINATION: ICD-10-CM

## 2021-10-19 DIAGNOSIS — Z12.31 ENCOUNTER FOR SCREENING MAMMOGRAM FOR MALIGNANT NEOPLASM OF BREAST: ICD-10-CM

## 2021-10-19 DIAGNOSIS — Z00.00 ROUTINE GENERAL MEDICAL EXAMINATION AT A HEALTH CARE FACILITY: Primary | ICD-10-CM

## 2021-10-19 DIAGNOSIS — E55.9 VITAMIN D DEFICIENCY: ICD-10-CM

## 2021-10-19 PROCEDURE — 3078F DIAST BP <80 MM HG: CPT | Performed by: FAMILY MEDICINE

## 2021-10-19 PROCEDURE — 3008F BODY MASS INDEX DOCD: CPT | Performed by: FAMILY MEDICINE

## 2021-10-19 PROCEDURE — 3074F SYST BP LT 130 MM HG: CPT | Performed by: FAMILY MEDICINE

## 2021-10-19 PROCEDURE — 90686 IIV4 VACC NO PRSV 0.5 ML IM: CPT | Performed by: FAMILY MEDICINE

## 2021-10-19 PROCEDURE — 88175 CYTOPATH C/V AUTO FLUID REDO: CPT | Performed by: FAMILY MEDICINE

## 2021-10-19 PROCEDURE — 90471 IMMUNIZATION ADMIN: CPT | Performed by: FAMILY MEDICINE

## 2021-10-19 PROCEDURE — 87625 HPV TYPES 16 & 18 ONLY: CPT | Performed by: FAMILY MEDICINE

## 2021-10-19 PROCEDURE — 99396 PREV VISIT EST AGE 40-64: CPT | Performed by: FAMILY MEDICINE

## 2021-10-19 PROCEDURE — 87624 HPV HI-RISK TYP POOLED RSLT: CPT | Performed by: FAMILY MEDICINE

## 2021-10-19 NOTE — H&P
CC: Annual Physical Exam    HPI:   Susan Dennis is a 35year old female who presents for a complete physical exam. Symptoms: periods are regular. Patient complains of worst HA of her life on 9/20/21 and went to ER but was not seen. It got better.   Still developed right eye a mild swelling that was             self-limited over the course of 24 hours.    Past Medical History:   Diagnosis Date   • Abdominal pain    • Arthritis    • Back pain    • Blood in urine    • Encounter for insertion of ParaGard Left arm, Patient Position: Sitting, Cuff Size: adult)   Pulse 76   Temp 98.5 °F (36.9 °C) (Oral)   Resp 16   Ht 5' 3.58\" (1.615 m)   Wt 136 lb 8 oz (61.9 kg)   LMP 09/27/2021   SpO2 99%   BMI 23.74 kg/m²   Body mass index is 23.74 kg/m².    GENERAL: well Visit:  Requested Prescriptions      No prescriptions requested or ordered in this encounter       Imaging & Consults:  FLULAVAL INFLUENZA VACCINE QUAD PRESERVATIVE FREE 0.5 ML  [facility] DOMINICK 2D+3D SCREENING BILAT (CPT=77067/17547)    Last eye exam -- 6/ 2021

## 2021-10-27 PROBLEM — B97.7 HPV IN FEMALE: Status: ACTIVE | Noted: 2021-10-27

## 2021-10-27 NOTE — PROGRESS NOTES
Dear Gretta Stoddard,    Your Pap smear is within normal limits. As we spoke about on the phone today, you are positive for HPV. At this time there is no concern. I do recommend yearly Pap smears.     Sincerely,  Dr. Ricky Le

## 2021-10-29 DIAGNOSIS — G89.29 CHRONIC LEFT SHOULDER PAIN: ICD-10-CM

## 2021-10-29 DIAGNOSIS — G89.29 CHRONIC BILATERAL LOW BACK PAIN WITHOUT SCIATICA: ICD-10-CM

## 2021-10-29 DIAGNOSIS — M25.50 PAIN IN JOINT, MULTIPLE SITES: ICD-10-CM

## 2021-10-29 DIAGNOSIS — M54.50 CHRONIC BILATERAL LOW BACK PAIN WITHOUT SCIATICA: ICD-10-CM

## 2021-10-29 DIAGNOSIS — M25.512 CHRONIC LEFT SHOULDER PAIN: ICD-10-CM

## 2021-10-29 RX ORDER — HYDROCODONE BITARTRATE AND ACETAMINOPHEN 10; 325 MG/1; MG/1
1 TABLET ORAL DAILY PRN
Qty: 7 TABLET | Refills: 0 | Status: SHIPPED | OUTPATIENT
Start: 2021-10-29 | End: 2021-11-29 | Stop reason: ALTCHOICE

## 2021-10-29 NOTE — TELEPHONE ENCOUNTER
Last Refill: 10/7/2021  Amount: 15  LOV: 10/19/2021 physical  Asked to Return: 1/2022  NOV: none scheduled

## 2021-11-29 ENCOUNTER — TELEPHONE (OUTPATIENT)
Dept: FAMILY MEDICINE CLINIC | Facility: CLINIC | Age: 41
End: 2021-11-29

## 2021-11-29 ENCOUNTER — HOSPITAL ENCOUNTER (OUTPATIENT)
Dept: MAMMOGRAPHY | Age: 41
Discharge: HOME | End: 2021-11-29
Attending: FAMILY MEDICINE
Payer: COMMERCIAL

## 2021-11-29 ENCOUNTER — OFFICE VISIT (OUTPATIENT)
Dept: FAMILY MEDICINE CLINIC | Facility: CLINIC | Age: 41
End: 2021-11-29
Payer: COMMERCIAL

## 2021-11-29 VITALS
RESPIRATION RATE: 16 BRPM | HEIGHT: 63.58 IN | DIASTOLIC BLOOD PRESSURE: 66 MMHG | HEART RATE: 98 BPM | SYSTOLIC BLOOD PRESSURE: 124 MMHG | OXYGEN SATURATION: 98 % | BODY MASS INDEX: 23.54 KG/M2 | WEIGHT: 134.5 LBS

## 2021-11-29 DIAGNOSIS — Z12.31 ENCOUNTER FOR SCREENING MAMMOGRAM FOR MALIGNANT NEOPLASM OF BREAST: ICD-10-CM

## 2021-11-29 DIAGNOSIS — G89.29 CHRONIC BILATERAL LOW BACK PAIN WITHOUT SCIATICA: Primary | ICD-10-CM

## 2021-11-29 DIAGNOSIS — M54.50 CHRONIC BILATERAL LOW BACK PAIN WITHOUT SCIATICA: Primary | ICD-10-CM

## 2021-11-29 DIAGNOSIS — R00.2 PALPITATION: ICD-10-CM

## 2021-11-29 PROBLEM — F11.29 OPIOID DEPENDENCE WITH UNSPECIFIED OPIOID-INDUCED DISORDER (HCC): Status: ACTIVE | Noted: 2021-11-29

## 2021-11-29 PROCEDURE — 3008F BODY MASS INDEX DOCD: CPT | Performed by: FAMILY MEDICINE

## 2021-11-29 PROCEDURE — 77067 SCR MAMMO BI INCL CAD: CPT | Performed by: FAMILY MEDICINE

## 2021-11-29 PROCEDURE — 3078F DIAST BP <80 MM HG: CPT | Performed by: FAMILY MEDICINE

## 2021-11-29 PROCEDURE — 3074F SYST BP LT 130 MM HG: CPT | Performed by: FAMILY MEDICINE

## 2021-11-29 PROCEDURE — 93000 ELECTROCARDIOGRAM COMPLETE: CPT | Performed by: FAMILY MEDICINE

## 2021-11-29 PROCEDURE — 99215 OFFICE O/P EST HI 40 MIN: CPT | Performed by: FAMILY MEDICINE

## 2021-11-29 PROCEDURE — 77063 BREAST TOMOSYNTHESIS BI: CPT | Performed by: FAMILY MEDICINE

## 2021-11-29 RX ORDER — HYDROCODONE BITARTRATE AND ACETAMINOPHEN 10; 325 MG/1; MG/1
1 TABLET ORAL DAILY PRN
Qty: 7 TABLET | Refills: 0 | Status: SHIPPED | OUTPATIENT
Start: 2021-11-29 | End: 2021-12-20

## 2021-11-29 NOTE — PROGRESS NOTES
Robin Emery is a 35year old female. HPI:   Patient is here for follow-up on her chronic back pain. She states she has tried every avenue to help control her back pain.   She also states that the tramadol is not controlling her pain as well as Norco. RafaVel IUD 11/27/2015    Lot# [id number] Exp 01/2022   • General counseling for prescription of oral contraceptives    • Nausea    • Other screening mammogram    • Pain in joints    • Routine Papanicolaou smear    • Visual impairment     glasses   • Vomiting precursors. False negative and false positive results can occur. Regular sampling is recommended to minimize false negative results.       Case Report       Gynecologic Cytology                              Case: [id number] encounter. Meds & Refills for this Visit:  Requested Prescriptions     Signed Prescriptions Disp Refills   • HYDROcodone-acetaminophen (NORCO)  MG Oral Tab 7 tablet 0     Sig: Take 1 tablet by mouth daily as needed for Pain.        Imaging & Cons

## 2021-12-09 ENCOUNTER — HOSPITAL ENCOUNTER (OUTPATIENT)
Dept: MRI IMAGING | Age: 41
Discharge: HOME | End: 2021-12-09
Attending: FAMILY MEDICINE
Payer: COMMERCIAL

## 2021-12-09 DIAGNOSIS — M54.50 CHRONIC BILATERAL LOW BACK PAIN WITHOUT SCIATICA: ICD-10-CM

## 2021-12-09 DIAGNOSIS — G89.29 CHRONIC BILATERAL LOW BACK PAIN WITHOUT SCIATICA: ICD-10-CM

## 2021-12-09 DIAGNOSIS — N83.202 LEFT OVARIAN CYST: Primary | ICD-10-CM

## 2021-12-09 PROCEDURE — 72148 MRI LUMBAR SPINE W/O DYE: CPT | Performed by: FAMILY MEDICINE

## 2021-12-20 RX ORDER — HYDROCODONE BITARTRATE AND ACETAMINOPHEN 10; 325 MG/1; MG/1
1 TABLET ORAL DAILY PRN
Qty: 7 TABLET | Refills: 0 | Status: SHIPPED | OUTPATIENT
Start: 2021-12-20 | End: 2022-01-07 | Stop reason: ALTCHOICE

## 2021-12-30 NOTE — TELEPHONE ENCOUNTER
LOV- 11/29/21, NOV 1/17/21   Last refill-10/11/21  Qty-180    RF-2    Refill     Rx pended for approval (post dated)   Thank you

## 2022-01-02 RX ORDER — TRAMADOL HYDROCHLORIDE 50 MG/1
TABLET ORAL
Qty: 180 TABLET | Refills: 0 | OUTPATIENT
Start: 2022-01-02

## 2022-01-02 RX ORDER — TRAMADOL HYDROCHLORIDE 50 MG/1
100 TABLET ORAL EVERY 8 HOURS PRN
Qty: 180 TABLET | Refills: 0 | Status: SHIPPED | OUTPATIENT
Start: 2022-01-11 | End: 2022-01-17 | Stop reason: SDUPTHER

## 2022-01-07 ENCOUNTER — OFFICE VISIT (OUTPATIENT)
Dept: PAIN CLINIC | Facility: CLINIC | Age: 42
End: 2022-01-07
Payer: COMMERCIAL

## 2022-01-07 VITALS
DIASTOLIC BLOOD PRESSURE: 72 MMHG | WEIGHT: 134 LBS | BODY MASS INDEX: 23 KG/M2 | OXYGEN SATURATION: 97 % | SYSTOLIC BLOOD PRESSURE: 116 MMHG | HEART RATE: 86 BPM

## 2022-01-07 DIAGNOSIS — M54.50 CHRONIC BILATERAL LOW BACK PAIN WITHOUT SCIATICA: ICD-10-CM

## 2022-01-07 DIAGNOSIS — G89.29 CHRONIC BILATERAL LOW BACK PAIN WITHOUT SCIATICA: ICD-10-CM

## 2022-01-07 DIAGNOSIS — M79.18 MYOFASCIAL PAIN: ICD-10-CM

## 2022-01-07 DIAGNOSIS — M47.817 SPONDYLOSIS OF LUMBOSACRAL REGION, UNSPECIFIED SPINAL OSTEOARTHRITIS COMPLICATION STATUS: ICD-10-CM

## 2022-01-07 DIAGNOSIS — M47.816 ARTHROPATHY OF LUMBAR FACET JOINT: Primary | ICD-10-CM

## 2022-01-07 PROCEDURE — 3074F SYST BP LT 130 MM HG: CPT | Performed by: ANESTHESIOLOGY

## 2022-01-07 PROCEDURE — 3078F DIAST BP <80 MM HG: CPT | Performed by: ANESTHESIOLOGY

## 2022-01-07 PROCEDURE — 99204 OFFICE O/P NEW MOD 45 MIN: CPT | Performed by: ANESTHESIOLOGY

## 2022-01-07 NOTE — PROGRESS NOTES
Subjective:   Patient ID: Aviva Scanlon is a 35year old female.     HPI    History/Other:   Review of Systems  Current Outpatient Medications   Medication Sig Dispense Refill   • [START ON 1/11/2022] traMADol 50 MG Oral Tab Take 2 tablets (100 mg total) b Current Pain Condition:   Physical Therapy and Other chiropractor    Prior diagnostic testing for your pain:  MRI

## 2022-01-07 NOTE — H&P
Name: Clark Elkins   : 1980   DOS: 2022     Chief complaint: Low back pain    History of present illness:  Clark Elkins is a 35year old female who presents today for evaluation of chronic axial low back pain without any radicular features. 04/08/2014    cystoscopy- Dr. Bhumi Mcleod   • SHOULDER ARTHROSCOPY Left 12/05/2019    Dr Gerard Carlton      Family History   Problem Relation Age of Onset   • Diabetes Father    • Hypertension Mother      Social History    Tobacco Use      Smoking status: Never Smoker lumbar facet joint  (primary encounter diagnosis)  Myofascial pain  Chronic bilateral low back pain without sciatica  Spondylosis of lumbosacral region, unspecified spinal osteoarthritis complication status.       Plan:     The patient is very pleasant 41-y

## 2022-01-17 ENCOUNTER — HOSPITAL ENCOUNTER (OUTPATIENT)
Age: 42
Discharge: HOME | End: 2022-01-17
Attending: EMERGENCY MEDICINE
Payer: COMMERCIAL

## 2022-01-17 ENCOUNTER — OFFICE VISIT (OUTPATIENT)
Dept: FAMILY MEDICINE CLINIC | Facility: CLINIC | Age: 42
End: 2022-01-17
Payer: COMMERCIAL

## 2022-01-17 VITALS
WEIGHT: 128 LBS | OXYGEN SATURATION: 99 % | DIASTOLIC BLOOD PRESSURE: 86 MMHG | SYSTOLIC BLOOD PRESSURE: 145 MMHG | BODY MASS INDEX: 21.85 KG/M2 | HEART RATE: 86 BPM | RESPIRATION RATE: 16 BRPM | TEMPERATURE: 98 F | HEIGHT: 64 IN

## 2022-01-17 VITALS
RESPIRATION RATE: 18 BRPM | HEART RATE: 112 BPM | HEIGHT: 63.5 IN | TEMPERATURE: 98 F | OXYGEN SATURATION: 97 % | DIASTOLIC BLOOD PRESSURE: 86 MMHG | BODY MASS INDEX: 22.75 KG/M2 | SYSTOLIC BLOOD PRESSURE: 110 MMHG | WEIGHT: 130 LBS

## 2022-01-17 DIAGNOSIS — R19.7 DIARRHEA, UNSPECIFIED TYPE: Primary | ICD-10-CM

## 2022-01-17 DIAGNOSIS — G89.4 CHRONIC PAIN SYNDROME: ICD-10-CM

## 2022-01-17 DIAGNOSIS — R53.83 FATIGUE, UNSPECIFIED TYPE: ICD-10-CM

## 2022-01-17 DIAGNOSIS — G89.29 CHRONIC BILATERAL LOW BACK PAIN WITHOUT SCIATICA: ICD-10-CM

## 2022-01-17 DIAGNOSIS — R10.9 ABDOMINAL PAIN, UNSPECIFIED ABDOMINAL LOCATION: ICD-10-CM

## 2022-01-17 DIAGNOSIS — R11.0 NAUSEA: ICD-10-CM

## 2022-01-17 DIAGNOSIS — M54.50 CHRONIC BILATERAL LOW BACK PAIN WITHOUT SCIATICA: ICD-10-CM

## 2022-01-17 DIAGNOSIS — R00.2 PALPITATION: ICD-10-CM

## 2022-01-17 DIAGNOSIS — Z79.899 MEDICATION MANAGEMENT: ICD-10-CM

## 2022-01-17 DIAGNOSIS — R00.0 TACHYCARDIA: ICD-10-CM

## 2022-01-17 DIAGNOSIS — F11.29 OPIOID DEPENDENCE WITH UNSPECIFIED OPIOID-INDUCED DISORDER (HCC): ICD-10-CM

## 2022-01-17 LAB
#MXD IC: 0.5 X10ˆ3/UL (ref 0.1–1)
BUN BLD-MCNC: 13 MG/DL (ref 7–18)
CHLORIDE BLD-SCNC: 103 MMOL/L (ref 98–112)
CO2 BLD-SCNC: 25 MMOL/L (ref 21–32)
CREAT BLD-MCNC: 0.7 MG/DL
GLUCOSE BLD-MCNC: 149 MG/DL (ref 70–99)
HCT VFR BLD AUTO: >57.5 %
HCT VFR BLD CALC: 45 %
HGB BLD-MCNC: 19.7 G/DL
ISTAT IONIZED CALCIUM FOR CHEM 8: 1.25 MMOL/L (ref 1.12–1.32)
LYMPHOCYTES # BLD AUTO: 0.9 X10ˆ3/UL (ref 1–4)
LYMPHOCYTES NFR BLD AUTO: 15.4 %
MCH RBC QN AUTO: 29.7 PG (ref 26–34)
MCHC RBC AUTO-ENTMCNC: 34 G/DL (ref 31–37)
MCV RBC AUTO: 87.3 FL (ref 80–100)
MIXED CELL %: 7.7 %
NEUTROPHILS # BLD AUTO: 4.6 X10ˆ3/UL (ref 1.5–7.7)
NEUTROPHILS NFR BLD AUTO: 76.9 %
PLATELET # BLD AUTO: 220 X10ˆ3/UL (ref 150–450)
POTASSIUM BLD-SCNC: 3.3 MMOL/L (ref 3.6–5.1)
RBC # BLD AUTO: 6.63 X10ˆ6/UL
SODIUM BLD-SCNC: 142 MMOL/L (ref 136–145)
WBC # BLD AUTO: 6 X10ˆ3/UL (ref 4–11)

## 2022-01-17 PROCEDURE — 80047 BASIC METABLC PNL IONIZED CA: CPT

## 2022-01-17 PROCEDURE — 99214 OFFICE O/P EST MOD 30 MIN: CPT | Performed by: FAMILY MEDICINE

## 2022-01-17 PROCEDURE — 99214 OFFICE O/P EST MOD 30 MIN: CPT

## 2022-01-17 PROCEDURE — 3079F DIAST BP 80-89 MM HG: CPT | Performed by: FAMILY MEDICINE

## 2022-01-17 PROCEDURE — 96361 HYDRATE IV INFUSION ADD-ON: CPT

## 2022-01-17 PROCEDURE — 96360 HYDRATION IV INFUSION INIT: CPT

## 2022-01-17 PROCEDURE — 3008F BODY MASS INDEX DOCD: CPT | Performed by: FAMILY MEDICINE

## 2022-01-17 PROCEDURE — 3074F SYST BP LT 130 MM HG: CPT | Performed by: FAMILY MEDICINE

## 2022-01-17 PROCEDURE — 85025 COMPLETE CBC W/AUTO DIFF WBC: CPT | Performed by: EMERGENCY MEDICINE

## 2022-01-17 RX ORDER — SODIUM CHLORIDE 9 MG/ML
1000 INJECTION, SOLUTION INTRAVENOUS ONCE
Status: COMPLETED | OUTPATIENT
Start: 2022-01-17 | End: 2022-01-17

## 2022-01-17 RX ORDER — POTASSIUM CHLORIDE 20 MEQ/1
20 TABLET, EXTENDED RELEASE ORAL ONCE
Status: COMPLETED | OUTPATIENT
Start: 2022-01-17 | End: 2022-01-17

## 2022-01-17 RX ORDER — TRAMADOL HYDROCHLORIDE 50 MG/1
100 TABLET ORAL EVERY 8 HOURS PRN
Qty: 54 TABLET | Refills: 0 | Status: SHIPPED | OUTPATIENT
Start: 2022-01-17 | End: 2022-01-24

## 2022-01-17 NOTE — ED PROVIDER NOTES
Patient Seen in: [facility]      History   Patient presents with:  Fatigue    Stated Complaint: dizzy, weak    Subjective:   HPI    27-year-old female complaining of dizziness and weakness.   The patient states that she has had 3-4 episode Resp 16   Ht 162.6 cm (5' 4\")   Wt 58.1 kg   LMP 01/10/2022 (Approximate)   SpO2 99%   BMI 21.97 kg/m²         Physical Exam    Patient is alert and orient x3 no acute distress HEENT exam within normal limits neck there is no lymphadenopathy or JVD lungs

## 2022-01-17 NOTE — PROGRESS NOTES
Lucio Mejia is a 35year old female. HPI:   Pt. Is here for follow up. Will do injections with Dr. Jodi Bowens. Diarrhea since Sunday. No appetite. No headache. Notes extreme tiredness. Notes increased back pain.   Lost her prescription that she keeps i orders placed or performed in visit on 10/19/21   HPV REFLEX PAP NEG W/ GENOTYPE   Result Value Ref Range    HPV High Risk Positive (A) Negative    HPV Source Vaginal/cervical    HPV 16,18/45 GENOTYPE   Result Value Ref Range    HPV 16 Genotype Negative Ne Jessica Willson                                                      First Screen:          Bright Lopez                                                               Rescreen:              Kiera Castillo Advised to  for further eval and hydration. Discussed tramadol withdrawal.  Concern for COVID. Advised to quarantine until gets PCR results. Will try to refill tramadol that she lost -- lost 1/2 of the prescription. Palpitations resolved per pt.

## 2022-01-17 NOTE — ED INITIAL ASSESSMENT (HPI)
Patient reports she has been feeling weak and shaky since yesterday. Patient reports she also has been having diarrhea.

## 2022-01-20 LAB — SARS-COV-2 RNA RESP QL NAA+PROBE: NOT DETECTED

## 2022-01-24 RX ORDER — TRAMADOL HYDROCHLORIDE 50 MG/1
100 TABLET ORAL EVERY 8 HOURS PRN
Qty: 54 TABLET | Refills: 0 | Status: SHIPPED | OUTPATIENT
Start: 2022-01-29 | End: 2022-01-31

## 2022-01-24 NOTE — TELEPHONE ENCOUNTER
Last refill 1/17/22 #54/ I spoke to patient and she does  have 12 pill left. She asked for it to assure she would get refill on time.    Quantity 54-takes 6 per day  LOV 1/17/22

## 2022-01-31 ENCOUNTER — TELEPHONE (OUTPATIENT)
Dept: NEUROLOGY | Facility: CLINIC | Age: 42
End: 2022-01-31

## 2022-01-31 DIAGNOSIS — M51.36 DDD (DEGENERATIVE DISC DISEASE), LUMBAR: Primary | ICD-10-CM

## 2022-01-31 NOTE — PROGRESS NOTES
Karina Contreras is a 35year old female. HPI:   Pt. Complains of ovarian/pelvic pain at least once a week. Still trying for a child. A lot of stress. Mood is ok. Khai Prasad tries to cheer her up. No suicidal thoughts.   Right ovary was removed  -- due to Used    Vaping Use      Vaping Use: Never used    Alcohol use: No    Drug use: No       Results for orders placed or performed during the hospital encounter of 01/17/22   POCT CBC   Result Value Ref Range    WBC IC 6.0 4.0 - 11.0 x10ˆ3/uL    RBC IC 6.63 (H edema  MUSCULOSKELETAL: Tight paraspinals lower thoracic/upper lumbar left greater than right; paraspinal stretches performed today; posterior right sacrum; sacral stretch performed    ASSESSMENT AND PLAN:   Opioid dependence with unspecified opioid-induce

## 2022-01-31 NOTE — TELEPHONE ENCOUNTER
Prior authorization request completed for: B Facet injection L4, L5  Authorization # [id number]  Authorization dates: 1/7/2022 - 4/30/2022  CPT codes approved: 83024 / 17836   Number of visits/dates of service approved: 1  Physician: Dr. Aziza Philip   Location: [facility] approval

## 2022-02-01 NOTE — TELEPHONE ENCOUNTER
Question Answer   Anesthesia Type Local   Provider Y[facility]it[facility]   Procedure Facet   Laterality/Level bilater L4-5, L5-S1   Medical clearance requested (will send to Pain Navigator) No   Patient has Medicare coverage?  No   Comments (Please list entire procedure name here.) bilateral lumbar facet injections

## 2022-02-02 ENCOUNTER — HOSPITAL ENCOUNTER (OUTPATIENT)
Dept: ULTRASOUND IMAGING | Age: 42
Discharge: HOME | End: 2022-02-02
Attending: FAMILY MEDICINE
Payer: COMMERCIAL

## 2022-02-02 DIAGNOSIS — N83.202 LEFT OVARIAN CYST: ICD-10-CM

## 2022-02-02 PROCEDURE — 76830 TRANSVAGINAL US NON-OB: CPT | Performed by: FAMILY MEDICINE

## 2022-02-02 PROCEDURE — 76856 US EXAM PELVIC COMPLETE: CPT | Performed by: FAMILY MEDICINE

## 2022-02-08 ENCOUNTER — APPOINTMENT (OUTPATIENT)
Dept: GENERAL RADIOLOGY | Facility: HOSPITAL | Age: 42
End: 2022-02-08
Attending: ANESTHESIOLOGY
Payer: COMMERCIAL

## 2022-02-08 ENCOUNTER — HOSPITAL ENCOUNTER (OUTPATIENT)
Facility: HOSPITAL | Age: 42
Setting detail: HOSPITAL OUTPATIENT SURGERY
Discharge: HOME | End: 2022-02-08
Attending: ANESTHESIOLOGY | Admitting: ANESTHESIOLOGY
Payer: COMMERCIAL

## 2022-02-08 VITALS
HEIGHT: 63.5 IN | WEIGHT: 132 LBS | HEART RATE: 70 BPM | BODY MASS INDEX: 23.1 KG/M2 | OXYGEN SATURATION: 99 % | TEMPERATURE: 98 F | RESPIRATION RATE: 16 BRPM | DIASTOLIC BLOOD PRESSURE: 88 MMHG | SYSTOLIC BLOOD PRESSURE: 147 MMHG

## 2022-02-08 DIAGNOSIS — M51.36 DDD (DEGENERATIVE DISC DISEASE), LUMBAR: ICD-10-CM

## 2022-02-08 PROCEDURE — 3E0U33Z INTRODUCTION OF ANTI-INFLAMMATORY INTO JOINTS, PERCUTANEOUS APPROACH: ICD-10-PCS | Performed by: ANESTHESIOLOGY

## 2022-02-08 PROCEDURE — 3E0U3BZ INTRODUCTION OF ANESTHETIC AGENT INTO JOINTS, PERCUTANEOUS APPROACH: ICD-10-PCS | Performed by: ANESTHESIOLOGY

## 2022-02-08 RX ORDER — BUPIVACAINE HYDROCHLORIDE 5 MG/ML
INJECTION, SOLUTION EPIDURAL; INTRACAUDAL
Status: DISCONTINUED | OUTPATIENT
Start: 2022-02-08 | End: 2022-02-08

## 2022-02-08 RX ORDER — METHYLPREDNISOLONE ACETATE 40 MG/ML
INJECTION, SUSPENSION INTRA-ARTICULAR; INTRALESIONAL; INTRAMUSCULAR; SOFT TISSUE
Status: DISCONTINUED | OUTPATIENT
Start: 2022-02-08 | End: 2022-02-08

## 2022-02-08 RX ORDER — LIDOCAINE HYDROCHLORIDE 10 MG/ML
INJECTION, SOLUTION EPIDURAL; INFILTRATION; INTRACAUDAL; PERINEURAL
Status: DISCONTINUED | OUTPATIENT
Start: 2022-02-08 | End: 2022-02-08

## 2022-02-08 NOTE — OPERATIVE REPORT
[facility]  Operative Report  2022     Ana Maria Reyes Patient Status:  Hospital Outpatient Surgery    1980 MRN [id number]   Location [address] [address] Attending Kevin Ellis MD   Hosp Day # 0 PCP Rose Cobian DO     Indication: Merline Christiansen is a 35year old female with a history of low back    Preoperative Diagnosis:  DDD (degenerative disc disease), lumbar [M51.36]    Postoperative Diagnosis: Same as above. Procedure performed: LUMBAR FACET INJECTION BILATERAL L4-5, L5-S1 WITH LOCAL     Anesthesia: Local     EBL: Less than 1 ml. Procedure Description:   After reviewing the patient's history and performing a focused physical examination, the diagnosis was confirmed and contraindications such as infection and coagulopathy were ruled out. Following review of potential side effects and complications, including but not necessarily limited to infection, allergic reaction, local tissue breakdown, nerve injury, and paresis, the patient indicated they understood and agreed to proceed. After obtaining the informed consent, the patient was brought to the procedure room and monitored. In the prone position, following sterile prep and drape of the lumbar region, the corresponding facet joints were identified under fluoroscopy. The skin was anesthetized via 25-gauge 1.5\" needle with approximately 2 mL of 1% lidocaine. A 22-gauge 3.5\" Quincke spinal needle was introduced and advanced into the left L4, L5 levels atraumatically under fluoroscopic guidance. Following negative aspiration for CSF and blood, approximately 1 mL of 1% lidocaine with 10 mg of methylprednisolone was injected into each joint without complication. The needle was withdrawn with stylet in situ after being flushed with 0.5 mL lidocaine. The contralateral injections were performed in the similar fashion. The patient tolerated procedure very well. The patient was observed until discharge criteria met. Discharge instructions were given and patient was released to a responsible adult. Complications: None. Follow up: The patient was followed in the pain clinic as needed basis.       Helena Pavon MD

## 2022-02-14 ENCOUNTER — OFFICE VISIT (OUTPATIENT)
Dept: PAIN CLINIC | Facility: CLINIC | Age: 42
End: 2022-02-14
Payer: COMMERCIAL

## 2022-02-14 VITALS — SYSTOLIC BLOOD PRESSURE: 122 MMHG | HEART RATE: 78 BPM | DIASTOLIC BLOOD PRESSURE: 82 MMHG | OXYGEN SATURATION: 100 %

## 2022-02-14 DIAGNOSIS — M47.817 SPONDYLOSIS OF LUMBOSACRAL REGION, UNSPECIFIED SPINAL OSTEOARTHRITIS COMPLICATION STATUS: Primary | ICD-10-CM

## 2022-02-14 DIAGNOSIS — M54.50 CHRONIC BILATERAL LOW BACK PAIN WITHOUT SCIATICA: ICD-10-CM

## 2022-02-14 DIAGNOSIS — G89.29 CHRONIC BILATERAL LOW BACK PAIN WITHOUT SCIATICA: ICD-10-CM

## 2022-02-14 DIAGNOSIS — F11.29 OPIOID DEPENDENCE WITH UNSPECIFIED OPIOID-INDUCED DISORDER (HCC): ICD-10-CM

## 2022-02-14 PROCEDURE — 3074F SYST BP LT 130 MM HG: CPT | Performed by: ANESTHESIOLOGY

## 2022-02-14 PROCEDURE — 99214 OFFICE O/P EST MOD 30 MIN: CPT | Performed by: ANESTHESIOLOGY

## 2022-02-14 PROCEDURE — 3079F DIAST BP 80-89 MM HG: CPT | Performed by: ANESTHESIOLOGY

## 2022-02-14 NOTE — PROGRESS NOTES
Last procedure: LUMBAR FACET INJECTION BILATERAL L4-5, L5-S1 WITH LOCAL  Date: 02/08/22  Percentage of relief obtained: 80%  Duration of relief: few days    Current Pain Score: 7/10

## 2022-02-15 ENCOUNTER — TELEPHONE (OUTPATIENT)
Dept: NEUROLOGY | Facility: CLINIC | Age: 42
End: 2022-02-15

## 2022-02-16 NOTE — TELEPHONE ENCOUNTER
Clinical notes sent to [facility] to request authorization for right RFA L3, 4, 5   CPT Codes: 26988,63129T2 -Pending approval     FYI: followed by Left RFA L3,4,5: Referral # [id number] CLOSED-Open pending

## 2022-02-21 ENCOUNTER — TELEPHONE (OUTPATIENT)
Dept: FAMILY MEDICINE CLINIC | Facility: CLINIC | Age: 42
End: 2022-02-21

## 2022-02-21 NOTE — TELEPHONE ENCOUNTER
I spoke to patient she is good with Thurs and if we get cancellation to call her. I bubbled Harmon to open slot on Thurs. 2/24/22 at 11:30,

## 2022-02-21 NOTE — TELEPHONE ENCOUNTER
Geovanna Hernandez is calling to see if she can get in to see Dr Ketty Rey soon, she does have a appt on March 16th, but she wants to get in sooner to discuss her back pain,  Dr Inés Ho was offered, pt wants to see Dr Ketty Rey. Please call Geovanna Hernandez at [phone]

## 2022-02-21 NOTE — TELEPHONE ENCOUNTER
LOV: 1/31/2022 for Back Pain and Other --> pt advised to RTC in 3 months (about 4/30/22)    Patient had an OV with Dr. Leoncio Gong r/t Spondylosis of lubosacral region on 2/14/22.     Dr. Kumar Service to advise (appointment date and time)

## 2022-02-24 ENCOUNTER — OFFICE VISIT (OUTPATIENT)
Dept: FAMILY MEDICINE CLINIC | Facility: CLINIC | Age: 42
End: 2022-02-24
Payer: COMMERCIAL

## 2022-02-24 VITALS
BODY MASS INDEX: 23.1 KG/M2 | HEART RATE: 92 BPM | WEIGHT: 132 LBS | DIASTOLIC BLOOD PRESSURE: 80 MMHG | SYSTOLIC BLOOD PRESSURE: 130 MMHG | RESPIRATION RATE: 16 BRPM | HEIGHT: 63.5 IN

## 2022-02-24 DIAGNOSIS — F11.29 OPIOID DEPENDENCE WITH UNSPECIFIED OPIOID-INDUCED DISORDER (HCC): ICD-10-CM

## 2022-02-24 DIAGNOSIS — G89.29 CHRONIC BILATERAL LOW BACK PAIN WITHOUT SCIATICA: ICD-10-CM

## 2022-02-24 DIAGNOSIS — G89.4 CHRONIC PAIN SYNDROME: Primary | ICD-10-CM

## 2022-02-24 DIAGNOSIS — M54.50 CHRONIC BILATERAL LOW BACK PAIN WITHOUT SCIATICA: ICD-10-CM

## 2022-02-24 PROCEDURE — 99214 OFFICE O/P EST MOD 30 MIN: CPT | Performed by: FAMILY MEDICINE

## 2022-02-24 PROCEDURE — 3079F DIAST BP 80-89 MM HG: CPT | Performed by: FAMILY MEDICINE

## 2022-02-24 PROCEDURE — 3008F BODY MASS INDEX DOCD: CPT | Performed by: FAMILY MEDICINE

## 2022-02-24 PROCEDURE — 3075F SYST BP GE 130 - 139MM HG: CPT | Performed by: FAMILY MEDICINE

## 2022-02-24 RX ORDER — TRAMADOL HYDROCHLORIDE 50 MG/1
100 TABLET ORAL EVERY 8 HOURS PRN
Qty: 180 TABLET | Refills: 0 | Status: SHIPPED | OUTPATIENT
Start: 2022-03-01 | End: 2022-03-31

## 2022-02-24 RX ORDER — HYDROCODONE BITARTRATE AND ACETAMINOPHEN 10; 325 MG/1; MG/1
1 TABLET ORAL DAILY PRN
Qty: 7 TABLET | Refills: 0 | Status: SHIPPED | OUTPATIENT
Start: 2022-02-24 | End: 2022-03-31 | Stop reason: ALTCHOICE

## 2022-02-25 NOTE — TELEPHONE ENCOUNTER
Received in-basket from [facility] with approval for right RFA L3, 4, 5 CPT CODE: 06495, 07347  valid 02/14/22 thru 05/25/22. Status: APPROVED    Notified patient navigator  for scheduling.     FYI: followed by Left RFA L3,4,5: Referral # [id number] CLOSED-Open pending

## 2022-02-25 NOTE — TELEPHONE ENCOUNTER
Question Answer   Anesthesia Type Sedation   Provider Jennifer Hilton   [facility]   Procedure RFA   Laterality/Level right L3, 4, 5   Medical clearance requested (will send to Pain Navigator) No   Patient has Medicare coverage?  No   Comments (Please list entire procedure name here.) radiofrequency ablation of right lumbar medial branches

## 2022-03-05 ENCOUNTER — LAB ENCOUNTER (OUTPATIENT)
Dept: LAB | Age: 42
End: 2022-03-05
Attending: ANESTHESIOLOGY
Payer: COMMERCIAL

## 2022-03-05 DIAGNOSIS — M51.36 DDD (DEGENERATIVE DISC DISEASE), LUMBAR: ICD-10-CM

## 2022-03-06 LAB — SARS-COV-2 RNA RESP QL NAA+PROBE: NOT DETECTED

## 2022-03-08 ENCOUNTER — HOSPITAL ENCOUNTER (OUTPATIENT)
Facility: HOSPITAL | Age: 42
Setting detail: HOSPITAL OUTPATIENT SURGERY
Discharge: HOME | End: 2022-03-08
Attending: ANESTHESIOLOGY | Admitting: ANESTHESIOLOGY
Payer: COMMERCIAL

## 2022-03-08 ENCOUNTER — APPOINTMENT (OUTPATIENT)
Dept: GENERAL RADIOLOGY | Facility: HOSPITAL | Age: 42
End: 2022-03-08
Attending: ANESTHESIOLOGY
Payer: COMMERCIAL

## 2022-03-08 VITALS
TEMPERATURE: 98 F | OXYGEN SATURATION: 100 % | HEART RATE: 77 BPM | RESPIRATION RATE: 16 BRPM | WEIGHT: 128 LBS | SYSTOLIC BLOOD PRESSURE: 118 MMHG | DIASTOLIC BLOOD PRESSURE: 72 MMHG | BODY MASS INDEX: 22 KG/M2

## 2022-03-08 DIAGNOSIS — M51.36 DDD (DEGENERATIVE DISC DISEASE), LUMBAR: ICD-10-CM

## 2022-03-08 LAB — B-HCG UR QL: NEGATIVE

## 2022-03-08 PROCEDURE — 3E0T3TZ INTRODUCTION OF DESTRUCTIVE AGENT INTO PERIPHERAL NERVES AND PLEXI, PERCUTANEOUS APPROACH: ICD-10-PCS | Performed by: ANESTHESIOLOGY

## 2022-03-08 PROCEDURE — 99152 MOD SED SAME PHYS/QHP 5/>YRS: CPT | Performed by: ANESTHESIOLOGY

## 2022-03-08 PROCEDURE — 81025 URINE PREGNANCY TEST: CPT

## 2022-03-08 RX ORDER — ONDANSETRON 2 MG/ML
4 INJECTION INTRAMUSCULAR; INTRAVENOUS ONCE AS NEEDED
Status: DISCONTINUED | OUTPATIENT
Start: 2022-03-08 | End: 2022-03-08

## 2022-03-08 RX ORDER — LIDOCAINE HYDROCHLORIDE 10 MG/ML
INJECTION, SOLUTION EPIDURAL; INFILTRATION; INTRACAUDAL; PERINEURAL
Status: DISCONTINUED | OUTPATIENT
Start: 2022-03-08 | End: 2022-03-08

## 2022-03-08 RX ORDER — DIPHENHYDRAMINE HYDROCHLORIDE 50 MG/ML
50 INJECTION INTRAMUSCULAR; INTRAVENOUS ONCE AS NEEDED
Status: DISCONTINUED | OUTPATIENT
Start: 2022-03-08 | End: 2022-03-08

## 2022-03-08 RX ORDER — MIDAZOLAM HYDROCHLORIDE 1 MG/ML
INJECTION INTRAMUSCULAR; INTRAVENOUS
Status: DISCONTINUED | OUTPATIENT
Start: 2022-03-08 | End: 2022-03-08

## 2022-03-08 RX ORDER — SODIUM CHLORIDE, SODIUM LACTATE, POTASSIUM CHLORIDE, CALCIUM CHLORIDE 600; 310; 30; 20 MG/100ML; MG/100ML; MG/100ML; MG/100ML
100 INJECTION, SOLUTION INTRAVENOUS CONTINUOUS
Status: DISCONTINUED | OUTPATIENT
Start: 2022-03-08 | End: 2022-03-08

## 2022-03-08 RX ORDER — METHYLPREDNISOLONE ACETATE 40 MG/ML
INJECTION, SUSPENSION INTRA-ARTICULAR; INTRALESIONAL; INTRAMUSCULAR; SOFT TISSUE
Status: DISCONTINUED | OUTPATIENT
Start: 2022-03-08 | End: 2022-03-08

## 2022-03-08 NOTE — OPERATIVE REPORT
[facility]  Operative Report  3/8/2022     Astrid Liu Patient Status:  Hospital Outpatient Surgery    1980 MRN [id number]   Location [address] [address] Attending Jamar Bradford MD   Hosp Day # 0 [facility]     Indication: Alon Salazar is a 35year old female with lumbar degenerative disc disease    Preoperative Diagnosis:  DDD (degenerative disc disease), lumbar [M51.36]    Postoperative Diagnosis: Same as above. Procedure performed: RADIOFREQUENCY ABLATION OF RIGHT LUMBAR MEDIAL BRANCH L3, 4, 5 WITH SEDATION     Anesthesia: Local and IV Sedation. EBL: Less than 1 ml. Procedure Description:   After reviewing the patient's history and performing a focused physical examination, the diagnosis was confirmed and contraindications such as infection and coagulopathy were ruled out. Following review of potential side effects and complications, including but not necessarily limited to infection, allergic reaction, local tissue breakdown, nerve injury, and paresis, the patient indicated they understood and agreed to proceed. After obtaining the informed consent, the patient was brought to the procedure room and monitored. Per my order and under my supervision, the patient was sedated with intermittent intravenous doses of versed and fentanyl. The vital signs were monitored and recorded by an experienced RN. The procedure started after the patient was adequately sedated. The moderate intravenous conscious sedation was provided for 15 minutes. The patient was placed prone on the table. The patient's back was prepped and draped in sterile fashion. The skin was anesthetized via 25-gauge 1.5\" needle with approximately 2 mL of 1% lidocaine for local anesthesia.   Under fluoroscopic guidance, a 22-gauge, 100-mm SMK needle was advanced to the junction of the superior aspect of the L3 transverse process and the lateral aspect of the same level superior articular process at right L3 level . The needle was then walked off the bony tissue and advanced 2 to 3 mm to lie along the path of the L3 medial branch nerve. AP and lateral radiographs were obtained to document proper needle position. Sensory and motor stimulation were then performed, which elicited deep local back discomfort but no evidence of motor stimulation in the gluteal muscles or extremities. At this point, 0.5 mL of 1% lidocaine was injected to the tissues around the tip of the SMK needle. Subsequently, a medial branch nerve denervation was performed for 90 seconds at 80 degrees centigrade without complication. Two additional needles were used to perform RFA at the  right L4-L5 and L5-S1 level. The radiofrequency probe was removed with the needle left in place and 1% lidocaine and 10 mg methylprednisolone was injected through each needle. The needles were removed with tips intact. The patient tolerated the procedure very well. There was no subjective or objective loss of motor strength. The patient was observed until discharge criteria met. Discharge instructions were given and patient was released to a responsible adult. Complications: None. Follow up: The patient will be followed in the pain clinic as needed basis.       Andrés Watkins MD

## 2022-03-15 ENCOUNTER — OFFICE VISIT (OUTPATIENT)
Dept: FAMILY MEDICINE CLINIC | Facility: CLINIC | Age: 42
End: 2022-03-15
Payer: COMMERCIAL

## 2022-03-15 ENCOUNTER — HOSPITAL ENCOUNTER (OUTPATIENT)
Dept: MRI IMAGING | Age: 42
Discharge: HOME | End: 2022-03-15
Attending: FAMILY MEDICINE
Payer: COMMERCIAL

## 2022-03-15 VITALS
WEIGHT: 136 LBS | BODY MASS INDEX: 23.8 KG/M2 | OXYGEN SATURATION: 98 % | HEIGHT: 63.5 IN | TEMPERATURE: 98 F | SYSTOLIC BLOOD PRESSURE: 118 MMHG | RESPIRATION RATE: 16 BRPM | DIASTOLIC BLOOD PRESSURE: 70 MMHG | HEART RATE: 109 BPM

## 2022-03-15 DIAGNOSIS — F11.29 OPIOID DEPENDENCE WITH UNSPECIFIED OPIOID-INDUCED DISORDER (HCC): ICD-10-CM

## 2022-03-15 DIAGNOSIS — M54.50 CHRONIC BILATERAL LOW BACK PAIN WITHOUT SCIATICA: ICD-10-CM

## 2022-03-15 DIAGNOSIS — M54.50 CHRONIC BILATERAL LOW BACK PAIN WITHOUT SCIATICA: Primary | ICD-10-CM

## 2022-03-15 DIAGNOSIS — G89.29 CHRONIC BILATERAL LOW BACK PAIN WITHOUT SCIATICA: ICD-10-CM

## 2022-03-15 DIAGNOSIS — R52 UNCONTROLLED PAIN: ICD-10-CM

## 2022-03-15 DIAGNOSIS — G89.4 CHRONIC PAIN SYNDROME: ICD-10-CM

## 2022-03-15 DIAGNOSIS — Z79.899 MEDICATION MANAGEMENT: ICD-10-CM

## 2022-03-15 DIAGNOSIS — G89.29 CHRONIC BILATERAL LOW BACK PAIN WITHOUT SCIATICA: Primary | ICD-10-CM

## 2022-03-15 PROCEDURE — 3078F DIAST BP <80 MM HG: CPT | Performed by: FAMILY MEDICINE

## 2022-03-15 PROCEDURE — 3008F BODY MASS INDEX DOCD: CPT | Performed by: FAMILY MEDICINE

## 2022-03-15 PROCEDURE — A9575 INJ GADOTERATE MEGLUMI 0.1ML: HCPCS | Performed by: FAMILY MEDICINE

## 2022-03-15 PROCEDURE — 99214 OFFICE O/P EST MOD 30 MIN: CPT | Performed by: FAMILY MEDICINE

## 2022-03-15 PROCEDURE — 3074F SYST BP LT 130 MM HG: CPT | Performed by: FAMILY MEDICINE

## 2022-03-15 PROCEDURE — 72158 MRI LUMBAR SPINE W/O & W/DYE: CPT | Performed by: FAMILY MEDICINE

## 2022-03-15 RX ORDER — NALOXONE HYDROCHLORIDE 4 MG/.1ML
4 SPRAY, METERED NASAL AS NEEDED
Qty: 1 KIT | Refills: 0 | Status: SHIPPED | OUTPATIENT
Start: 2022-03-15

## 2022-03-15 RX ORDER — HYDROCODONE BITARTRATE AND ACETAMINOPHEN 10; 325 MG/1; MG/1
1 TABLET ORAL EVERY 6 HOURS PRN
Qty: 120 TABLET | Refills: 0 | Status: SHIPPED | OUTPATIENT
Start: 2022-03-15

## 2022-03-15 RX ORDER — PREDNISONE 20 MG/1
TABLET ORAL
Qty: 13 TABLET | Refills: 0 | Status: SHIPPED | OUTPATIENT
Start: 2022-03-15 | End: 2022-03-31 | Stop reason: ALTCHOICE

## 2022-03-15 NOTE — PROGRESS NOTES
Dear Giuliana Romero,    Your MRI is stable. Please address with Dr. Antony Fuller next week.      Sincerely,  Dr. Herminio Florentino

## 2022-03-18 ENCOUNTER — OFFICE VISIT (OUTPATIENT)
Dept: PAIN CLINIC | Facility: CLINIC | Age: 42
End: 2022-03-18
Payer: COMMERCIAL

## 2022-03-18 VITALS
OXYGEN SATURATION: 98 % | DIASTOLIC BLOOD PRESSURE: 78 MMHG | HEART RATE: 78 BPM | WEIGHT: 136 LBS | SYSTOLIC BLOOD PRESSURE: 118 MMHG | BODY MASS INDEX: 24 KG/M2

## 2022-03-18 DIAGNOSIS — G89.29 CHRONIC BILATERAL LOW BACK PAIN WITHOUT SCIATICA: Primary | ICD-10-CM

## 2022-03-18 DIAGNOSIS — M79.18 MYOFASCIAL PAIN: ICD-10-CM

## 2022-03-18 DIAGNOSIS — M47.817 SPONDYLOSIS OF LUMBOSACRAL REGION, UNSPECIFIED SPINAL OSTEOARTHRITIS COMPLICATION STATUS: ICD-10-CM

## 2022-03-18 DIAGNOSIS — M54.50 CHRONIC BILATERAL LOW BACK PAIN WITHOUT SCIATICA: Primary | ICD-10-CM

## 2022-03-18 PROCEDURE — 3074F SYST BP LT 130 MM HG: CPT | Performed by: ANESTHESIOLOGY

## 2022-03-18 PROCEDURE — 3078F DIAST BP <80 MM HG: CPT | Performed by: ANESTHESIOLOGY

## 2022-03-18 PROCEDURE — 99214 OFFICE O/P EST MOD 30 MIN: CPT | Performed by: ANESTHESIOLOGY

## 2022-03-18 NOTE — PROGRESS NOTES
Last procedure: RADIOFREQUENCY ABLATION OF RIGHT LUMBAR MEDIAL BRANCH L3, 4, 5 WITH SEDATION  Date: 03/08/22  Percentage of relief obtained: 0  Duration of relief: NA    Current Pain Score: 8

## 2022-03-31 ENCOUNTER — OFFICE VISIT (OUTPATIENT)
Dept: FAMILY MEDICINE CLINIC | Facility: CLINIC | Age: 42
End: 2022-03-31
Payer: COMMERCIAL

## 2022-03-31 VITALS
HEIGHT: 63.5 IN | DIASTOLIC BLOOD PRESSURE: 70 MMHG | WEIGHT: 134 LBS | RESPIRATION RATE: 14 BRPM | BODY MASS INDEX: 23.45 KG/M2 | SYSTOLIC BLOOD PRESSURE: 116 MMHG | HEART RATE: 64 BPM

## 2022-03-31 DIAGNOSIS — G89.29 CHRONIC BILATERAL LOW BACK PAIN WITHOUT SCIATICA: Primary | ICD-10-CM

## 2022-03-31 DIAGNOSIS — M54.50 CHRONIC BILATERAL LOW BACK PAIN WITHOUT SCIATICA: Primary | ICD-10-CM

## 2022-03-31 DIAGNOSIS — M25.50 ARTHRALGIA, UNSPECIFIED JOINT: ICD-10-CM

## 2022-03-31 PROCEDURE — 3078F DIAST BP <80 MM HG: CPT | Performed by: FAMILY MEDICINE

## 2022-03-31 PROCEDURE — 99215 OFFICE O/P EST HI 40 MIN: CPT | Performed by: FAMILY MEDICINE

## 2022-03-31 PROCEDURE — 3008F BODY MASS INDEX DOCD: CPT | Performed by: FAMILY MEDICINE

## 2022-03-31 PROCEDURE — 3074F SYST BP LT 130 MM HG: CPT | Performed by: FAMILY MEDICINE

## 2022-03-31 RX ORDER — TRAMADOL HYDROCHLORIDE 50 MG/1
100 TABLET ORAL EVERY 8 HOURS PRN
Qty: 180 TABLET | Refills: 2 | Status: SHIPPED | OUTPATIENT
Start: 2022-03-31

## 2022-04-18 ENCOUNTER — TELEPHONE (OUTPATIENT)
Dept: FAMILY MEDICINE CLINIC | Facility: CLINIC | Age: 42
End: 2022-04-18

## 2022-04-18 DIAGNOSIS — G89.29 CHRONIC BILATERAL LOW BACK PAIN WITHOUT SCIATICA: ICD-10-CM

## 2022-04-18 DIAGNOSIS — M54.50 CHRONIC BILATERAL LOW BACK PAIN WITHOUT SCIATICA: ICD-10-CM

## 2022-04-18 DIAGNOSIS — R52 UNCONTROLLED PAIN: ICD-10-CM

## 2022-04-18 DIAGNOSIS — F11.29 OPIOID DEPENDENCE WITH UNSPECIFIED OPIOID-INDUCED DISORDER (HCC): ICD-10-CM

## 2022-04-18 DIAGNOSIS — Z79.899 MEDICATION MANAGEMENT: ICD-10-CM

## 2022-04-18 DIAGNOSIS — G89.4 CHRONIC PAIN SYNDROME: ICD-10-CM

## 2022-04-18 RX ORDER — HYDROCODONE BITARTRATE AND ACETAMINOPHEN 10; 325 MG/1; MG/1
1 TABLET ORAL EVERY 6 HOURS PRN
Qty: 120 TABLET | Refills: 0 | OUTPATIENT
Start: 2022-04-18

## 2022-04-18 NOTE — TELEPHONE ENCOUNTER
LR: Norco 3/15/22 #120 and Tramadol 3/31/22 #180. Only asking for [place] refill, not Tramadol. Has \"a lot\" of those. She stopped it. She thought since it would last longer it would work during Ramadan. It did not last longer like she hoped. So she stopped it after a few days. Taking 4 Norco's a day and has stopped fasting since. She says her back \"hurts\" all the time, sharp at times. It goes from 10/10 to 2/10 \"immediately\" after taking Norco. Relief only lasts a few hours. Also having the pelvic pain as well. US of pelvis 5/9. Saqib Xavier is only thing that helps.

## 2022-04-20 ENCOUNTER — OFFICE VISIT (OUTPATIENT)
Dept: FAMILY MEDICINE CLINIC | Facility: CLINIC | Age: 42
End: 2022-04-20
Payer: COMMERCIAL

## 2022-04-20 VITALS
HEIGHT: 63.5 IN | HEART RATE: 92 BPM | RESPIRATION RATE: 16 BRPM | DIASTOLIC BLOOD PRESSURE: 70 MMHG | BODY MASS INDEX: 23.62 KG/M2 | WEIGHT: 135 LBS | SYSTOLIC BLOOD PRESSURE: 118 MMHG | TEMPERATURE: 98 F | OXYGEN SATURATION: 100 %

## 2022-04-20 DIAGNOSIS — R52 UNCONTROLLED PAIN: ICD-10-CM

## 2022-04-20 DIAGNOSIS — G89.29 CHRONIC BILATERAL LOW BACK PAIN WITHOUT SCIATICA: ICD-10-CM

## 2022-04-20 DIAGNOSIS — M54.50 CHRONIC BILATERAL LOW BACK PAIN WITHOUT SCIATICA: ICD-10-CM

## 2022-04-20 DIAGNOSIS — G89.4 CHRONIC PAIN SYNDROME: Primary | ICD-10-CM

## 2022-04-20 DIAGNOSIS — F11.29 OPIOID DEPENDENCE WITH OPIOID-INDUCED DISORDER (HCC): ICD-10-CM

## 2022-04-20 PROCEDURE — 3078F DIAST BP <80 MM HG: CPT | Performed by: FAMILY MEDICINE

## 2022-04-20 PROCEDURE — 99214 OFFICE O/P EST MOD 30 MIN: CPT | Performed by: FAMILY MEDICINE

## 2022-04-20 PROCEDURE — 3008F BODY MASS INDEX DOCD: CPT | Performed by: FAMILY MEDICINE

## 2022-04-20 PROCEDURE — 3074F SYST BP LT 130 MM HG: CPT | Performed by: FAMILY MEDICINE

## 2022-04-20 RX ORDER — HYDROCODONE BITARTRATE AND ACETAMINOPHEN 10; 325 MG/1; MG/1
1 TABLET ORAL EVERY 8 HOURS PRN
Qty: 90 TABLET | Refills: 0 | Status: SHIPPED | OUTPATIENT
Start: 2022-04-20

## 2022-05-09 ENCOUNTER — HOSPITAL ENCOUNTER (OUTPATIENT)
Dept: ULTRASOUND IMAGING | Age: 42
Discharge: HOME | End: 2022-05-09
Attending: FAMILY MEDICINE
Payer: COMMERCIAL

## 2022-05-09 DIAGNOSIS — N83.202 LEFT OVARIAN CYST: ICD-10-CM

## 2022-05-09 PROCEDURE — 76856 US EXAM PELVIC COMPLETE: CPT | Performed by: FAMILY MEDICINE

## 2022-05-09 PROCEDURE — 76830 TRANSVAGINAL US NON-OB: CPT | Performed by: FAMILY MEDICINE

## 2022-05-12 ENCOUNTER — TELEPHONE (OUTPATIENT)
Dept: FAMILY MEDICINE CLINIC | Facility: CLINIC | Age: 42
End: 2022-05-12

## 2022-05-12 ENCOUNTER — OFFICE VISIT (OUTPATIENT)
Dept: FAMILY MEDICINE CLINIC | Facility: CLINIC | Age: 42
End: 2022-05-12
Payer: COMMERCIAL

## 2022-05-12 VITALS
BODY MASS INDEX: 23.1 KG/M2 | HEART RATE: 90 BPM | HEIGHT: 63.5 IN | RESPIRATION RATE: 16 BRPM | WEIGHT: 132 LBS | SYSTOLIC BLOOD PRESSURE: 114 MMHG | DIASTOLIC BLOOD PRESSURE: 72 MMHG | OXYGEN SATURATION: 99 % | TEMPERATURE: 98 F

## 2022-05-12 DIAGNOSIS — Z00.00 LABORATORY EXAMINATION ORDERED AS PART OF A ROUTINE GENERAL MEDICAL EXAMINATION: ICD-10-CM

## 2022-05-12 DIAGNOSIS — Z13.89 SCREENING FOR GENITOURINARY CONDITION: ICD-10-CM

## 2022-05-12 DIAGNOSIS — G89.29 CHRONIC BILATERAL LOW BACK PAIN WITHOUT SCIATICA: Primary | ICD-10-CM

## 2022-05-12 DIAGNOSIS — R73.9 HYPERGLYCEMIA: ICD-10-CM

## 2022-05-12 DIAGNOSIS — F11.29 OPIOID DEPENDENCE WITH OPIOID-INDUCED DISORDER (HCC): ICD-10-CM

## 2022-05-12 DIAGNOSIS — E55.9 VITAMIN D DEFICIENCY: ICD-10-CM

## 2022-05-12 DIAGNOSIS — M54.50 CHRONIC BILATERAL LOW BACK PAIN WITHOUT SCIATICA: Primary | ICD-10-CM

## 2022-05-12 DIAGNOSIS — R52 UNCONTROLLED PAIN: ICD-10-CM

## 2022-05-12 DIAGNOSIS — G89.4 CHRONIC PAIN SYNDROME: ICD-10-CM

## 2022-05-12 DIAGNOSIS — Z79.899 MEDICATION MANAGEMENT: ICD-10-CM

## 2022-05-12 LAB
ALBUMIN SERPL-MCNC: 3.9 G/DL (ref 3.4–5)
ALBUMIN/GLOB SERPL: 0.9 {RATIO} (ref 1–2)
ALP LIVER SERPL-CCNC: 61 U/L
ALT SERPL-CCNC: 15 U/L
ANION GAP SERPL CALC-SCNC: 6 MMOL/L (ref 0–18)
AST SERPL-CCNC: 13 U/L (ref 15–37)
BASOPHILS # BLD AUTO: 0.09 X10(3) UL (ref 0–0.2)
BASOPHILS NFR BLD AUTO: 0.9 %
BILIRUB SERPL-MCNC: 0.2 MG/DL (ref 0.1–2)
BILIRUB UR QL STRIP.AUTO: NEGATIVE
BUN BLD-MCNC: 9 MG/DL (ref 7–18)
CALCIUM BLD-MCNC: 9.7 MG/DL (ref 8.5–10.1)
CHLORIDE SERPL-SCNC: 105 MMOL/L (ref 98–112)
CHOLEST SERPL-MCNC: 191 MG/DL (ref ?–200)
CLARITY UR REFRACT.AUTO: CLEAR
CO2 SERPL-SCNC: 28 MMOL/L (ref 21–32)
CREAT BLD-MCNC: 0.99 MG/DL
EOSINOPHIL # BLD AUTO: 0.05 X10(3) UL (ref 0–0.7)
EOSINOPHIL NFR BLD AUTO: 0.5 %
ERYTHROCYTE [DISTWIDTH] IN BLOOD BY AUTOMATED COUNT: 11.9 %
FASTING PATIENT LIPID ANSWER: NO
FASTING STATUS PATIENT QL REPORTED: NO
GLOBULIN PLAS-MCNC: 4.4 G/DL (ref 2.8–4.4)
GLUCOSE BLD-MCNC: 139 MG/DL (ref 70–99)
GLUCOSE UR STRIP.AUTO-MCNC: >=500 MG/DL
HCT VFR BLD AUTO: 43.4 %
HDLC SERPL-MCNC: 60 MG/DL (ref 40–59)
HGB BLD-MCNC: 14.3 G/DL
IMM GRANULOCYTES # BLD AUTO: 0.04 X10(3) UL (ref 0–1)
IMM GRANULOCYTES NFR BLD: 0.4 %
KETONES UR STRIP.AUTO-MCNC: NEGATIVE MG/DL
LDLC SERPL CALC-MCNC: 101 MG/DL (ref ?–100)
LEUKOCYTE ESTERASE UR QL STRIP.AUTO: NEGATIVE
LYMPHOCYTES # BLD AUTO: 1.6 X10(3) UL (ref 1–4)
LYMPHOCYTES NFR BLD AUTO: 16 %
MCH RBC QN AUTO: 29.6 PG (ref 26–34)
MCHC RBC AUTO-ENTMCNC: 32.9 G/DL (ref 31–37)
MCV RBC AUTO: 89.9 FL
MONOCYTES # BLD AUTO: 0.6 X10(3) UL (ref 0.1–1)
MONOCYTES NFR BLD AUTO: 6 %
NEUTROPHILS # BLD AUTO: 7.64 X10 (3) UL (ref 1.5–7.7)
NEUTROPHILS # BLD AUTO: 7.64 X10(3) UL (ref 1.5–7.7)
NEUTROPHILS NFR BLD AUTO: 76.2 %
NITRITE UR QL STRIP.AUTO: NEGATIVE
NONHDLC SERPL-MCNC: 131 MG/DL (ref ?–130)
OSMOLALITY SERPL CALC.SUM OF ELEC: 289 MOSM/KG (ref 275–295)
PH UR STRIP.AUTO: 6 [PH] (ref 5–8)
PLATELET # BLD AUTO: 395 10(3)UL (ref 150–450)
POTASSIUM SERPL-SCNC: 3.9 MMOL/L (ref 3.5–5.1)
PROT SERPL-MCNC: 8.3 G/DL (ref 6.4–8.2)
PROT UR STRIP.AUTO-MCNC: NEGATIVE MG/DL
RBC # BLD AUTO: 4.83 X10(6)UL
SODIUM SERPL-SCNC: 139 MMOL/L (ref 136–145)
SP GR UR STRIP.AUTO: 1.01 (ref 1–1.03)
TRIGL SERPL-MCNC: 176 MG/DL (ref 30–149)
TSI SER-ACNC: 0.43 MIU/ML (ref 0.36–3.74)
UROBILINOGEN UR STRIP.AUTO-MCNC: <2 MG/DL
VIT D+METAB SERPL-MCNC: 56.5 NG/ML (ref 30–100)
VLDLC SERPL CALC-MCNC: 29 MG/DL (ref 0–30)
WBC # BLD AUTO: 10 X10(3) UL (ref 4–11)

## 2022-05-12 PROCEDURE — 80307 DRUG TEST PRSMV CHEM ANLYZR: CPT | Performed by: FAMILY MEDICINE

## 2022-05-12 PROCEDURE — 80053 COMPREHEN METABOLIC PANEL: CPT | Performed by: FAMILY MEDICINE

## 2022-05-12 PROCEDURE — 83036 HEMOGLOBIN GLYCOSYLATED A1C: CPT | Performed by: FAMILY MEDICINE

## 2022-05-12 PROCEDURE — 84443 ASSAY THYROID STIM HORMONE: CPT | Performed by: FAMILY MEDICINE

## 2022-05-12 PROCEDURE — 3008F BODY MASS INDEX DOCD: CPT | Performed by: FAMILY MEDICINE

## 2022-05-12 PROCEDURE — 82306 VITAMIN D 25 HYDROXY: CPT | Performed by: FAMILY MEDICINE

## 2022-05-12 PROCEDURE — 3078F DIAST BP <80 MM HG: CPT | Performed by: FAMILY MEDICINE

## 2022-05-12 PROCEDURE — 81001 URINALYSIS AUTO W/SCOPE: CPT | Performed by: FAMILY MEDICINE

## 2022-05-12 PROCEDURE — 85025 COMPLETE CBC W/AUTO DIFF WBC: CPT | Performed by: FAMILY MEDICINE

## 2022-05-12 PROCEDURE — 3074F SYST BP LT 130 MM HG: CPT | Performed by: FAMILY MEDICINE

## 2022-05-12 PROCEDURE — 80061 LIPID PANEL: CPT | Performed by: FAMILY MEDICINE

## 2022-05-12 PROCEDURE — 99215 OFFICE O/P EST HI 40 MIN: CPT | Performed by: FAMILY MEDICINE

## 2022-05-12 RX ORDER — NALOXONE HYDROCHLORIDE 4 MG/.1ML
4 SPRAY, METERED NASAL AS NEEDED
Qty: 1 KIT | Refills: 0 | Status: SHIPPED | OUTPATIENT
Start: 2022-05-12

## 2022-05-12 RX ORDER — HYDROCODONE BITARTRATE AND ACETAMINOPHEN 10; 325 MG/1; MG/1
1 TABLET ORAL EVERY 6 HOURS PRN
Qty: 120 TABLET | Refills: 0 | Status: SHIPPED | OUTPATIENT
Start: 2022-05-12

## 2022-05-12 NOTE — TELEPHONE ENCOUNTER
John Alvarez with Dr Loretta Nicole is calling with a request. She would like a few things for [facility] upcoming appointment. Please fax:  Referral  Last 3 OV notes  Any/All imaging, CT, Xrays or MRI    Fax [phone]    Thank you!

## 2022-05-13 LAB
EST. AVERAGE GLUCOSE BLD GHB EST-MCNC: 123 MG/DL (ref 68–126)
HBA1C MFR BLD: 5.9 % (ref ?–5.7)

## 2022-05-15 LAB — DRUG SCREEN (NONFORENSIC), URINE: POSITIVE

## 2022-05-18 LAB
6-ACETYLMORHINE CUTOFF 20 NG/ML: NOT DETECTED
7-AMINOCLONAZEPAM 40 NG/ML: NOT DETECTED
A-OH-ALPRAZOLM CUTOFF 20 NG/ML: NOT DETECTED
ALPHA-OH-MIDAZOLAM (CUTOFF 20 NG/ML): NOT DETECTED
ALPRAZOLAM CUTOFF 40 NG/ML: NOT DETECTED
AMPHETAMINE CUTOFF 10 NG/ML: NOT DETECTED
BARBITURATES CUTOFF 200 NG/ML: NOT DETECTED
BENZOYLECGONINE  150 NG/ML: NOT DETECTED
BUPRENORPHINE CUTOFF 5 NG/ML: NOT DETECTED
CARISOPRODOL CUTOFF 100 NG/ML: NOT DETECTED
CODINE CUTOFF 40 NG/ML: NOT DETECTED
COLNAZEPAM CUTOFF 20 NG/ML: NOT DETECTED
CREATININE,URINE: 77.7 MG/DL
DIAZEPAM CUTOFF 50 NG/ML: NOT DETECTED
ETHYL-GLUCURONIDE 500 NG/ML: NOT DETECTED
FENTANYL CUTOFF 2 NG/ML: NOT DETECTED
GABAPENTIN (CUTOFF 100 NG/ML): NOT DETECTED
HYDROCODONE CUTOFF 40 NG/ML: NOT DETECTED
HYDROMORPHONE CUTOFF 40 NG/ML: NOT DETECTED
LORAZEPAM CUTOFF 60 NG/ML: NOT DETECTED
MARIJUANA CUTOFF 20 NG/ML: NOT DETECTED
MDA CUTOFF 200 NG/ML: NOT DETECTED
MDEA EVE CUTOFF 200 NG/ML: NOT DETECTED
MDMA-ECSTASY CUTOFF 200 NG/ML: NOT DETECTED
MEPERIDINE MET CUTOFF 50 NG/ML: NOT DETECTED
METHADONE CUTOFF 150 NG/ML: NOT DETECTED
METHAMPHETMN CUTOFF 400 NG/ML: NOT DETECTED
METHYLPHENIDATE (CUTOFF 100 NG/ML): NOT DETECTED
MIDAZOLAM CUTOFF 20 NG/ML: NOT DETECTED
MORHINE CUTOFF 20 NG/ML: NOT DETECTED
NALOXONE (CUTOFF 100 NG/ML): NOT DETECTED
NORBUPRENORPHINE  20 NG/ML: NOT DETECTED
NORDIAZEPAM CUTOFF 50 NG/ML: NOT DETECTED
NORFENTANYL CUTOFF 2 NG/ML: NOT DETECTED
NORHYDRCODONE CUTOFF 100 NG/ML: NOT DETECTED
NOROXYCODONE CUTOFF 100 NG/ML: NOT DETECTED
NOROXYMORPHNE CUTOFF 100 NG/ML: NOT DETECTED
OXAZEPAM CUTOFF 50 NG/ML: NOT DETECTED
OXYCODONE CUTOFF 40 NG/ML: NOT DETECTED
OXYMORPHONE CUTOFF 40 NG/ML: NOT DETECTED
PCP CUTOFF 25 NG/ML: NOT DETECTED
PHENTERMINE CUTOFF 100 NG/ML: NOT DETECTED
PREGABALIN (CUTOFF 100 NG/ML): NOT DETECTED
TAPENTADOL CUTOFF 100 NG/ML: NOT DETECTED
TAPENTADOL-O SULF 200 NG/ML: NOT DETECTED
TEMAZEPAM CUTOFF 50 NG/ML: NOT DETECTED
TRAMADOL CUTOFF 200 NG/ML: PRESENT
ZOLPIDEM CUTOFF 20 NG/ML: NOT DETECTED
ZOLPIDEM METABOLITE (CUTOFF 100 NG/ML): NOT DETECTED

## 2022-05-24 ENCOUNTER — LAB ENCOUNTER (OUTPATIENT)
Dept: LAB | Age: 42
End: 2022-05-24
Attending: FAMILY MEDICINE
Payer: COMMERCIAL

## 2022-05-24 DIAGNOSIS — M54.50 CHRONIC BILATERAL LOW BACK PAIN WITHOUT SCIATICA: ICD-10-CM

## 2022-05-24 DIAGNOSIS — F11.29 OPIOID DEPENDENCE WITH UNSPECIFIED OPIOID-INDUCED DISORDER (HCC): ICD-10-CM

## 2022-05-24 DIAGNOSIS — G89.29 CHRONIC BILATERAL LOW BACK PAIN WITHOUT SCIATICA: ICD-10-CM

## 2022-05-24 PROCEDURE — 80307 DRUG TEST PRSMV CHEM ANLYZR: CPT

## 2022-06-06 ENCOUNTER — OFFICE VISIT (OUTPATIENT)
Dept: FAMILY MEDICINE CLINIC | Facility: CLINIC | Age: 42
End: 2022-06-06
Payer: COMMERCIAL

## 2022-06-06 VITALS
WEIGHT: 132 LBS | HEIGHT: 63.5 IN | HEART RATE: 68 BPM | RESPIRATION RATE: 16 BRPM | BODY MASS INDEX: 23.1 KG/M2 | SYSTOLIC BLOOD PRESSURE: 140 MMHG | DIASTOLIC BLOOD PRESSURE: 84 MMHG

## 2022-06-06 DIAGNOSIS — M54.50 CHRONIC BILATERAL LOW BACK PAIN WITHOUT SCIATICA: ICD-10-CM

## 2022-06-06 DIAGNOSIS — G89.4 CHRONIC PAIN SYNDROME: Primary | ICD-10-CM

## 2022-06-06 DIAGNOSIS — G89.29 CHRONIC BILATERAL LOW BACK PAIN WITHOUT SCIATICA: ICD-10-CM

## 2022-06-06 PROCEDURE — 3079F DIAST BP 80-89 MM HG: CPT | Performed by: STUDENT IN AN ORGANIZED HEALTH CARE EDUCATION/TRAINING PROGRAM

## 2022-06-06 PROCEDURE — 3008F BODY MASS INDEX DOCD: CPT | Performed by: STUDENT IN AN ORGANIZED HEALTH CARE EDUCATION/TRAINING PROGRAM

## 2022-06-06 PROCEDURE — 3077F SYST BP >= 140 MM HG: CPT | Performed by: STUDENT IN AN ORGANIZED HEALTH CARE EDUCATION/TRAINING PROGRAM

## 2022-06-06 PROCEDURE — 99213 OFFICE O/P EST LOW 20 MIN: CPT | Performed by: STUDENT IN AN ORGANIZED HEALTH CARE EDUCATION/TRAINING PROGRAM

## 2022-06-06 RX ORDER — ACETAMINOPHEN 500 MG
1000 TABLET ORAL EVERY 6 HOURS PRN
COMMUNITY

## 2022-06-06 RX ORDER — PREGABALIN 50 MG/1
50 CAPSULE ORAL DAILY
COMMUNITY
Start: 2022-05-13

## 2022-06-06 RX ORDER — HYDROCODONE BITARTRATE AND ACETAMINOPHEN 10; 325 MG/1; MG/1
1 TABLET ORAL EVERY 8 HOURS PRN
Qty: 90 TABLET | Refills: 0 | Status: SHIPPED | OUTPATIENT
Start: 2022-06-06

## 2022-06-17 ENCOUNTER — OFFICE VISIT (OUTPATIENT)
Dept: FAMILY MEDICINE CLINIC | Facility: CLINIC | Age: 42
End: 2022-06-17
Payer: COMMERCIAL

## 2022-06-17 VITALS
WEIGHT: 140 LBS | RESPIRATION RATE: 16 BRPM | DIASTOLIC BLOOD PRESSURE: 80 MMHG | HEIGHT: 63.5 IN | SYSTOLIC BLOOD PRESSURE: 132 MMHG | OXYGEN SATURATION: 99 % | BODY MASS INDEX: 24.5 KG/M2 | HEART RATE: 98 BPM

## 2022-06-17 DIAGNOSIS — R03.0 ELEVATED BP WITHOUT DIAGNOSIS OF HYPERTENSION: ICD-10-CM

## 2022-06-17 DIAGNOSIS — F41.9 ANXIETY: ICD-10-CM

## 2022-06-17 DIAGNOSIS — M54.50 CHRONIC BILATERAL LOW BACK PAIN WITHOUT SCIATICA: ICD-10-CM

## 2022-06-17 DIAGNOSIS — F33.0 MILD EPISODE OF RECURRENT MAJOR DEPRESSIVE DISORDER (HCC): ICD-10-CM

## 2022-06-17 DIAGNOSIS — G89.4 CHRONIC PAIN SYNDROME: Primary | ICD-10-CM

## 2022-06-17 DIAGNOSIS — G89.29 CHRONIC BILATERAL LOW BACK PAIN WITHOUT SCIATICA: ICD-10-CM

## 2022-06-17 DIAGNOSIS — F11.29 OPIOID DEPENDENCE WITH OPIOID-INDUCED DISORDER (HCC): ICD-10-CM

## 2022-06-17 DIAGNOSIS — Z79.899 MEDICATION MANAGEMENT: ICD-10-CM

## 2022-06-17 PROCEDURE — 99214 OFFICE O/P EST MOD 30 MIN: CPT | Performed by: FAMILY MEDICINE

## 2022-06-17 PROCEDURE — 3008F BODY MASS INDEX DOCD: CPT | Performed by: FAMILY MEDICINE

## 2022-06-17 PROCEDURE — 3075F SYST BP GE 130 - 139MM HG: CPT | Performed by: FAMILY MEDICINE

## 2022-06-17 PROCEDURE — 3079F DIAST BP 80-89 MM HG: CPT | Performed by: FAMILY MEDICINE

## 2022-06-17 RX ORDER — FENTANYL 25 UG/H
1 PATCH TRANSDERMAL
Qty: 10 PATCH | Refills: 0 | Status: SHIPPED | OUTPATIENT
Start: 2022-06-17 | End: 2022-07-17

## 2022-06-17 RX ORDER — DULOXETIN HYDROCHLORIDE 30 MG/1
30 CAPSULE, DELAYED RELEASE ORAL DAILY
Qty: 90 CAPSULE | Refills: 0 | Status: SHIPPED | OUTPATIENT
Start: 2022-06-17

## 2022-06-17 RX ORDER — NALOXONE HYDROCHLORIDE 4 MG/.1ML
4 SPRAY, METERED NASAL AS NEEDED
Qty: 1 KIT | Refills: 0 | Status: SHIPPED | OUTPATIENT
Start: 2022-06-17

## 2022-06-18 ENCOUNTER — TELEPHONE (OUTPATIENT)
Dept: FAMILY MEDICINE CLINIC | Facility: CLINIC | Age: 42
End: 2022-06-18

## 2022-06-19 NOTE — TELEPHONE ENCOUNTER
Received [employer] message that patient's fentanyl was not approved for  by the pharmacy. Called [facility] on file on [address] and spoke to representative. Per representative, wanted to confirm that patient is not taking tramadol or Norco in addition to the fentanyl patch and is aware of interaction with duloxetine that can cause increased risk of serotonin syndrome. Confirmed with patient that she is not to take Norco or tramadol at same time as fentanyl (patient endorses that she was aware of this based on prior discussion with PCP regarding fentanyl prescription). Also went over symptoms of serotonin syndrome to watch out for while also on duloxetine. Patient endorses understanding. Pharmacy confirmed that medication now available for .      Roberto Coates MD, 06/18/22, 9:58 PM

## 2022-07-11 ENCOUNTER — OFFICE VISIT (OUTPATIENT)
Dept: FAMILY MEDICINE CLINIC | Facility: CLINIC | Age: 42
End: 2022-07-11
Payer: COMMERCIAL

## 2022-07-11 VITALS
HEART RATE: 91 BPM | RESPIRATION RATE: 16 BRPM | WEIGHT: 137 LBS | TEMPERATURE: 97 F | OXYGEN SATURATION: 100 % | BODY MASS INDEX: 23.97 KG/M2 | DIASTOLIC BLOOD PRESSURE: 70 MMHG | HEIGHT: 63.5 IN | SYSTOLIC BLOOD PRESSURE: 112 MMHG

## 2022-07-11 DIAGNOSIS — F41.9 ANXIETY: ICD-10-CM

## 2022-07-11 DIAGNOSIS — F33.0 MILD EPISODE OF RECURRENT MAJOR DEPRESSIVE DISORDER (HCC): ICD-10-CM

## 2022-07-11 DIAGNOSIS — G89.29 CHRONIC BILATERAL LOW BACK PAIN WITHOUT SCIATICA: ICD-10-CM

## 2022-07-11 DIAGNOSIS — Z79.899 MEDICATION MANAGEMENT: ICD-10-CM

## 2022-07-11 DIAGNOSIS — F11.29 OPIOID DEPENDENCE WITH OPIOID-INDUCED DISORDER (HCC): ICD-10-CM

## 2022-07-11 DIAGNOSIS — M54.50 CHRONIC BILATERAL LOW BACK PAIN WITHOUT SCIATICA: ICD-10-CM

## 2022-07-11 DIAGNOSIS — G89.4 CHRONIC PAIN SYNDROME: Primary | ICD-10-CM

## 2022-07-11 PROCEDURE — 3074F SYST BP LT 130 MM HG: CPT | Performed by: FAMILY MEDICINE

## 2022-07-11 PROCEDURE — 3008F BODY MASS INDEX DOCD: CPT | Performed by: FAMILY MEDICINE

## 2022-07-11 PROCEDURE — 99214 OFFICE O/P EST MOD 30 MIN: CPT | Performed by: FAMILY MEDICINE

## 2022-07-11 PROCEDURE — 3078F DIAST BP <80 MM HG: CPT | Performed by: FAMILY MEDICINE

## 2022-07-11 RX ORDER — FENTANYL 25 UG/H
1 PATCH TRANSDERMAL
Qty: 10 PATCH | Refills: 0 | Status: SHIPPED | OUTPATIENT
Start: 2022-07-11 | End: 2022-08-10

## 2022-08-08 ENCOUNTER — OFFICE VISIT (OUTPATIENT)
Dept: FAMILY MEDICINE CLINIC | Facility: CLINIC | Age: 42
End: 2022-08-08
Payer: COMMERCIAL

## 2022-08-08 VITALS
OXYGEN SATURATION: 99 % | WEIGHT: 140 LBS | HEART RATE: 77 BPM | DIASTOLIC BLOOD PRESSURE: 70 MMHG | SYSTOLIC BLOOD PRESSURE: 110 MMHG | RESPIRATION RATE: 16 BRPM | BODY MASS INDEX: 24.5 KG/M2 | HEIGHT: 63.5 IN

## 2022-08-08 DIAGNOSIS — F11.29 OPIOID DEPENDENCE WITH OPIOID-INDUCED DISORDER (HCC): ICD-10-CM

## 2022-08-08 DIAGNOSIS — F41.9 ANXIETY: ICD-10-CM

## 2022-08-08 DIAGNOSIS — F11.20 NARCOTIC DEPENDENCE (HCC): ICD-10-CM

## 2022-08-08 DIAGNOSIS — G89.29 CHRONIC BILATERAL LOW BACK PAIN WITHOUT SCIATICA: ICD-10-CM

## 2022-08-08 DIAGNOSIS — R52 UNCONTROLLED PAIN: ICD-10-CM

## 2022-08-08 DIAGNOSIS — F33.0 MILD EPISODE OF RECURRENT MAJOR DEPRESSIVE DISORDER (HCC): ICD-10-CM

## 2022-08-08 DIAGNOSIS — G89.4 CHRONIC PAIN SYNDROME: Primary | ICD-10-CM

## 2022-08-08 DIAGNOSIS — M54.50 CHRONIC BILATERAL LOW BACK PAIN WITHOUT SCIATICA: ICD-10-CM

## 2022-08-08 DIAGNOSIS — Z79.899 MEDICATION MANAGEMENT: ICD-10-CM

## 2022-08-08 PROCEDURE — 3074F SYST BP LT 130 MM HG: CPT | Performed by: FAMILY MEDICINE

## 2022-08-08 PROCEDURE — 3078F DIAST BP <80 MM HG: CPT | Performed by: FAMILY MEDICINE

## 2022-08-08 PROCEDURE — 99214 OFFICE O/P EST MOD 30 MIN: CPT | Performed by: FAMILY MEDICINE

## 2022-08-08 PROCEDURE — 3008F BODY MASS INDEX DOCD: CPT | Performed by: FAMILY MEDICINE

## 2022-08-08 RX ORDER — DULOXETIN HYDROCHLORIDE 60 MG/1
60 CAPSULE, DELAYED RELEASE ORAL DAILY
Qty: 90 CAPSULE | Refills: 0 | Status: SHIPPED | OUTPATIENT
Start: 2022-08-08

## 2022-08-08 RX ORDER — FENTANYL 25 UG/H
1 PATCH TRANSDERMAL
Qty: 10 PATCH | Refills: 0 | Status: SHIPPED | OUTPATIENT
Start: 2022-08-10

## 2022-09-01 ENCOUNTER — APPOINTMENT (OUTPATIENT)
Dept: CT IMAGING | Facility: HOSPITAL | Age: 42
End: 2022-09-01
Payer: COMMERCIAL

## 2022-09-01 ENCOUNTER — APPOINTMENT (OUTPATIENT)
Dept: CT IMAGING | Facility: HOSPITAL | Age: 42
End: 2022-09-01
Attending: EMERGENCY MEDICINE
Payer: COMMERCIAL

## 2022-09-01 ENCOUNTER — HOSPITAL ENCOUNTER (EMERGENCY)
Facility: HOSPITAL | Age: 42
Discharge: HOME | End: 2022-09-01
Attending: EMERGENCY MEDICINE
Payer: COMMERCIAL

## 2022-09-01 VITALS
OXYGEN SATURATION: 100 % | WEIGHT: 130 LBS | HEART RATE: 68 BPM | DIASTOLIC BLOOD PRESSURE: 88 MMHG | BODY MASS INDEX: 22.2 KG/M2 | TEMPERATURE: 98 F | RESPIRATION RATE: 18 BRPM | HEIGHT: 64 IN | SYSTOLIC BLOOD PRESSURE: 136 MMHG

## 2022-09-01 DIAGNOSIS — R55 SYNCOPE, VASOVAGAL: Primary | ICD-10-CM

## 2022-09-01 DIAGNOSIS — F43.0 ACUTE REACTION TO SITUATIONAL STRESS: ICD-10-CM

## 2022-09-01 DIAGNOSIS — G89.4 CHRONIC PAIN SYNDROME: Primary | ICD-10-CM

## 2022-09-01 LAB
ALBUMIN SERPL-MCNC: 3.7 G/DL (ref 3.4–5)
ALBUMIN/GLOB SERPL: 0.8 {RATIO} (ref 1–2)
ALP LIVER SERPL-CCNC: 75 U/L
ALT SERPL-CCNC: 21 U/L
ANION GAP SERPL CALC-SCNC: 7 MMOL/L (ref 0–18)
AST SERPL-CCNC: 19 U/L (ref 15–37)
BASOPHILS # BLD AUTO: 0.08 X10(3) UL (ref 0–0.2)
BASOPHILS NFR BLD AUTO: 1 %
BILIRUB SERPL-MCNC: 0.2 MG/DL (ref 0.1–2)
BUN BLD-MCNC: 13 MG/DL (ref 7–18)
CALCIUM BLD-MCNC: 9 MG/DL (ref 8.5–10.1)
CHLORIDE SERPL-SCNC: 105 MMOL/L (ref 98–112)
CO2 SERPL-SCNC: 26 MMOL/L (ref 21–32)
CREAT BLD-MCNC: 1.04 MG/DL
EOSINOPHIL # BLD AUTO: 0.34 X10(3) UL (ref 0–0.7)
EOSINOPHIL NFR BLD AUTO: 4.1 %
ERYTHROCYTE [DISTWIDTH] IN BLOOD BY AUTOMATED COUNT: 11.9 %
GFR SERPLBLD BASED ON 1.73 SQ M-ARVRAT: 68 ML/MIN/1.73M2 (ref 60–?)
GLOBULIN PLAS-MCNC: 4.8 G/DL (ref 2.8–4.4)
GLUCOSE BLD-MCNC: 108 MG/DL (ref 70–99)
GLUCOSE BLD-MCNC: 114 MG/DL (ref 70–99)
HCT VFR BLD AUTO: 41.6 %
HGB BLD-MCNC: 14.2 G/DL
IMM GRANULOCYTES # BLD AUTO: 0.01 X10(3) UL (ref 0–1)
IMM GRANULOCYTES NFR BLD: 0.1 %
LYMPHOCYTES # BLD AUTO: 3.14 X10(3) UL (ref 1–4)
LYMPHOCYTES NFR BLD AUTO: 38.2 %
MCH RBC QN AUTO: 29.6 PG (ref 26–34)
MCHC RBC AUTO-ENTMCNC: 34.1 G/DL (ref 31–37)
MCV RBC AUTO: 86.8 FL
MONOCYTES # BLD AUTO: 0.67 X10(3) UL (ref 0.1–1)
MONOCYTES NFR BLD AUTO: 8.1 %
NEUTROPHILS # BLD AUTO: 3.99 X10 (3) UL (ref 1.5–7.7)
NEUTROPHILS # BLD AUTO: 3.99 X10(3) UL (ref 1.5–7.7)
NEUTROPHILS NFR BLD AUTO: 48.5 %
OSMOLALITY SERPL CALC.SUM OF ELEC: 287 MOSM/KG (ref 275–295)
PLATELET # BLD AUTO: 364 10(3)UL (ref 150–450)
POTASSIUM SERPL-SCNC: 3.3 MMOL/L (ref 3.5–5.1)
PROT SERPL-MCNC: 8.5 G/DL (ref 6.4–8.2)
RBC # BLD AUTO: 4.79 X10(6)UL
SODIUM SERPL-SCNC: 138 MMOL/L (ref 136–145)
WBC # BLD AUTO: 8.2 X10(3) UL (ref 4–11)

## 2022-09-01 PROCEDURE — 36415 COLL VENOUS BLD VENIPUNCTURE: CPT

## 2022-09-01 PROCEDURE — 93010 ELECTROCARDIOGRAM REPORT: CPT

## 2022-09-01 PROCEDURE — 93005 ELECTROCARDIOGRAM TRACING: CPT

## 2022-09-01 PROCEDURE — 99284 EMERGENCY DEPT VISIT MOD MDM: CPT

## 2022-09-01 PROCEDURE — 82962 GLUCOSE BLOOD TEST: CPT

## 2022-09-01 PROCEDURE — 70450 CT HEAD/BRAIN W/O DYE: CPT | Performed by: EMERGENCY MEDICINE

## 2022-09-01 PROCEDURE — 80053 COMPREHEN METABOLIC PANEL: CPT | Performed by: EMERGENCY MEDICINE

## 2022-09-01 PROCEDURE — 85025 COMPLETE CBC W/AUTO DIFF WBC: CPT | Performed by: EMERGENCY MEDICINE

## 2022-09-01 RX ORDER — FENTANYL 25 UG/H
1 PATCH TRANSDERMAL
Qty: 10 PATCH | Refills: 0 | Status: SHIPPED | OUTPATIENT
Start: 2022-09-01

## 2022-09-01 RX ORDER — ACETAMINOPHEN 500 MG
1000 TABLET ORAL ONCE
Status: COMPLETED | OUTPATIENT
Start: 2022-09-01 | End: 2022-09-01

## 2022-09-01 NOTE — TELEPHONE ENCOUNTER
Eleni Felix is calling to get a early refill on her fentaNYL 25 MCG/HR Transdermal Patch 72 Hr sent to the [place] [address] [address], she is leaving out of the [place] 2 [place] and her next refill is on Sept 5th but she will be gone.  Questions or concerns please call Eleni Felix at [phone]

## 2022-09-02 LAB
ATRIAL RATE: 74 BPM
P AXIS: 46 DEGREES
P-R INTERVAL: 164 MS
Q-T INTERVAL: 420 MS
QRS DURATION: 80 MS
QTC CALCULATION (BEZET): 466 MS
R AXIS: 31 DEGREES
T AXIS: 33 DEGREES
VENTRICULAR RATE: 74 BPM

## 2022-09-02 NOTE — ED QUICK NOTES
Patients  will be at the [facility] ED with son who is a minor and who was brought there for a psych eval.  left cell number to reach him. [phone]. Parents of pt are on their way to sit with A9.
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
## 2022-09-13 ENCOUNTER — OFFICE VISIT (OUTPATIENT)
Dept: FAMILY MEDICINE CLINIC | Facility: CLINIC | Age: 42
End: 2022-09-13
Payer: COMMERCIAL

## 2022-09-13 VITALS
HEIGHT: 64 IN | SYSTOLIC BLOOD PRESSURE: 132 MMHG | BODY MASS INDEX: 23.9 KG/M2 | OXYGEN SATURATION: 99 % | RESPIRATION RATE: 18 BRPM | DIASTOLIC BLOOD PRESSURE: 88 MMHG | HEART RATE: 92 BPM | WEIGHT: 140 LBS

## 2022-09-13 DIAGNOSIS — R52 UNCONTROLLED PAIN: ICD-10-CM

## 2022-09-13 DIAGNOSIS — N92.6 IRREGULAR PERIODS: ICD-10-CM

## 2022-09-13 DIAGNOSIS — N39.3 STRESS INCONTINENCE: ICD-10-CM

## 2022-09-13 DIAGNOSIS — F11.29 OPIOID DEPENDENCE WITH OPIOID-INDUCED DISORDER (HCC): ICD-10-CM

## 2022-09-13 DIAGNOSIS — M54.50 CHRONIC BILATERAL LOW BACK PAIN WITHOUT SCIATICA: ICD-10-CM

## 2022-09-13 DIAGNOSIS — R03.0 ELEVATED BP WITHOUT DIAGNOSIS OF HYPERTENSION: ICD-10-CM

## 2022-09-13 DIAGNOSIS — G89.29 CHRONIC BILATERAL LOW BACK PAIN WITHOUT SCIATICA: ICD-10-CM

## 2022-09-13 DIAGNOSIS — G89.4 CHRONIC PAIN SYNDROME: ICD-10-CM

## 2022-09-13 DIAGNOSIS — R51.9 SUDDEN ONSET OF SEVERE HEADACHE: Primary | ICD-10-CM

## 2022-09-13 LAB
ESTRADIOL SERPL-MCNC: 351.6 PG/ML
FSH SERPL-ACNC: 5.6 MIU/ML
LH SERPL-ACNC: 14.1 MIU/ML

## 2022-09-13 PROCEDURE — 83002 ASSAY OF GONADOTROPIN (LH): CPT | Performed by: FAMILY MEDICINE

## 2022-09-13 PROCEDURE — 3079F DIAST BP 80-89 MM HG: CPT | Performed by: FAMILY MEDICINE

## 2022-09-13 PROCEDURE — 82670 ASSAY OF TOTAL ESTRADIOL: CPT | Performed by: FAMILY MEDICINE

## 2022-09-13 PROCEDURE — 99214 OFFICE O/P EST MOD 30 MIN: CPT | Performed by: FAMILY MEDICINE

## 2022-09-13 PROCEDURE — 3075F SYST BP GE 130 - 139MM HG: CPT | Performed by: FAMILY MEDICINE

## 2022-09-13 PROCEDURE — 3008F BODY MASS INDEX DOCD: CPT | Performed by: FAMILY MEDICINE

## 2022-09-13 PROCEDURE — 83001 ASSAY OF GONADOTROPIN (FSH): CPT | Performed by: FAMILY MEDICINE

## 2022-09-13 RX ORDER — TOLTERODINE 4 MG/1
4 CAPSULE, EXTENDED RELEASE ORAL DAILY
Qty: 30 CAPSULE | Refills: 2 | Status: SHIPPED | OUTPATIENT
Start: 2022-09-13 | End: 2023-09-08

## 2022-09-13 RX ORDER — HYDROCODONE BITARTRATE AND ACETAMINOPHEN 10; 325 MG/1; MG/1
1 TABLET ORAL EVERY 8 HOURS PRN
Qty: 90 TABLET | Refills: 0 | Status: SHIPPED | OUTPATIENT
Start: 2022-09-13

## 2022-09-14 NOTE — PROGRESS NOTES
Dear Marlo Banks,    You hormones show that you are still cycling.      Sincerely,  Dr. Betito Fontaine

## 2022-09-21 ENCOUNTER — HOSPITAL ENCOUNTER (OUTPATIENT)
Dept: MRI IMAGING | Age: 42
Discharge: HOME | End: 2022-09-21
Attending: FAMILY MEDICINE

## 2022-09-21 DIAGNOSIS — R51.9 SUDDEN ONSET OF SEVERE HEADACHE: ICD-10-CM

## 2022-09-21 PROCEDURE — 70551 MRI BRAIN STEM W/O DYE: CPT | Performed by: FAMILY MEDICINE

## 2022-09-21 PROCEDURE — 70544 MR ANGIOGRAPHY HEAD W/O DYE: CPT | Performed by: FAMILY MEDICINE

## 2022-09-21 NOTE — PROGRESS NOTES
Dear Alon Salazar,    Your MRI/MRA of the brain is within normal limits.     Sincerely,  Dr. Johnson Fruits

## 2022-10-03 ENCOUNTER — OFFICE VISIT (OUTPATIENT)
Dept: FAMILY MEDICINE CLINIC | Facility: CLINIC | Age: 42
End: 2022-10-03
Payer: COMMERCIAL

## 2022-10-03 VITALS
DIASTOLIC BLOOD PRESSURE: 70 MMHG | HEIGHT: 64 IN | SYSTOLIC BLOOD PRESSURE: 116 MMHG | BODY MASS INDEX: 23.9 KG/M2 | WEIGHT: 140 LBS | HEART RATE: 84 BPM | RESPIRATION RATE: 16 BRPM

## 2022-10-03 DIAGNOSIS — G89.4 CHRONIC PAIN SYNDROME: Primary | ICD-10-CM

## 2022-10-03 DIAGNOSIS — F11.29 OPIOID DEPENDENCE WITH OPIOID-INDUCED DISORDER (HCC): ICD-10-CM

## 2022-10-03 DIAGNOSIS — G89.29 CHRONIC BILATERAL LOW BACK PAIN WITHOUT SCIATICA: ICD-10-CM

## 2022-10-03 DIAGNOSIS — M54.50 CHRONIC BILATERAL LOW BACK PAIN WITHOUT SCIATICA: ICD-10-CM

## 2022-10-03 DIAGNOSIS — Z71.85 VACCINE COUNSELING: ICD-10-CM

## 2022-10-03 DIAGNOSIS — Z23 NEED FOR VACCINATION: ICD-10-CM

## 2022-10-03 DIAGNOSIS — R61 EXCESSIVE SWEATING: ICD-10-CM

## 2022-10-03 LAB
T4 FREE SERPL-MCNC: 0.9 NG/DL (ref 0.8–1.7)
TSI SER-ACNC: 0.92 MIU/ML (ref 0.36–3.74)

## 2022-10-03 PROCEDURE — 84443 ASSAY THYROID STIM HORMONE: CPT | Performed by: FAMILY MEDICINE

## 2022-10-03 PROCEDURE — 84439 ASSAY OF FREE THYROXINE: CPT | Performed by: FAMILY MEDICINE

## 2022-10-03 RX ORDER — FENTANYL 25 UG/H
1 PATCH TRANSDERMAL
Qty: 10 PATCH | Refills: 0 | Status: SHIPPED | OUTPATIENT
Start: 2022-12-04 | End: 2023-01-03

## 2022-10-03 RX ORDER — FENTANYL 25 UG/H
1 PATCH TRANSDERMAL
Qty: 10 PATCH | Refills: 0 | Status: SHIPPED | OUTPATIENT
Start: 2022-10-03 | End: 2022-11-02

## 2022-10-03 RX ORDER — FENTANYL 25 UG/H
1 PATCH TRANSDERMAL
Qty: 10 PATCH | Refills: 0 | Status: SHIPPED | OUTPATIENT
Start: 2022-11-03 | End: 2022-12-03

## 2022-10-31 ENCOUNTER — TELEPHONE (OUTPATIENT)
Dept: FAMILY MEDICINE CLINIC | Facility: CLINIC | Age: 42
End: 2022-10-31

## 2022-10-31 RX ORDER — FENTANYL 25 UG/H
1 PATCH TRANSDERMAL
Qty: 10 PATCH | Refills: 0 | OUTPATIENT
Start: 2022-10-31 | End: 2022-11-30

## 2022-10-31 NOTE — TELEPHONE ENCOUNTER
Patient would like to get Fentanyl patches dispensed 2 days earlier due to losing last path. Please advise if okay to wait 2 days or want us to call in to dispense earlier?

## 2022-10-31 NOTE — TELEPHONE ENCOUNTER
Leah Hall is calling to see if she can get her Fentynal patch from the pharmacy early, she lost her patch today in the show and cannot get a refill until Wed, so she will be without one for 2 days, she would like to see if we can call the [facility] at [facility] and book to have them release it today.

## 2022-11-01 NOTE — TELEPHONE ENCOUNTER
Yes, pharmacy getting ready. Patient is aware. Evidently, it is not sticking. Pharmacy suggested [address] to get patch covers they make.

## 2022-11-28 ENCOUNTER — TELEPHONE (OUTPATIENT)
Dept: FAMILY MEDICINE CLINIC | Facility: CLINIC | Age: 42
End: 2022-11-28

## 2022-11-28 ENCOUNTER — OFFICE VISIT (OUTPATIENT)
Dept: FAMILY MEDICINE CLINIC | Facility: CLINIC | Age: 42
End: 2022-11-28
Payer: COMMERCIAL

## 2022-11-28 VITALS
DIASTOLIC BLOOD PRESSURE: 72 MMHG | SYSTOLIC BLOOD PRESSURE: 118 MMHG | HEIGHT: 64 IN | RESPIRATION RATE: 16 BRPM | BODY MASS INDEX: 22.88 KG/M2 | WEIGHT: 134 LBS | OXYGEN SATURATION: 100 % | HEART RATE: 69 BPM

## 2022-11-28 DIAGNOSIS — F41.9 ANXIETY: ICD-10-CM

## 2022-11-28 DIAGNOSIS — I73.00 RAYNAUD'S DISEASE WITHOUT GANGRENE: ICD-10-CM

## 2022-11-28 DIAGNOSIS — F11.29 OPIOID DEPENDENCE WITH OPIOID-INDUCED DISORDER (HCC): ICD-10-CM

## 2022-11-28 DIAGNOSIS — G56.03 BILATERAL CARPAL TUNNEL SYNDROME: Primary | ICD-10-CM

## 2022-11-28 DIAGNOSIS — F33.0 MILD EPISODE OF RECURRENT MAJOR DEPRESSIVE DISORDER (HCC): ICD-10-CM

## 2022-11-28 DIAGNOSIS — G89.4 CHRONIC PAIN SYNDROME: ICD-10-CM

## 2022-11-28 PROCEDURE — 99215 OFFICE O/P EST HI 40 MIN: CPT | Performed by: FAMILY MEDICINE

## 2022-11-28 PROCEDURE — 3078F DIAST BP <80 MM HG: CPT | Performed by: FAMILY MEDICINE

## 2022-11-28 PROCEDURE — 3074F SYST BP LT 130 MM HG: CPT | Performed by: FAMILY MEDICINE

## 2022-11-28 PROCEDURE — 3008F BODY MASS INDEX DOCD: CPT | Performed by: FAMILY MEDICINE

## 2022-11-28 NOTE — TELEPHONE ENCOUNTER
All fingertips feel numbs for a week now   \"feels cold\"   After visiting [place], out in 35s weather, no gloves  \"looks normal\" , no color changes  Felt swollen but better now  No weakness of extremity  Denies confusion   Have not tried hand warmers, to see if it will feel better  Do you want to see the patient? She said if possible today since off work  430, red slot?    Pls advise  Thank you

## 2022-11-29 ENCOUNTER — TELEPHONE (OUTPATIENT)
Dept: FAMILY MEDICINE CLINIC | Facility: CLINIC | Age: 42
End: 2022-11-29

## 2022-11-29 DIAGNOSIS — G89.29 CHRONIC BILATERAL LOW BACK PAIN WITHOUT SCIATICA: ICD-10-CM

## 2022-11-29 DIAGNOSIS — G89.4 CHRONIC PAIN SYNDROME: Primary | ICD-10-CM

## 2022-11-29 DIAGNOSIS — M54.50 CHRONIC BILATERAL LOW BACK PAIN WITHOUT SCIATICA: ICD-10-CM

## 2022-11-29 RX ORDER — FENTANYL 25 UG/H
1 PATCH TRANSDERMAL
Qty: 10 PATCH | Refills: 0 | OUTPATIENT
Start: 2022-11-29 | End: 2022-12-29

## 2022-11-29 NOTE — TELEPHONE ENCOUNTER
Diana Greene is calling to let Dr Lupe Juarez know that she needs her fentaNYL 25 MCG/HR Transdermal Patch 72 Hr needs to be sent to [facility] at [address] in [place], her [facility] not [facility] the patch, but she called around and the [facility] [address] [facility] it.  Please call Diana Greene at [phone] with any questions

## 2022-11-30 ENCOUNTER — PATIENT MESSAGE (OUTPATIENT)
Dept: FAMILY MEDICINE CLINIC | Facility: CLINIC | Age: 42
End: 2022-11-30

## 2022-11-30 DIAGNOSIS — G89.4 CHRONIC PAIN SYNDROME: Primary | ICD-10-CM

## 2022-11-30 RX ORDER — FENTANYL 25 UG/H
1 PATCH TRANSDERMAL
Qty: 10 PATCH | Refills: 0 | Status: SHIPPED | OUTPATIENT
Start: 2022-12-03 | End: 2023-01-02

## 2022-11-30 NOTE — TELEPHONE ENCOUNTER
So I guess we are just transferring, I post-dated to 12/3/22. If you send this I will call [facility] and cancel that one.

## 2022-11-30 NOTE — TELEPHONE ENCOUNTER
Mae Fernández is calling to let the office know that it is not a early refill, she needs her patch sent to the pharmacy

## 2022-12-01 ENCOUNTER — PATIENT MESSAGE (OUTPATIENT)
Dept: FAMILY MEDICINE CLINIC | Facility: CLINIC | Age: 42
End: 2022-12-01

## 2022-12-05 NOTE — TELEPHONE ENCOUNTER
See my notes. I just read the below message. I did call pharmacy to confirm the date Rx picked up, but I did not ok for her to  early. Mitul Almendarez said no early  when I spoke to her. This is to clarify that is why the pharmacist has no record of me saying that.

## 2022-12-08 ENCOUNTER — PATIENT MESSAGE (OUTPATIENT)
Dept: FAMILY MEDICINE CLINIC | Facility: CLINIC | Age: 42
End: 2022-12-08

## 2022-12-08 DIAGNOSIS — R20.0 NUMBNESS AND TINGLING IN BOTH HANDS: ICD-10-CM

## 2022-12-08 DIAGNOSIS — G56.03 BILATERAL CARPAL TUNNEL SYNDROME: Primary | ICD-10-CM

## 2022-12-08 DIAGNOSIS — I73.00 RAYNAUD'S DISEASE WITHOUT GANGRENE: ICD-10-CM

## 2022-12-08 DIAGNOSIS — R20.2 NUMBNESS AND TINGLING IN BOTH HANDS: ICD-10-CM

## 2022-12-09 ENCOUNTER — PATIENT MESSAGE (OUTPATIENT)
Dept: FAMILY MEDICINE CLINIC | Facility: CLINIC | Age: 42
End: 2022-12-09

## 2022-12-12 ENCOUNTER — TELEPHONE (OUTPATIENT)
Dept: FAMILY MEDICINE CLINIC | Facility: CLINIC | Age: 42
End: 2022-12-12

## 2022-12-12 NOTE — TELEPHONE ENCOUNTER
Centralized scheduling Tiffany Sargent confirmed they are booked out till April to schedule that with no exceptions. He did give me [facility] [phone] as they do these on site. Was on hold for >10 minutes. Jose Maria Hendrix gave me [address] number [phone]. I did call patient and gave her these numbers to try and get this done sooner. She agrees to call them.

## 2022-12-12 NOTE — TELEPHONE ENCOUNTER
Christina Rosenberg is calling to let Dr Josee Benoit know that the EMG test that was ordered, she called to schedule and they cannot get her in until March or April, she is wondering if that is okay, Please call Christina Rosenberg at [phone]

## 2023-01-04 ENCOUNTER — TELEPHONE (OUTPATIENT)
Dept: FAMILY MEDICINE CLINIC | Facility: CLINIC | Age: 43
End: 2023-01-04

## 2023-01-04 DIAGNOSIS — G56.03 BILATERAL CARPAL TUNNEL SYNDROME: Primary | ICD-10-CM

## 2023-01-04 DIAGNOSIS — R20.2 NUMBNESS AND TINGLING IN BOTH HANDS: ICD-10-CM

## 2023-01-04 DIAGNOSIS — R20.0 NUMBNESS AND TINGLING IN BOTH HANDS: ICD-10-CM

## 2023-01-18 ENCOUNTER — PATIENT MESSAGE (OUTPATIENT)
Dept: FAMILY MEDICINE CLINIC | Facility: CLINIC | Age: 43
End: 2023-01-18

## 2023-01-18 DIAGNOSIS — G89.4 CHRONIC PAIN SYNDROME: Primary | ICD-10-CM

## 2023-01-19 RX ORDER — FENTANYL 25 UG/H
1 PATCH TRANSDERMAL
Qty: 10 PATCH | Refills: 0 | Status: SHIPPED | OUTPATIENT
Start: 2023-01-19 | End: 2023-02-18

## 2023-01-26 PROBLEM — R73.03 PREDIABETES: Status: ACTIVE | Noted: 2023-01-26

## 2023-01-27 ENCOUNTER — OFFICE VISIT (OUTPATIENT)
Dept: FAMILY MEDICINE CLINIC | Facility: CLINIC | Age: 43
End: 2023-01-27
Payer: COMMERCIAL

## 2023-01-27 VITALS
DIASTOLIC BLOOD PRESSURE: 68 MMHG | OXYGEN SATURATION: 100 % | RESPIRATION RATE: 16 BRPM | HEIGHT: 64 IN | WEIGHT: 137 LBS | BODY MASS INDEX: 23.39 KG/M2 | HEART RATE: 89 BPM | SYSTOLIC BLOOD PRESSURE: 112 MMHG

## 2023-01-27 DIAGNOSIS — F11.20 NARCOTIC DEPENDENCE (HCC): ICD-10-CM

## 2023-01-27 DIAGNOSIS — F11.29 OPIOID DEPENDENCE WITH OPIOID-INDUCED DISORDER (HCC): ICD-10-CM

## 2023-01-27 DIAGNOSIS — R10.13 EPIGASTRIC PAIN: ICD-10-CM

## 2023-01-27 DIAGNOSIS — E55.9 VITAMIN D DEFICIENCY: ICD-10-CM

## 2023-01-27 DIAGNOSIS — E78.5 DYSLIPIDEMIA: ICD-10-CM

## 2023-01-27 DIAGNOSIS — R73.9 HYPERGLYCEMIA: ICD-10-CM

## 2023-01-27 DIAGNOSIS — G89.4 CHRONIC PAIN SYNDROME: Primary | ICD-10-CM

## 2023-01-27 DIAGNOSIS — Z12.31 SCREENING MAMMOGRAM FOR BREAST CANCER: ICD-10-CM

## 2023-01-27 DIAGNOSIS — Z79.899 MEDICATION MANAGEMENT: ICD-10-CM

## 2023-01-27 DIAGNOSIS — R73.03 PREDIABETES: ICD-10-CM

## 2023-01-27 DIAGNOSIS — K21.9 GERD WITHOUT ESOPHAGITIS: ICD-10-CM

## 2023-01-27 PROBLEM — G56.03 BILATERAL CARPAL TUNNEL SYNDROME: Status: ACTIVE | Noted: 2023-01-26

## 2023-01-27 LAB
ALBUMIN SERPL-MCNC: 4 G/DL (ref 3.4–5)
ALBUMIN/GLOB SERPL: 0.8 {RATIO} (ref 1–2)
ALP LIVER SERPL-CCNC: 78 U/L
ALT SERPL-CCNC: 21 U/L
ANION GAP SERPL CALC-SCNC: 7 MMOL/L (ref 0–18)
AST SERPL-CCNC: 18 U/L (ref 15–37)
BILIRUB SERPL-MCNC: 0.2 MG/DL (ref 0.1–2)
BUN BLD-MCNC: 11 MG/DL (ref 7–18)
CALCIUM BLD-MCNC: 9.4 MG/DL (ref 8.5–10.1)
CHLORIDE SERPL-SCNC: 103 MMOL/L (ref 98–112)
CHOLEST SERPL-MCNC: 249 MG/DL (ref ?–200)
CO2 SERPL-SCNC: 29 MMOL/L (ref 21–32)
CREAT BLD-MCNC: 1.04 MG/DL
EST. AVERAGE GLUCOSE BLD GHB EST-MCNC: 140 MG/DL (ref 68–126)
FASTING PATIENT LIPID ANSWER: NO
FASTING STATUS PATIENT QL REPORTED: NO
GFR SERPLBLD BASED ON 1.73 SQ M-ARVRAT: 68 ML/MIN/1.73M2 (ref 60–?)
GLOBULIN PLAS-MCNC: 4.9 G/DL (ref 2.8–4.4)
GLUCOSE BLD-MCNC: 132 MG/DL (ref 70–99)
HBA1C MFR BLD: 6.5 % (ref ?–5.7)
HDLC SERPL-MCNC: 53 MG/DL (ref 40–59)
LDLC SERPL CALC-MCNC: 147 MG/DL (ref ?–100)
NONHDLC SERPL-MCNC: 196 MG/DL (ref ?–130)
OSMOLALITY SERPL CALC.SUM OF ELEC: 289 MOSM/KG (ref 275–295)
POTASSIUM SERPL-SCNC: 3.9 MMOL/L (ref 3.5–5.1)
PROT SERPL-MCNC: 8.9 G/DL (ref 6.4–8.2)
SODIUM SERPL-SCNC: 139 MMOL/L (ref 136–145)
TRIGL SERPL-MCNC: 271 MG/DL (ref 30–149)
VLDLC SERPL CALC-MCNC: 52 MG/DL (ref 0–30)

## 2023-01-27 PROCEDURE — 99214 OFFICE O/P EST MOD 30 MIN: CPT | Performed by: FAMILY MEDICINE

## 2023-01-27 PROCEDURE — 3078F DIAST BP <80 MM HG: CPT | Performed by: FAMILY MEDICINE

## 2023-01-27 PROCEDURE — 3008F BODY MASS INDEX DOCD: CPT | Performed by: FAMILY MEDICINE

## 2023-01-27 PROCEDURE — 80053 COMPREHEN METABOLIC PANEL: CPT | Performed by: FAMILY MEDICINE

## 2023-01-27 PROCEDURE — 80061 LIPID PANEL: CPT | Performed by: FAMILY MEDICINE

## 2023-01-27 PROCEDURE — 3044F HG A1C LEVEL LT 7.0%: CPT | Performed by: FAMILY MEDICINE

## 2023-01-27 PROCEDURE — 83036 HEMOGLOBIN GLYCOSYLATED A1C: CPT | Performed by: FAMILY MEDICINE

## 2023-01-27 PROCEDURE — 3074F SYST BP LT 130 MM HG: CPT | Performed by: FAMILY MEDICINE

## 2023-01-27 RX ORDER — FENTANYL 25 UG/H
1 PATCH TRANSDERMAL
Qty: 5 PATCH | Refills: 0 | Status: SHIPPED | OUTPATIENT
Start: 2023-02-11 | End: 2023-02-26

## 2023-01-27 RX ORDER — DULOXETINE 40 MG/1
1 CAPSULE, DELAYED RELEASE ORAL DAILY
Qty: 90 CAPSULE | Refills: 0 | Status: SHIPPED | OUTPATIENT
Start: 2023-01-27 | End: 2023-01-30

## 2023-01-27 RX ORDER — NALOXONE HYDROCHLORIDE 4 MG/.1ML
4 SPRAY NASAL AS NEEDED
Qty: 1 KIT | Refills: 0 | Status: SHIPPED | OUTPATIENT
Start: 2023-01-27

## 2023-01-27 RX ORDER — OMEPRAZOLE 40 MG/1
40 CAPSULE, DELAYED RELEASE ORAL DAILY
Qty: 90 CAPSULE | Refills: 1 | Status: SHIPPED | OUTPATIENT
Start: 2023-01-27 | End: 2024-01-22

## 2023-01-28 ENCOUNTER — PATIENT MESSAGE (OUTPATIENT)
Dept: FAMILY MEDICINE CLINIC | Facility: CLINIC | Age: 43
End: 2023-01-28

## 2023-01-28 DIAGNOSIS — R20.2 NUMBNESS AND TINGLING IN BOTH HANDS: Primary | ICD-10-CM

## 2023-01-28 DIAGNOSIS — R20.0 NUMBNESS AND TINGLING IN BOTH HANDS: Primary | ICD-10-CM

## 2023-01-30 RX ORDER — DULOXETIN HYDROCHLORIDE 20 MG/1
40 CAPSULE, DELAYED RELEASE ORAL DAILY
Qty: 60 CAPSULE | Refills: 0 | Status: SHIPPED | OUTPATIENT
Start: 2023-01-30

## 2023-01-30 NOTE — TELEPHONE ENCOUNTER
From: Lisy Yu  To: Diane Valladares DO  Sent: 1/28/2023 1:59 PM CST  Subject: Medication     Dr.Eldewek Kiara lowered my dosage for Duloxetine for 40mg. Per the pharmacy, it is not covered by my insurance. Appreciate if you can send a substitute for it. Thanks. [id number].

## 2023-01-31 ENCOUNTER — TELEPHONE (OUTPATIENT)
Dept: ORTHOPEDICS CLINIC | Facility: CLINIC | Age: 43
End: 2023-01-31

## 2023-01-31 NOTE — TELEPHONE ENCOUNTER
NP Bilateral finger tip numbness. Please advise if imaging is needed.   Future Appointments   Date Time Provider Jeanmarie Suellen   3/6/2023  3:30 PM Petty Dong, DO EMG 11 [facility] [place]   3/7/2023  1:00 PM Bri Sherwood MD EMG ORTHO LB [facility] [place]   5/26/2023  2:30 PM Giovanna Parker, DO EMG 11 [facility] [place]   6/12/2023  1:00 PM Giovanna Parker DO EMG 11 [facility] [place]

## 2023-02-06 ENCOUNTER — TELEPHONE (OUTPATIENT)
Dept: ADMINISTRATIVE | Age: 43
End: 2023-02-06

## 2023-02-06 DIAGNOSIS — G56.03 BILATERAL CARPAL TUNNEL SYNDROME: Primary | ICD-10-CM

## 2023-02-10 ENCOUNTER — PATIENT MESSAGE (OUTPATIENT)
Dept: FAMILY MEDICINE CLINIC | Facility: CLINIC | Age: 43
End: 2023-02-10

## 2023-02-10 DIAGNOSIS — G89.4 CHRONIC PAIN SYNDROME: Primary | ICD-10-CM

## 2023-02-10 RX ORDER — FENTANYL 25 UG/H
1 PATCH TRANSDERMAL
Qty: 5 PATCH | Refills: 0 | Status: SHIPPED | OUTPATIENT
Start: 2023-02-11 | End: 2023-02-26

## 2023-02-10 NOTE — TELEPHONE ENCOUNTER
LOV- 1/27/23 , NOV 3/6/23   Last refill-1/19/23  Qty- 5   RF-0    Resend to diff pharmacy d/t availability     Rx pended for approval  Thank you

## 2023-02-10 NOTE — TELEPHONE ENCOUNTER
Spoke with [facility]  Pt was dispensed qty # 5 patch on 1/27th and used Rx sent 1/19th   The original script is for qty 8, but 5 only was dispensed due to unavailability     Pharm reiterated that Rx is usually good for 90 days  BUT if given partial for narco, it voids the rest  Another Rx was sent 1/27th for qty 5 to fill the remaining 5 ( from voided Rx on 1/19th)   Pt is due now this (#[id number]) pharm  is out of stock  Now requesting to be sent to another location

## 2023-02-10 NOTE — TELEPHONE ENCOUNTER
Noted, sent 5 patches to alternative pharmacy as today would technically parmjit day 15 from dispense of only 5 patches on the 27th of January.     Roberto Coates MD, 02/10/23, 3:03 PM

## 2023-02-10 NOTE — TELEPHONE ENCOUNTER
From: Lori Velasquez  To: lEissa Cain DO  Sent: 2/10/2023 12:18 PM CST  Subject: Refill    Hello,  I am due for my Fentanyl patch and my pharmacy is out of it. The nearest [facility] that has it in stock is at [address] [place] [address] and they require a new prescription. I would appreciate if you could send a new prescription to the [facility] location.      Thank you,  [email or web address]

## 2023-02-16 ENCOUNTER — TELEPHONE (OUTPATIENT)
Dept: ORTHOPEDICS CLINIC | Facility: CLINIC | Age: 43
End: 2023-02-16

## 2023-02-16 ENCOUNTER — OFFICE VISIT (OUTPATIENT)
Dept: ORTHOPEDICS CLINIC | Facility: CLINIC | Age: 43
End: 2023-02-16
Payer: COMMERCIAL

## 2023-02-16 VITALS — BODY MASS INDEX: 23.39 KG/M2 | WEIGHT: 137 LBS | HEIGHT: 64 IN

## 2023-02-16 DIAGNOSIS — G56.03 BILATERAL CARPAL TUNNEL SYNDROME: Primary | ICD-10-CM

## 2023-02-16 PROCEDURE — 3008F BODY MASS INDEX DOCD: CPT | Performed by: PHYSICIAN ASSISTANT

## 2023-02-16 PROCEDURE — 99203 OFFICE O/P NEW LOW 30 MIN: CPT | Performed by: PHYSICIAN ASSISTANT

## 2023-02-23 ENCOUNTER — PATIENT MESSAGE (OUTPATIENT)
Dept: FAMILY MEDICINE CLINIC | Facility: CLINIC | Age: 43
End: 2023-02-23

## 2023-02-23 DIAGNOSIS — G89.4 CHRONIC PAIN SYNDROME: ICD-10-CM

## 2023-02-24 RX ORDER — FENTANYL 25 UG/H
1 PATCH TRANSDERMAL
Qty: 10 PATCH | Refills: 0 | Status: SHIPPED | OUTPATIENT
Start: 2023-02-27 | End: 2023-03-29

## 2023-03-06 ENCOUNTER — OFFICE VISIT (OUTPATIENT)
Dept: FAMILY MEDICINE CLINIC | Facility: CLINIC | Age: 43
End: 2023-03-06
Payer: COMMERCIAL

## 2023-03-06 VITALS
HEIGHT: 64 IN | HEART RATE: 86 BPM | OXYGEN SATURATION: 99 % | WEIGHT: 141 LBS | SYSTOLIC BLOOD PRESSURE: 126 MMHG | RESPIRATION RATE: 16 BRPM | BODY MASS INDEX: 24.07 KG/M2 | DIASTOLIC BLOOD PRESSURE: 74 MMHG

## 2023-03-06 DIAGNOSIS — E78.5 DYSLIPIDEMIA: ICD-10-CM

## 2023-03-06 DIAGNOSIS — N39.3 STRESS INCONTINENCE: ICD-10-CM

## 2023-03-06 DIAGNOSIS — S73.192D TEAR OF LEFT ACETABULAR LABRUM, SUBSEQUENT ENCOUNTER: ICD-10-CM

## 2023-03-06 DIAGNOSIS — F11.29 OPIOID DEPENDENCE WITH OPIOID-INDUCED DISORDER (HCC): ICD-10-CM

## 2023-03-06 DIAGNOSIS — E11.9 TYPE 2 DIABETES MELLITUS WITHOUT COMPLICATION, WITHOUT LONG-TERM CURRENT USE OF INSULIN (HCC): Primary | ICD-10-CM

## 2023-03-06 DIAGNOSIS — M25.552 LEFT HIP PAIN: ICD-10-CM

## 2023-03-06 DIAGNOSIS — G89.4 CHRONIC PAIN SYNDROME: ICD-10-CM

## 2023-03-06 DIAGNOSIS — K63.89 SMALL INTESTINAL BACTERIAL OVERGROWTH (SIBO): ICD-10-CM

## 2023-03-06 PROCEDURE — 3008F BODY MASS INDEX DOCD: CPT | Performed by: FAMILY MEDICINE

## 2023-03-06 PROCEDURE — 3078F DIAST BP <80 MM HG: CPT | Performed by: FAMILY MEDICINE

## 2023-03-06 PROCEDURE — 3074F SYST BP LT 130 MM HG: CPT | Performed by: FAMILY MEDICINE

## 2023-03-06 PROCEDURE — 99215 OFFICE O/P EST HI 40 MIN: CPT | Performed by: FAMILY MEDICINE

## 2023-03-06 RX ORDER — FENTANYL 25 UG/H
1 PATCH TRANSDERMAL
Qty: 10 PATCH | Refills: 0 | Status: SHIPPED | OUTPATIENT
Start: 2023-03-12 | End: 2023-04-11

## 2023-03-06 RX ORDER — NALOXONE HYDROCHLORIDE 4 MG/.1ML
4 SPRAY NASAL AS NEEDED
Qty: 1 KIT | Refills: 0 | Status: SHIPPED | OUTPATIENT
Start: 2023-03-06

## 2023-03-06 RX ORDER — ATORVASTATIN CALCIUM 20 MG/1
20 TABLET, FILM COATED ORAL NIGHTLY
Qty: 30 TABLET | Refills: 3 | Status: SHIPPED | OUTPATIENT
Start: 2023-03-06

## 2023-03-07 ENCOUNTER — TELEPHONE (OUTPATIENT)
Dept: ORTHOPEDICS CLINIC | Facility: CLINIC | Age: 43
End: 2023-03-07

## 2023-03-07 ENCOUNTER — OFFICE VISIT (OUTPATIENT)
Dept: ORTHOPEDICS CLINIC | Facility: CLINIC | Age: 43
End: 2023-03-07
Payer: COMMERCIAL

## 2023-03-07 VITALS — BODY MASS INDEX: 24.07 KG/M2 | HEIGHT: 64 IN | WEIGHT: 141 LBS

## 2023-03-07 DIAGNOSIS — M25.552 LEFT HIP PAIN: Primary | ICD-10-CM

## 2023-03-07 DIAGNOSIS — G56.03 BILATERAL CARPAL TUNNEL SYNDROME: Primary | ICD-10-CM

## 2023-03-07 PROCEDURE — 3008F BODY MASS INDEX DOCD: CPT | Performed by: ORTHOPAEDIC SURGERY

## 2023-03-07 PROCEDURE — 99214 OFFICE O/P EST MOD 30 MIN: CPT | Performed by: ORTHOPAEDIC SURGERY

## 2023-03-07 NOTE — TELEPHONE ENCOUNTER
Est Left Hip Pain. Please advise if imaging is needed.   Future Appointments   Date Time Provider Jeanmarie Ken   3/7/2023  1:00 PM Evert Rios MD EMG [facility]TH[facility] [place]   3/15/2023  2:40 PM Waldo Reina MD EMG [facility]THO 75 [facility] [place]   3/20/2023 10:30 AM WDR US RM1 WDR US EDW [id number] [facility] 151   3/22/2023  2:30 PM ELKIN Lee SGINP ECC SUB GI   4/13/2023  9:20 AM KANE Muro EMG ORTHO 75 [facility] [place]   5/26/2023  2:30 PM Giovanna Parker DO EMG 11 [facility] [place]   6/12/2023  1:00 PM Giovanna Parker DO EMG 11 EMG [facility]

## 2023-03-07 NOTE — TELEPHONE ENCOUNTER
X-ray order placed. Please schedule with radiology and contact patient to inform them to arrive to clinic 20-25 mins prior to appointment to obtain X-rays. Thank you!

## 2023-03-15 ENCOUNTER — OFFICE VISIT (OUTPATIENT)
Dept: ORTHOPEDICS CLINIC | Facility: CLINIC | Age: 43
End: 2023-03-15
Payer: COMMERCIAL

## 2023-03-15 ENCOUNTER — HOSPITAL ENCOUNTER (OUTPATIENT)
Dept: GENERAL RADIOLOGY | Age: 43
Discharge: HOME | End: 2023-03-15
Attending: PHYSICIAN ASSISTANT
Payer: COMMERCIAL

## 2023-03-15 VITALS — OXYGEN SATURATION: 99 % | BODY MASS INDEX: 23.76 KG/M2 | HEIGHT: 64 IN | HEART RATE: 75 BPM | WEIGHT: 139.19 LBS

## 2023-03-15 DIAGNOSIS — M25.552 LEFT HIP PAIN: Primary | ICD-10-CM

## 2023-03-15 DIAGNOSIS — M25.552 LEFT HIP PAIN: ICD-10-CM

## 2023-03-15 PROCEDURE — 3008F BODY MASS INDEX DOCD: CPT | Performed by: ORTHOPAEDIC SURGERY

## 2023-03-15 PROCEDURE — 99214 OFFICE O/P EST MOD 30 MIN: CPT | Performed by: ORTHOPAEDIC SURGERY

## 2023-03-15 PROCEDURE — 73502 X-RAY EXAM HIP UNI 2-3 VIEWS: CPT | Performed by: PHYSICIAN ASSISTANT

## 2023-03-17 ENCOUNTER — HOSPITAL ENCOUNTER (OUTPATIENT)
Dept: MRI IMAGING | Facility: HOSPITAL | Age: 43
Discharge: HOME | End: 2023-03-17
Attending: ORTHOPAEDIC SURGERY
Payer: COMMERCIAL

## 2023-03-17 DIAGNOSIS — M25.552 LEFT HIP PAIN: ICD-10-CM

## 2023-03-17 PROCEDURE — 73721 MRI JNT OF LWR EXTRE W/O DYE: CPT | Performed by: ORTHOPAEDIC SURGERY

## 2023-03-20 ENCOUNTER — PATIENT MESSAGE (OUTPATIENT)
Dept: FAMILY MEDICINE CLINIC | Facility: CLINIC | Age: 43
End: 2023-03-20

## 2023-03-20 ENCOUNTER — HOSPITAL ENCOUNTER (OUTPATIENT)
Dept: ULTRASOUND IMAGING | Age: 43
Discharge: HOME | End: 2023-03-20
Attending: FAMILY MEDICINE
Payer: COMMERCIAL

## 2023-03-20 DIAGNOSIS — R10.2 PELVIC PAIN: ICD-10-CM

## 2023-03-20 DIAGNOSIS — R10.13 EPIGASTRIC PAIN: ICD-10-CM

## 2023-03-20 DIAGNOSIS — R93.89 ABNORMAL MRI: Primary | ICD-10-CM

## 2023-03-20 DIAGNOSIS — M25.552 LEFT HIP PAIN: ICD-10-CM

## 2023-03-20 DIAGNOSIS — R11.2 NAUSEA AND VOMITING, UNSPECIFIED VOMITING TYPE: ICD-10-CM

## 2023-03-20 PROCEDURE — 76700 US EXAM ABDOM COMPLETE: CPT | Performed by: FAMILY MEDICINE

## 2023-03-20 PROCEDURE — 93975 VASCULAR STUDY: CPT | Performed by: FAMILY MEDICINE

## 2023-03-24 ENCOUNTER — HOSPITAL ENCOUNTER (OUTPATIENT)
Dept: ULTRASOUND IMAGING | Age: 43
Discharge: HOME | End: 2023-03-24
Attending: FAMILY MEDICINE
Payer: COMMERCIAL

## 2023-03-24 DIAGNOSIS — R10.2 PELVIC PAIN: ICD-10-CM

## 2023-03-24 DIAGNOSIS — R93.89 ABNORMAL MRI: ICD-10-CM

## 2023-03-24 PROCEDURE — 76856 US EXAM PELVIC COMPLETE: CPT | Performed by: FAMILY MEDICINE

## 2023-03-24 PROCEDURE — 76830 TRANSVAGINAL US NON-OB: CPT | Performed by: FAMILY MEDICINE

## 2023-03-27 DIAGNOSIS — N92.6 IRREGULAR PERIODS: Primary | ICD-10-CM

## 2023-03-27 DIAGNOSIS — R93.89 ABNORMAL MRI: ICD-10-CM

## 2023-03-27 DIAGNOSIS — R10.2 PELVIC PAIN: ICD-10-CM

## 2023-03-30 ENCOUNTER — HOSPITAL ENCOUNTER (OUTPATIENT)
Dept: CT IMAGING | Age: 43
Discharge: HOME | End: 2023-03-30
Attending: FAMILY MEDICINE
Payer: COMMERCIAL

## 2023-03-30 DIAGNOSIS — R93.5 ABNORMAL ABDOMINAL ULTRASOUND: ICD-10-CM

## 2023-03-30 LAB
CREAT BLD-MCNC: 0.9 MG/DL
GFR SERPLBLD BASED ON 1.73 SQ M-ARVRAT: 81 ML/MIN/1.73M2 (ref 60–?)

## 2023-03-30 PROCEDURE — 74174 CTA ABD&PLVS W/CONTRAST: CPT | Performed by: FAMILY MEDICINE

## 2023-03-30 PROCEDURE — 82565 ASSAY OF CREATININE: CPT

## 2023-04-03 ENCOUNTER — PATIENT MESSAGE (OUTPATIENT)
Dept: FAMILY MEDICINE CLINIC | Facility: CLINIC | Age: 43
End: 2023-04-03

## 2023-04-03 DIAGNOSIS — I77.1: ICD-10-CM

## 2023-04-03 DIAGNOSIS — R10.13 EPIGASTRIC PAIN: ICD-10-CM

## 2023-04-03 DIAGNOSIS — R93.5 ABNORMAL CT OF THE ABDOMEN: Primary | ICD-10-CM

## 2023-04-03 NOTE — TELEPHONE ENCOUNTER
From: Ana Maria Reyes  To: Rose Cobian DO  Sent: 4/3/2023 11:06 AM CDT  Subject: Referral Request     Kiara Pineda,  My US of the abdomen and CTA were abnormal and Dr Neva Ko wanted me to follow up with Vascular surgeon. He is referring me to Dr. Vitaly Palencia MD. I would appreciate if you could put in a referral for me please. Thank you and have a great day!   [id number]

## 2023-04-05 ENCOUNTER — ANESTHESIA EVENT (OUTPATIENT)
Dept: SURGERY | Facility: HOSPITAL | Age: 43
End: 2023-04-05
Payer: COMMERCIAL

## 2023-04-05 ENCOUNTER — HOSPITAL ENCOUNTER (OUTPATIENT)
Facility: HOSPITAL | Age: 43
Setting detail: HOSPITAL OUTPATIENT SURGERY
Discharge: HOME | End: 2023-04-05
Attending: ORTHOPAEDIC SURGERY | Admitting: ORTHOPAEDIC SURGERY
Payer: COMMERCIAL

## 2023-04-05 ENCOUNTER — ANESTHESIA (OUTPATIENT)
Dept: SURGERY | Facility: HOSPITAL | Age: 43
End: 2023-04-05
Payer: COMMERCIAL

## 2023-04-05 VITALS
SYSTOLIC BLOOD PRESSURE: 122 MMHG | WEIGHT: 135 LBS | RESPIRATION RATE: 16 BRPM | TEMPERATURE: 97 F | DIASTOLIC BLOOD PRESSURE: 75 MMHG | BODY MASS INDEX: 23.05 KG/M2 | HEIGHT: 64 IN | OXYGEN SATURATION: 100 % | HEART RATE: 72 BPM

## 2023-04-05 LAB — B-HCG UR QL: NEGATIVE

## 2023-04-05 PROCEDURE — 01N54ZZ RELEASE MEDIAN NERVE, PERCUTANEOUS ENDOSCOPIC APPROACH: ICD-10-PCS | Performed by: ORTHOPAEDIC SURGERY

## 2023-04-05 PROCEDURE — 8E0XXBZ COMPUTER ASSISTED PROCEDURE OF UPPER EXTREMITY: ICD-10-PCS | Performed by: ORTHOPAEDIC SURGERY

## 2023-04-05 PROCEDURE — 81025 URINE PREGNANCY TEST: CPT

## 2023-04-05 RX ORDER — ACETAMINOPHEN 500 MG
1000 TABLET ORAL ONCE AS NEEDED
Status: COMPLETED | OUTPATIENT
Start: 2023-04-05 | End: 2023-04-05

## 2023-04-05 RX ORDER — SODIUM CHLORIDE, SODIUM LACTATE, POTASSIUM CHLORIDE, CALCIUM CHLORIDE 600; 310; 30; 20 MG/100ML; MG/100ML; MG/100ML; MG/100ML
INJECTION, SOLUTION INTRAVENOUS CONTINUOUS
Status: DISCONTINUED | OUTPATIENT
Start: 2023-04-05 | End: 2023-04-05

## 2023-04-05 RX ORDER — HYDROMORPHONE HYDROCHLORIDE 1 MG/ML
0.4 INJECTION, SOLUTION INTRAMUSCULAR; INTRAVENOUS; SUBCUTANEOUS EVERY 5 MIN PRN
Status: DISCONTINUED | OUTPATIENT
Start: 2023-04-05 | End: 2023-04-05

## 2023-04-05 RX ORDER — LIDOCAINE HYDROCHLORIDE 5 MG/ML
INJECTION, SOLUTION INFILTRATION; INTRAVENOUS AS NEEDED
Status: DISCONTINUED | OUTPATIENT
Start: 2023-04-05 | End: 2023-04-05 | Stop reason: SURG

## 2023-04-05 RX ORDER — HYDROMORPHONE HYDROCHLORIDE 1 MG/ML
0.6 INJECTION, SOLUTION INTRAMUSCULAR; INTRAVENOUS; SUBCUTANEOUS EVERY 5 MIN PRN
Status: DISCONTINUED | OUTPATIENT
Start: 2023-04-05 | End: 2023-04-05

## 2023-04-05 RX ORDER — NALOXONE HYDROCHLORIDE 0.4 MG/ML
80 INJECTION, SOLUTION INTRAMUSCULAR; INTRAVENOUS; SUBCUTANEOUS AS NEEDED
Status: DISCONTINUED | OUTPATIENT
Start: 2023-04-05 | End: 2023-04-05

## 2023-04-05 RX ORDER — DIPHENHYDRAMINE HYDROCHLORIDE 50 MG/ML
12.5 INJECTION INTRAMUSCULAR; INTRAVENOUS AS NEEDED
Status: DISCONTINUED | OUTPATIENT
Start: 2023-04-05 | End: 2023-04-05

## 2023-04-05 RX ORDER — ONDANSETRON 2 MG/ML
4 INJECTION INTRAMUSCULAR; INTRAVENOUS EVERY 6 HOURS PRN
Status: DISCONTINUED | OUTPATIENT
Start: 2023-04-05 | End: 2023-04-05

## 2023-04-05 RX ORDER — MIDAZOLAM HYDROCHLORIDE 1 MG/ML
1 INJECTION INTRAMUSCULAR; INTRAVENOUS EVERY 5 MIN PRN
Status: DISCONTINUED | OUTPATIENT
Start: 2023-04-05 | End: 2023-04-05

## 2023-04-05 RX ORDER — PROCHLORPERAZINE EDISYLATE 5 MG/ML
5 INJECTION INTRAMUSCULAR; INTRAVENOUS EVERY 8 HOURS PRN
Status: DISCONTINUED | OUTPATIENT
Start: 2023-04-05 | End: 2023-04-05

## 2023-04-05 RX ORDER — SCOLOPAMINE TRANSDERMAL SYSTEM 1 MG/1
1 PATCH, EXTENDED RELEASE TRANSDERMAL ONCE
Status: DISCONTINUED | OUTPATIENT
Start: 2023-04-05 | End: 2023-04-05 | Stop reason: HOSPADM

## 2023-04-05 RX ORDER — CEFAZOLIN SODIUM/WATER 2 G/20 ML
2 SYRINGE (ML) INTRAVENOUS ONCE
Status: COMPLETED | OUTPATIENT
Start: 2023-04-05 | End: 2023-04-05

## 2023-04-05 RX ORDER — HYDROMORPHONE HYDROCHLORIDE 1 MG/ML
0.2 INJECTION, SOLUTION INTRAMUSCULAR; INTRAVENOUS; SUBCUTANEOUS EVERY 5 MIN PRN
Status: DISCONTINUED | OUTPATIENT
Start: 2023-04-05 | End: 2023-04-05

## 2023-04-05 RX ORDER — HYDROCODONE BITARTRATE AND ACETAMINOPHEN 5; 325 MG/1; MG/1
1 TABLET ORAL ONCE AS NEEDED
Status: COMPLETED | OUTPATIENT
Start: 2023-04-05 | End: 2023-04-05

## 2023-04-05 RX ORDER — LABETALOL HYDROCHLORIDE 5 MG/ML
5 INJECTION, SOLUTION INTRAVENOUS EVERY 5 MIN PRN
Status: DISCONTINUED | OUTPATIENT
Start: 2023-04-05 | End: 2023-04-05

## 2023-04-05 RX ORDER — HYDROCODONE BITARTRATE AND ACETAMINOPHEN 5; 325 MG/1; MG/1
2 TABLET ORAL ONCE AS NEEDED
Status: COMPLETED | OUTPATIENT
Start: 2023-04-05 | End: 2023-04-05

## 2023-04-05 RX ORDER — MEPERIDINE HYDROCHLORIDE 25 MG/ML
12.5 INJECTION INTRAMUSCULAR; INTRAVENOUS; SUBCUTANEOUS AS NEEDED
Status: DISCONTINUED | OUTPATIENT
Start: 2023-04-05 | End: 2023-04-05

## 2023-04-05 RX ORDER — ACETAMINOPHEN 500 MG
1000 TABLET ORAL ONCE
Status: DISCONTINUED | OUTPATIENT
Start: 2023-04-05 | End: 2023-04-05 | Stop reason: HOSPADM

## 2023-04-05 RX ORDER — HYDROCODONE BITARTRATE AND ACETAMINOPHEN 5; 325 MG/1; MG/1
1 TABLET ORAL EVERY 6 HOURS PRN
Qty: 5 TABLET | Refills: 0 | Status: SHIPPED | OUTPATIENT
Start: 2023-04-05

## 2023-04-05 RX ADMIN — LIDOCAINE HYDROCHLORIDE 35 ML: 5 INJECTION, SOLUTION INFILTRATION; INTRAVENOUS at 08:08:00

## 2023-04-05 RX ADMIN — SODIUM CHLORIDE, SODIUM LACTATE, POTASSIUM CHLORIDE, CALCIUM CHLORIDE: 600; 310; 30; 20 INJECTION, SOLUTION INTRAVENOUS at 08:27:00

## 2023-04-05 RX ADMIN — CEFAZOLIN SODIUM/WATER 2 G: 2 G/20 ML SYRINGE (ML) INTRAVENOUS at 08:05:00

## 2023-04-05 RX ADMIN — SODIUM CHLORIDE, SODIUM LACTATE, POTASSIUM CHLORIDE, CALCIUM CHLORIDE: 600; 310; 30; 20 INJECTION, SOLUTION INTRAVENOUS at 08:03:00

## 2023-04-05 NOTE — ANESTHESIA POSTPROCEDURE EVALUATION
[address] Patient Status:  Hospital Outpatient Surgery   Age/Gender 38year old female MRN [id number]   [facility] SURGERY Attending Edward Garnett MD   Hosp Day # 0 PCP Ginger Blanco DO       Anesthesia Post-op Note    RIGHT ENDOSCOPIC CARPAL TUNNEL RELEASE     Procedure Summary     Date: 04/05/23 Room / Location: [facility] MAIN OR 05 / [facility] OR    Anesthesia Start: 0802 Anesthesia Stop: 5248    Procedure: RIGHT ENDOSCOPIC CARPAL TUNNEL RELEASE (Right: Hand) Diagnosis:       Bilateral carpal tunnel syndrome      (Bilateral carpal tunnel syndrome [G56.03])    Surgeons: Edward Garnett MD Anesthesiologist: Lucina Hickman MD    Anesthesia Type: germán block ASA Status: 2          Anesthesia Type: germán block    Vitals Value Taken Time   /82 04/05/23 0849   Temp  04/05/23 0850   Pulse 72 04/05/23 0850   Resp 16 04/05/23 0850   SpO2 100 % 04/05/23 0850   Vitals shown include unvalidated device data. Patient Location: Same Day Surgery    Anesthesia Type: Germán block    Airway Patency: patent    Postop Pain Control: adequate    Mental Status: preanesthetic baseline    Nausea/Vomiting: none    Cardiopulmonary/Hydration status: stable euvolemic    Complications: no apparent anesthesia related complications    Postop vital signs: stable    Dental Exam: Unchanged from Preop    Patient to be discharged home when criteria met.

## 2023-04-05 NOTE — OPERATIVE REPORT
Operative Note    Patient Name: Dianna [place]    Preoperative Diagnosis:     1. Bilateral carpal tunnel syndrome [G56.03]    Postoperative Diagnosis:     1. Bilateral carpal tunnel syndrome [G56.03]    Surgeon(s) and Role:     Tuna Rey MD - Primary     Assistant: Cara Whittington PA-C    A PA was needed for the successful completion of this case. She was essential for the proper positioning of patient, manipulation of instruments, proper exposure, manipulation of soft tissue, and wound closure. Procedures:     1. Right endoscopic carpal tunnel release (64478)    Antibiotics: Ancef 2g    Surgical Findings: Normal Anatomy     Anesthesia: Germán Block    Complications: None    Implants: None    Specimen: None    Condition: Stable    Estimated Blood Loss: 1mL    Indications:  38year old female with EMG/NCV confirmed right carpal tunnel syndrome that failed non-surgical management. Patient consented to an endoscopic carpal tunnel release possible open having understood the risks associated with surgery, expected outcome, time to recovery and the need for possible rehab. Risks that were discussed but not limited to infection, nerve injury, tendon injury, artery injury, need for additional surgery, and no improvements of present symptoms. Procedure:  Patient was met in the preoperative holding area where consent was verified, laterality was marked with the surgeon's initials, and the H&P was updated. Patient was brought to the operating room on a transport cart. Patient was then transferred onto the operating room table and placed in supine position with an arm table and with bony prominences well-padded. SCDs were placed. Antibiotics were fully infused. An upper arm tourniquet was placed and the limb was exsanguinated using Esmarch bandage. Tourniquet was raised to 250mmHg. A germán block was subsequently performed. The arm was then prepped and draped in the usual sterile fashion.  A surgical timeout was performed. A transverse incision through the dermis was made 5mm proximal to the distal volar wrist crease just ulnar to the palmaris longus using a 15 blade. Maraon's and Yaritza Mixer scissors was used to dissect through the subcutaneous tissue onto the antebrachial fascia. A distally based 1 cm wide rectangular flap was elevated using a [place] blade. The flap was retracted distally and volarly allowing access to the carpal tunnel. The undersurface of the transverse carpal ligament was accessed, cleaned, and dilated. After assessing the width of the ligament, the microaire endoscopic apparatus with camera was inserted into the carpal tunnel. Under camera magnification with direct visualization of the undersurface of the transverse carpal ligament, the blade was engaged at the distal extents of the ligament and the release of the ligament was carried out distal to proximal.  I verified the release with the endoscopy camera noting divergent edges of the transverse carpal ligament. I also physically verified with a tenosynovium elevator complete release of the transverse carpal ligament. The tourniquet was then lowered and bipolar cautery was used to achieve hemostasis. Closure: The incision was closed using 5-0 Monocryl in a buried simple interrupted fashion. Exofin skin glue was applied and Steri-Strips were placed. Dressing/Splint:  Tegaderm with a pad was applied over the endoscopic carpal tunnel incision. Post Operative:  Patient was woken up from anesthesia and taken to PACU for further recovery and discharge home. Lay Hernandez MD  Hand, Wrist, & Elbow Surgery  [facility]  [facility] [address], [address] [facility], [address] [place] [address]  [email or web address]  t: [id number]  f: [phone]

## 2023-04-11 ENCOUNTER — OFFICE VISIT (OUTPATIENT)
Dept: ORTHOPEDICS CLINIC | Facility: CLINIC | Age: 43
End: 2023-04-11
Payer: COMMERCIAL

## 2023-04-11 VITALS — HEIGHT: 64 IN | WEIGHT: 135 LBS | BODY MASS INDEX: 23.05 KG/M2

## 2023-04-11 DIAGNOSIS — G56.03 BILATERAL CARPAL TUNNEL SYNDROME: Primary | ICD-10-CM

## 2023-04-11 DIAGNOSIS — G89.4 CHRONIC PAIN SYNDROME: ICD-10-CM

## 2023-04-11 PROCEDURE — 99024 POSTOP FOLLOW-UP VISIT: CPT | Performed by: PHYSICIAN ASSISTANT

## 2023-04-11 PROCEDURE — 3008F BODY MASS INDEX DOCD: CPT | Performed by: PHYSICIAN ASSISTANT

## 2023-04-11 RX ORDER — FENTANYL 25 UG/H
1 PATCH TRANSDERMAL
Qty: 10 PATCH | Refills: 0 | Status: SHIPPED | OUTPATIENT
Start: 2023-04-11 | End: 2023-05-11

## 2023-04-11 NOTE — TELEPHONE ENCOUNTER
Dr. Yesenia Villarreal called asking for a refill on her fentanyl 25mcg patches to be sent to the [facility] in [place] on [address]. LOV 3-6-23  NOV 5-26-23  Last refill of fentanyl 25mcg patch 3-12-23 with 10 patches given 0 refills    Order pended. Called and talked to Saúl who states that [facility] has it. Pharmacy loaded.

## 2023-04-11 NOTE — PROGRESS NOTES
SURGICAL BOOKING SHEET   Name: Clark Elkins  MRN: [id number]   : 1980    Surgical Date:   2023   Surgical Consent:   Left endoscopic carpal tunnel is possible open   Diagnosis:    (G56.02) left carpal tunnel syndrome  (primary encounter diagnosis)    Procedure Codes:   Carpal Tunnel Release Endoscopic (20382)   Disposition:   Outpatient   Operative Time:   15 mins   Antibiotics:   Ancef 2g   Anesthesia Type:   David Block   Clearance:    NONE   Equipment:   ECTR: [place] Blade, Microaire Endoscopic System, Lead Hand (on standby), 5-0 moncryl, Exofin, Telfa+Tegaderm    Positioning:   Supine with arm table on transport cart   Assistant:   Assistant: Navarro Canela PA-C   Follow Up:   7-10 days post op with Elroy Rand   Pain Medication:   Norco   Therapy:   None

## 2023-04-11 NOTE — TELEPHONE ENCOUNTER
Pt asking if refill of  fentaNYL 25 MCG/HR Transdermal Patch 72 Hr be sent to [facility]  [place], ph:  [phone]. [facility] [address] [facility] not [facility]. Pt is leaving for out of country tonight.

## 2023-04-12 ENCOUNTER — TELEPHONE (OUTPATIENT)
Dept: ORTHOPEDICS CLINIC | Facility: CLINIC | Age: 43
End: 2023-04-12

## 2023-04-12 DIAGNOSIS — G56.03 BILATERAL CARPAL TUNNEL SYNDROME: Primary | ICD-10-CM

## 2023-05-01 ENCOUNTER — HOSPITAL ENCOUNTER (OUTPATIENT)
Dept: MAMMOGRAPHY | Age: 43
Discharge: HOME | End: 2023-05-01
Attending: FAMILY MEDICINE
Payer: COMMERCIAL

## 2023-05-01 DIAGNOSIS — Z12.31 SCREENING MAMMOGRAM FOR BREAST CANCER: ICD-10-CM

## 2023-05-01 PROCEDURE — 77067 SCR MAMMO BI INCL CAD: CPT | Performed by: FAMILY MEDICINE

## 2023-05-01 PROCEDURE — 77063 BREAST TOMOSYNTHESIS BI: CPT | Performed by: FAMILY MEDICINE

## 2023-05-16 ENCOUNTER — OFFICE VISIT (OUTPATIENT)
Dept: ORTHOPEDICS CLINIC | Facility: CLINIC | Age: 43
End: 2023-05-16
Payer: COMMERCIAL

## 2023-05-16 VITALS — HEIGHT: 64 IN | BODY MASS INDEX: 22.88 KG/M2 | WEIGHT: 134 LBS

## 2023-05-16 DIAGNOSIS — G56.03 BILATERAL CARPAL TUNNEL SYNDROME: Primary | ICD-10-CM

## 2023-05-16 PROCEDURE — 3008F BODY MASS INDEX DOCD: CPT | Performed by: ORTHOPAEDIC SURGERY

## 2023-05-16 PROCEDURE — 99024 POSTOP FOLLOW-UP VISIT: CPT | Performed by: ORTHOPAEDIC SURGERY

## 2023-05-22 ENCOUNTER — LAB ENCOUNTER (OUTPATIENT)
Dept: LAB | Age: 43
End: 2023-05-22
Attending: FAMILY MEDICINE
Payer: COMMERCIAL

## 2023-05-22 ENCOUNTER — TELEPHONE (OUTPATIENT)
Dept: UROLOGY | Facility: CLINIC | Age: 43
End: 2023-05-22

## 2023-05-22 DIAGNOSIS — E78.5 DYSLIPIDEMIA: ICD-10-CM

## 2023-05-22 DIAGNOSIS — E11.9 TYPE 2 DIABETES MELLITUS WITHOUT COMPLICATION, WITHOUT LONG-TERM CURRENT USE OF INSULIN (HCC): ICD-10-CM

## 2023-05-22 LAB
ALBUMIN SERPL-MCNC: 3.7 G/DL (ref 3.4–5)
ALBUMIN/GLOB SERPL: 0.9 {RATIO} (ref 1–2)
ALP LIVER SERPL-CCNC: 54 U/L
ALT SERPL-CCNC: 29 U/L
ANION GAP SERPL CALC-SCNC: 7 MMOL/L (ref 0–18)
AST SERPL-CCNC: 26 U/L (ref 15–37)
BILIRUB SERPL-MCNC: 0.4 MG/DL (ref 0.1–2)
BUN BLD-MCNC: 9 MG/DL (ref 7–18)
CALCIUM BLD-MCNC: 8.9 MG/DL (ref 8.5–10.1)
CHLORIDE SERPL-SCNC: 109 MMOL/L (ref 98–112)
CHOLEST SERPL-MCNC: 104 MG/DL (ref ?–200)
CO2 SERPL-SCNC: 22 MMOL/L (ref 21–32)
CREAT BLD-MCNC: 0.89 MG/DL
EST. AVERAGE GLUCOSE BLD GHB EST-MCNC: 123 MG/DL (ref 68–126)
FASTING PATIENT LIPID ANSWER: YES
FASTING STATUS PATIENT QL REPORTED: YES
GFR SERPLBLD BASED ON 1.73 SQ M-ARVRAT: 82 ML/MIN/1.73M2 (ref 60–?)
GLOBULIN PLAS-MCNC: 4.3 G/DL (ref 2.8–4.4)
GLUCOSE BLD-MCNC: 100 MG/DL (ref 70–99)
HBA1C MFR BLD: 5.9 % (ref ?–5.7)
HDLC SERPL-MCNC: 48 MG/DL (ref 40–59)
LDLC SERPL CALC-MCNC: 39 MG/DL (ref ?–100)
NONHDLC SERPL-MCNC: 56 MG/DL (ref ?–130)
OSMOLALITY SERPL CALC.SUM OF ELEC: 285 MOSM/KG (ref 275–295)
POTASSIUM SERPL-SCNC: 3.9 MMOL/L (ref 3.5–5.1)
PROT SERPL-MCNC: 8 G/DL (ref 6.4–8.2)
SODIUM SERPL-SCNC: 138 MMOL/L (ref 136–145)
TRIGL SERPL-MCNC: 86 MG/DL (ref 30–149)
VLDLC SERPL CALC-MCNC: 12 MG/DL (ref 0–30)

## 2023-05-22 PROCEDURE — 80061 LIPID PANEL: CPT

## 2023-05-22 PROCEDURE — 80053 COMPREHEN METABOLIC PANEL: CPT

## 2023-05-22 PROCEDURE — 3044F HG A1C LEVEL LT 7.0%: CPT | Performed by: FAMILY MEDICINE

## 2023-05-22 PROCEDURE — 36415 COLL VENOUS BLD VENIPUNCTURE: CPT

## 2023-05-22 PROCEDURE — 83036 HEMOGLOBIN GLYCOSYLATED A1C: CPT

## 2023-05-22 NOTE — TELEPHONE ENCOUNTER
Spoke to Saúl, reminded her of appt with Dr. Tali Shepherd May 30. She rec'd the NP paperwork and she has the address.

## 2023-05-26 ENCOUNTER — OFFICE VISIT (OUTPATIENT)
Dept: FAMILY MEDICINE CLINIC | Facility: CLINIC | Age: 43
End: 2023-05-26
Payer: COMMERCIAL

## 2023-05-26 VITALS
BODY MASS INDEX: 22.88 KG/M2 | HEART RATE: 76 BPM | HEIGHT: 64 IN | WEIGHT: 134 LBS | RESPIRATION RATE: 16 BRPM | SYSTOLIC BLOOD PRESSURE: 108 MMHG | DIASTOLIC BLOOD PRESSURE: 70 MMHG | OXYGEN SATURATION: 99 %

## 2023-05-26 DIAGNOSIS — F11.20 NARCOTIC DEPENDENCE (HCC): ICD-10-CM

## 2023-05-26 DIAGNOSIS — M25.50 PAIN IN JOINT, MULTIPLE SITES: ICD-10-CM

## 2023-05-26 DIAGNOSIS — F11.29 OPIOID DEPENDENCE WITH OPIOID-INDUCED DISORDER (HCC): ICD-10-CM

## 2023-05-26 DIAGNOSIS — E11.9 TYPE 2 DIABETES MELLITUS WITHOUT COMPLICATION, WITHOUT LONG-TERM CURRENT USE OF INSULIN (HCC): Primary | ICD-10-CM

## 2023-05-26 DIAGNOSIS — Z71.85 VACCINE COUNSELING: ICD-10-CM

## 2023-05-26 DIAGNOSIS — G89.4 CHRONIC PAIN SYNDROME: ICD-10-CM

## 2023-05-26 DIAGNOSIS — R09.81 SINUS CONGESTION: ICD-10-CM

## 2023-05-26 DIAGNOSIS — R93.89 ABNORMAL MRI: ICD-10-CM

## 2023-05-26 DIAGNOSIS — E78.5 DYSLIPIDEMIA: ICD-10-CM

## 2023-05-26 PROCEDURE — 3008F BODY MASS INDEX DOCD: CPT | Performed by: FAMILY MEDICINE

## 2023-05-26 PROCEDURE — 99215 OFFICE O/P EST HI 40 MIN: CPT | Performed by: FAMILY MEDICINE

## 2023-05-26 PROCEDURE — 3074F SYST BP LT 130 MM HG: CPT | Performed by: FAMILY MEDICINE

## 2023-05-26 PROCEDURE — 3078F DIAST BP <80 MM HG: CPT | Performed by: FAMILY MEDICINE

## 2023-05-26 PROCEDURE — 90677 PCV20 VACCINE IM: CPT | Performed by: FAMILY MEDICINE

## 2023-05-26 PROCEDURE — 90471 IMMUNIZATION ADMIN: CPT | Performed by: FAMILY MEDICINE

## 2023-05-26 RX ORDER — HYDROCODONE BITARTRATE AND ACETAMINOPHEN 10; 325 MG/1; MG/1
1 TABLET ORAL EVERY 6 HOURS PRN
Qty: 120 TABLET | Refills: 0 | Status: SHIPPED | OUTPATIENT
Start: 2023-06-08 | End: 2023-07-08

## 2023-05-27 ENCOUNTER — HOSPITAL ENCOUNTER (OUTPATIENT)
Dept: ULTRASOUND IMAGING | Age: 43
Discharge: HOME | End: 2023-05-27
Attending: FAMILY MEDICINE
Payer: COMMERCIAL

## 2023-05-27 DIAGNOSIS — N92.6 IRREGULAR PERIODS: ICD-10-CM

## 2023-05-27 DIAGNOSIS — R93.89 ABNORMAL MRI: ICD-10-CM

## 2023-05-27 DIAGNOSIS — R10.2 PELVIC PAIN: ICD-10-CM

## 2023-05-27 PROCEDURE — 76856 US EXAM PELVIC COMPLETE: CPT | Performed by: FAMILY MEDICINE

## 2023-05-27 PROCEDURE — 76830 TRANSVAGINAL US NON-OB: CPT | Performed by: FAMILY MEDICINE

## 2023-05-29 NOTE — PROGRESS NOTES
Dear Alba Flavors,    Your prior cysts have resolved and a new 4 cm simple cyst is  benign.     Sincerely,  Dr. Yanez Chula

## 2023-05-30 ENCOUNTER — OFFICE VISIT (OUTPATIENT)
Dept: UROLOGY | Facility: CLINIC | Age: 43
End: 2023-05-30
Attending: OBSTETRICS & GYNECOLOGY
Payer: COMMERCIAL

## 2023-05-30 VITALS
HEART RATE: 79 BPM | TEMPERATURE: 98 F | SYSTOLIC BLOOD PRESSURE: 121 MMHG | BODY MASS INDEX: 22.88 KG/M2 | HEIGHT: 64 IN | WEIGHT: 134 LBS | DIASTOLIC BLOOD PRESSURE: 84 MMHG

## 2023-05-30 DIAGNOSIS — N81.84 PELVIC MUSCLE WASTING: ICD-10-CM

## 2023-05-30 DIAGNOSIS — N39.3 FEMALE STRESS INCONTINENCE: Primary | ICD-10-CM

## 2023-05-30 DIAGNOSIS — R35.0 URINARY FREQUENCY: ICD-10-CM

## 2023-05-30 DIAGNOSIS — R39.14 FEELING OF INCOMPLETE BLADDER EMPTYING: ICD-10-CM

## 2023-05-30 LAB
BILIRUB UR QL STRIP.AUTO: NEGATIVE
CLARITY UR REFRACT.AUTO: CLEAR
COLOR UR AUTO: COLORLESS
CONTROL RUN WITHIN 24 HOURS?: YES
GLUCOSE UR STRIP.AUTO-MCNC: NEGATIVE MG/DL
KETONES UR STRIP.AUTO-MCNC: NEGATIVE MG/DL
LEUKOCYTE ESTERASE UR QL STRIP.AUTO: NEGATIVE
LEUKOCYTE ESTERASE URINE: NEGATIVE
NITRITE UR QL STRIP.AUTO: NEGATIVE
NITRITE URINE: NEGATIVE
PH UR STRIP.AUTO: 7 [PH] (ref 5–8)
PROT UR STRIP.AUTO-MCNC: NEGATIVE MG/DL
SP GR UR STRIP.AUTO: 1 (ref 1–1.03)
UROBILINOGEN UR STRIP.AUTO-MCNC: <2 MG/DL

## 2023-05-30 PROCEDURE — 99212 OFFICE O/P EST SF 10 MIN: CPT

## 2023-05-30 PROCEDURE — 81002 URINALYSIS NONAUTO W/O SCOPE: CPT | Performed by: OBSTETRICS & GYNECOLOGY

## 2023-05-30 PROCEDURE — 87086 URINE CULTURE/COLONY COUNT: CPT | Performed by: OBSTETRICS & GYNECOLOGY

## 2023-05-30 PROCEDURE — 81001 URINALYSIS AUTO W/SCOPE: CPT | Performed by: OBSTETRICS & GYNECOLOGY

## 2023-06-02 ENCOUNTER — PATIENT MESSAGE (OUTPATIENT)
Dept: FAMILY MEDICINE CLINIC | Facility: CLINIC | Age: 43
End: 2023-06-02

## 2023-06-02 ENCOUNTER — TELEPHONE (OUTPATIENT)
Dept: UROLOGY | Facility: CLINIC | Age: 43
End: 2023-06-02

## 2023-06-02 RX ORDER — HYDROCODONE BITARTRATE AND ACETAMINOPHEN 10; 325 MG/1; MG/1
1 TABLET ORAL EVERY 8 HOURS PRN
Qty: 15 TABLET | Refills: 0 | Status: SHIPPED | OUTPATIENT
Start: 2023-06-02 | End: 2023-06-07

## 2023-06-02 NOTE — TELEPHONE ENCOUNTER
Dr. Kumar Service,    [address] [place] [address] 3-95-90  [address] [place] [address] 6-12-23  See [employer] message. Thank you.

## 2023-06-02 NOTE — TELEPHONE ENCOUNTER
TC to pt this am w/ ucx and u/a results. Per Dr Rafael Fletcher, would like hematuria w/u. Pt had hematuria w/u in 2014 w/ Dr Ana Rosa Solano for renal US and then office cysto. Pt waiting for HMO referral to schedule UDS. Plan for UDS f/u and office cysto on same day. Will have  schedule. Pt verbalized an understanding and agrees w/ plan. All questions answered.

## 2023-06-02 NOTE — TELEPHONE ENCOUNTER
I spoke with the pt this evening and told her the importance of trying to wean down on the norco and it could be life threatening taking 5 a day. She verbalized understanding -- I will dispense 3 a day until her next norco fill of 4 daily on 6/8.

## 2023-06-05 ENCOUNTER — HOSPITAL ENCOUNTER (OUTPATIENT)
Dept: NUCLEAR MEDICINE | Facility: HOSPITAL | Age: 43
Discharge: HOME | End: 2023-06-05
Attending: STUDENT IN AN ORGANIZED HEALTH CARE EDUCATION/TRAINING PROGRAM
Payer: COMMERCIAL

## 2023-06-05 DIAGNOSIS — R14.0 BLOATING: ICD-10-CM

## 2023-06-05 DIAGNOSIS — R10.13 EPIGASTRIC PAIN: ICD-10-CM

## 2023-06-05 PROCEDURE — 78227 HEPATOBIL SYST IMAGE W/DRUG: CPT | Performed by: STUDENT IN AN ORGANIZED HEALTH CARE EDUCATION/TRAINING PROGRAM

## 2023-06-06 RX ORDER — ATORVASTATIN CALCIUM 20 MG/1
TABLET, FILM COATED ORAL
COMMUNITY
Start: 2023-06-01

## 2023-06-09 NOTE — PROGRESS NOTES
Called patient, see telephone note. General surgery referral.  Please help arrange with patient. ---    Harris Mondragon,    I wanted to summarize our discussion over the telephone today via this message. As we discussed, your recent HIDA scan showed that your gallbladder values are normal.  However given that you experienced abdominal pain and nausea during this examination with administration of a certain type of medication, I will place a referral for you to see a general surgeon to discuss your options and management for this finding, and for further management of your gallbladder. Please let me know if you have any questions or concerns.      Sincerely,  Faisal Yepez MD

## 2023-06-12 ENCOUNTER — OFFICE VISIT (OUTPATIENT)
Dept: FAMILY MEDICINE CLINIC | Facility: CLINIC | Age: 43
End: 2023-06-12
Payer: COMMERCIAL

## 2023-06-12 VITALS
HEART RATE: 82 BPM | DIASTOLIC BLOOD PRESSURE: 60 MMHG | OXYGEN SATURATION: 99 % | BODY MASS INDEX: 22.53 KG/M2 | RESPIRATION RATE: 16 BRPM | WEIGHT: 132 LBS | SYSTOLIC BLOOD PRESSURE: 100 MMHG | HEIGHT: 64 IN

## 2023-06-12 DIAGNOSIS — Z00.00 ROUTINE GENERAL MEDICAL EXAMINATION AT A HEALTH CARE FACILITY: Primary | ICD-10-CM

## 2023-06-12 DIAGNOSIS — Z12.11 SCREENING FOR COLON CANCER: ICD-10-CM

## 2023-06-12 DIAGNOSIS — E55.9 VITAMIN D DEFICIENCY: ICD-10-CM

## 2023-06-12 DIAGNOSIS — Z00.00 LABORATORY EXAMINATION ORDERED AS PART OF A ROUTINE GENERAL MEDICAL EXAMINATION: ICD-10-CM

## 2023-06-12 DIAGNOSIS — E11.9 TYPE 2 DIABETES MELLITUS WITHOUT COMPLICATION, WITHOUT LONG-TERM CURRENT USE OF INSULIN (HCC): ICD-10-CM

## 2023-06-12 DIAGNOSIS — Z12.4 SCREENING FOR CERVICAL CANCER: ICD-10-CM

## 2023-06-12 DIAGNOSIS — R10.10 PAIN OF UPPER ABDOMEN: ICD-10-CM

## 2023-06-12 DIAGNOSIS — R10.2 PELVIC PAIN: ICD-10-CM

## 2023-06-12 DIAGNOSIS — Z13.89 SCREENING FOR GENITOURINARY CONDITION: ICD-10-CM

## 2023-06-12 PROCEDURE — 99396 PREV VISIT EST AGE 40-64: CPT | Performed by: FAMILY MEDICINE

## 2023-06-12 PROCEDURE — 3074F SYST BP LT 130 MM HG: CPT | Performed by: FAMILY MEDICINE

## 2023-06-12 PROCEDURE — 3008F BODY MASS INDEX DOCD: CPT | Performed by: FAMILY MEDICINE

## 2023-06-12 PROCEDURE — 3078F DIAST BP <80 MM HG: CPT | Performed by: FAMILY MEDICINE

## 2023-06-15 ENCOUNTER — ANESTHESIA EVENT (OUTPATIENT)
Dept: SURGERY | Facility: HOSPITAL | Age: 43
End: 2023-06-15
Payer: COMMERCIAL

## 2023-06-16 ENCOUNTER — HOSPITAL ENCOUNTER (OUTPATIENT)
Facility: HOSPITAL | Age: 43
Setting detail: HOSPITAL OUTPATIENT SURGERY
Discharge: HOME | End: 2023-06-16
Attending: ORTHOPAEDIC SURGERY | Admitting: ORTHOPAEDIC SURGERY
Payer: COMMERCIAL

## 2023-06-16 ENCOUNTER — TELEPHONE (OUTPATIENT)
Dept: UROLOGY | Facility: CLINIC | Age: 43
End: 2023-06-16

## 2023-06-16 ENCOUNTER — ANESTHESIA (OUTPATIENT)
Dept: SURGERY | Facility: HOSPITAL | Age: 43
End: 2023-06-16
Payer: COMMERCIAL

## 2023-06-16 ENCOUNTER — TELEPHONE (OUTPATIENT)
Dept: ORTHOPEDICS CLINIC | Facility: CLINIC | Age: 43
End: 2023-06-16

## 2023-06-16 VITALS
BODY MASS INDEX: 22.47 KG/M2 | OXYGEN SATURATION: 98 % | DIASTOLIC BLOOD PRESSURE: 83 MMHG | TEMPERATURE: 99 F | HEIGHT: 64 IN | WEIGHT: 131.63 LBS | RESPIRATION RATE: 16 BRPM | HEART RATE: 91 BPM | SYSTOLIC BLOOD PRESSURE: 122 MMHG

## 2023-06-16 LAB — B-HCG UR QL: NEGATIVE

## 2023-06-16 PROCEDURE — 01N54ZZ RELEASE MEDIAN NERVE, PERCUTANEOUS ENDOSCOPIC APPROACH: ICD-10-PCS | Performed by: ORTHOPAEDIC SURGERY

## 2023-06-16 PROCEDURE — 81025 URINE PREGNANCY TEST: CPT

## 2023-06-16 RX ORDER — MIDAZOLAM HYDROCHLORIDE 1 MG/ML
1 INJECTION INTRAMUSCULAR; INTRAVENOUS EVERY 5 MIN PRN
Status: DISCONTINUED | OUTPATIENT
Start: 2023-06-16 | End: 2023-06-16

## 2023-06-16 RX ORDER — HYDROMORPHONE HYDROCHLORIDE 1 MG/ML
0.4 INJECTION, SOLUTION INTRAMUSCULAR; INTRAVENOUS; SUBCUTANEOUS EVERY 5 MIN PRN
Status: DISCONTINUED | OUTPATIENT
Start: 2023-06-16 | End: 2023-06-16

## 2023-06-16 RX ORDER — SODIUM CHLORIDE, SODIUM LACTATE, POTASSIUM CHLORIDE, CALCIUM CHLORIDE 600; 310; 30; 20 MG/100ML; MG/100ML; MG/100ML; MG/100ML
INJECTION, SOLUTION INTRAVENOUS CONTINUOUS
Status: DISCONTINUED | OUTPATIENT
Start: 2023-06-16 | End: 2023-06-16

## 2023-06-16 RX ORDER — CEFAZOLIN SODIUM/WATER 2 G/20 ML
2 SYRINGE (ML) INTRAVENOUS ONCE
Status: COMPLETED | OUTPATIENT
Start: 2023-06-16 | End: 2023-06-16

## 2023-06-16 RX ORDER — MIDAZOLAM HYDROCHLORIDE 1 MG/ML
INJECTION INTRAMUSCULAR; INTRAVENOUS AS NEEDED
Status: DISCONTINUED | OUTPATIENT
Start: 2023-06-16 | End: 2023-06-16 | Stop reason: SURG

## 2023-06-16 RX ORDER — ONDANSETRON 2 MG/ML
4 INJECTION INTRAMUSCULAR; INTRAVENOUS EVERY 6 HOURS PRN
Status: DISCONTINUED | OUTPATIENT
Start: 2023-06-16 | End: 2023-06-16

## 2023-06-16 RX ORDER — SCOLOPAMINE TRANSDERMAL SYSTEM 1 MG/1
1 PATCH, EXTENDED RELEASE TRANSDERMAL ONCE
Status: DISCONTINUED | OUTPATIENT
Start: 2023-06-16 | End: 2023-06-16 | Stop reason: HOSPADM

## 2023-06-16 RX ORDER — CEFAZOLIN SODIUM/WATER 2 G/20 ML
SYRINGE (ML) INTRAVENOUS
Status: DISCONTINUED
Start: 2023-06-16 | End: 2023-06-16

## 2023-06-16 RX ORDER — HYDROMORPHONE HYDROCHLORIDE 1 MG/ML
0.2 INJECTION, SOLUTION INTRAMUSCULAR; INTRAVENOUS; SUBCUTANEOUS EVERY 5 MIN PRN
Status: DISCONTINUED | OUTPATIENT
Start: 2023-06-16 | End: 2023-06-16

## 2023-06-16 RX ORDER — ACETAMINOPHEN 500 MG
1000 TABLET ORAL ONCE
Status: DISCONTINUED | OUTPATIENT
Start: 2023-06-16 | End: 2023-06-16 | Stop reason: HOSPADM

## 2023-06-16 RX ORDER — HYDROCODONE BITARTRATE AND ACETAMINOPHEN 5; 325 MG/1; MG/1
1 TABLET ORAL EVERY 6 HOURS PRN
Qty: 5 TABLET | Refills: 0 | Status: CANCELLED
Start: 2023-06-16

## 2023-06-16 RX ORDER — TRAMADOL HYDROCHLORIDE 50 MG/1
50 TABLET ORAL EVERY 6 HOURS PRN
Qty: 10 TABLET | Refills: 1 | Status: SHIPPED | OUTPATIENT
Start: 2023-06-16

## 2023-06-16 RX ORDER — NALOXONE HYDROCHLORIDE 0.4 MG/ML
80 INJECTION, SOLUTION INTRAMUSCULAR; INTRAVENOUS; SUBCUTANEOUS AS NEEDED
Status: DISCONTINUED | OUTPATIENT
Start: 2023-06-16 | End: 2023-06-16

## 2023-06-16 RX ORDER — HYDROMORPHONE HYDROCHLORIDE 1 MG/ML
0.6 INJECTION, SOLUTION INTRAMUSCULAR; INTRAVENOUS; SUBCUTANEOUS EVERY 5 MIN PRN
Status: DISCONTINUED | OUTPATIENT
Start: 2023-06-16 | End: 2023-06-16

## 2023-06-16 RX ORDER — LABETALOL HYDROCHLORIDE 5 MG/ML
5 INJECTION, SOLUTION INTRAVENOUS EVERY 5 MIN PRN
Status: DISCONTINUED | OUTPATIENT
Start: 2023-06-16 | End: 2023-06-16

## 2023-06-16 RX ORDER — PROCHLORPERAZINE EDISYLATE 5 MG/ML
5 INJECTION INTRAMUSCULAR; INTRAVENOUS EVERY 8 HOURS PRN
Status: DISCONTINUED | OUTPATIENT
Start: 2023-06-16 | End: 2023-06-16

## 2023-06-16 RX ORDER — ACETAMINOPHEN 500 MG
1000 TABLET ORAL ONCE AS NEEDED
Status: DISCONTINUED | OUTPATIENT
Start: 2023-06-16 | End: 2023-06-16

## 2023-06-16 RX ORDER — HYDROCODONE BITARTRATE AND ACETAMINOPHEN 10; 325 MG/1; MG/1
2 TABLET ORAL ONCE AS NEEDED
Status: DISCONTINUED | OUTPATIENT
Start: 2023-06-16 | End: 2023-06-16

## 2023-06-16 RX ORDER — HYDROCODONE BITARTRATE AND ACETAMINOPHEN 10; 325 MG/1; MG/1
1 TABLET ORAL ONCE AS NEEDED
Status: DISCONTINUED | OUTPATIENT
Start: 2023-06-16 | End: 2023-06-16

## 2023-06-16 RX ORDER — ONDANSETRON 2 MG/ML
INJECTION INTRAMUSCULAR; INTRAVENOUS AS NEEDED
Status: DISCONTINUED | OUTPATIENT
Start: 2023-06-16 | End: 2023-06-16 | Stop reason: SURG

## 2023-06-16 RX ORDER — MEPERIDINE HYDROCHLORIDE 25 MG/ML
12.5 INJECTION INTRAMUSCULAR; INTRAVENOUS; SUBCUTANEOUS AS NEEDED
Status: DISCONTINUED | OUTPATIENT
Start: 2023-06-16 | End: 2023-06-16

## 2023-06-16 RX ADMIN — CEFAZOLIN SODIUM/WATER 2 G: 2 G/20 ML SYRINGE (ML) INTRAVENOUS at 09:03:00

## 2023-06-16 RX ADMIN — ONDANSETRON 4 MG: 2 INJECTION INTRAMUSCULAR; INTRAVENOUS at 09:25:00

## 2023-06-16 RX ADMIN — MIDAZOLAM HYDROCHLORIDE 2 MG: 1 INJECTION INTRAMUSCULAR; INTRAVENOUS at 09:08:00

## 2023-06-16 RX ADMIN — SODIUM CHLORIDE, SODIUM LACTATE, POTASSIUM CHLORIDE, CALCIUM CHLORIDE: 600; 310; 30; 20 INJECTION, SOLUTION INTRAVENOUS at 09:31:00

## 2023-06-16 NOTE — TELEPHONE ENCOUNTER
Aware of the norco already prescribed. Tramadol is for breakthrough post surgical pain on top of current norco regimen she is already on.

## 2023-06-16 NOTE — TELEPHONE ENCOUNTER
Caroline Freitas from [facility] called and states that patient has 2 pain meds I was picked up from Dr. Ray Mahmood, just wanted to check is we were aware of this. Please advise. Thanks.     Caroline Freitas can be reached at [phone]

## 2023-06-16 NOTE — OPERATIVE REPORT
Operative Note    Patient Name: Cali Hu    Preoperative Diagnosis:     1. Left carpal tunnel syndrome [G56.02]    Postoperative Diagnosis:     1. Left carpal tunnel syndrome [G56.02]    Surgeon(s) and Role:     Tanner Saeed MD - Primary     Assistant: Nick Obando PA-C    A PA was needed for the successful completion of this case. She was essential for the proper positioning of patient, manipulation of instruments, proper exposure, manipulation of soft tissue, and wound closure. Procedures:     1. Left endoscopic carpal tunnel release (08213)    Antibiotics: Ancef 2g    Surgical Findings: Normal Anatomy     Anesthesia: [place] Block    Complications: None    Implants: None    Specimen: None    Condition: Stable    Estimated Blood Loss: 1mL    Indications:  38year old female with EMG/NCV confirmed left carpal tunnel syndrome that failed non-surgical management. Patient consented to an endoscopic carpal tunnel release possible open having understood the risks associated with surgery, expected outcome, time to recovery and the need for possible rehab. Risks that were discussed but not limited to infection, nerve injury, tendon injury, artery injury, need for additional surgery, and no improvements of present symptoms. Procedure:  Patient was met in the preoperative holding area where consent was verified, laterality was marked with the surgeon's initials, and the H&P was updated. Patient was brought to the operating room on a transport cart. Patient was then transferred onto the operating room table and placed in supine position with an arm table and with bony prominences well-padded. SCDs were placed. Antibiotics were fully infused. An upper arm tourniquet was placed and the limb was exsanguinated using Esmarch bandage. Tourniquet was raised to 250mmHg. A germán block was subsequently performed. The arm was then prepped and draped in the usual sterile fashion.  A surgical timeout was performed. A transverse incision through the dermis was made 5mm proximal to the distal volar wrist crease just ulnar to the palmaris longus using a 15 blade. Maraon's and Yaritza Mixer scissors was used to dissect through the subcutaneous tissue onto the antebrachial fascia. A distally based 1 cm wide rectangular flap was elevated using a [place] blade. The flap was retracted distally and volarly allowing access to the carpal tunnel. The undersurface of the transverse carpal ligament was accessed, cleaned, and dilated. After assessing the width of the ligament, the microaire endoscopic apparatus with camera was inserted into the carpal tunnel. Under camera magnification with direct visualization of the undersurface of the transverse carpal ligament, the blade was engaged at the distal extents of the ligament and the release of the ligament was carried out distal to proximal.  I verified the release with the endoscopy camera noting divergent edges of the transverse carpal ligament. I also physically verified with a tenosynovium elevator complete release of the transverse carpal ligament. The tourniquet was then lowered and bipolar cautery was used to achieve hemostasis. Closure: The incision was closed using 5-0 Monocryl in a buried simple interrupted fashion. Exofin skin glue was applied and Steri-Strips were placed. Dressing/Splint:  Tegaderm with a pad was applied over the endoscopic carpal tunnel incision. Post Operative:  Patient was woken up from anesthesia and taken to PACU for further recovery and discharge home. Lay Hernandez MD  Hand, Wrist, & Elbow Surgery  [facility]  [facility], [address] [facility], [address] [place] [address]  [email or web address]  t: [phone]  f: [phone]

## 2023-06-16 NOTE — TELEPHONE ENCOUNTER
Hx Dx - Opioid dependence with unspecified opioid-induced disorder ([facility] [address]    - Norco script for 120 pills on 6/8/23 by Dr. Cheko Russ script for post surgical pain management tramadol 50mg 10 tabs 1 refill  - Please review/clarify plan as patient is already taking Norco 10/325 q6 hours

## 2023-06-16 NOTE — TELEPHONE ENCOUNTER
Called pt to confirm UDS and review instructions.   Answered all questions, pt verbalizes understanding

## 2023-06-16 NOTE — ANESTHESIA PROCEDURE NOTES
Regional Block    Date/Time: 6/16/2023 9:09 AM    Performed by: Alondra Plasencia MD  Authorized by: Alondra Plasencia MD      General Information and Staff    Start Time:  6/16/2023 9:09 AM  End Time:  6/16/2023 9:11 AM  Anesthesiologist:  Alondra Plasencia MD  Performed by: Anesthesiologist  Patient Location:  OR    Block Placement: Pre Induction  Block site/laterality marked before start: site marked  Reason for Block: at surgeon's request and surgical anesthesia    Preanesthetic Checklist: 2 patient identifers, IV checked, risks and benefits discussed, monitors and equipment checked, pre-op evaluation, timeout performed, anesthesia consent, sterile technique used, no prohibitive neurological deficits and no local skin infection at insertion site      Procedure Details    Patient Position:  Supine  Prep: ChloraPrep    Monitoring:  Cardiac monitor, continuous pulse ox and blood pressure cuff  Block Type:  [place] block  Laterality:  Left  Injection Technique:  Single-shot    Needle      Assessment    Injection Assessment:  Good spread noted, negative resistance, incremental injection and low pressure  Heart Rate Change: No    - Patient tolerated block procedure well without evidence of immediate block related complications.      Medications  6/16/2023 9:09 AM      Additional Comments    Medication: Lidocaine 0.5% 40cc

## 2023-06-16 NOTE — TELEPHONE ENCOUNTER
Pharmacy called and clarified patient is taking tramadol for breakthrough post surgical pain. Pharmacy states they will fill prescription at this time.

## 2023-06-21 ENCOUNTER — OFFICE VISIT (OUTPATIENT)
Dept: UROLOGY | Facility: CLINIC | Age: 43
End: 2023-06-21
Attending: OBSTETRICS & GYNECOLOGY
Payer: COMMERCIAL

## 2023-06-21 VITALS — BODY MASS INDEX: 22.36 KG/M2 | WEIGHT: 131 LBS | HEIGHT: 64 IN | RESPIRATION RATE: 16 BRPM

## 2023-06-21 DIAGNOSIS — R35.0 URINARY FREQUENCY: ICD-10-CM

## 2023-06-21 DIAGNOSIS — R39.14 FEELING OF INCOMPLETE BLADDER EMPTYING: ICD-10-CM

## 2023-06-21 DIAGNOSIS — N39.3 FEMALE STRESS INCONTINENCE: Primary | ICD-10-CM

## 2023-06-21 DIAGNOSIS — R31.29 MICROHEMATURIA: ICD-10-CM

## 2023-06-21 LAB
CONTROL RUN WITHIN 24 HOURS?: YES
HPV I/H RISK 1 DNA SPEC QL NAA+PROBE: NEGATIVE
LEUKOCYTE ESTERASE URINE: NEGATIVE
NITRITE URINE: NEGATIVE

## 2023-06-21 PROCEDURE — 81002 URINALYSIS NONAUTO W/O SCOPE: CPT

## 2023-06-21 PROCEDURE — 51784 ANAL/URINARY MUSCLE STUDY: CPT

## 2023-06-21 PROCEDURE — 51741 ELECTRO-UROFLOWMETRY FIRST: CPT

## 2023-06-21 PROCEDURE — 51729 CYSTOMETROGRAM W/VP&UP: CPT

## 2023-06-21 PROCEDURE — 51797 INTRAABDOMINAL PRESSURE TEST: CPT

## 2023-06-21 RX ORDER — LIDOCAINE HYDROCHLORIDE 20 MG/ML
10 JELLY TOPICAL ONCE
Status: COMPLETED | OUTPATIENT
Start: 2023-06-21 | End: 2023-06-21

## 2023-06-21 RX ADMIN — LIDOCAINE HYDROCHLORIDE 10 ML: 20 JELLY TOPICAL at 13:45:00

## 2023-06-21 NOTE — PATIENT INSTRUCTIONS
[facility] UROGYNECOLOGY  [address]   [place] [address]  [address] [address]: [phone]  FAX: [phone]       Urodynamic Testing Discharge Instructions: There are NO dietary or activity restrictions. You may resume your normal schedule. You may have mild discomfort for a few hours after your testing today. There may be some mild burning when you urinate or you may see some blood in your urine. These problems should not last more than 24 hours. The following suggestions may minimize any symptoms you experience. Drink 6-8 large glasses of water over the next 8 hours  A compress or sitz bath may be soothing  Tylenol or Ibuprofen may be taken as needed    If you experience any of the following, please call the office or, if after hours, the on-call physician at [phone]. Excessive pain  Bright red bloody discharge  Fever or chills  Continued urgency, frequency or burning with urination    Obtaining Test Results    Your urodynamic test will be interpreted by a specialist and available to the referring physician within 7-14 days. Patients in our clinic are given an appointment to come back to discuss the results and any appropriate treatment recommendations. Please do not hesitate to contact our office with any questions or concerns at [phone]. I acknowledge that I have received verbal and written discharge  instructions and that I understand these instructions clearly.     Patient Signature:    Date:

## 2023-06-21 NOTE — PROCEDURES
Patient here for urodynamic testing. Procedure explained and confirmed by patient. See evaluation form for results. Both verbal and written discharge instructions were given. Patient tolerated procedure well and will follow up with Dr. Rigo Roberts on 23. URODYNAMIC EVALUATION    PATIENT HISTORY:    Prolapse:  No  DEVENDRA:  Yes   - quite bothersome   UUI:  No  Nocturia:  1  Frequency:  Every 2 hours  Incomplete Emptying:  No  Constipation:  No  Last void prior to UDS testin hours  Current urge to void? Moderate  OAB meds stopped prior to test?  NA  Other symptoms? Pelvic floor therapy in past with no change in DEVENDRA - also history of microhematuria - scheduled for cysto at f/u appt -     Surgery? [x]  No  []  Yes, specify date:      PATIENT DIAGNOSIS:  Stress Incontinence N39.3    MEDICATION: No outpatient medications have been marked as taking for the 23 encounter (Office Visit) with LIS PROCEDURE. ALLERGIES:  Advil [Ibuprofen] and Ketorolac      EXAM:  Urinalysis Dip:  Today's Results   Component Date Value    control run 2023 Yes     Blood Urine 2023 Small (A)     Nitrite Urine 2023 Negative     Leukocyte esterase urine 2023 Negative       Uroves[place]unction ( >30 degrees ):  [x]  Mobile  []  Fixed    Perineal Sensation:  [x]  Normal  []  Abnormal    Additional Notes:    PROLAPSE (past introitus):  []  Yes  [x]  No  Prolapse reduced for testing?   []  Yes  [x]  No  []  Pessary  []  Speculum  []  Proctoswab  []  Vag Packing    Additional Notes:    UROFLOWMETRY:  Voided Volume:                    359         mL  Maximum Flow Rate:                       29         mL/sec  Average flow rate:                      13       mL/sec  Post-void Residual:                       15    mL  Pattern:  [x]  Normal  []  Poor flow     []  Intermittent  []  Other  Void:   []  Typical  []  Atypical    Additional Notes:    CYSTOMETRY:  Urethral Catheter:  Fr 7 / tdoc  Abd Catheter: Fr 7 / tdoc   Infusion:  Water Rate 30-40 mL/min  Temp:  Room  Position:  [x]  Sit  []  Stand  []  Supine  First sensation:   174 mL  First desire to void:   235 mL  Strong desire to void:  350 mL  Maximum cystometric capacity:   510 mL  Detrusor Activity:  []  Unstable   [x]  Stable  Urge leakage? []  Yes [x]  No  Volume at 1st unhibited detrusor cont:    mL  Detrusor instability provoked by:    []  Spontaneous []  Coughing  []  Filling  []  Valsalva  []  Other    Additional Notes:      URETHRAL FUNCTION:  Valsava (vesical) Leak Point Pressures:    Volume Leak Point Pressure Leak? Cough Valsalva      100mL 143 67    cm H2O []  Yes [x]  No   200mL 142 75    cm H2O []  Yes []  No   300mL 92 140    cm H2O [x]  Yes []  No   350mL 115 cm H2O [x]  Yes []  No     Genuine Stress Incontinence demonstrated?    [x]  Yes  @ 300ml []  No    Resting Urethral Pressure Profile:     Functional Urethral Length:       0.6   cm       0.6          cm                 cm  Maximum UCP:          103 cm            92 cm                  cm    PRESSURE/FLOW STUDY:  Voided volume:       800 mL  Maximum flow rate:       16 mL/sec  Pressure Detrusor (at maximum flow):          84  cm H2O  Post void residual:            18   mL  Voiding mechanism:  [x]  Abnormal  []  Normal  [x]  Strain to void   []  Weak detrusor  Void:   []  Typical   [x]  Atypical    Additional Notes: reports that she felt the need to strain to start urine flow - this does not always occur    EMG:  [x]  Reactive []  Non-Reactive    6/21/2023 3:49 PM     PERFORMED BY:  Yeyo Armenta RN      URODYNAMIC PHYSICIAN INTERPRETATION    IMPRESSION:   359/15cc & 800/18cc    [facility] 510cc  No DO  DEVENDRA   @ 300ml                Post-Procedure Diagnoses: Stress urinary incontinence [N39.3]    Comments:      Jenni Henry DO   6/22/2023   9:41 AM

## 2023-06-22 LAB
LAST PAP RESULT: NORMAL
PAP HISTORY (OTHER THAN LAST PAP): NORMAL

## 2023-06-27 ENCOUNTER — TELEPHONE (OUTPATIENT)
Dept: UROLOGY | Facility: CLINIC | Age: 43
End: 2023-06-27

## 2023-06-27 ENCOUNTER — OFFICE VISIT (OUTPATIENT)
Dept: ORTHOPEDICS CLINIC | Facility: CLINIC | Age: 43
End: 2023-06-27
Payer: COMMERCIAL

## 2023-06-27 VITALS — WEIGHT: 131 LBS | HEIGHT: 64 IN | BODY MASS INDEX: 22.36 KG/M2

## 2023-06-27 DIAGNOSIS — G56.03 BILATERAL CARPAL TUNNEL SYNDROME: Primary | ICD-10-CM

## 2023-06-27 PROCEDURE — 3008F BODY MASS INDEX DOCD: CPT | Performed by: PHYSICIAN ASSISTANT

## 2023-06-27 PROCEDURE — 99024 POSTOP FOLLOW-UP VISIT: CPT | Performed by: PHYSICIAN ASSISTANT

## 2023-07-06 DIAGNOSIS — F11.20 NARCOTIC DEPENDENCE (HCC): ICD-10-CM

## 2023-07-06 DIAGNOSIS — G89.4 CHRONIC PAIN SYNDROME: ICD-10-CM

## 2023-07-06 DIAGNOSIS — F11.29 OPIOID DEPENDENCE WITH OPIOID-INDUCED DISORDER (HCC): ICD-10-CM

## 2023-07-06 DIAGNOSIS — M25.50 PAIN IN JOINT, MULTIPLE SITES: ICD-10-CM

## 2023-07-06 DIAGNOSIS — R93.89 ABNORMAL MRI: ICD-10-CM

## 2023-07-06 RX ORDER — HYDROCODONE BITARTRATE AND ACETAMINOPHEN 10; 325 MG/1; MG/1
1 TABLET ORAL EVERY 8 HOURS PRN
Qty: 90 TABLET | Refills: 0 | Status: SHIPPED | OUTPATIENT
Start: 2023-07-06 | End: 2023-08-05

## 2023-07-06 NOTE — TELEPHONE ENCOUNTER
Patient states she appreciated that I discussed the risks of this medication and she states she has weaned herself down to [address] [facility] 3 times a day per my last note.   The prescription has been sent for Norco 10/325 3 times daily as needed #90 refill 0

## 2023-07-18 ENCOUNTER — OFFICE VISIT (OUTPATIENT)
Dept: UROLOGY | Facility: CLINIC | Age: 43
End: 2023-07-18
Attending: OBSTETRICS & GYNECOLOGY
Payer: COMMERCIAL

## 2023-07-18 VITALS — TEMPERATURE: 99 F

## 2023-07-18 DIAGNOSIS — R31.29 MICROSCOPIC HEMATURIA: Primary | ICD-10-CM

## 2023-07-18 DIAGNOSIS — N39.3 FEMALE STRESS INCONTINENCE: ICD-10-CM

## 2023-07-18 LAB
CONTROL RUN WITHIN 24 HOURS?: YES
LEUKOCYTE ESTERASE URINE: NEGATIVE
NITRITE URINE: NEGATIVE

## 2023-07-18 PROCEDURE — 99212 OFFICE O/P EST SF 10 MIN: CPT

## 2023-07-18 PROCEDURE — 81002 URINALYSIS NONAUTO W/O SCOPE: CPT | Performed by: OBSTETRICS & GYNECOLOGY

## 2023-07-18 PROCEDURE — 52000 CYSTOURETHROSCOPY: CPT

## 2023-07-18 PROCEDURE — 88108 CYTOPATH CONCENTRATE TECH: CPT | Performed by: OBSTETRICS & GYNECOLOGY

## 2023-07-18 RX ORDER — LIDOCAINE HYDROCHLORIDE 20 MG/ML
10 JELLY TOPICAL ONCE
Status: COMPLETED | OUTPATIENT
Start: 2023-07-18 | End: 2023-07-18

## 2023-07-18 RX ADMIN — LIDOCAINE HYDROCHLORIDE 10 ML: 20 JELLY TOPICAL at 11:00:00

## 2023-07-19 ENCOUNTER — HOSPITAL ENCOUNTER (OUTPATIENT)
Dept: NUCLEAR MEDICINE | Facility: HOSPITAL | Age: 43
Discharge: HOME | End: 2023-07-19
Attending: FAMILY MEDICINE
Payer: COMMERCIAL

## 2023-07-19 DIAGNOSIS — R11.0 NAUSEA: ICD-10-CM

## 2023-07-19 DIAGNOSIS — R14.0 BLOATING: ICD-10-CM

## 2023-07-19 DIAGNOSIS — R10.13 EPIGASTRIC PAIN: ICD-10-CM

## 2023-07-19 PROCEDURE — 78264 GASTRIC EMPTYING IMG STUDY: CPT | Performed by: FAMILY MEDICINE

## 2023-07-20 LAB — NON GYNE INTERPRETATION: NEGATIVE

## 2023-07-21 ENCOUNTER — TELEPHONE (OUTPATIENT)
Dept: UROLOGY | Facility: CLINIC | Age: 43
End: 2023-07-21

## 2023-07-21 NOTE — TELEPHONE ENCOUNTER
LVM informing of wnl of recent urine for cytology. Encouraged to call office w/any questions or concerns.

## 2023-07-24 ENCOUNTER — OFFICE VISIT (OUTPATIENT)
Dept: FAMILY MEDICINE CLINIC | Facility: CLINIC | Age: 43
End: 2023-07-24
Payer: COMMERCIAL

## 2023-07-24 VITALS
BODY MASS INDEX: 20.66 KG/M2 | WEIGHT: 121 LBS | DIASTOLIC BLOOD PRESSURE: 64 MMHG | RESPIRATION RATE: 18 BRPM | HEART RATE: 93 BPM | HEIGHT: 64 IN | OXYGEN SATURATION: 99 % | SYSTOLIC BLOOD PRESSURE: 102 MMHG

## 2023-07-24 DIAGNOSIS — R10.13 EPIGASTRIC PAIN: ICD-10-CM

## 2023-07-24 DIAGNOSIS — F11.29 OPIOID DEPENDENCE WITH OPIOID-INDUCED DISORDER (HCC): ICD-10-CM

## 2023-07-24 DIAGNOSIS — K21.9 GASTROESOPHAGEAL REFLUX DISEASE, UNSPECIFIED WHETHER ESOPHAGITIS PRESENT: ICD-10-CM

## 2023-07-24 DIAGNOSIS — L91.0 KELOID SCAR: Primary | ICD-10-CM

## 2023-07-24 DIAGNOSIS — G89.4 CHRONIC PAIN SYNDROME: ICD-10-CM

## 2023-07-24 DIAGNOSIS — Z79.899 MEDICATION MANAGEMENT: ICD-10-CM

## 2023-07-24 PROCEDURE — 3074F SYST BP LT 130 MM HG: CPT | Performed by: FAMILY MEDICINE

## 2023-07-24 PROCEDURE — 3008F BODY MASS INDEX DOCD: CPT | Performed by: FAMILY MEDICINE

## 2023-07-24 PROCEDURE — 3078F DIAST BP <80 MM HG: CPT | Performed by: FAMILY MEDICINE

## 2023-07-24 PROCEDURE — 99214 OFFICE O/P EST MOD 30 MIN: CPT | Performed by: FAMILY MEDICINE

## 2023-07-24 RX ORDER — TRAMADOL HYDROCHLORIDE 50 MG/1
100 TABLET ORAL EVERY 8 HOURS PRN
Qty: 180 TABLET | Refills: 2 | Status: SHIPPED | OUTPATIENT
Start: 2023-07-24 | End: 2023-10-22

## 2023-08-03 ENCOUNTER — OFFICE VISIT (OUTPATIENT)
Dept: ORTHOPEDICS CLINIC | Facility: CLINIC | Age: 43
End: 2023-08-03
Payer: COMMERCIAL

## 2023-08-03 VITALS — BODY MASS INDEX: 20.66 KG/M2 | WEIGHT: 121 LBS | HEIGHT: 64 IN

## 2023-08-03 DIAGNOSIS — G56.03 BILATERAL CARPAL TUNNEL SYNDROME: Primary | ICD-10-CM

## 2023-08-03 PROCEDURE — 3008F BODY MASS INDEX DOCD: CPT | Performed by: ORTHOPAEDIC SURGERY

## 2023-08-03 PROCEDURE — 99024 POSTOP FOLLOW-UP VISIT: CPT | Performed by: ORTHOPAEDIC SURGERY

## 2023-08-14 ENCOUNTER — TELEPHONE (OUTPATIENT)
Dept: FAMILY MEDICINE CLINIC | Facility: CLINIC | Age: 43
End: 2023-08-14

## 2023-08-14 ENCOUNTER — PATIENT MESSAGE (OUTPATIENT)
Dept: FAMILY MEDICINE CLINIC | Facility: CLINIC | Age: 43
End: 2023-08-14

## 2023-08-14 RX ORDER — TRAMADOL HYDROCHLORIDE 50 MG/1
100 TABLET ORAL EVERY 8 HOURS PRN
Qty: 180 TABLET | Refills: 2 | Status: SHIPPED | OUTPATIENT
Start: 2023-08-14 | End: 2023-11-12

## 2023-08-14 RX ORDER — TRAMADOL HYDROCHLORIDE 50 MG/1
100 TABLET ORAL EVERY 8 HOURS PRN
Qty: 180 TABLET | Refills: 2 | OUTPATIENT
Start: 2023-08-14 | End: 2023-11-12

## 2023-08-14 NOTE — TELEPHONE ENCOUNTER
Pt requests early refill on Tramadol before trip to [facility] tomorrow.     Last office visit: 7/24/23   Labs last completed: 6/12/23  Requested medication(s) are due for refill today: No  Requested medication(s) are on the active medication list same strength, form, dose/ sig:Yes  Requested medication(s) are managed by provider: Yes  Patient has already received a courtsey refill: No}

## 2023-08-14 NOTE — TELEPHONE ENCOUNTER
Pt states she is [place] tomorrow. Pt is requesting approval to fill the following medication early.  It is not eligible for refill until 8/24    traMADol 50 MG Oral Tab 180 tablet       [id number] - [facility], [address] [place] [address] AT [id number] [place] [address], [phone], [phone]

## 2023-08-15 NOTE — TELEPHONE ENCOUNTER
Received [employer] message that patient needs early fill of her tramadol as she is leaving to a trip to [facility] tomorrow. Called pharmacy and authorized early fill of tramadol. Informed patient of this.      Marco A Rodriguez MD, 08/14/23, 7:07 PM

## 2023-09-07 ENCOUNTER — TELEPHONE (OUTPATIENT)
Dept: ADMINISTRATIVE | Age: 43
End: 2023-09-07

## 2023-09-07 DIAGNOSIS — K62.5 RECTAL BLEEDING: Primary | ICD-10-CM

## 2023-09-08 ENCOUNTER — TELEPHONE (OUTPATIENT)
Dept: FAMILY MEDICINE CLINIC | Facility: CLINIC | Age: 43
End: 2023-09-08

## 2023-09-08 PROCEDURE — 82570 ASSAY OF URINE CREATININE: CPT | Performed by: FAMILY MEDICINE

## 2023-09-08 PROCEDURE — 85025 COMPLETE CBC W/AUTO DIFF WBC: CPT | Performed by: FAMILY MEDICINE

## 2023-09-08 PROCEDURE — 84443 ASSAY THYROID STIM HORMONE: CPT | Performed by: FAMILY MEDICINE

## 2023-09-08 PROCEDURE — 83036 HEMOGLOBIN GLYCOSYLATED A1C: CPT | Performed by: FAMILY MEDICINE

## 2023-09-08 PROCEDURE — 82043 UR ALBUMIN QUANTITATIVE: CPT | Performed by: FAMILY MEDICINE

## 2023-09-08 PROCEDURE — 82652 VIT D 1 25-DIHYDROXY: CPT | Performed by: FAMILY MEDICINE

## 2023-09-08 PROCEDURE — 80053 COMPREHEN METABOLIC PANEL: CPT | Performed by: FAMILY MEDICINE

## 2023-09-08 PROCEDURE — 80061 LIPID PANEL: CPT | Performed by: FAMILY MEDICINE

## 2023-09-14 ENCOUNTER — OFFICE VISIT (OUTPATIENT)
Dept: ORTHOPEDICS CLINIC | Facility: CLINIC | Age: 43
End: 2023-09-14
Payer: COMMERCIAL

## 2023-09-14 VITALS — BODY MASS INDEX: 20.66 KG/M2 | WEIGHT: 121 LBS | HEIGHT: 64 IN

## 2023-09-14 DIAGNOSIS — M18.10 ARTHRITIS OF CARPOMETACARPAL (CMC) JOINT OF THUMB: Primary | ICD-10-CM

## 2023-09-14 PROCEDURE — 3008F BODY MASS INDEX DOCD: CPT | Performed by: ORTHOPAEDIC SURGERY

## 2023-09-14 PROCEDURE — 99213 OFFICE O/P EST LOW 20 MIN: CPT | Performed by: ORTHOPAEDIC SURGERY

## 2023-09-26 PROCEDURE — 84165 PROTEIN E-PHORESIS SERUM: CPT | Performed by: FAMILY MEDICINE

## 2023-09-26 PROCEDURE — 86334 IMMUNOFIX E-PHORESIS SERUM: CPT | Performed by: FAMILY MEDICINE

## 2023-09-26 PROCEDURE — 83521 IG LIGHT CHAINS FREE EACH: CPT | Performed by: FAMILY MEDICINE

## 2023-09-26 PROCEDURE — 82784 ASSAY IGA/IGD/IGG/IGM EACH: CPT | Performed by: FAMILY MEDICINE

## 2023-09-28 DIAGNOSIS — R76.8 INCREASED IMMUNOGLOBULIN: Primary | ICD-10-CM

## 2023-10-07 ENCOUNTER — LAB ENCOUNTER (OUTPATIENT)
Dept: LAB | Age: 43
End: 2023-10-07
Attending: ALLERGY & IMMUNOLOGY
Payer: COMMERCIAL

## 2023-10-07 DIAGNOSIS — D84.9 IMMUNE DEFICIENCY DISORDER (HCC): Primary | ICD-10-CM

## 2023-10-07 LAB
CRP SERPL-MCNC: 0.54 MG/DL (ref ?–0.3)
ERYTHROCYTE [SEDIMENTATION RATE] IN BLOOD: 42 MM/HR
IMMUNOGLOBULIN PNL SER-MCNC: 2110 MG/DL (ref 791–1643)

## 2023-10-07 PROCEDURE — 85652 RBC SED RATE AUTOMATED: CPT

## 2023-10-07 PROCEDURE — 36415 COLL VENOUS BLD VENIPUNCTURE: CPT

## 2023-10-07 PROCEDURE — 86140 C-REACTIVE PROTEIN: CPT

## 2023-10-07 PROCEDURE — 86162 COMPLEMENT TOTAL (CH50): CPT

## 2023-10-07 PROCEDURE — 82784 ASSAY IGA/IGD/IGG/IGM EACH: CPT

## 2023-10-10 ENCOUNTER — PATIENT MESSAGE (OUTPATIENT)
Dept: FAMILY MEDICINE CLINIC | Facility: CLINIC | Age: 43
End: 2023-10-10

## 2023-10-10 DIAGNOSIS — R76.9 ABNORMAL IMMUNOLOGICAL FINDING IN SERUM: Primary | ICD-10-CM

## 2023-10-11 LAB — CH50 COMPLEMENT: 55 U/ML

## 2023-12-07 ENCOUNTER — MED REC SCAN ONLY (OUTPATIENT)
Dept: FAMILY MEDICINE CLINIC | Facility: CLINIC | Age: 43
End: 2023-12-07

## 2023-12-17 DIAGNOSIS — E11.9 TYPE 2 DIABETES MELLITUS WITHOUT COMPLICATION, WITHOUT LONG-TERM CURRENT USE OF INSULIN (HCC): Primary | ICD-10-CM

## 2023-12-18 ENCOUNTER — OFFICE VISIT (OUTPATIENT)
Dept: FAMILY MEDICINE CLINIC | Facility: CLINIC | Age: 43
End: 2023-12-18
Payer: COMMERCIAL

## 2023-12-18 VITALS
RESPIRATION RATE: 16 BRPM | HEART RATE: 86 BPM | SYSTOLIC BLOOD PRESSURE: 120 MMHG | DIASTOLIC BLOOD PRESSURE: 80 MMHG | BODY MASS INDEX: 20.32 KG/M2 | HEIGHT: 64 IN | WEIGHT: 119 LBS | OXYGEN SATURATION: 98 %

## 2023-12-18 DIAGNOSIS — N92.6 MENSTRUAL CHANGES: ICD-10-CM

## 2023-12-18 DIAGNOSIS — Z71.85 VACCINE COUNSELING: ICD-10-CM

## 2023-12-18 DIAGNOSIS — Z79.899 MEDICATION MANAGEMENT: ICD-10-CM

## 2023-12-18 DIAGNOSIS — R00.2 AWARENESS OF HEART BEAT: ICD-10-CM

## 2023-12-18 DIAGNOSIS — Z71.84 COUNSELING ABOUT TRAVEL: Primary | ICD-10-CM

## 2023-12-18 NOTE — TELEPHONE ENCOUNTER
Last office visit: 11/29/23   Protocol: pass  Requested medication(s) are due for refill today: Yes  Requested medication(s) are on the active medication list same strength, form, dose/ sig:Yes  Requested medication(s) are managed by provider: Yes  Patient has already received a courtsey refill: No}

## 2023-12-26 ENCOUNTER — TELEPHONE (OUTPATIENT)
Dept: FAMILY MEDICINE CLINIC | Facility: CLINIC | Age: 43
End: 2023-12-26

## 2023-12-26 DIAGNOSIS — F11.20 NARCOTIC DEPENDENCE (HCC): ICD-10-CM

## 2023-12-26 DIAGNOSIS — G89.4 CHRONIC PAIN SYNDROME: ICD-10-CM

## 2023-12-26 DIAGNOSIS — M79.2 NEUROPATHIC PAIN: ICD-10-CM

## 2023-12-26 DIAGNOSIS — M47.817 SPONDYLOSIS OF LUMBOSACRAL REGION, UNSPECIFIED SPINAL OSTEOARTHRITIS COMPLICATION STATUS: ICD-10-CM

## 2023-12-26 RX ORDER — HYDROCODONE BITARTRATE AND ACETAMINOPHEN 10; 325 MG/1; MG/1
1 TABLET ORAL
Qty: 30 TABLET | Refills: 0 | Status: CANCELLED | OUTPATIENT
Start: 2023-12-26

## 2023-12-26 RX ORDER — HYDROCODONE BITARTRATE AND ACETAMINOPHEN 10; 325 MG/1; MG/1
1 TABLET ORAL EVERY 8 HOURS PRN
Qty: 30 TABLET | Refills: 0 | Status: SHIPPED | OUTPATIENT
Start: 2023-12-26

## 2023-12-26 NOTE — TELEPHONE ENCOUNTER
Pt is spotting today, asks if it is okay to start norethindrone today as planned to postpone period for travel? Pt also requests  small refill of Norco: last Refill 11/29/23 #90/0  Order pended for your review. #30/0?     Last office visit: 12/18/23   Labs last completed: 9/26/23  Requested medication(s) are due for refill today: No, asking for supply for trip  Requested medication(s) are on the active medication list same strength, form, dose/ sig: Yes  Requested medication(s) are managed by provider: Yes  Patient has already received a courtsey refill: No

## 2023-12-26 NOTE — TELEPHONE ENCOUNTER
Pt is leaving on [employer] tomorrow. Dr. Louie Mcelroy gave patient a hormone pill she is supposed to start today to postpone her period. Pt started spotting yesterday and is asking if it is OK to start pill today. Also asking for refill a couple days early of HYDROcodone-acetaminophen ([address]). Pharmacy told her to call Dr. Louie Mcelroy for this.

## 2023-12-26 NOTE — TELEPHONE ENCOUNTER
Pt informed of Dr Easton Lovell recommendations that she can start norethindrone today. Pt verbalizes understanding. Called [facility] to verify with pharmacist that [address] [facility] #30 can be filled today.

## 2024-01-05 RX ORDER — HYDROCODONE BITARTRATE AND ACETAMINOPHEN 10; 325 MG/1; MG/1
1 TABLET ORAL EVERY 8 HOURS PRN
Qty: 30 TABLET | Refills: 0 | OUTPATIENT
Start: 2024-01-05

## 2024-01-07 ENCOUNTER — PATIENT MESSAGE (OUTPATIENT)
Dept: FAMILY MEDICINE CLINIC | Facility: CLINIC | Age: 44
End: 2024-01-07

## 2024-01-07 DIAGNOSIS — G89.4 CHRONIC PAIN SYNDROME: Primary | ICD-10-CM

## 2024-01-08 ENCOUNTER — TELEPHONE (OUTPATIENT)
Dept: FAMILY MEDICINE CLINIC | Facility: CLINIC | Age: 44
End: 2024-01-08

## 2024-01-08 RX ORDER — HYDROCODONE BITARTRATE AND ACETAMINOPHEN 10; 325 MG/1; MG/1
1 TABLET ORAL EVERY 8 HOURS PRN
Qty: 90 TABLET | Refills: 0 | Status: SHIPPED | OUTPATIENT
Start: 2024-01-08

## 2024-01-08 NOTE — TELEPHONE ENCOUNTER
Last OV 12/18/23  follow up      Next OV: Med check due Feb    Pt completed Norethindrone as prescribed during pilgrimage.  Intermittent Vaginal itching started 1-5 or 1-6-24  Denies discharge  Trying home care of washing with mild soap, keeping clean and dry with No relief.  Pt asks if itching will subside without treatment?

## 2024-01-08 NOTE — TELEPHONE ENCOUNTER
From: Marielos Reid  To: Giovanna Parker  Sent: 1/7/2024 7:26 PM CST  Subject: Medication Refill    Hello, I requested for a medication refill on Thursday and the pharmacy is saying they don’t have the prescription. I am out of the medicine and need it asap. Appreciate if you could send the prescription over. Thanks.

## 2024-01-08 NOTE — TELEPHONE ENCOUNTER
PT completed course of the following medication:    norethindrone 5 MG Oral Tab     Pt states she is now experiencing a lot of vaginal itching and is looking for advice on what to do to help with it.     Thank you

## 2024-01-22 ENCOUNTER — OFFICE VISIT (OUTPATIENT)
Dept: FAMILY MEDICINE CLINIC | Facility: CLINIC | Age: 44
End: 2024-01-22
Payer: COMMERCIAL

## 2024-01-22 ENCOUNTER — PATIENT MESSAGE (OUTPATIENT)
Dept: FAMILY MEDICINE CLINIC | Facility: CLINIC | Age: 44
End: 2024-01-22

## 2024-01-22 VITALS
HEART RATE: 67 BPM | DIASTOLIC BLOOD PRESSURE: 82 MMHG | SYSTOLIC BLOOD PRESSURE: 144 MMHG | OXYGEN SATURATION: 100 % | RESPIRATION RATE: 16 BRPM | HEIGHT: 64 IN | WEIGHT: 124 LBS | BODY MASS INDEX: 21.17 KG/M2

## 2024-01-22 DIAGNOSIS — N89.8 VAGINAL DISCHARGE: ICD-10-CM

## 2024-01-22 DIAGNOSIS — F11.20 NARCOTIC DEPENDENCE (HCC): ICD-10-CM

## 2024-01-22 DIAGNOSIS — G89.4 CHRONIC PAIN SYNDROME: ICD-10-CM

## 2024-01-22 DIAGNOSIS — Z87.42 HISTORY OF OVARIAN CYST: ICD-10-CM

## 2024-01-22 DIAGNOSIS — R10.2 PELVIC PAIN: Primary | ICD-10-CM

## 2024-01-22 DIAGNOSIS — Z79.899 MEDICATION MANAGEMENT: ICD-10-CM

## 2024-01-22 PROBLEM — G56.03 BILATERAL CARPAL TUNNEL SYNDROME: Status: RESOLVED | Noted: 2023-01-26 | Resolved: 2024-01-22

## 2024-01-22 PROCEDURE — 3077F SYST BP >= 140 MM HG: CPT | Performed by: FAMILY MEDICINE

## 2024-01-22 PROCEDURE — 81514 NFCT DS BV&VAGINITIS DNA ALG: CPT | Performed by: FAMILY MEDICINE

## 2024-01-22 PROCEDURE — 3079F DIAST BP 80-89 MM HG: CPT | Performed by: FAMILY MEDICINE

## 2024-01-22 PROCEDURE — 99214 OFFICE O/P EST MOD 30 MIN: CPT | Performed by: FAMILY MEDICINE

## 2024-01-22 PROCEDURE — 3008F BODY MASS INDEX DOCD: CPT | Performed by: FAMILY MEDICINE

## 2024-01-22 RX ORDER — TRAMADOL HYDROCHLORIDE 50 MG/1
TABLET ORAL
COMMUNITY
Start: 2023-12-04 | End: 2024-01-22 | Stop reason: ALTCHOICE

## 2024-01-22 RX ORDER — TRAMADOL HYDROCHLORIDE 50 MG/1
100 TABLET ORAL EVERY 8 HOURS PRN
Qty: 180 TABLET | Refills: 0 | Status: SHIPPED | OUTPATIENT
Start: 2024-01-22

## 2024-01-22 NOTE — PROGRESS NOTES
Marielos Reid is a 43 year old female.  HPI:   Pt. Is here for pelvic pain -- noted it in lower abdomen. It started around the 8th.  Had period 1/8/24.  Cramping has lasted with the pain and pressure since her period.  She would like to change back to tramadol.  She wants to get off the norco.  She is aware not to use the 2 together.    Current Outpatient Medications   Medication Sig Dispense Refill    traMADol 50 MG Oral Tab Take 2 tablets (100 mg total) by mouth every 8 (eight) hours as needed for Pain. 180 tablet 0    HYDROcodone-acetaminophen (NORCO)  MG Oral Tab Take 1 tablet by mouth every 8 (eight) hours as needed for Pain. 90 tablet 0    metFORMIN 500 MG Oral Tab TAKE 1 TABLET BY MOUTH TWICE DAILY 60 tablet 2    norethindrone 5 MG Oral Tab Take 1 tablet (5 mg total) by mouth in the morning, at noon, and at bedtime. 36 tablet 0    ondansetron (ZOFRAN) 4 mg tablet TAKE 1 TABLET(4 MG) BY MOUTH EVERY 8 HOURS AS NEEDED FOR NAUSEA 30 tablet 0     No current facility-administered medications for this visit.      Allergies   Allergen Reactions    Advil [Ibuprofen] SWELLING    Ketorolac SWELLING     Patient received a subacromial right shoulder corticosteroid and ketorolac injection and developed right eye a mild swelling that was self-limited over the course of 24 hours.      Past Medical History:   Diagnosis Date    Abdominal pain     Arthritis     Back pain     Back problem     low back pain    Blood in urine     Encounter for insertion of ParaGard IUD 11/27/2015    Lot# [id number] Exp 01/2022    General counseling for prescription of oral contraceptives     Heartburn     Irritable bowel syndrome     Nausea     Other screening mammogram     Pain in joints     Prediabetes     Routine Papanicolaou smear     Visual impairment     glasses and contacts    Vomiting     Wears glasses       Social History:  Social History     Socioeconomic History    Marital status:    Tobacco Use    Smoking status: Never     negative Smokeless tobacco: Never   Vaping Use    Vaping Use: Never used   Substance and Sexual Activity    Alcohol use: No    Drug use: No    Sexual activity: Yes     Partners: Male   Other Topics Concern    Caffeine Concern Yes     Comment: 2 cups tea or coffee/day    Exercise Yes     Comment: rare    Seat Belt Yes        Results for orders placed or performed in visit on 10/07/23   C-Reactive Protein   Result Value Ref Range    C-Reactive Protein 0.54 (H) <0.30 mg/dL   Complement, Total   Result Value Ref Range    CH50 Complement 55 >41 U/mL   Immunoglobulin G, Qn, Serum   Result Value Ref Range    Immunoglobulin G 2,110 (H) 791 - 1,643 mg/dL   Sed Rate, Westergren (Automated)   Result Value Ref Range    Sed Rate 42 (H) 0 - 20 mm/Hr       REVIEW OF SYSTEMS:   GENERAL: feels well otherwise  SKIN: denies any unusual skin lesions  LUNGS: denies shortness of breath with exertion  CARDIOVASCULAR: denies chest pain on exertion  GI: denies abdominal pain,denies heartburn  MUSCULOSKELETAL: denies back pain  : pelvic pain and vaginal discharge  EXTREMITIES:  No pain or numbness    EXAM:   /82 (BP Location: Right arm, Patient Position: Sitting, Cuff Size: adult)   Pulse 67   Resp 16   Ht 5' 4\" (1.626 m)   Wt 124 lb (56.2 kg)   LMP 01/07/2024 (Approximate)   SpO2 100%   BMI 21.28 kg/m²   GENERAL: well developed, well nourished,in no apparent distress  SKIN: no rashes,no suspicious lesions  HEENT: atraumatic, normocephalic  LUNGS: clear to auscultation  CARDIO: RRR without murmur  GI: soft, good BS's  : white discharge; cervix stable with cyst at 10 oclock; vaginitis panel performed; pelvic discomfort on exam  EXTREMITIES: no cyanosis, clubbing or edema    ASSESSMENT AND PLAN:     Encounter Diagnoses   Name Primary?    Pelvic pain Yes    Vaginal discharge     History of ovarian cyst     Chronic pain syndrome     Narcotic dependence (HCC)     Medication management        Orders Placed This Encounter   Procedures     Vaginitis Vaginosis PCR Panel       Meds & Refills for this Visit:  Requested Prescriptions     Signed Prescriptions Disp Refills    traMADol 50 MG Oral Tab 180 tablet 0     Sig: Take 2 tablets (100 mg total) by mouth every 8 (eight) hours as needed for Pain.       Imaging & Consults:  US PELVIS (TRANSABDOMINAL AND TRANSVAGINAL) (CPT=76856/68520)    Stop norco.  Start tramadol 100 mg TID for pain.  Advised will try to wean when the weather is warmer.  Advised US pelvis.  May need to see gyne if pain does not improve.  Will monitor BP    The patient indicates understanding of these issues and agrees to the plan.  Return in about 2 months (around 3/22/2024) for BP check, med check.     normal/soft/nontender/nondistended/normal active bowel sounds

## 2024-01-23 LAB
BV BACTERIA DNA VAG QL NAA+PROBE: NEGATIVE
C GLABRATA DNA VAG QL NAA+PROBE: NEGATIVE
C KRUSEI DNA VAG QL NAA+PROBE: NEGATIVE
CANDIDA DNA VAG QL NAA+PROBE: NEGATIVE
T VAGINALIS DNA VAG QL NAA+PROBE: NEGATIVE

## 2024-01-23 NOTE — TELEPHONE ENCOUNTER
From: Marielos Ried  To: Giovanna Parker  Sent: 1/22/2024 5:19 PM CST  Subject: BP reading     Hello,  I just checked my BP, it was 134/91.     Thanks,  Marielos

## 2024-01-23 NOTE — TELEPHONE ENCOUNTER
Pt advised of Dr Parker's recommendations to  continue to monitor BP, contact office to follow up  if BP is consistently over 130/80. Reviewed home bp method. Pt verbalizes understanding.

## 2024-02-19 DIAGNOSIS — F11.20 NARCOTIC DEPENDENCE (HCC): ICD-10-CM

## 2024-02-19 DIAGNOSIS — G89.4 CHRONIC PAIN SYNDROME: ICD-10-CM

## 2024-02-20 ENCOUNTER — PATIENT MESSAGE (OUTPATIENT)
Dept: FAMILY MEDICINE CLINIC | Facility: CLINIC | Age: 44
End: 2024-02-20

## 2024-02-20 DIAGNOSIS — F11.20 NARCOTIC DEPENDENCE (HCC): ICD-10-CM

## 2024-02-20 DIAGNOSIS — R10.2 PELVIC PAIN: ICD-10-CM

## 2024-02-20 DIAGNOSIS — G89.4 CHRONIC PAIN SYNDROME: Primary | ICD-10-CM

## 2024-02-20 RX ORDER — TRAMADOL HYDROCHLORIDE 50 MG/1
100 TABLET ORAL EVERY 8 HOURS PRN
Qty: 180 TABLET | Refills: 0 | Status: SHIPPED | OUTPATIENT
Start: 2024-02-20 | End: 2024-03-21

## 2024-02-20 RX ORDER — TRAMADOL HYDROCHLORIDE 50 MG/1
100 TABLET ORAL EVERY 8 HOURS PRN
Qty: 180 TABLET | Refills: 0 | OUTPATIENT
Start: 2024-02-20

## 2024-02-20 NOTE — TELEPHONE ENCOUNTER
From: Marielos Reid  To: Giovanna Parker  Sent: 2/20/2024 9:00 AM CST  Subject: Refill request     Hello,     Can you please refill my Tramadol today? It is due for refill tomorrow but I need to pick it up today as I am going to [facility].     Thank you,  Marielos

## 2024-02-20 NOTE — TELEPHONE ENCOUNTER
Pt informed of  of Dr Parker's recommendation that she has too many pills of the norco and tramadol in a short period of time -- she should have extra of one or the other to get her through her trip.   Pt she has no extra Tramadol at home and does not want to take Norco.

## 2024-02-20 NOTE — TELEPHONE ENCOUNTER
Last office visit: 1/22/2024   Labs last completed: 9/8/2023  Requested medication(s) are due for refill today: yes  Requested medication(s) are on the active medication list same strength, form, dose/ sig: yes  Requested medication(s) are managed by provider: yes  Patient has already received a courtsey refill: no     NOV: 3/5/2024

## 2024-02-20 NOTE — TELEPHONE ENCOUNTER
Last OV:  1/22/24 pelvic pain                 Next OV:  3/5/24  Med Check    Pt sent mychart  requesting Tramadol  Early Refill     Per [facility] :  Last [place] [address] #90  refill picked up  1/8/24,  #30  picked up 12/26/23  Last Tramadol 50 mg  #180 refill picked up  1/22/24, and #180 picked up 12/4/23    Pt reports she is taking tramadol two  50 mg pills three times daily for back pain and low abdominal pain.  Pt reports she is not taking Norco anymore.

## 2024-02-20 NOTE — TELEPHONE ENCOUNTER
Sorry, she has too many pills of the norco and tramadol in a shot period of time -- she shoulder have extra of one or the other to get her through her trip

## 2024-02-20 NOTE — TELEPHONE ENCOUNTER
Last office visit: 1/22/24   Labs last completed: 10/7/23  Requested medication(s) are due for refill today: pt requests early refill, leaving town tomorrow.   #180 1/22/24  Requested medication(s) are on the active medication list same strength, form, dose/ sig: Yes  Requested medication(s) are managed by provider: Yes  Patient has already received a courtsey refill: No    NOV: 3/5/24

## 2024-02-21 NOTE — TELEPHONE ENCOUNTER
Pt informed of Dr Peterson's recommendation that she avoid Norco when taking Tramadol, Rx was sent to [facility]. Pt verbalized understanding.

## 2024-03-05 ENCOUNTER — OFFICE VISIT (OUTPATIENT)
Dept: FAMILY MEDICINE CLINIC | Facility: CLINIC | Age: 44
End: 2024-03-05
Payer: COMMERCIAL

## 2024-03-05 VITALS
BODY MASS INDEX: 20.32 KG/M2 | SYSTOLIC BLOOD PRESSURE: 134 MMHG | HEIGHT: 64 IN | OXYGEN SATURATION: 100 % | RESPIRATION RATE: 16 BRPM | WEIGHT: 119 LBS | HEART RATE: 84 BPM | DIASTOLIC BLOOD PRESSURE: 80 MMHG

## 2024-03-05 DIAGNOSIS — Z79.899 MEDICATION MANAGEMENT: ICD-10-CM

## 2024-03-05 DIAGNOSIS — M19.90 OSTEOARTHRITIS, UNSPECIFIED OSTEOARTHRITIS TYPE, UNSPECIFIED SITE: ICD-10-CM

## 2024-03-05 DIAGNOSIS — I73.00 RAYNAUD'S PHENOMENON WITHOUT GANGRENE: ICD-10-CM

## 2024-03-05 DIAGNOSIS — E78.5 DYSLIPIDEMIA: ICD-10-CM

## 2024-03-05 DIAGNOSIS — M54.2 NECK PAIN: ICD-10-CM

## 2024-03-05 DIAGNOSIS — E11.9 TYPE 2 DIABETES MELLITUS WITHOUT COMPLICATION, WITHOUT LONG-TERM CURRENT USE OF INSULIN (HCC): Primary | ICD-10-CM

## 2024-03-05 DIAGNOSIS — L60.8 CHANGE IN NAIL APPEARANCE: ICD-10-CM

## 2024-03-05 DIAGNOSIS — Z12.31 SCREENING MAMMOGRAM FOR BREAST CANCER: ICD-10-CM

## 2024-03-05 PROCEDURE — 3079F DIAST BP 80-89 MM HG: CPT | Performed by: FAMILY MEDICINE

## 2024-03-05 PROCEDURE — 3075F SYST BP GE 130 - 139MM HG: CPT | Performed by: FAMILY MEDICINE

## 2024-03-05 PROCEDURE — 99214 OFFICE O/P EST MOD 30 MIN: CPT | Performed by: FAMILY MEDICINE

## 2024-03-05 PROCEDURE — 3008F BODY MASS INDEX DOCD: CPT | Performed by: FAMILY MEDICINE

## 2024-03-05 NOTE — PROGRESS NOTES
Marielos Reid is a 43 year old female.  HPI:   Pt. Is here for med check.  DM -- on metformin.  Pt. Complains of neck pain with radiation left shoulder most of the time for the past 3-4 weeks.  Pain 7/10.  Pain still in her toes, fingers, back, knees, shoulders -- mostly left and neck.  Would like to get  off tramadol.  Natural option to put in her ear to help with withdrawal -- [facility].    Current Outpatient Medications   Medication Sig Dispense Refill    traMADol 50 MG Oral Tab Take 2 tablets (100 mg total) by mouth every 8 (eight) hours as needed for Pain. 180 tablet 0    HYDROcodone-acetaminophen (NORCO)  MG Oral Tab Take 1 tablet by mouth every 8 (eight) hours as needed for Pain. 90 tablet 0    metFORMIN 500 MG Oral Tab TAKE 1 TABLET BY MOUTH TWICE DAILY 60 tablet 2    ondansetron (ZOFRAN) 4 mg tablet TAKE 1 TABLET(4 MG) BY MOUTH EVERY 8 HOURS AS NEEDED FOR NAUSEA 30 tablet 0     No current facility-administered medications for this visit.      Allergies   Allergen Reactions    Advil [Ibuprofen] SWELLING    Ketorolac SWELLING     Patient received a subacromial right shoulder corticosteroid and ketorolac injection and developed right eye a mild swelling that was self-limited over the course of 24 hours.      Past Medical History:   Diagnosis Date    Abdominal pain     Arthritis     Back pain     Back problem     low back pain    Blood in urine     Encounter for insertion of ParaGard IUD 11/27/2015    Lot# [id number] Exp 01/2022    General counseling for prescription of oral contraceptives     Heartburn     Irritable bowel syndrome     Nausea     Other screening mammogram     Pain in joints     Prediabetes     Routine Papanicolaou smear     Visual impairment     glasses and contacts    Vomiting     Wears glasses       Social History:  Social History     Socioeconomic History    Marital status:    Tobacco Use    Smoking status: Never    Smokeless tobacco: Never   Vaping Use    Vaping Use: Never used    Substance and Sexual Activity    Alcohol use: No    Drug use: No    Sexual activity: Yes     Partners: Male   Other Topics Concern    Caffeine Concern Yes     Comment: 2 cups tea or coffee/day    Exercise Yes     Comment: rare    Seat Belt Yes        Results for orders placed or performed in visit on 01/22/24   Vaginitis Vaginosis PCR Panel    Specimen: Vaginal; Other   Result Value Ref Range    Bacterial Vaginosis Negative Negative    Candida group Negative Negative    Nakaseomyces glabrata (Candida glabrata) Negative Negative    Pichia kudriavzevii (Candida krusei) Negative Negative    Trichomonas vaginalis Negative Negative       REVIEW OF SYSTEMS:   GENERAL: feels well otherwise  SKIN: denies any unusual skin lesions  LUNGS: denies shortness of breath with exertion  CARDIOVASCULAR: denies chest pain on exertion  GI: denies abdominal pain,denies heartburn  MUSCULOSKELETAL: denies back pain  EXTREMITIES:  No pain or numbness    EXAM:   /82 (BP Location: Right arm, Patient Position: Sitting, Cuff Size: adult)   Pulse 84   Resp 16   Ht 5' 4\" (1.626 m)   Wt 119 lb (54 kg)   LMP 02/07/2024 (Approximate)   SpO2 100%   BMI 20.43 kg/m²   GENERAL: well developed, well nourished,in no apparent distress  SKIN: no rashes,no suspicious lesions; grooved in thumb nails  HEENT: atraumatic, normocephalic  NECK: supple,no adenopathy,no bruits; tight posterior neck muscles  LUNGS: clear to auscultation  CARDIO: RRR without murmur  GI: soft, good BS's; no HSM  EXTREMITIES: no cyanosis, clubbing or edema; swollen PIP joints    ASSESSMENT AND PLAN:     Encounter Diagnoses   Name Primary?    Type 2 diabetes mellitus without complication, without long-term current use of insulin (HCC) Yes    Dyslipidemia     Medication management     Screening mammogram for breast cancer     Osteoarthritis, unspecified osteoarthritis type, unspecified site     Neck pain     Raynaud's phenomenon without gangrene     Change in nail  appearance        Orders Placed This Encounter   Procedures    Hemoglobin A1C [E]    Lipid Panel [E]    Comp Metabolic Panel (14) [E]    Rheumatoid Arthritis Factor    Uric Acid, Serum    Cyclic Citrullinate Peptide (CCP) antibodies    Sed Rate, Westergren (Automated)    C-Reactive Protein    Ghislaine, Direct, Reflex To 9 Enas ([facility])    Microalb/Creat Ratio, Random Urine [E]       Meds & Refills for this Visit:  Requested Prescriptions      No prescriptions requested or ordered in this encounter       Imaging & Consults:  RHEUMATOLOGY - INTERNAL  OP REFERRAL TO [facility]  [facility] 2D+3D SCREENING BILAT (CPT=77067/36687)  XR CERVICAL SPINE (2-3 VIEWS) (CPT=72040)    Advised PT for her neck and xr.  Advised to monitor BP.  Low salt diet.  To rheum for eval.  Labs ordered.    The patient indicates understanding of these issues and agrees to the plan.  Return in about 3 months (around 6/5/2024) for med check, neck pain.

## 2024-03-18 DIAGNOSIS — R10.2 PELVIC PAIN: ICD-10-CM

## 2024-03-18 DIAGNOSIS — G89.4 CHRONIC PAIN SYNDROME: ICD-10-CM

## 2024-03-18 DIAGNOSIS — F11.20 NARCOTIC DEPENDENCE (HCC): ICD-10-CM

## 2024-03-18 RX ORDER — TRAMADOL HYDROCHLORIDE 50 MG/1
100 TABLET ORAL EVERY 8 HOURS PRN
Qty: 180 TABLET | Refills: 0 | Status: SHIPPED | OUTPATIENT
Start: 2024-03-20 | End: 2024-04-19

## 2024-03-18 NOTE — TELEPHONE ENCOUNTER
Last office visit: 3/5/2024   Labs last completed: 9/8/2023  Requested medication(s) are due for refill today: yes  Requested medication(s) are on the active medication list same strength, form, dose/ sig: yes  Requested medication(s) are managed by provider: yes  Patient has already received a courtsey refill: no    NOV: 4/18/2024

## 2024-03-19 DIAGNOSIS — E11.9 TYPE 2 DIABETES MELLITUS WITHOUT COMPLICATION, WITHOUT LONG-TERM CURRENT USE OF INSULIN (HCC): ICD-10-CM

## 2024-03-19 NOTE — TELEPHONE ENCOUNTER
Last office visit: 3/5/2024   Protocol: pass  Requested medication(s) are due for refill today: yes  Requested medication(s) are on the active medication list same strength, form, dose/ sig: yes  Requested medication(s) are managed by provider: yes  Patient has already received a courtsey refill: no    NOV: 4/18/2024

## 2024-04-16 DIAGNOSIS — R10.2 PELVIC PAIN: ICD-10-CM

## 2024-04-16 DIAGNOSIS — F11.20 NARCOTIC DEPENDENCE (HCC): ICD-10-CM

## 2024-04-16 DIAGNOSIS — G89.4 CHRONIC PAIN SYNDROME: ICD-10-CM

## 2024-04-16 RX ORDER — TRAMADOL HYDROCHLORIDE 50 MG/1
100 TABLET ORAL EVERY 8 HOURS PRN
Qty: 180 TABLET | Refills: 0 | Status: SHIPPED | OUTPATIENT
Start: 2024-04-16 | End: 2024-05-16

## 2024-04-16 NOTE — TELEPHONE ENCOUNTER
LOV: 03/05/24  for: med check   Patient advised to RTC on: 6/5/24   Previous Rx: (traMADol 50 MG Oral Tab) à Sig: Take 2 tablets (100 mg total) by mouth every 8 (eight) hours as needed for Pain. Disp # 180 tablet / 0 refills.  LF: 03/20/24  Next appt: 4/18/24

## 2024-04-18 ENCOUNTER — OFFICE VISIT (OUTPATIENT)
Dept: FAMILY MEDICINE CLINIC | Facility: CLINIC | Age: 44
End: 2024-04-18
Payer: COMMERCIAL

## 2024-04-18 VITALS
DIASTOLIC BLOOD PRESSURE: 84 MMHG | BODY MASS INDEX: 19.63 KG/M2 | WEIGHT: 115 LBS | HEIGHT: 64 IN | HEART RATE: 73 BPM | OXYGEN SATURATION: 99 % | SYSTOLIC BLOOD PRESSURE: 136 MMHG | RESPIRATION RATE: 16 BRPM

## 2024-04-18 DIAGNOSIS — G89.29 CHRONIC LEFT SHOULDER PAIN: ICD-10-CM

## 2024-04-18 DIAGNOSIS — M54.2 NECK PAIN: ICD-10-CM

## 2024-04-18 DIAGNOSIS — M25.512 CHRONIC LEFT SHOULDER PAIN: ICD-10-CM

## 2024-04-18 DIAGNOSIS — I10 ESSENTIAL HYPERTENSION: Primary | ICD-10-CM

## 2024-04-18 DIAGNOSIS — G89.4 CHRONIC PAIN SYNDROME: ICD-10-CM

## 2024-04-18 DIAGNOSIS — Z79.899 MEDICATION MANAGEMENT: ICD-10-CM

## 2024-04-18 DIAGNOSIS — F11.20 NARCOTIC DEPENDENCE (HCC): ICD-10-CM

## 2024-04-18 RX ORDER — LISINOPRIL 5 MG/1
5 TABLET ORAL DAILY
Qty: 30 TABLET | Refills: 2 | Status: SHIPPED | OUTPATIENT
Start: 2024-04-18

## 2024-04-18 NOTE — PROGRESS NOTES
Marielos Reid is a 43 year old female.  HPI:   Pt. States that her BP was 100-110/.  BP here 130/80's.  Noted back pain going up her spine - on the toilet and while laying on a hard surface.  It lasts minutes.  Notes her left shoulder is starting to bother her more -- it started 2 days ago.  Had sx on that shoulder 5 years ago.  She notes her neck is still bothering her.    Current Outpatient Medications   Medication Sig Dispense Refill    traMADol 50 MG Oral Tab Take 2 tablets (100 mg total) by mouth every 8 (eight) hours as needed for Pain. 180 tablet 0    metFORMIN 500 MG Oral Tab Take 1 tablet (500 mg total) by mouth 2 (two) times daily. 60 tablet 2    ondansetron (ZOFRAN) 4 mg tablet Take 1 tablet (4 mg total) by mouth every 8 (eight) hours as needed for Nausea. 30 tablet 0     No current facility-administered medications for this visit.      Allergies   Allergen Reactions    Advil [Ibuprofen] SWELLING    Ketorolac SWELLING     Patient received a subacromial right shoulder corticosteroid and ketorolac injection and developed right eye a mild swelling that was self-limited over the course of 24 hours.      Past Medical History:    Abdominal pain    Arthritis    Back pain    Back problem    low back pain    Blood in urine    Encounter for insertion of ParaGard IUD    Lot# [id number] Exp 01/2022    General counseling for prescription of oral contraceptives    Heartburn    Irritable bowel syndrome    Nausea    Other screening mammogram    Pain in joints    Prediabetes    Routine Papanicolaou smear    Visual impairment    glasses and contacts    Vomiting    Wears glasses      Social History:  Social History     Socioeconomic History    Marital status:    Tobacco Use    Smoking status: Never    Smokeless tobacco: Never   Vaping Use    Vaping status: Never Used   Substance and Sexual Activity    Alcohol use: No    Drug use: No    Sexual activity: Yes     Partners: Male   Other Topics Concern    Caffeine  Concern Yes     Comment: 2 cups tea or coffee/day    Exercise Yes     Comment: rare    Seat Belt Yes     Social Determinants of Health      Received from [facility]        Results for orders placed or performed in visit on 01/22/24   Vaginitis Vaginosis PCR Panel    Specimen: Vaginal; Other   Result Value Ref Range    Bacterial Vaginosis Negative Negative    Candida group Negative Negative    Nakaseomyces glabrata (Candida glabrata) Negative Negative    Pichia kudriavzevii (Candida krusei) Negative Negative    Trichomonas vaginalis Negative Negative       REVIEW OF SYSTEMS:   GENERAL: feels well otherwise  SKIN: denies any unusual skin lesions  LUNGS: denies shortness of breath with exertion  CARDIOVASCULAR: denies chest pain on exertion  GI: denies abdominal pain,denies heartburn  MUSCULOSKELETAL: chronic neck and shoulder left and back pain  EXTREMITIES:  No pain or numbness    EXAM:   /84 (BP Location: Left arm, Patient Position: Sitting, Cuff Size: adult)   Pulse 73   Resp 16   Ht 5' 4\" (1.626 m)   Wt 115 lb (52.2 kg)   LMP 03/29/2024 (Approximate)   SpO2 99%   BMI 19.74 kg/m²   GENERAL: well developed, well nourished,in no apparent distress  SKIN: no rashes,no suspicious lesions  HEENT: atraumatic, normocephalic  NECK: supple,no adenopathy,no bruits  LUNGS: clear to auscultation  CARDIO: RRR without murmur  GI: soft, good BS's  EXTREMITIES: no cyanosis, clubbing or edema    ASSESSMENT AND PLAN:     Encounter Diagnoses   Name Primary?    Essential hypertension Yes    Chronic pain syndrome     Narcotic dependence (HCC)     Neck pain     Chronic left shoulder pain     Medication management        No orders of the defined types were placed in this encounter.      Meds & Refills for this Visit:  Requested Prescriptions     Signed Prescriptions Disp Refills    lisinopril 5 MG Oral Tab 30 tablet 2     Sig: Take 1 tablet (5 mg total) by mouth daily.       Imaging & Consults:  OP REFERRAL  [facility]    Advised to start physical therapy for her neck, left shoulder, and back.  Advised to start lisinopril 5 mg daily for her blood pressure and to monitor her BP.  Advised to bring her BP machine in next time.  Advised to follow-up on imaging in the system.    The patient indicates understanding of these issues and agrees to the plan.  Return in about 6 weeks (around 5/30/2024) for med check, HTN check, pain check.

## 2024-04-20 ENCOUNTER — LAB ENCOUNTER (OUTPATIENT)
Dept: LAB | Age: 44
End: 2024-04-20
Attending: FAMILY MEDICINE
Payer: COMMERCIAL

## 2024-04-20 DIAGNOSIS — Z79.899 MEDICATION MANAGEMENT: ICD-10-CM

## 2024-04-20 DIAGNOSIS — E78.5 DYSLIPIDEMIA: ICD-10-CM

## 2024-04-20 DIAGNOSIS — M19.90 OSTEOARTHRITIS: Primary | ICD-10-CM

## 2024-04-20 DIAGNOSIS — M19.90 OSTEOARTHRITIS, UNSPECIFIED OSTEOARTHRITIS TYPE, UNSPECIFIED SITE: ICD-10-CM

## 2024-04-20 DIAGNOSIS — E11.9 TYPE 2 DIABETES MELLITUS WITHOUT COMPLICATION, WITHOUT LONG-TERM CURRENT USE OF INSULIN (HCC): ICD-10-CM

## 2024-04-20 LAB
ALBUMIN SERPL-MCNC: 4.3 G/DL (ref 3.2–4.8)
ALBUMIN/GLOB SERPL: 1 {RATIO} (ref 1–2)
ALP LIVER SERPL-CCNC: 71 U/L
ALT SERPL-CCNC: 9 U/L
ANION GAP SERPL CALC-SCNC: 10 MMOL/L (ref 0–18)
AST SERPL-CCNC: 19 U/L (ref ?–34)
BILIRUB SERPL-MCNC: 0.5 MG/DL (ref 0.3–1.2)
BUN BLD-MCNC: 9 MG/DL (ref 9–23)
BUN/CREAT SERPL: 9.5 (ref 10–20)
CALCIUM BLD-MCNC: 9.5 MG/DL (ref 8.7–10.4)
CHLORIDE SERPL-SCNC: 104 MMOL/L (ref 98–112)
CHOLEST SERPL-MCNC: 167 MG/DL (ref ?–200)
CO2 SERPL-SCNC: 27 MMOL/L (ref 21–32)
CREAT BLD-MCNC: 0.95 MG/DL
CREAT UR-SCNC: 210.4 MG/DL
CRP SERPL-MCNC: 0.9 MG/DL (ref ?–1)
EGFRCR SERPLBLD CKD-EPI 2021: 75 ML/MIN/1.73M2 (ref 60–?)
ERYTHROCYTE [SEDIMENTATION RATE] IN BLOOD: 68 MM/HR
EST. AVERAGE GLUCOSE BLD GHB EST-MCNC: 120 MG/DL (ref 68–126)
FASTING PATIENT LIPID ANSWER: YES
FASTING STATUS PATIENT QL REPORTED: YES
GLOBULIN PLAS-MCNC: 4.2 G/DL (ref 2.8–4.4)
GLUCOSE BLD-MCNC: 94 MG/DL (ref 70–99)
HBA1C MFR BLD: 5.8 % (ref ?–5.7)
HDLC SERPL-MCNC: 56 MG/DL (ref 40–59)
LDLC SERPL CALC-MCNC: 91 MG/DL (ref ?–100)
MICROALBUMIN UR-MCNC: 0.6 MG/DL
MICROALBUMIN/CREAT 24H UR-RTO: 2.9 UG/MG (ref ?–30)
NONHDLC SERPL-MCNC: 111 MG/DL (ref ?–130)
OSMOLALITY SERPL CALC.SUM OF ELEC: 290 MOSM/KG (ref 275–295)
POTASSIUM SERPL-SCNC: 3.8 MMOL/L (ref 3.5–5.1)
PROT SERPL-MCNC: 8.5 G/DL (ref 5.7–8.2)
RHEUMATOID FACT SERPL-ACNC: <10 IU/ML (ref ?–14)
SODIUM SERPL-SCNC: 141 MMOL/L (ref 136–145)
TRIGL SERPL-MCNC: 113 MG/DL (ref 30–149)
URATE SERPL-MCNC: 7.2 MG/DL
VLDLC SERPL CALC-MCNC: 18 MG/DL (ref 0–30)

## 2024-04-20 PROCEDURE — 80061 LIPID PANEL: CPT

## 2024-04-20 PROCEDURE — 82570 ASSAY OF URINE CREATININE: CPT

## 2024-04-20 PROCEDURE — 84550 ASSAY OF BLOOD/URIC ACID: CPT

## 2024-04-20 PROCEDURE — 86431 RHEUMATOID FACTOR QUANT: CPT

## 2024-04-20 PROCEDURE — 86140 C-REACTIVE PROTEIN: CPT

## 2024-04-20 PROCEDURE — 85652 RBC SED RATE AUTOMATED: CPT

## 2024-04-20 PROCEDURE — 82043 UR ALBUMIN QUANTITATIVE: CPT

## 2024-04-20 PROCEDURE — 86038 ANTINUCLEAR ANTIBODIES: CPT

## 2024-04-20 PROCEDURE — 86200 CCP ANTIBODY: CPT

## 2024-04-20 PROCEDURE — 83036 HEMOGLOBIN GLYCOSYLATED A1C: CPT

## 2024-04-20 PROCEDURE — 86225 DNA ANTIBODY NATIVE: CPT

## 2024-04-20 PROCEDURE — 80053 COMPREHEN METABOLIC PANEL: CPT

## 2024-04-20 PROCEDURE — 36415 COLL VENOUS BLD VENIPUNCTURE: CPT

## 2024-04-24 LAB
CCP IGG SERPL-ACNC: 2.6 U/ML (ref 0–6.9)
DSDNA IGG SERPL IA-ACNC: 3.4 IU/ML
ENA AB SER QL IA: 5.3 UG/L
ENA AB SER QL IA: POSITIVE

## 2024-04-25 LAB
CENTROMERE IGG SER-ACNC: 0.5 U/ML
ENA JO1 AB SER IA-ACNC: 0.3 U/ML
ENA RNP IGG SER IA-ACNC: 4.5 U/ML
ENA SCL70 IGG SER IA-ACNC: 1.4 U/ML
ENA SM IGG SER IA-ACNC: <0.7 U/ML
ENA SS-A IGG SER IA-ACNC: 28.1 U/ML
ENA SS-B IGG SER IA-ACNC: 0.5 U/ML
U1 SNRNP IGG SER IA-ACNC: 2.1 U/ML

## 2024-05-12 DIAGNOSIS — G89.4 CHRONIC PAIN SYNDROME: ICD-10-CM

## 2024-05-12 DIAGNOSIS — F11.20 NARCOTIC DEPENDENCE (HCC): ICD-10-CM

## 2024-05-12 DIAGNOSIS — R10.2 PELVIC PAIN: ICD-10-CM

## 2024-05-12 DIAGNOSIS — I10 ESSENTIAL HYPERTENSION: ICD-10-CM

## 2024-05-14 RX ORDER — LISINOPRIL 5 MG/1
5 TABLET ORAL DAILY
Qty: 30 TABLET | Refills: 2 | Status: SHIPPED | OUTPATIENT
Start: 2024-05-14

## 2024-05-14 RX ORDER — TRAMADOL HYDROCHLORIDE 50 MG/1
100 TABLET ORAL EVERY 8 HOURS PRN
Qty: 180 TABLET | Refills: 0 | Status: SHIPPED | OUTPATIENT
Start: 2024-05-14 | End: 2024-06-13

## 2024-05-14 NOTE — TELEPHONE ENCOUNTER
Last office visit: 04/18/24  Protocol: PASS    Requested medication(s) are due for refill today: Yes    Requested medication(s) are on the active medication list same strength, form, dose/ sig: Yes    Requested medication(s) are managed by provider: Yes    Patient has already received a courtsey refill: No    NOV: 06/28/24

## 2024-05-14 NOTE — TELEPHONE ENCOUNTER
Covering for Dr. Frye, refill of tramadol and lisinopril sent.     Jeffery Gonzalez MD, 05/14/24, 11:35 AM

## 2024-06-10 ENCOUNTER — OFFICE VISIT (OUTPATIENT)
Dept: FAMILY MEDICINE CLINIC | Facility: CLINIC | Age: 44
End: 2024-06-10
Payer: COMMERCIAL

## 2024-06-10 VITALS
BODY MASS INDEX: 20.66 KG/M2 | OXYGEN SATURATION: 95 % | SYSTOLIC BLOOD PRESSURE: 118 MMHG | HEART RATE: 67 BPM | DIASTOLIC BLOOD PRESSURE: 80 MMHG | HEIGHT: 64 IN | RESPIRATION RATE: 16 BRPM | WEIGHT: 121 LBS

## 2024-06-10 DIAGNOSIS — Z79.899 MEDICATION MANAGEMENT: ICD-10-CM

## 2024-06-10 DIAGNOSIS — E11.9 TYPE 2 DIABETES MELLITUS WITHOUT COMPLICATION, WITHOUT LONG-TERM CURRENT USE OF INSULIN (HCC): Primary | ICD-10-CM

## 2024-06-10 DIAGNOSIS — Z00.00 LABORATORY EXAMINATION ORDERED AS PART OF A ROUTINE GENERAL MEDICAL EXAMINATION: ICD-10-CM

## 2024-06-10 DIAGNOSIS — N92.6 IRREGULAR PERIODS: ICD-10-CM

## 2024-06-10 DIAGNOSIS — Z13.89 SCREENING FOR GENITOURINARY CONDITION: ICD-10-CM

## 2024-06-10 DIAGNOSIS — G89.4 CHRONIC PAIN SYNDROME: ICD-10-CM

## 2024-06-10 DIAGNOSIS — F11.20 NARCOTIC DEPENDENCE (HCC): ICD-10-CM

## 2024-06-10 DIAGNOSIS — E78.5 DYSLIPIDEMIA: ICD-10-CM

## 2024-06-10 DIAGNOSIS — K64.8 INTERNAL HEMORRHOIDS: ICD-10-CM

## 2024-06-10 DIAGNOSIS — R10.2 PELVIC PAIN: ICD-10-CM

## 2024-06-10 RX ORDER — TRAMADOL HYDROCHLORIDE 50 MG/1
100 TABLET ORAL EVERY 8 HOURS PRN
Qty: 180 TABLET | Refills: 2 | Status: SHIPPED | OUTPATIENT
Start: 2024-06-10 | End: 2024-09-08

## 2024-06-10 NOTE — PROGRESS NOTES
Marielos Reid is a 43 year old female.  HPI:   Pt. Complains of internal hemorrhoids.  Pt. Started 3 days ago on her heavy period after missing period last month.  Changing a pad every hour.  Pt. Continues to have chronic pain even now that the weather is warmer, her pain seems to increase.  Has not been able to get ellen rheum as of yet.  Pt. Complains of pain at PIP joints of both hands.  Pt. Would like a refill on tramadol starting today.  She states she has a few pills left.    Current Outpatient Medications   Medication Sig Dispense Refill    traMADol 50 MG Oral Tab Take 2 tablets (100 mg total) by mouth every 8 (eight) hours as needed for Pain. 180 tablet 0    lisinopril 5 MG Oral Tab Take 1 tablet (5 mg total) by mouth daily. 30 tablet 2    metFORMIN 500 MG Oral Tab Take 1 tablet (500 mg total) by mouth 2 (two) times daily. 60 tablet 2    ondansetron (ZOFRAN) 4 mg tablet Take 1 tablet (4 mg total) by mouth every 8 (eight) hours as needed for Nausea. 30 tablet 0     No current facility-administered medications for this visit.      Allergies   Allergen Reactions    Advil [Ibuprofen] SWELLING    Ketorolac SWELLING     Patient received a subacromial right shoulder corticosteroid and ketorolac injection and developed right eye a mild swelling that was self-limited over the course of 24 hours.      Past Medical History:    Abdominal pain    Arthritis    Back pain    Back problem    low back pain    Blood in urine    Encounter for insertion of ParaGard IUD    Lot# [id number] Exp 01/2022    General counseling for prescription of oral contraceptives    Heartburn    Irritable bowel syndrome    Nausea    Other screening mammogram    Pain in joints    Prediabetes    Routine Papanicolaou smear    Visual impairment    glasses and contacts    Vomiting    Wears glasses      Social History:  Social History     Socioeconomic History    Marital status:    Tobacco Use    Smoking status: Never    Smokeless tobacco: Never    Vaping Use    Vaping status: Never Used   Substance and Sexual Activity    Alcohol use: No    Drug use: No    Sexual activity: Yes     Partners: Male   Other Topics Concern    Caffeine Concern Yes     Comment: 2 cups tea or coffee/day    Exercise Yes     Comment: rare    Seat Belt Yes     Social Determinants of Health      Received from [facility]        Results for orders placed or performed in visit on 04/20/24   Hemoglobin A1C [E]   Result Value Ref Range    HgbA1C 5.8 (H) <5.7 %    Estimated Average Glucose 120 68 - 126 mg/dL   Lipid Panel [E]   Result Value Ref Range    Cholesterol, Total 167 <200 mg/dL    HDL Cholesterol 56 40 - 59 mg/dL    Triglycerides 113 30 - 149 mg/dL    LDL Cholesterol 91 <100 mg/dL    VLDL 18 0 - 30 mg/dL    Non HDL Chol 111 <130 mg/dL    Patient Fasting for Lipid? Yes    Comp Metabolic Panel (14) [E]   Result Value Ref Range    Glucose 94 70 - 99 mg/dL    Sodium 141 136 - 145 mmol/L    Potassium 3.8 3.5 - 5.1 mmol/L    Chloride 104 98 - 112 mmol/L    CO2 27.0 21.0 - 32.0 mmol/L    Anion Gap 10 0 - 18 mmol/L    BUN 9 9 - 23 mg/dL    Creatinine 0.95 0.55 - 1.02 mg/dL    BUN/CREA Ratio 9.5 (L) 10.0 - 20.0    Calcium, Total 9.5 8.7 - 10.4 mg/dL    Calculated Osmolality 290 275 - 295 mOsm/kg    eGFR-Cr 75 >=60 mL/min/1.73m2    ALT 9 (L) 10 - 49 U/L    AST 19 <=34 U/L    Alkaline Phosphatase 71 37 - 98 U/L    Bilirubin, Total 0.5 0.3 - 1.2 mg/dL    Total Protein 8.5 (H) 5.7 - 8.2 g/dL    Albumin 4.3 3.2 - 4.8 g/dL    Globulin  4.2 2.8 - 4.4 g/dL    A/G Ratio 1.0 1.0 - 2.0    Patient Fasting for CMP? Yes    Rheumatoid Arthritis Factor   Result Value Ref Range    Rheumatoid Factor <10 <14 IU/mL   Uric Acid, Serum   Result Value Ref Range    Uric Acid 7.2 3.1 - 7.8 mg/dL   Cyclic Citrullinate Peptide (CCP) antibodies   Result Value Ref Range    C-Citrullinated Peptide IgG AB 2.6 0.0 - 6.9 U/mL   Sed Rate, Renetta (Automated)   Result Value Ref Range    Sed Rate 68 (H) 0 -  20 mm/Hr   C-Reactive Protein   Result Value Ref Range    C-Reactive Protein 0.90 <1.00 mg/dL   Microalb/Creat Ratio, Random Urine [E]   Result Value Ref Range    Microalbumin, Urine 0.60 mg/dL    Creatinine Ur Random 210.40 mg/dL    Malb/Cre Calc 2.9 <=30.0 ug/mg   Connective Tissue Disease (SHANA) Screen, Reflex Specific Antibody   Result Value Ref Range    Expanded FABIO Antibody Screen, IGG 5.30 (H) <0.7 ug/l    Anti-dsDNA antibody 3.4 <10 IU/mL    Connective Tissue Disease Screen Interpretation Positive (A) Negative   SHANA Specific Antibodies, Reflex Group   Result Value Ref Range    Anti-SSA Antibody, IGG 28.1 (H) <7 U/mL    Anti-SSB Antibody, IGG 0.5 <7 U/mL    Anti-Smith Antibody, IGG <0.7 <7 U/mL    Anti-U1RNP Antibody, IGG 2.1 <5 U/mL    Anti-RNP70 Antibody, IGG 4.5 <7 U/mL    Anti-Centromere Antibody, IGG 0.5 <7 U/mL    Anti-SCL70 Antibody, IGG 1.4 <7 U/mL    Anti-Yu-1 Antibody, IGG 0.3 <7 U/mL       REVIEW OF SYSTEMS:   GENERAL: feels well otherwise  SKIN: denies any unusual skin lesions  LUNGS: denies shortness of breath with exertion  CARDIOVASCULAR: denies chest pain on exertion  GI: chronic abdominal pain,denies heartburn  : abnormal period  MUSCULOSKELETAL: chronic pain and joint pain in her hands  EXTREMITIES:  No pain or numbness    EXAM:   /80 (BP Location: Left arm, Patient Position: Sitting, Cuff Size: adult)   Pulse 67   Resp 16   Ht 5' 4\" (1.626 m)   Wt 121 lb (54.9 kg)   LMP 06/08/2024 (Exact Date)   SpO2 95%   BMI 20.77 kg/m²   GENERAL: well developed, well nourished,in no apparent distress  SKIN: no rashes,no suspicious lesions  HEENT: atraumatic, normocephalic; conjunctiva bright pink as well as her hands  NECK: supple,no adenopathy,no bruits  LUNGS: clear to auscultation  CARDIO: RRR without murmur  GI: soft, good BS's, general tenderness on exam  EXTREMITIES: no cyanosis, clubbing or edema; mild swelling noted at PIP joints -- mostly joints 3 and 4  Bilateral barefoot skin  diabetic exam is normal, visualized feet and the appearance is normal.  Bilateral monofilament/sensation of both feet is normal.  Pulsation pedal pulse exam of both lower legs/feet is normal as well.       ASSESSMENT AND PLAN:     Encounter Diagnoses   Name Primary?    Type 2 diabetes mellitus without complication, without long-term current use of insulin (HCC) Yes    Dyslipidemia     Medication management     Internal hemorrhoids     Irregular periods     Chronic pain syndrome     Narcotic dependence (HCC)     Pelvic pain     Laboratory examination ordered as part of a routine general medical examination     Screening for genitourinary condition        Orders Placed This Encounter   Procedures    CBC With Differential With Platelet    Comp Metabolic Panel (14)    TSH W Reflex To Free T4    Lipid Panel    Vitamin D, 25-Hydroxy    Hemoglobin A1C    Urinalysis, Routine       Meds & Refills for this Visit:  Requested Prescriptions     Signed Prescriptions Disp Refills    traMADol 50 MG Oral Tab 180 tablet 2     Sig: Take 2 tablets (100 mg total) by mouth every 8 (eight) hours as needed for Pain.       Imaging & Consults:  SURGERY - INTERNAL    Meds refilled.  Advised surgery -- Dr. Montanez for her internal hemorrhoids.  Monitor period.  Labs ordered for physical.  I changing her pad less than every hour/ hemorrhaging -- to ER for eval.     The patient indicates understanding of these issues and agrees to the plan.  Return in about 3 months (around 9/10/2024) for physical.

## 2024-06-11 ENCOUNTER — OFFICE VISIT (OUTPATIENT)
Dept: RHEUMATOLOGY | Facility: CLINIC | Age: 44
End: 2024-06-11
Payer: COMMERCIAL

## 2024-06-11 VITALS
TEMPERATURE: 98 F | OXYGEN SATURATION: 99 % | DIASTOLIC BLOOD PRESSURE: 66 MMHG | RESPIRATION RATE: 16 BRPM | HEART RATE: 89 BPM | HEIGHT: 64 IN | WEIGHT: 123 LBS | BODY MASS INDEX: 21 KG/M2 | SYSTOLIC BLOOD PRESSURE: 110 MMHG

## 2024-06-11 DIAGNOSIS — M35.00 SJOGREN'S SYNDROME, WITH UNSPECIFIED ORGAN INVOLVEMENT (HCC): ICD-10-CM

## 2024-06-11 DIAGNOSIS — G62.9 NEUROPATHY: ICD-10-CM

## 2024-06-11 DIAGNOSIS — Z87.19 HISTORY OF IBS: ICD-10-CM

## 2024-06-11 DIAGNOSIS — M62.9 HAMSTRING TIGHTNESS OF BOTH LOWER EXTREMITIES: ICD-10-CM

## 2024-06-11 DIAGNOSIS — M79.7 FIBROMYALGIA: Primary | ICD-10-CM

## 2024-06-11 DIAGNOSIS — I73.00 RAYNAUD'S DISEASE WITHOUT GANGRENE: ICD-10-CM

## 2024-06-11 DIAGNOSIS — M15.9 PRIMARY OSTEOARTHRITIS INVOLVING MULTIPLE JOINTS: ICD-10-CM

## 2024-06-11 DIAGNOSIS — F11.20 NARCOTIC DEPENDENCE (HCC): ICD-10-CM

## 2024-06-11 PROBLEM — M15.0 PRIMARY OSTEOARTHRITIS INVOLVING MULTIPLE JOINTS: Status: ACTIVE | Noted: 2024-06-11

## 2024-06-11 PROCEDURE — 99205 OFFICE O/P NEW HI 60 MIN: CPT | Performed by: INTERNAL MEDICINE

## 2024-06-11 PROCEDURE — 3008F BODY MASS INDEX DOCD: CPT | Performed by: INTERNAL MEDICINE

## 2024-06-11 PROCEDURE — 3078F DIAST BP <80 MM HG: CPT | Performed by: INTERNAL MEDICINE

## 2024-06-11 PROCEDURE — 3074F SYST BP LT 130 MM HG: CPT | Performed by: INTERNAL MEDICINE

## 2024-06-11 RX ORDER — GABAPENTIN 100 MG/1
300 CAPSULE ORAL NIGHTLY
Qty: 90 CAPSULE | Refills: 2 | Status: SHIPPED | OUTPATIENT
Start: 2024-06-11

## 2024-06-11 NOTE — PROGRESS NOTES
[facility], [address] [place] [address]      Consult     Marielos Reid Patient Status:  No patient class for patient encounter    1980 MRN [id number]   Location [facility], [address] [place] [address] Attending No att. providers found   Hosp Day # 0 PCP Giovanna Parker DO     Referring Provider: PCP    Reason for Consultation:       Component  Ref Range & Units 24  7:55 AM   Anti-SSA Antibody, IGG  <7 U/mL 28.1 High        Subjective:    Marielos Reid is a 43 year old female with for further evaluation for chronic back pain after  and incidental positive SSA antibody.     She states she has history of chronic joint pain since her teenager    Elevated esr for many years. Pain is described subjective swelling feeling of tightness and spasm. Shooting no burning. All over pain generalized and ankles.     Denies any pain in her shoulders, elbows , wrists or hands (sporadic pain; worse in winter) recently more pain and summer time     Has some knee pain; low back; hip pain and off chronic neck (tightness)     Over the years (has seen few rheumatologist with the last 1) Dr Quarles; tried hydroxychloroquine (more that 6 months) no change in symptoms     Had issues with rotator cuff tendonitis (hematoma) reattachment (2019)    Tried lyrica at that time and helped; not gabapentin; cymbalta tried (helped when she was on it)    Tried antiinflammatories (naproxen; mobic celebrex) diclfenac    Tried cyclobenzaprine (tizadine) ? Others no significant benefit    (Tried norco; good temporary) Taking tramadol now as needed some days 50mg (takes 3 per day) (chronically)     Denies any history of DVT PE TIA CVA seizures migraines or headaches (not very often)     States no shortness of breath ,chest pain ,fevers ,chills (last stress test was a few years ago)     States no urinary or bowel symptoms; bloody stools (chronic abdominal pain in  CT abdomen; did colonoscopy negative and EGD  negative) had some bacterial infection and treated for 6 weeks; according to records she has IBS she was not aware of this diagnosis    Still has some nausea and vomiting (bowel movement is good) ; recently hemmoroids (internal) bulging outside     States no jaw pain, vision changes (grinding teeth) (no mouth guards)     States no history of pericardial, pleural effusions    States no significant dry eyes or dry mouth (no dryness that significant)     States no history of uveitis iritis scleritis (eye infection; viral) antiviral (herpes) few years ago     ? Raynaud's (whitish; reddish; numbness) worse in winter; NO  digital ulcerations    States no major weight changes; night sweats    Her weight has been stable    States no history of photosensitive or malar rash.    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     Time to time exercise moving around     Emg was done a year ago  had carpal tunnel surgery (both sides) DR Ross; improvement symptoms     States her mother has issues with chronic pain      History/Other:      Past Medical History:  Past Medical History:    Abdominal pain    Arthritis    Back pain    Back problem    low back pain    Blood in urine    Encounter for insertion of ParaGard IUD    Lot# [id number] Exp 01/2022    General counseling for prescription of oral contraceptives    Heartburn    Irritable bowel syndrome    Nausea    Other screening mammogram    Pain in joints    Prediabetes    Routine Papanicolaou smear    Visual impairment    glasses and contacts    Vomiting    Wears glasses        Past Surgical History:   Past Surgical History:   Procedure Laterality Date    Colonoscopy      Colonoscopy      Colonoscopy N/A 03/01/2021    Procedure: COLONOSCOPY;  Surgeon: Louis Pereira MD;  Location:  ENDOSCOPY    Other surgical history  04/08/2014    cystoscopy- Dr. Abbott     Salpingectomy Right 2020    with oopherectomy    Shoulder arthroscopy Left 12/05/2019    Dr Lozoya       Social History:  reports that she has never smoked. She has never used smokeless tobacco. She reports that she does not drink alcohol and does not use drugs.    Family History:   Family History   Problem Relation Age of Onset    Diabetes Father     Hypertension Mother        Allergies:   Allergies   Allergen Reactions    Advil [Ibuprofen] SWELLING    Ketorolac SWELLING     Patient received a subacromial right shoulder corticosteroid and ketorolac injection and developed right eye a mild swelling that was self-limited over the course of 24 hours.       Current Medications:  Current Outpatient Medications   Medication Sig Dispense Refill    gabapentin 100 MG Oral Cap Take 3 capsules (300 mg total) by mouth nightly. 100mg at bedtime x 1 week then 200mg at bedtime x 1 week then 300mg at bedtime 90 capsule 2    traMADol 50 MG Oral Tab Take 2 tablets (100 mg total) by mouth every 8 (eight) hours as needed for Pain. 180 tablet 2    lisinopril 5 MG Oral Tab Take 1 tablet (5 mg total) by mouth daily. 30 tablet 2    metFORMIN 500 MG Oral Tab Take 1 tablet (500 mg total) by mouth 2 (two) times daily. 60 tablet 2    ondansetron (ZOFRAN) 4 mg tablet Take 1 tablet (4 mg total) by mouth every 8 (eight) hours as needed for Nausea. 30 tablet 0      No current facility-administered medications for this visit.     (Not in a hospital admission)      Review of Systems:     Constitutional: Negative for chills, ,+ fatigue, fever and unexpected weight change.    HENT: Negative for congestion, and mouth sores.    Eyes: Negative for photophobia, pain, redness and visual disturbance.    Respiratory: Negative for apnea, cough, chest tightness, shortness of breath, wheezing and stridor.    Cardiovascular: Negative for chest pain, palpitations and leg swelling.    Gastrointestinal: Negative for abdominal distention, abdominal pain, blood in  stool, constipation, diarrhea and nausea.    Endocrine: Negative.     Genitourinary: Negative for decreased urine volume, difficulty urinating, dyspareunia, dysuria, flank pain, and frequency.    Musculoskeletal: + arthralgias, no gait problem and subjective swelling.    Skin: Negative for color change, pallor and rash. No raynauds or digital ulcerations no sclerodactly.    Allergic/Immunologic: Negative.    Neurological: Negative for dizziness, tremors, seizures, syncope, speech difficulty, weakness, light-headedness,+ numbness and occasional headaches.    Hematological: Does not bruise/bleed easily.    Psychiatric/Behavioral: Negative for confusion, decreased concentration, hallucinations, self-injury, +sleep disturbance and no suicidal ideas or depression.    Objective:   Vitals:    06/11/24 0856   BP: 110/66   Pulse: 89   Resp: 16   Temp: 98 °F (36.7 °C)          Constitutional: is oriented to person, place, and time. Appears well-developed and well-nourished. No distress.    HEENT: Normocephalic; EOMI; no jvd; no LAD; no oral or nasal ulcers.     Eyes: Conjunctivae and EOM are normal. Pupils are equal, round, and reactive to light.     Neck: Normal range of motion. No thyromegaly present.    Cardiovascular: RRR, no murmurs.    Lungs: Clear, Bilateral air entry, no wheezes.    Abdominal: Soft.    Musculoskeletal:         Joint Exam 06/11/2024        Right  Left   Sternoclavicular   Tender   Tender   Acromioclavicular   Tender   Tender   Elbow   Tender   Tender   CMC   Tender      PIP 2 (finger)      Tender   PIP 3 (finger)   Tender   Tender   PIP 4 (finger)   Tender   Tender   PIP 5 (finger)   Tender      Thoracic Spine   Tender      Lumbar Spine   Tender      Sacroiliac   Tender   Tender   Hip   Tender   Tender   Knee   Tender   Tender   Ankle   Tender   Tender        Swollen: 0      Tender: 23          Right shoulder: Exhibits normal range of motion on abduction and internal rotation, no tenderness, no bony  tenderness, no deformity, no laceration, no pain and no spasm.        Left shoulder: Exhibits normal range of motion on abduction and internal and external rotation.  no tenderness, no bony tenderness, no swelling, no effusion, no deformity, no pain, no spasm and normal strength.        Right elbow:  Exhibits normal range of motion, no swelling, no effusion and no deformity. No tenderness found. No medial epicondyle, no lateral epicondyle and no olecranon process tenderness noted. There are no contractures or tophi or nodules.        Left elbow:  Normal range of motion, no swelling, no effusion and no deformity. No medial epicondyle, no lateral epicondyle and no olecranon process tenderness noted. There are no contractures or tophi or nodules.        Right wrist:  Exhibits normal range of motion, no tenderness, no bony tenderness, no swelling, no effusion and no crepitus. Flexion and extension intact w/o limitation.        Left wrist: Exhibits normal range of motion, no tenderness, no bony tenderness, no swelling, no effusion, no crepitus and no deformity. Flexion and extension intact without limitation.        Right hip: Exhibits normal range of motion, normal strength, no tenderness, no bony tenderness, no swelling and no crepitus.        Right hand: No synovitis of MCP,PIP or DIP joints; very small scattered Bouchards very small scattered Heberden nodules noted;  strength: 100%.  Mild squaring first CMC joint        Left hand: No synovitis of MCP,PIP or DIP joints; very small scattered Bouchards very small scattered Heberden nodules noted;  strength: 100%.  Mild squaring first CMC joint        Left hip: Exhibits normal range of motion, normal strength, no tenderness, no bony tenderness, No swelling and no crepitus.        Right knee: Exhibits normal range of motion, no swelling, no effusion, no ecchymosis, no deformity and no erythema. No tenderness found. No medial joint line, no lateral joint line, no  MCL and no LCL tenderness noted. No crepitation on flexion of knee and extension normal.        Left knee:  Exhibits normal range of motion, no swelling, no effusion, no ecchymosis and no erythema. No tenderness found. No medial joint line, no lateral joint line and no patellar tendon tenderness noted. No crepitation on flexion of the knee. Extension intact and normal.        Right ankle: No swelling, no deformity. No tenderness. Dorsiflexion and plantar flexion intact without limitation in range of motion.        Left ankle: Exhibits no swelling. No tenderness. No lateral malleolus and no medial malleolus tenderness found. Achilles tendon normal. Achilles tendon exhibits no pain, no defect and normal Burris's test results.  Dorsiflexion and plantar flexion intact without limitation in range of motion.        Cervical back: Exhibits normal range of motion, no tenderness, no bony tenderness, no swelling, no pain and + spasm.        Thoracic back: Exhibits normal range of motion, no tenderness, no bony tenderness and + spasm.        Lumbar back:  Exhibits normal range of motion, no tenderness, no bony tenderness, no pain and + spasm.    Full range of motion of the hips internal/external Tatian tenderness of the trochanteric bursa no erythema warmth    Tightness hamstrings bilaterally on extension        Right foot: normal. There is normal range of motion, no tenderness, no bony tenderness, no crepitus and no laceration. There is no synovitis or tenderness of the MTP joints to palpation.        Left foot: normal. There is normal range of motion, no tenderness, no bony tenderness and no crepitus. There is no synovitis or tenderness of the MTP joints to palpation.    Lymphadenopathy: No submental, no submandibular, and no occipital adenopathy present, has no cervical adenopathy or axillary lympadenopathy.    Neurological: Alert and oriented. No focal motor or sensory abnormalities. Strength is 5/5 Upper  Extremities/Lower Extremities proximally and distally.    Skin: Skin is warm, dry and intact.  Mild Raynaud's without digital ulcerations    Psychiatric: Normal behavior.    Results:    Labs:      Lab Results   Component Value Date    WBC 7.7 09/08/2023    RBC 4.60 09/08/2023    HGB 13.2 09/08/2023    HCT 40.9 09/08/2023    MCV 88.9 09/08/2023    MCH 28.7 09/08/2023    MCHC 32.3 09/08/2023    RDW 12.5 09/08/2023    .0 09/08/2023    MPV 9.9 01/06/2011       No components found for: \"RELY\", \"NMET\", \"MYEL\", \"PROMY\", \"ALMA\", \"ABSNEUTS\", \"ABSBANDS\", \"ABMM\", \"ABMY\", \"ABPM\", \"ABBL\"      Lab Results   Component Value Date     04/20/2024    K 3.8 04/20/2024    CO2 27.0 04/20/2024    BUN 9 04/20/2024    GFR 83 07/30/2016    ALB 4.3 04/20/2024    AST 19 04/20/2024    ALT 9 (L) 04/20/2024          No components found for: \"ESRWESTERGRN\"       Lab Results   Component Value Date    CRP 0.90 04/20/2024         Lab Results   Component Value Date    CLARITY Clear 05/30/2023    UROBILINOGEN <2.0 05/30/2023     @LABRCNTIP(RF,B12)@      [unfilled]        Component  Ref Range & Units 4/20/24  7:55 AM   Anti-SSA Antibody, IGG  <7 U/mL 28.1 High    Comment: Detection of SS-A/Ro antibodies is of interest and significance for the clinical diagnosis of SLE (prevalence 40-50%) and Sjogren's syndrome (prevalence 60-75% for primary Sjogren's syndrome).   Anti-SSB Antibody, IGG  <7 U/mL 0.5   Anti-Smith Antibody, IGG  <7 U/mL <0.7   Anti-U1RNP Antibody, IGG  <5 U/mL 2.1   Anti-RNP70 Antibody, IGG  <7 U/mL 4.5   Anti-Centromere Antibody, IGG  <7 U/mL 0.5   Anti-SCL70 Antibody, IGG  <7 U/mL 1.4   Anti-Yu-1 Antibody, IGG  <7 U/mL 0.3   Resulting Agency [facility] ([facility])              Narrative  Performed by: [facility] ([facility])  Interpretive Ranges:    Anti-U1RNP Antibody:  Equivocal: 5-10 U/mL  Positive: >10 U/mL            Component  Ref Range & Units 4/20/24  7:55 AM   Expanded FABIO Antibody Screen, IGG  <0.7 ug/l 5.30 High     Anti-dsDNA antibody  <10 IU/mL 3.4   Connective Tissue Disease Screen Interpretation  Negative Positive Abnormal    Resulting Agency [facility] ([facility])              Narrative  Performed by: [facility] ([facility])  SHANA screen determined using [employer] Cristal by fluorescent enzyme immunoassay. Both a specific dsDNA test along with an FABIO screen detecting antibodies to recombinant U1RNP (RNP 70, A, C), SS-A/Ro, SS-B/La, Centromere B, Scl-70, Yu-1 proteins and a synthetic SmD3 peptide are utilized to determine SHANA screen results.        omponent  Ref Range & Units 4/20/24  7:55 AM   C-Reactive Protein  <1.00 mg/dL 0.90         Component  Ref Range & Units 4/20/24  7:55 AM   Sed Rate  0 - 20 mm/Hr 68 High               Component  Ref Range & Units 4/20/24  7:55 AM   C-Citrullinated Peptide IgG AB  0.0 - 6.9 U/mL 2.6       Result Notes      Component  Ref Range & Units 4/20/24  7:55 AM   Uric Acid  3.1 - 7.8 mg/dL 7.2        omponent  Ref Range & Units 4/20/24  7:55 AM   Rheumatoid Factor  <14 IU/mL <10   2 Result Notes       1 HM Topic      Component  Ref Range & Units 4/20/24  7:55 AM   Glucose  70 - 99 mg/dL 94   Sodium  136 - 145 mmol/L 141   Potassium  3.5 - 5.1 mmol/L 3.8   Chloride  98 - 112 mmol/L 104   CO2  21.0 - 32.0 mmol/L 27.0   Anion Gap  0 - 18 mmol/L 10   BUN  9 - 23 mg/dL 9   Creatinine  0.55 - 1.02 mg/dL 0.95   BUN/CREA Ratio  10.0 - 20.0 9.5 Low    Calcium, Total  8.7 - 10.4 mg/dL 9.5   Calculated Osmolality  275 - 295 mOsm/kg 290   eGFR-Cr  >=60 mL/min/1.73m2 75   ALT  10 - 49 U/L 9 Low    AST  <=34 U/L 19   Alkaline Phosphatase  37 - 98 U/L 71   Bilirubin, Total  0.3 - 1.2 mg/dL 0.5   Total Protein  5.7 - 8.2 g/dL 8.5 High    Albumin  3.2 - 4.8 g/dL 4.3   Globulin  2.8 - 4.4 g/dL 4.2   A/G Ratio  1.0 - 2.0 1.0   Patient Fasting for CMP? Yes        Imaging:  No results found.  TECHNIQUE:  Unilateral 2 to 3 views of the hip and pelvis if performed.     COMPARISON:  None.     INDICATIONS:   M25.552 Left hip pain     PATIENT STATED HISTORY: (As transcribed by Technologist)  Patient is having an orthopedic evaluation.  Patient complains of severe pain in her Left hip.  She stated she had an MRI of the hip in 2019 that showed a tear in it.  Patient denies injury.         FINDINGS:    BONES:  The hip joint spaces are symmetric bilaterally.  There is no fracture, subluxation or dislocation identified.  No focal bony destructive lesion is seen.  SOFT TISSUES:  No visible soft tissue swelling.  EFFUSION:  None visible.  OTHER:  Negative.                   Impression   CONCLUSION:  No fracture, subluxation or dislocation in the left hip.  Further management of the hip pain including the need for additional imaging should be based clinically.           Narrative   PROCEDURE:  MRI BRAIN/MRA BRAIN (XDX=62393/[id number])     LOCATION:  [facility]       COMPARISON:  MR [facility], BRAIN W WO CONTRAST MRI, 4/19/2011, 2:28 PM.     INDICATIONS:  R51.9 Sudden onset of severe headache     TECHNIQUE:  MRI of the brain was performed with multi-planar T1, T2-weighted images with FLAIR sequences and diffusion weighted images without infusion.  MR angiography of the brain without infusion was performed using 3D time of flight, multi-planar and   3D reconstructed images.  All measurements obtained in this exam were performed using NASCET criteria.     PATIENT STATED HISTORY: (As transcribed by Technologist)  Patient says she had sudden onset of headache and vomiting a few weeks ago.         FINDINGS:  Motion degraded examination.     MRI brain findings:     The left lateral ventricle is not enlarged but mildly larger than the right lateral ventricle.  This finding is not significantly changed since 4/19/2011 and likely developmental.  There is no midline shift or mass effect.  The basal cisterns are patent.         There is no acute intracranial hemorrhage or extra-axial fluid collection identified.  There is no parenchymal signal  abnormality identified.  There is no restricted diffusion to suggest acute ischemia/infarction.     The visualized paranasal sinuses and mastoid air cells are unremarkable.  The expected major intracranial flow voids are present.     MRA BRAIN  The MRA head is limited secondary to motion degradation.     The upper cervical, petrous, cavernous, and supraclinoid internal carotid arteries are patent.  An anterior communicating artery is seen.  The branches of the anterior cerebral and middle cerebral arteries are patent.  A moderate-sized left posterior  communicating artery is identified.  The branches of the posterior cerebral and superior cerebellar arteries are patent.  The basilar artery has a normal course and caliber.  The bilateral vertebral arteries are unremarkable.  The origins of the  bilateral PICA are seen.                     Impression   CONCLUSION:    1. No acute intracranial abnormality.  2. No high-grade stenosis, occlusion, or aneurysm is identified within the major intracranial arterial vasculature within the limitations of the motion degradation.           Narrative   PROCEDURE:  CTA ABDOMEN PELVIS (INCLUDES ILIAC ARTERIES)(CPT=74175)     LOCATION:  [place]       COMPARISON:  [place], US, US ABDOMEN COMPLETE WITH DOPPLER(CPT=76700/76874), 3/20/2023, 9:34 AM.  [facility] , CT, CT ABDOMEN PELVIS IV CONTRAST, NO ORAL (ER), 7/13/2021, 6:45 PM.     INDICATIONS:  R93.5 Abnormal abdominal ultrasound     TECHNIQUE:  CT images of the abdomen and pelvis were obtained pre- and post- injection of non-ionic intravenous contrast material. Multi-planar reformatted/3-D images were created to optimize visualization of vascular anatomy.  Dose reduction techniques  were used. Dose information is transmitted to the ACR (American College of Radiology) NRDR (National Radiology Data Registry) which includes the Dose Index Registry.     PATIENT STATED HISTORY:(As transcribed by Technologist)  Patient had abnormal Ultrasound  of Abdomen.      CONTRAST USED:  80cc of Isovue 370     FINDINGS:    LUNG BASES:  Dependent atelectasis bilaterally.  LIVER:  Normal in shape and contour.  BILIARY:  Gallbladder is contracted.  No intrahepatic biliary dilatation..  SPLEEN:  Normal.  No enlargement or focal lesion.  PANCREAS:  No russ-pancreatic inflammatory stranding  ADRENALS:  Normal.  No mass or enlargement.    KIDNEYS:  Kidneys are symmetrical in size without evidence of hydronephrosis.  BOWEL/MESENTERY:  Bowel is normal in caliber. No evidence of obstruction.  The appendix is normal in appearance.  PELVIS:  Bladder is unremarkable in appearance.  Calcified phleboliths within the pelvis.  Trace amount of free pelvic fluid.  Multiple cysts noted within the left adnexa, largest measuring 2.6 x 2.1 cm.  AORTA/VASCULAR:  Aorta is normal in caliber.  The common hepatic artery originates off the aorta with mild narrowing within the origin of less than 50%.  The splenic artery originates off the superior mesenteric artery.  No significant stenosis  identified within the superior mesenteric artery.  Renal arteries are patent.  The inferior mesenteric artery is patent.  Bifurcations unremarkable in appearance.  BONES:  No acute fractures.                   Impression   CONCLUSION:    1. No significant stenosis identified within the superior mesenteric artery.  The splenic artery originates off the superior mesenteric artery.  2. Common hepatic artery originates off the aorta with mild less than 50% narrowing noted within the proximal portion.  3. Multiple cysts noted within the left adnexa likely representing ovarian cysts.  If further evaluation is needed, consider ultrasound.  4. Trace amount of free pelvic fluid.          Narrative   PROCEDURE:  MRI SPINE LUMBAR (W+WO) (CPT=72158)     COMPARISON:  [address] [place] [address], , MRI SPINE LUMBAR (CPT=72148), 12/09/2021, 1:50 PM.     INDICATIONS:  R52 Uncontrolled pain F11.29 Opioid dependence with unspecified  opioid-induced disorder (HCC) G89.29 Chronic bilateral low back pain without sciatica M54.50 Ch*     TECHNIQUE:  Multiplanar T1 and T2 weighted images including fat suppression sequences.  Images acquired in sagittal and axial planes. Intravenous gadolinium was administered followed by multiplanar post-infusion T1 weighted sequences.       PATIENT STATED HISTORY:(As transcribed by Technologist)  Patient complains of chronic lower back pain which has become worse within the past few months      CONTRAST USED:  13 mL of Dotarem     FINDINGS:       There is mild levoscoliosis of the thoracolumbar spine.  There is lumbar lordosis.  The vertebral body heights and disc heights are maintained.  No significant disc desiccation.  No suspicious focal osseous lesion is evident.  The conus and cauda equina  nerve roots are unremarkable in caliber and signal.  At L5-S1, there is asymmetric mild facet hypertrophy, greater on the right.  No spinal canal or foraminal narrowing is identified at L5-S1.  No pathologic intrathecal enhancement is identified within  the lumbar spinal canal.                   Impression   CONCLUSION:    1. Mild facet arthropathy at L5-S1, right greater than left.  No spinal canal or foraminal narrowing is identified in the lumbar spine.  2. Mild levoscoliosis of the thoracolumbar spine.           Assessment & Plan:      43-year-old woman comes in for further evaluation for incidental positive SSA antibody elevated ESR CRP likely early Sjogren's syndrome with minimal to no sicca symptoms    Likely early Sjogren's syndrome with minimal sicca symptoms no systemic organ involvement evident  Mild osteoarthritis multiple joints  Fibromyalgia with multiple tender points nonrestorative sleep likely diagnosis of IBS chronic neuropathy  History of carpal tunnel syndrome status post bilateral carpal tunnel surgery per patient  Cervical lumbar spasm tightness    Patient has no obvious synovitis on exam  We will get  baseline x-rays of the hands suspect mild osteoarthritic changes  She has tried multiple anti-inflammatories also in the past has been on Lyrica Cymbalta on tramadol 100 mg every 8 hours through PCP/pain management  Encouraged weaning tramadol to lower doses she states she takes consistently at least 50 mg 3 times daily  Discussed the diagnosis of fibromyalgia in detail and trying to wean off narcotics which may help her bowel symptoms  We will continue to monitor for systemic organ involvement in terms of her Sjogren syndrome  Will get EMG upper lower extremities nerve conduction studies to see if there is any evidence of small fiber neuropathy and refer to neurology to see if she is a candidate for skin biopsy or other options of treatment including IVIG  If she develops overt swelling of her joints we may need to consider methotrexate or consider another trial of hydroxychloroquine  States she was on this for 6 months from previous rheumatologist with no benefit I see no need to really try this at this time  I have offered gabapentin.  Wrist side effects discussed start 100 mg at bedtime for 1 week 200 mg at bedtime for 1 week then 300 mg at bedtime for 1 week.  Risk side effects discussed.  She does have nonrestorative sleep and fatigue.  If she gets off narcotics could consider addition of Cymbalta through her PCP or at some point Lyrica or Savella/amitriptyline; nortriptyline    Discussed I will see her every 6 months to monitor for evolving connective tissue disease or systemic organ involvement from her underlying Sjogren's syndrome.  But otherwise she will need to see her PCP in between visits for chronic pain syndrome fibromyalgia.  If she would like to call us in 3 months to uptitrate gabapentin if she stays on this I am more than willing to do that.  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  Core measures discussed written information given to the patient    If her back and neck pain come back would suggest going back to pain management to get updating imaging.  She is also tried nerve ablations and epidural injections in the past through them.  Would recommend reassessing if this becomes a problem.    Education and counseling provided to patient.  Instructed patient to call my office or seek medical attention immediately if symptoms worsen. Risks and side effects of medications and diagnosis discussed in detail and patient was given written information on new prescribed medications.    Return to clinic:  Return in about 6 months (around 12/11/2024).    An Gallego MD  6/11/2024

## 2024-06-11 NOTE — PATIENT INSTRUCTIONS
Fast Facts    Fibromyalgia affects two - four percent of people, women more often than men.    Fibromyalgia is not an autoimmune or inflammation based illness, but research suggests the nervous system is involved.    Doctors diagnose fibromyalgia based on all the patient's relevant symptoms (what you feel), no longer just on the number of tender places during an examination.    There is no test to detect this disease, but you may need lab tests or X-rays to rule out other health problems.    Though there is no cure, medications can reduce symptoms in some patients.    Patients also may feel better with proper self-care, such as exercise and getting enough sleep.    Fibromyalgia is a common neurologic health problem that causes widespread pain and tenderness (sensitivity to touch). The pain and tenderness tend to come and go, and move about the body. Most often, people with this chronic (long-term) illness are fatigued (very tired) and have sleep problems. The diagnosis can be made with a careful examination.    Fibromyalgia is most common in women, though it can occur in men. It most often starts in middle adulthood, but can occur in the teen years and in old age. You are at higher risk for fibromyalgia if you have a rheumatic disease (health problem that affects the joints, muscles and bones). These include osteoarthritis, lupus, rheumatoid arthritis, or ankylosing spondylitis.    What is fibromyalgia?    What causes fibromyalgia?    The causes of fibromyalgia are unclear. They may be different in different people. Current research suggests involvement of the nervous system, particularly the central nervous system (brain and spinal cord). Fibromyalgia is not from an autoimmune, inflammation, joint, or muscle disorder. Fibromyalgia may run in families. There likely are certain genes that can make people more prone to getting fibromyalgia and the other health problems that can occur with it. Genes alone, though, do  not cause fibromyalgia.    There is most often some triggering factor that sets off fibromyalgia. It may be spine problems, arthritis, injury, or other type of physical stress. Emotional stress also may trigger this illness. The result is a change in the way the body \"talks\" with the spinal cord and brain. Levels of brain chemicals and proteins may change. More recently, Fibromyalgia has been described as Central Pain Amplification disorder, meaning the volume of pain sensation in the brain is turned up too high.    Although Fibromyalgia can affect quality of life, it is still considered medically benign. It does not cause any heart attacks, stroke, cancer, physical deformities, or loss of life.         How is fibromyalgia diagnosed?    A doctor will suspect fibromyalgia based on your symptoms. Doctors may require that you have tenderness to pressure or tender points at a specific number of certain spots before saying you have fibromyalgia, but they are not required to make the diagnosis (see the Box). A physical exam can be helpful to detect tenderness and to exclude other causes of muscle pain. There are no diagnostic tests (such as X-rays or blood tests) for this problem. Yet, you may need tests to rule out another health problem that can be confused with fibromyalgia.    Because widespread body pain is the main feature of fibromyalgia, health care providers will ask you to describe your pain. This may help tell the difference between fibromyalgia and other diseases with similar symptoms. Other conditions such as hypothyroidism (underactive thyroid gland) and polymyalgia rheumatica sometimes mimic fibromyalgia. Blood tests can tell if you have either of these problems. Sometimes, fibromyalgia is confused with rheumatoid arthritis or lupus. But, again, there is a difference in the symptoms, physical findings and blood tests that will help your health care provider detect these health problems. Unlike fibromyalgia,  these rheumatic diseases cause inflammation in the joints and tissues.    Criteria Needed for a Fibromyalgia Diagnosis      1. Pain and symptoms over the past week, based on the total of number of painful areas out of 19 parts of the body plus level of severity of these symptoms:    a. Fatigue    b. Waking unrefreshed    c. Cognitive (memory or thought) problems    Plus number of other general physical symptoms    2. Symptoms lasting at least three months at a similar level    3. No other health problem that would explain the pain and other symptoms    How is fibromyalgia treated?    There is no cure for fibromyalgia. However, symptoms can be treated with both non-drug and medication based treatments. Many times the best outcomes are achieved by using multiple types of treatments.    Non-Drug Therapies: People with fibromyalgia should use non-drug treatments as well as any medicines their doctors suggest. Research shows that the most effective treatment for fibromyalgia is physical exercise. Physical exercise should be used in addition to any drug treatment. Patients benefit most from regular aerobic exercises. Other body-based therapies, including Des Chi and yoga, can ease fibromyalgia symptoms. Although you may be in pain, low impact physical exercise will not be harmful.    Cognitive behavioral therapy is a type of therapy focused on understanding how thoughts and behaviors affect pain and other symptoms. CBT and related treatments, such as mindfulness, can help patients learn symptom reduction skills that lessen pain. Mindfulness is a non-spiritual meditation practice that cultivates present moment awareness. Mindfulness based stress reduction has been shown to significantly improve symptoms of fibromyalgia.    Other complementary and alternative therapies (sometimes called CAM or integrative medicine), such as acupuncture, chiropractic and massage therapy, can be useful to manage fibromyalgia symptoms. Many of  these treatments, though, have not been well tested in patients with fibromyalgia.    It is important to address risk factors and triggers for fibromyalgia including sleep disorders, such as sleep apnea, and mood problems such as stress, anxiety, panic disorder, and depression. This may require involvement of other specialists such as a Sleep Medicine doctor, Psychiatrist, and therapist.     Medications: The U[place] Food and Drug Administration has approved three drugs for the treatment of fibromyalgia. They include two drugs that change some of the brain chemicals (serotonin and norepinephrine) that help control pain levels: duloxetine (Cymbalta) and milnacipran (Savella). Older drugs that affect these same brain chemicals also may be used to treat fibromyalgia. These include amitriptyline (Elavil) and cyclobenzaprine (Flexeril). Other antidepressant drugs can be helpful in some patients. Side effects vary by the drug. Ask your doctor about the risks and benefits of your medicine.    The other drug approved for fibromyalgia is pregabalin (Lyrica). Pregabalin and another drug, gabapentin (Neurontin), work by blocking the over activity of nerve cells involved in pain transmission. These medicines may cause dizziness, sleepiness, swelling and weight gain.    It is strongly recommended to avoid opioid narcotic medications for treating fibromyalgia. The reason for this is that research evidence shows these drugs are not of helpful to most people with fibromyalgia, and will cause greater pain sensitivity or make pain persist. Tramadol (Ultram) may be used to treat fibromyalgia pain if short-term use of an opioid narcotic is needed. Over-the-counter medicines such as acetaminophen (Tylenol) or nonsteroidal anti-inflammatory drugs (commonly called NSAIDs) like ibuprofen (Advil, Motrin) or naproxen (Aleve, Anaprox) are not effective for fibromyalgia pain. Yet, these drugs may be useful to treat the pain triggers of  fibromyalgia. Thus, they are most useful in people who have other causes for pain such as arthritis in addition to fibromyalgia.    For sleep problems, some of the medicines that treat pain also improve sleep. These include cyclobenzaprine (Flexeril), amitriptyline (Elavil), gabapentin (Neurontin) or pregabalin (Lyrica). It is not recommended that patients with fibromyalgia take sleeping medicines like zolpidem (Ambien) or benzodiazepine medications.      Daily reading is advised, 20 minutes per day.  \"12 Rules for Life: An Antidote to Chaos\" by Cooper Davenport  \"The [facility] Guide to Stress-Free Living\" by Ayan Constantino MD   \"Hardwiring Happiness: The New Brain Science of Contentment, Calm, and Confidence\" by Sree Sheikh  \"Get Out of Your Mind and Into Your Life: The New Acceptance and Commitment Therapy\" by Derik Mcneil   \"Full Catastrophe Living\" and \"Wherever You Go, There You Are\" by Esteban Piedra   \"The Mindfulness Solution to Pain: Step-by-Step Techniques for Chronic Pain Management\", by Stella Regan   \"Is It [place] Dying For?: How To Make Stress Work For You - Not Against You\" by Dr. Ezio Landaverde  \"Mind over Mood\" by Jody.  \"[facility] on Chronic Pain\"  \"Radical Acceptance: Embracing Your Life With the Heart of a Buddha \" by Regina Everett    \"Man's Search for Meaning\", by Macr Reynolds  \"Left to Tell: Discovering God Amidst the [place] Holocaust\" by Gilmer Scherer   \"Waking the Tiger : Healing Trauma : The Innate Capacity to Transform Overwhelming Experiences\", by Natalio Haskins     \"The Emperor's Handbook: A New Translation of The Meditations\" by Hernan Rodriguez   \"How to be a Stoic\" in [facility], by Adri Schreiber (http://[email or web address].Multigig/[email or web address]/12/19/wyh-hh-zm-a-stoic)  How to Be a Stoic by Kalin Rodriguez & Juanic Philosophy  https://www.Busapube.com/watch?v=q29cyf-DLDu  (http://opinionator.blogs.Options Away.com/2015/02/02/hpl-er-ym-a-stoic/?emc=eta1)    \"How to Fail at Almost Everything and Still Win Big: Kind of the Story of My Life\" by Alf Pagan  \"Antifragile: Things That Gain from Disorder\" by Owen Jung  \"Tools of Titans: The Tactics, Routines, and Habits of Billionaires, Icons, and World-Class Performers\", by Grabiel Hays   Paced breathing, such as in meditation, has been found to be helpful by some patients. Several mediation apps are available on Google Play and the [employer]. She should perform this 10- 15 minutes twice daily for a couple of months and then assess its utility. Utilizing calming visualizations, such as nature scenes, can enhance the effect of the paced-breathing exercises (Mems-ID search: meditation and nature). Mind/body therapies, such as yoga, soco chi, exercise, and biofeedback, can also decrease the physical effects of stress.     These instructions by Dr. Ayan Constantino offer breathing exercises that may be useful for training:  Calm and Energize: A Meditation With Your Breath (https://www.Busapube.com/watch?v=YFNYyD9NTy3)  Rhythm: A Meditation With a Word (https://www.Busapube.com/watch?v=FnlI-e4uxHw)  These instructions by Dr. Andrew Weil offer breathing exercises that may be useful for training: \"Three Breathing Exercises\" http://www.drweil.Sealed/drw/u/YHE53912/three-breathing-exercises.html.   Meditative movement therapies employ these breathing techniques and can help decrease the physical effects of stress including yoga, soco chi, exercise, and biofeedback.   Bmxmen7wzlbw: http://y8cawdfn.VividCortexoe.mil/apps/wjfgdua5rsext     Additional relaxation exercises:  Relaxation Abdominal Breathing - http://bcove.[email or web address]  Relaxation Passive Muscle - http://bcove.[email or web address]  Relaxation [place] Guided Imagery - http://bcove.me/[email or web address]  Relaxation [place] Guided Imagery -  http://bcove.me/ebswboeg  Relaxation Progressive Muscle - http://[email or web address]    PSYCHIATRY AND PSYCHOLOGY  Advised working with a local psychologist for cognitive behavioral training regarding the central sensitization disorder, including ongoing education on how to improve coping and adaptation skills. The Association for Behavioral and Cognitive Therapies (ABCT) Find A Therapist Service: http://www.findcbt.org/xFAT/    Personality traits developed when younger are how one nate with present life. A history of traumatic life events often form the basis for central sensitization. As adults, such individuals often show tendencies for people-pleasing, perfectionism, and a strong sense of responsibility. Although this can make one an ideal worker, it comes at the cost of being physically exhausting and serves as a source of great frustration when symptoms prevent expected performance.     Often the manifestations of central sensitization coexist with mood disorders such as depression and anxiety. When these conditions are present, they can amply some of the debilitating symptoms and complicate recovery. Therefore, we strongly encourage our patients to work with their local physicians when mood symptoms are present. Psychiatry referrals can be facilitated by local physicians if necessary.     SLEEP  She has been diagnosed with possible narcolepsy, will meet with Sleep medicine to discuss diagnosis and treatment.    PHYSICAL AND OCCUPATIONAL THERAPY  Many patients will benefit from a brief period of targeted physical therapy for 8-12 weeks to improve musculoskeletal symptoms. Additionally, occupational therapy can be helpful for relaxation, stress management, life skills, and energy conservation. Referrals can be facilitated locally by the primary care provider.    EXERCISE  Above all else, she needs to begin cardio and weight training. An exercise program either independently or supervised by a  or  physical therapist should focus on proximal lower extremity resistance strength training as well as aerobic fitness.   Some patients can benefit initially by using local [facility] for supervised slow incremental reconditioning.    Cardio should be done for at least 30 minutes every day.   The target heart rate should be at least 130 and perhaps close to 160 bpm, followed by a long cool down to allow heart rate recovery before stopping.   Recumbent exercises are often better tolerated, including recumbent biking, rowing, or swimming (e.g., walking or jogging in water).   Examples of cardio exercise include brisk walking, jogging, elliptical machine, stair stepper, upright stationary bicycle, biking outdoors, and group fitness classes at a local gym such as [employer], cycling class, or [employer].     1) Postural training: It is important to remain upright as much as possible during the day. Patients with orthostatic intolerance can improve their tolerance of orthostatic stress by increasing each day the time spent upright. Prolonged bedrest is best avoided, as this can lead to orthostatic deconditioning.    2) Strength training: Exercises that strengthen the thigh muscles, such as cycling, can be helpful. Weight training should target the proximal lower limbs and core with such exercises as leg presses, toe presses, leg extensions, leg curls. Yoga and Pilates can also be helpful.  Daily large muscle exercises facilitate venous return.  These include bicep curls with 3-5 pound weights, squats, and heel raises working up to 25 repetitions of each exercise.   These can be done as part of the morning getting-out-of-bed routine, improving symptoms for the next several hours.     MEDICATIONS  There is no evidence that pure opioids, such as morphine or oxycodone have any benefit in fibromyalgia. In addition, opioid-induced hyperalgesia is a serious concern, in which pain is worsened by opioid use. Although  Tramadol has moderate evidence for efficacy, adverse events, drug cross-reactions, and opioid-induced hyperalgesia argue against its consistent use.     Modest improvement of fibromyalgia has been observed with drugs targeting a diverse range of molecular mechanisms. However, no single drug has offered substantial efficacy. The heterogeneity of this disorder and widely varied responses to medications suggests existence of patient subgroups. As a result, therapies for fibromyalgia need to recognize the impact of central and peripheral aspects of the pathophysiology utilizing both medications and nonpharmacologic approaches.    From a medication perspective, FDA-approved medications for fibromyalgia include duloxetine, milnacipran, and pregabalin. Additional options (non-FDA approved) include gabapentin and the older tricyclic agents (amitriptyline, nortriptyline). Ideally it is best to start with a single medication and slowly titrate upwards to a therapeutic dose, attempting to minimize adverse effects. A combined medication approach can also be undertaken. The advantage of this approach is the use of two different medications with different mechanisms at lower doses to minimize side effects with potential additive benefit from an overall symptom standpoint.    A reasonable approach to medication initiation and titration is listed below. The rate of titration should be based on medication tolerability, presence of adverse effects, and the patient's sensitivity towards medications. Following medication initiation, a patient must be reevaluated by their local provider for efficacy and side effects.     FDA approved:  Duloxetine (SNRI): Best for pain + mood symptoms.  · Start at 20 mg daily, increase over several weeks-to-months to 60 mg   · Maximum dose: 60 mg daily    Milnacipran (SNRI): Best for pain + mood symptoms.  · Start at 6.25-12.5 mg daily, increase over several weeks-to-months 25-50 mg   · Maximum dose: 100  mg twice daily    Pregabalin (Alpha-2-delta calcium channel ligand): Best for pain + sleep + paresthesia symptoms.   · Start at 25 mg daily, increase over several weeks-to-months to 50-75 mg   · Maximum dose: 225 mg twice daily    Non-FDA approved:  Gabapentin: Best for pain + sleep + paresthesia symptoms.  · Start at 100-300 mg nightly, increase over several weeks-to-months to 600 mg   · If efficacious, a morning or afternoon dose can be started with gradual uptitration  · Maximum dose: 2400 mg total per day    Amitriptyline or Nortriptyline: Best for pain + sleep + paresthesia symptoms.  · Start at 10 mg nightly, and increase over several weeks-to-months to 50 mg  · Maximum dose: 75 mg daily    COMBINED MEDICATION APPROACH  A combination of low dose SNRI (Duloxetine 30 mg daily, or Milnacipran 12.5-25 mg daily) along with a low dose of pregabalin or gabapentin (pregabalin 50-75 mg or gabapentin 300 mg at night) could be considered. The advantage of this approach is use of two different medications with different mechanisms at lower doses to minimize side effects with potential additive benefit from an overall symptom standpoint.     DIET  Low inflammation diets can be helpful, such as the Mediterranean diet.  There is some recent evidence that probiotics alters the intestinal microbiome, which has considerable effects on the enteric nervous system connections with the central nervous system, including direct effects on intestinal function, mood and anxiety.  Probiotics, especially when coupled with prebiotic, can also be helpful.   Fermented milk products such as Activia yogurt or Esther's Kefir are useful examples. Notably, increased fiber, a prebiotic, can also be helpful at reducing inflammation, according to studies on osteoarthritis.    FATIGUE  Oncologists have been recommending ginseng for cancer patients to treat their chronic fatigue,   The mechanism for cancer-related fatigue appears to be the same as the  proposed origin of chronic fatigue.  \"The mechanism by which American ginseng may be able to moderate fatigue is evidenced by preclinical data. Several investigators have established a consistent link between CRF and inflammation and have provided data to support dysregulation of the hypothalamic pituitary adrenal axis. These data suggest that chronic fatigue in cancer is associated with an inability for the hypothalamic pituitary adrenal axis to regulate inflammatory processes and that concentrations of inflammatory cytokines remain elevated instead of reachieving homeostasis. Preclinical data evaluating the biologic activity of ginseng have demonstrated the ability of ginseng to downregulate inflammatory pathways, decrease inflammation, and modulate cortisol and the impact of chronic stress on the hypothalamic pituitary adrenal axis.\"  DOSE:  1,000 mg taken 2 times per day, at breakfast and lunch (before noon to ensure it doesn't negatively impact sleep although this has not been reported).  Vitamin C 1 g daily may help with reduction of tumor necrosis factor (TNF), an inflammatory cytokine known to be active in chronic fatigue symptoms.  Vitamin B6 is known to reduce inflammatory cytokines in inflammatory disorder such as rheumatoid arthritis. Recommended daily allowance (RDA) for oral intake is:  19 to 50 years: 1.3 mg per day.  >51 years: Females: 1.5 mg, Males: 1.7 mg per day.  Vitamin B6 deficiency can be treated with 100 mg daily for 4 weeks.  Excessive intake can cause neurologic affects such as neuropathy symptoms.      OSTEOARTHRITIS    Fast Facts    Though some of the joint changes are irreversible, most patients will not need joint replacement surgery.    OA symptoms (what you feel) can vary greatly among patients.    A rheumatologist can detect arthritis and prescribe the proper treatment. The goal of treatment in OA is to reduce pain and improve function.    Exercise is an important part of OA  treatment, because it can decrease joint pain and improve function.    At present, there is no treatment that can reverse the damage of OA in the joints. Researchers are trying to find ways to slow or reverse this joint damage.    Osteoarthritis (also known as OA) is a common joint disease that most often affects middle-age to elderly people. It is commonly referred to as \"wear and tear\" of the joints, but we now know that OA is a disease of the entire joint, involving the cartilage, joint lining, ligaments, and bone. Although it is more common in older people, it is not really accurate to say that the joints are just \"wearing out.\" It is characterized by breakdown of the cartilage (the tissue that cushions the ends of the bones between joints), bony changes of the joints, deterioration of tendons and ligaments, and various degrees of inflammation of the joint lining (called the synovium).    This arthritis tends to occur in the hand joints, spine, hips, knees, and great toes. The lifetime risk of developing OA of the knee is about 46%, and the lifetime risk of developing OA of the hip is 25%, according to the [facility] Osteoarthritis Project, a long-term study from the [facility] and sponsored by the Centers for Disease Control and Prevention (often called the CDC) and the National Institutes of Health.    OA is a top cause of disability in older people. The goal of osteoarthritis treatment is to reduce pain and improve function. There is no cure for the disease, but some treatments attempt to slow disease progression.         What is osteoarthritis?    OA is a frequently slowly progressive joint disease typically seen in middle-aged to elderly people. In osteoarthritis, the cartilage between the bones in the joint breaks down. This causes the affected bones to slowly get bigger. The joint cartilage often breaks down because of mechanical stress or biochemical changes within the body,  causing the bone underneath to fail. OA can occur together with other types of arthritis, such as gout or rheumatoid arthritis.    OA tends to affect commonly used joints such as the hands and spine, and the weight-bearing joints such as the hips and knees. Symptoms include:    Joint pain and stiffness    Knobby swelling at the joint    Cracking or grinding noise with joint movement    Decreased function of the joint    How do you treat osteoarthritis?    There is no proven treatment yet that can reverse joint damage from OA. The goal of osteoarthritis treatment is to reduce pain and improve function of the affected joints. Most often, this is possible with a mixture of physical measures and drug therapy and, sometimes, surgery.    Physical measures: Weight loss and exercise are useful in OA. Excess weight puts stress on your knee joints and hips and low back. For every 10 pounds of weight you lose over 10 years, you can reduce the chance of developing knee OA by up to 50 percent. Exercise can improve your muscle strength, decrease joint pain and stiffness, and lower the chance of disability due to OA. Also helpful are support (\"assistive\") devices, such as orthotics or a walking cane, that help you do daily activities. Heat or cold therapy can help relieve OA symptoms for a short time.    Certain alternative treatments such as spa (hot tub), massage, and chiropractic manipulation can help relieve pain for a short time. They can be costly, though, and require repeated treatments. Also, the long-term benefits of these alternative (sometimes called complementary or integrative) medicine treatments are unproven but are under study.    Drug therapy: Forms of drug therapy include topical, oral (by mouth) and injections (shots). You apply topical drugs directly on the skin over the affected joints. These medicines include capsaicin cream, lidocaine and diclofenac gel. Oral pain relievers such as acetaminophen are common  first treatments. So are nonsteroidal anti-inflammatory drugs (often called NSAIDs), which decrease swelling and pain.    In 2010, the government (FDA) approved the use of duloxetine (Cymbalta) for chronic (long-term) musculoskeletal pain including from OA. This oral drug is not new. It also is in use for other health concerns, such as mood disorders, nerve pain and fibromyalgia.    Patients with more serious pain may need stronger medications, such as prescription narcotics.    Joint injections with corticosteroids (sometimes called cortisone shots) or with a form of lubricant called hyaluronic acid can give months of pain relief from OA. This lubricant is given in the knee, and these shots may help delay the need for a knee replacement by a few years in some patients.    Surgery: Surgical treatment becomes an option for severe cases. This includes when the joint has serious damage, or when medical treatment fails to relieve pain and you have major loss of function. Surgery may involve arthroscopy, repair of the joint done through small incisions (cuts). If the joint damage cannot be repaired, you may need a joint replacement.    Supplements: Many over-the-counter nutrition supplements have been used for osteoarthritis treatment. Most lack good research data to support their effectiveness and safety. Among the most widely used are calcium, vitamin D and omega-3 fatty acids. To ensure safety and avoid drug interactions, consult your doctor or pharmacist before using any of these supplements. This is especially true when you are combining these supplements with prescribed

## 2024-06-23 ENCOUNTER — APPOINTMENT (OUTPATIENT)
Dept: CT IMAGING | Facility: HOSPITAL | Age: 44
DRG: 872 | End: 2024-06-23
Attending: EMERGENCY MEDICINE
Payer: COMMERCIAL

## 2024-06-23 ENCOUNTER — HOSPITAL ENCOUNTER (INPATIENT)
Facility: HOSPITAL | Age: 44
LOS: 6 days | Discharge: HOME | DRG: 872 | End: 2024-06-29
Attending: EMERGENCY MEDICINE | Admitting: HOSPITALIST
Payer: COMMERCIAL

## 2024-06-23 ENCOUNTER — APPOINTMENT (OUTPATIENT)
Dept: GENERAL RADIOLOGY | Facility: HOSPITAL | Age: 44
DRG: 872 | End: 2024-06-23
Attending: EMERGENCY MEDICINE
Payer: COMMERCIAL

## 2024-06-23 DIAGNOSIS — E86.0 DEHYDRATION: ICD-10-CM

## 2024-06-23 DIAGNOSIS — K52.9 GASTROENTERITIS: Primary | ICD-10-CM

## 2024-06-23 DIAGNOSIS — D72.829 LEUKOCYTOSIS, UNSPECIFIED TYPE: ICD-10-CM

## 2024-06-23 LAB
ALBUMIN SERPL-MCNC: 3.7 G/DL (ref 3.4–5)
ALBUMIN/GLOB SERPL: 0.8 {RATIO} (ref 1–2)
ALP LIVER SERPL-CCNC: 71 U/L
ALT SERPL-CCNC: 16 U/L
ANION GAP SERPL CALC-SCNC: 10 MMOL/L (ref 0–18)
AST SERPL-CCNC: 30 U/L (ref 15–37)
ATRIAL RATE: 123 BPM
B-HCG SERPL-ACNC: <1 MIU/ML
BASOPHILS # BLD AUTO: 0.1 X10(3) UL (ref 0–0.2)
BASOPHILS NFR BLD AUTO: 0.3 %
BILIRUB SERPL-MCNC: 0.9 MG/DL (ref 0.1–2)
BILIRUB UR QL STRIP.AUTO: NEGATIVE
BUN BLD-MCNC: 9 MG/DL (ref 9–23)
CALCIUM BLD-MCNC: 9 MG/DL (ref 8.5–10.1)
CHLORIDE SERPL-SCNC: 102 MMOL/L (ref 98–112)
CLARITY UR REFRACT.AUTO: CLEAR
CO2 SERPL-SCNC: 23 MMOL/L (ref 21–32)
COLOR UR AUTO: COLORLESS
CREAT BLD-MCNC: 1.11 MG/DL
EGFRCR SERPLBLD CKD-EPI 2021: 62 ML/MIN/1.73M2 (ref 60–?)
EOSINOPHIL # BLD AUTO: 0.04 X10(3) UL (ref 0–0.7)
EOSINOPHIL NFR BLD AUTO: 0.1 %
ERYTHROCYTE [DISTWIDTH] IN BLOOD BY AUTOMATED COUNT: 12.5 %
GLOBULIN PLAS-MCNC: 4.8 G/DL (ref 2.8–4.4)
GLUCOSE BLD-MCNC: 126 MG/DL (ref 70–99)
GLUCOSE BLD-MCNC: 166 MG/DL (ref 70–99)
GLUCOSE UR STRIP.AUTO-MCNC: 200 MG/DL
HCT VFR BLD AUTO: 39.2 %
HGB BLD-MCNC: 13.4 G/DL
IMM GRANULOCYTES # BLD AUTO: 0.27 X10(3) UL (ref 0–1)
IMM GRANULOCYTES NFR BLD: 0.8 %
KETONES UR STRIP.AUTO-MCNC: 40 MG/DL
LEUKOCYTE ESTERASE UR QL STRIP.AUTO: 25
LIPASE SERPL-CCNC: 22 U/L (ref 13–75)
LYMPHOCYTES # BLD AUTO: 1.27 X10(3) UL (ref 1–4)
LYMPHOCYTES NFR BLD AUTO: 4 %
MCH RBC QN AUTO: 29.3 PG (ref 26–34)
MCHC RBC AUTO-ENTMCNC: 34.2 G/DL (ref 31–37)
MCV RBC AUTO: 85.8 FL
MONOCYTES # BLD AUTO: 0.66 X10(3) UL (ref 0.1–1)
MONOCYTES NFR BLD AUTO: 2.1 %
NEUTROPHILS # BLD AUTO: 29.62 X10 (3) UL (ref 1.5–7.7)
NEUTROPHILS # BLD AUTO: 29.62 X10(3) UL (ref 1.5–7.7)
NEUTROPHILS NFR BLD AUTO: 92.7 %
NITRITE UR QL STRIP.AUTO: NEGATIVE
OSMOLALITY SERPL CALC.SUM OF ELEC: 282 MOSM/KG (ref 275–295)
P-R INTERVAL: 120 MS
PH UR STRIP.AUTO: 7 [PH] (ref 5–8)
PLATELET # BLD AUTO: 374 10(3)UL (ref 150–450)
POTASSIUM SERPL-SCNC: 3.8 MMOL/L (ref 3.5–5.1)
PROT SERPL-MCNC: 8.5 G/DL (ref 6.4–8.2)
PROT UR STRIP.AUTO-MCNC: NEGATIVE MG/DL
Q-T INTERVAL: 414 MS
QRS DURATION: 66 MS
QTC CALCULATION (BEZET): 592 MS
R AXIS: 81 DEGREES
RBC # BLD AUTO: 4.57 X10(6)UL
SODIUM SERPL-SCNC: 135 MMOL/L (ref 136–145)
SP GR UR STRIP.AUTO: >1.03 (ref 1–1.03)
T AXIS: 70 DEGREES
UROBILINOGEN UR STRIP.AUTO-MCNC: NORMAL MG/DL
VENTRICULAR RATE: 123 BPM
WBC # BLD AUTO: 32 X10(3) UL (ref 4–11)

## 2024-06-23 PROCEDURE — 99223 1ST HOSP IP/OBS HIGH 75: CPT | Performed by: HOSPITALIST

## 2024-06-23 PROCEDURE — 71045 X-RAY EXAM CHEST 1 VIEW: CPT | Performed by: EMERGENCY MEDICINE

## 2024-06-23 PROCEDURE — 74177 CT ABD & PELVIS W/CONTRAST: CPT | Performed by: EMERGENCY MEDICINE

## 2024-06-23 RX ORDER — PROCHLORPERAZINE EDISYLATE 5 MG/ML
5 INJECTION INTRAMUSCULAR; INTRAVENOUS EVERY 8 HOURS PRN
Status: DISCONTINUED | OUTPATIENT
Start: 2024-06-23 | End: 2024-06-30

## 2024-06-23 RX ORDER — POLYETHYLENE GLYCOL 3350 17 G/17G
17 POWDER, FOR SOLUTION ORAL DAILY PRN
Status: DISCONTINUED | OUTPATIENT
Start: 2024-06-23 | End: 2024-06-30

## 2024-06-23 RX ORDER — ECHINACEA PURPUREA EXTRACT 125 MG
1 TABLET ORAL
Status: DISCONTINUED | OUTPATIENT
Start: 2024-06-23 | End: 2024-06-30

## 2024-06-23 RX ORDER — NICOTINE POLACRILEX 4 MG
30 LOZENGE BUCCAL
Status: DISCONTINUED | OUTPATIENT
Start: 2024-06-23 | End: 2024-06-30

## 2024-06-23 RX ORDER — HYDROMORPHONE HYDROCHLORIDE 1 MG/ML
0.8 INJECTION, SOLUTION INTRAMUSCULAR; INTRAVENOUS; SUBCUTANEOUS EVERY 2 HOUR PRN
Status: DISCONTINUED | OUTPATIENT
Start: 2024-06-23 | End: 2024-06-24

## 2024-06-23 RX ORDER — SENNOSIDES 8.6 MG
17.2 TABLET ORAL NIGHTLY PRN
Status: DISCONTINUED | OUTPATIENT
Start: 2024-06-23 | End: 2024-06-30

## 2024-06-23 RX ORDER — ACETAMINOPHEN 500 MG
500 TABLET ORAL EVERY 4 HOURS PRN
Status: DISCONTINUED | OUTPATIENT
Start: 2024-06-23 | End: 2024-06-30

## 2024-06-23 RX ORDER — TRAMADOL HYDROCHLORIDE 50 MG/1
100 TABLET ORAL EVERY 8 HOURS PRN
Status: DISCONTINUED | OUTPATIENT
Start: 2024-06-23 | End: 2024-06-30

## 2024-06-23 RX ORDER — ENEMA 19; 7 G/133ML; G/133ML
1 ENEMA RECTAL ONCE AS NEEDED
Status: DISCONTINUED | OUTPATIENT
Start: 2024-06-23 | End: 2024-06-30

## 2024-06-23 RX ORDER — ONDANSETRON 2 MG/ML
4 INJECTION INTRAMUSCULAR; INTRAVENOUS ONCE
Status: COMPLETED | OUTPATIENT
Start: 2024-06-23 | End: 2024-06-23

## 2024-06-23 RX ORDER — ONDANSETRON 2 MG/ML
4 INJECTION INTRAMUSCULAR; INTRAVENOUS EVERY 6 HOURS PRN
Status: DISCONTINUED | OUTPATIENT
Start: 2024-06-23 | End: 2024-06-30

## 2024-06-23 RX ORDER — ACETAMINOPHEN 500 MG
1000 TABLET ORAL ONCE
Status: COMPLETED | OUTPATIENT
Start: 2024-06-23 | End: 2024-06-23

## 2024-06-23 RX ORDER — HYDROMORPHONE HYDROCHLORIDE 1 MG/ML
0.5 INJECTION, SOLUTION INTRAMUSCULAR; INTRAVENOUS; SUBCUTANEOUS EVERY 30 MIN PRN
Status: DISCONTINUED | OUTPATIENT
Start: 2024-06-23 | End: 2024-06-24

## 2024-06-23 RX ORDER — GABAPENTIN 300 MG/1
300 CAPSULE ORAL NIGHTLY
Status: DISCONTINUED | OUTPATIENT
Start: 2024-06-23 | End: 2024-06-30

## 2024-06-23 RX ORDER — SODIUM CHLORIDE 9 MG/ML
INJECTION, SOLUTION INTRAVENOUS CONTINUOUS
Status: DISCONTINUED | OUTPATIENT
Start: 2024-06-23 | End: 2024-06-23

## 2024-06-23 RX ORDER — ENOXAPARIN SODIUM 100 MG/ML
40 INJECTION SUBCUTANEOUS DAILY
Status: DISCONTINUED | OUTPATIENT
Start: 2024-06-23 | End: 2024-06-30

## 2024-06-23 RX ORDER — HYDROMORPHONE HYDROCHLORIDE 1 MG/ML
0.4 INJECTION, SOLUTION INTRAMUSCULAR; INTRAVENOUS; SUBCUTANEOUS EVERY 2 HOUR PRN
Status: DISCONTINUED | OUTPATIENT
Start: 2024-06-23 | End: 2024-06-24

## 2024-06-23 RX ORDER — SODIUM CHLORIDE 9 MG/ML
INJECTION, SOLUTION INTRAVENOUS CONTINUOUS
Status: DISCONTINUED | OUTPATIENT
Start: 2024-06-23 | End: 2024-06-27

## 2024-06-23 RX ORDER — NICOTINE POLACRILEX 4 MG
15 LOZENGE BUCCAL
Status: DISCONTINUED | OUTPATIENT
Start: 2024-06-23 | End: 2024-06-30

## 2024-06-23 RX ORDER — DEXTROSE MONOHYDRATE 25 G/50ML
50 INJECTION, SOLUTION INTRAVENOUS
Status: DISCONTINUED | OUTPATIENT
Start: 2024-06-23 | End: 2024-06-30

## 2024-06-23 RX ORDER — BISACODYL 10 MG
10 SUPPOSITORY, RECTAL RECTAL
Status: DISCONTINUED | OUTPATIENT
Start: 2024-06-23 | End: 2024-06-30

## 2024-06-23 RX ORDER — MELATONIN
3 NIGHTLY PRN
Status: DISCONTINUED | OUTPATIENT
Start: 2024-06-23 | End: 2024-06-30

## 2024-06-23 RX ORDER — HYDROMORPHONE HYDROCHLORIDE 1 MG/ML
0.2 INJECTION, SOLUTION INTRAMUSCULAR; INTRAVENOUS; SUBCUTANEOUS EVERY 2 HOUR PRN
Status: DISCONTINUED | OUTPATIENT
Start: 2024-06-23 | End: 2024-06-24

## 2024-06-23 RX ORDER — LISINOPRIL 5 MG/1
5 TABLET ORAL DAILY
Status: DISCONTINUED | OUTPATIENT
Start: 2024-06-24 | End: 2024-06-30

## 2024-06-23 RX ORDER — ACETAMINOPHEN 500 MG
1000 TABLET ORAL ONCE
Status: DISCONTINUED | OUTPATIENT
Start: 2024-06-23 | End: 2024-06-23

## 2024-06-23 NOTE — H&P
[facility]     Marielos Reid Patient Status:  Emergency    1980 MRN [id number]   Location [facility] EMERGENCY DEPARTMENT Attending Rosmery Johnson, *   Hosp Day # 0 PCP Giovanna Parker DO     Chief Complaint: Abdominal pain, vomiting    Subjective:    History of Present Illness:     Marielos Reid is a 43 year old female with a past medical history of irritable bowel syndrome who presents emergency department with abdominal pain, vomiting, diarrhea.  Patient symptoms started suddenly this morning around 10 AM.  She was feeling unwell yesterday evening prior to bed but was not having any pain at that time and no vomiting.  Around 10 AM she began having severe and diffuse abdominal pain, she vomited, nonbloody nonbilious 5 times.  She also had loose stools x 2.  Symptoms associated with a fever and chills.  Patient endorses having symptoms like this a few years ago, she had an extensive workup done at that time without any findings of the cause.  She occasionally gets pains like this but typically goes away on its own after a few minutes.  Patient's last menstrual period was a week ago, it was heavier than normal.  Currently denies any vaginal discharge or bleeding.  Denies any chest pain, shortness of breath, no other acute complaints.    History/Other:    Past Medical History:  Past Medical History:    Abdominal pain    Arthritis    Back pain    Back problem    low back pain    Blood in urine    Encounter for insertion of ParaGard IUD    Lot# [id number] Exp 2022    General counseling for prescription of oral contraceptives    Heartburn    Irritable bowel syndrome    Nausea    Other screening mammogram    Pain in joints    Prediabetes    Routine Papanicolaou smear    Visual impairment    glasses and contacts    Vomiting    Wears glasses     Past Surgical History:   Past Surgical History:   Procedure Laterality Date    Colonoscopy      Colonoscopy      Colonoscopy N/A  03/01/2021    Procedure: COLONOSCOPY;  Surgeon: Louis Pereira MD;  Location:  ENDOSCOPY    Other surgical history  04/08/2014    cystoscopy- Dr. Abbott    Salpingectomy Right 2020    with oopherectomy    Shoulder arthroscopy Left 12/05/2019    Dr Lozoya      Family History:   Family History   Problem Relation Age of Onset    Diabetes Father     Hypertension Mother      Social History:    reports that she has never smoked. She has never used smokeless tobacco. She reports that she does not drink alcohol and does not use drugs.     Allergies:   Allergies   Allergen Reactions    Advil [Ibuprofen] SWELLING    Ketorolac SWELLING     Patient received a subacromial right shoulder corticosteroid and ketorolac injection and developed right eye a mild swelling that was self-limited over the course of 24 hours.       Medications:    No current facility-administered medications on file prior to encounter.     Current Outpatient Medications on File Prior to Encounter   Medication Sig Dispense Refill    gabapentin 100 MG Oral Cap Take 3 capsules (300 mg total) by mouth nightly. 100mg at bedtime x 1 week then 200mg at bedtime x 1 week then 300mg at bedtime 90 capsule 2    traMADol 50 MG Oral Tab Take 2 tablets (100 mg total) by mouth every 8 (eight) hours as needed for Pain. 180 tablet 2    lisinopril 5 MG Oral Tab Take 1 tablet (5 mg total) by mouth daily. 30 tablet 2    metFORMIN 500 MG Oral Tab Take 1 tablet (500 mg total) by mouth 2 (two) times daily. 60 tablet 2    ondansetron (ZOFRAN) 4 mg tablet Take 1 tablet (4 mg total) by mouth every 8 (eight) hours as needed for Nausea. 30 tablet 0       Review of Systems:   A comprehensive review of systems was completed.    Pertinent positives and negatives noted in the HPI.    Objective:   Physical Exam:    /78   Pulse 118   Temp 98.1 °F (36.7 °C) (Oral)   Resp 20   LMP 06/08/2024 (Exact Date)   SpO2 100%   General: Mild acute distress, Alert, pleasant   Respiratory: No  rhonchi, no wheezes  Cardiovascular: S1, S2. Regular rate and rhythm  Abdomen: Soft, diffuse TTP, no rebound, non-distended, + bowel sounds  Neuro: No new focal deficits  Extremities: No edema    Results:    Labs:      Labs Last 24 Hours:    Recent Labs   Lab 06/23/24  1256   RBC 4.57   HGB 13.4   HCT 39.2   MCV 85.8   MCH 29.3   MCHC 34.2   RDW 12.5   NEPRELIM 29.62*   WBC 32.0*   .0       Recent Labs   Lab 06/23/24  1256   *   BUN 9   CREATSERUM 1.11*   EGFRCR 62   CA 9.0   ALB 3.7   *   K 3.8      CO2 23.0   ALKPHO 71   AST 30   ALT 16   BILT 0.9   TP 8.5*       No results found for: \"PT\", \"INR\"    No results for input(s): \"TROP\", \"TROPHS\", \"CK\" in the last 168 hours.    No results for input(s): \"TROP\", \"PBNP\" in the last 168 hours.    No results for input(s): \"PCT\" in the last 168 hours.    Imaging: Imaging data reviewed in Epic.    Assessment & Plan:      #Abdominal pain  #Vomiting  #Diarrhea  EKG Sinus tachycardia  CXR clear, no consolidation or effusion  CT with moderate stool, non obstruction, minimal free fluid in pelvis  Pain control, anti-emetics, ivf's, symptomatic support  Allergy to NSAIDs  GI panel, c diff sent from ER    #Sepsis - unknown source  Could be 2/2 GI gastroenteritis  Fever, WBC 32,   CXR clear  U/a is pending  Blood cultures ordered  Start Rocephin    #Pre - DM  Check HgA1c, ISS    #Mild hyponatremia - Na 135, IVF's    #Chronic pain  #Narcotic dependence   Patient takes Tramadol 100mg TID daily         Plan of care discussed with patient, family, er physician, nurse     Yovani Alberto MD    Supplementary Documentation:     The 21st Century Cures Act makes medical notes like these available to patients in the interest of transparency. Please be advised this is a medical document. Medical documents are intended to carry relevant information, facts as evident, and the clinical opinion of the practitioner. The medical note is intended as peer to peer communication  and may appear blunt or direct. It is written in medical language and may contain abbreviations or verbiage that are unfamiliar.

## 2024-06-23 NOTE — ED QUICK NOTES
Orders for admission, patient is aware of plan and ready to go upstairs. Any questions, please call ED RN Henry at extension [phone].     Patient Covid vaccination status: Fully vaccinated     COVID Test Ordered in ED: None    COVID Suspicion at Admission: N/A    Running Infusions:    sodium chloride Stopped (06/23/24 1639)    NS 1000mL bolus    Mental Status/LOC at time of transport: A&O x 4    Other pertinent information:   CIWA score: N/A   NIH score:  N/A

## 2024-06-23 NOTE — ED PROVIDER NOTES
Patient Seen in: [facility] Emergency Department      History     Chief Complaint   Patient presents with    Abdomen/Flank Pain     Stated Complaint: abdominal pain    Subjective:   HPI    43-year-old female presents for evaluation of fever to 101, abdominal pain, vomiting and diarrhea for the past 24 hours.  History of recurrent abdominal pain but this was several years ago.  Had an essentially negative workup at that time.  No other persons are sick at home.  No cough or cold.    Objective:   Past Medical History:    Abdominal pain    Arthritis    Back pain    Back problem    low back pain    Blood in urine    Encounter for insertion of ParaGard IUD    Lot# [id number] Exp 01/2022    General counseling for prescription of oral contraceptives    Heartburn    Irritable bowel syndrome    Nausea    Other screening mammogram    Pain in joints    Prediabetes    Routine Papanicolaou smear    Visual impairment    glasses and contacts    Vomiting    Wears glasses              Past Surgical History:   Procedure Laterality Date    Colonoscopy      Colonoscopy      Colonoscopy N/A 03/01/2021    Procedure: COLONOSCOPY;  Surgeon: Louis Pereira MD;  Location:  ENDOSCOPY    Other surgical history  04/08/2014    cystoscopy- Dr. Abbott    Salpingectomy Right 2020    with oopherectomy    Shoulder arthroscopy Left 12/05/2019    Dr Lozoya                Social History     Socioeconomic History    Marital status:    Tobacco Use    Smoking status: Never    Smokeless tobacco: Never   Vaping Use    Vaping status: Never Used   Substance and Sexual Activity    Alcohol use: No    Drug use: No    Sexual activity: Yes     Partners: Male   Other Topics Concern    Caffeine Concern Yes     Comment: 2 cups tea or coffee/day    Exercise Yes     Comment: rare    Seat Belt Yes     Social Determinants of Health      Received from [facility]              Review of Systems    Positive for stated complaint: abdominal pain  Other  systems are as noted in HPI.  Constitutional and vital signs reviewed.      All other systems reviewed and negative except as noted above.    Physical Exam     ED Triage Vitals   BP 06/23/24 1244 126/51   Pulse 06/23/24 1242 (!) 127   Resp 06/23/24 1242 16   Temp 06/23/24 1242 98.1 °F (36.7 °C)   Temp src 06/23/24 1242 Oral   SpO2 06/23/24 1242 98 %   O2 Device 06/23/24 1242 None (Room air)       Current Vitals:   Vital Signs  BP: 124/78  Pulse: (!) 124  Resp: 13  Temp: (!) 101.1 °F (38.4 °C)  Temp src: Oral  MAP (mmHg): 91    Oxygen Therapy  SpO2: 100 %  O2 Device: None (Room air)            Physical Exam    General: Alert, oriented, no apparent distress  HEENT:  Pupils equal reactive.  Extraocular motions intact. MMM.  Neck: Supple  Lungs: Clear to auscultation bilaterally.  Heart: Regular rate and rhythm.  Abdomen: Soft, nontender.   Skin: No rash.  No edema.  Neurologic: No focal neurologic deficits.  Normal speech pattern  Musculoskeletal: No tenderness or deformity noted.  Full range of motion throughout.        ED Course     Labs Reviewed   COMP METABOLIC PANEL (14) - Abnormal; Notable for the following components:       Result Value    Glucose 166 (*)     Sodium 135 (*)     Creatinine 1.11 (*)     Total Protein 8.5 (*)     Globulin  4.8 (*)     A/G Ratio 0.8 (*)     All other components within normal limits   CBC W/ DIFFERENTIAL - Abnormal; Notable for the following components:    WBC 32.0 (*)     Neutrophil Absolute Prelim 29.62 (*)     Neutrophil Absolute 29.62 (*)     All other components within normal limits   LIPASE - Normal   HCG, BETA SUBUNIT (QUANT PREGNANCY TEST) - Normal   CBC WITH DIFFERENTIAL WITH PLATELET    Narrative:     The following orders were created for panel order CBC With Differential With Platelet.  Procedure                               Abnormality         Status                     ---------                               -----------         ------                     CBC W/  DIFFERENTIAL[[id number]]          Abnormal            Final result                 Please view results for these tests on the individual orders.   SCAN SLIDE   URINALYSIS WITH CULTURE REFLEX   C. DIFFICILE(TOXIGENIC)PCR   GI STOOL PANEL BY PCR     EKG    Rate, intervals and axes as noted on EKG Report.  Rate: 127  Rhythm: Sinus Rhythm  Reading: Poor baseline due to tremor, no acute appearing ST elevation or depression                          MDM      Differential diagnosis includes pancreatitis, viral gastroenteritis, colitis    Chemistry unremarkable.    Lipase normal.    CBC with leukocytosis 32.2      CT ABDOMEN+PELVIS(CONTRAST ONLY)(CPT=74177)    Result Date: 6/23/2024  PROCEDURE:  CT ABDOMEN+PELVIS (CONTRAST ONLY) (CPT=74177)  COMPARISON:  [facility] CT, CTA ABDOMEN PELVIS (INCLUDES ILIAC ARTERIES)(SRN=[id number]), 3/30/2023, 3:36 PM.  INDICATIONS:  abdominal pain  TECHNIQUE:  CT scanning was performed from the dome of the diaphragm to the pubic symphysis with non-ionic intravenous contrast material. Post contrast coronal MPR imaging was performed.  Dose reduction techniques were used. Dose information is transmitted to the ACR (American College of Radiology) NRDR (National Radiology Data Registry) which includes the Dose Index Registry.  PATIENT STATED HISTORY:(As transcribed by Technologist)  Patient has severe left lower quadrant pain with diarrhea, vomiting, and fever.   CONTRAST USED:  65cc of Isovue 370  FINDINGS:  LIVER:  No enlargement, atrophy, abnormal density, or significant focal lesion.  BILIARY:  No visible dilatation or calcification.  PANCREAS:  No lesion, fluid collection, ductal dilatation, or atrophy.  SPLEEN:  No enlargement or focal lesion.  KIDNEYS:  No mass, obstruction, or calcification.  ADRENALS:  No mass or enlargement.  AORTA/VASCULAR:  No aneurysm or dissection.  RETROPERITONEUM:  No mass or adenopathy.  BOWEL/MESENTERY:  Moderate amount of stool in the rectum and the sigmoid colon.   The mild thickening of the small bowel is noted.  Minimal free fluid. ABDOMINAL WALL:  No mass or hernia.  URINARY BLADDER:  No visible focal wall thickening, lesion, or calculus.  PELVIC NODES:  No adenopathy.  PELVIC ORGANS:  Left adnexal cyst is noted.  This measures approximately 2.1 cm. This minimal free fluid is noted. BONES:  Levoscoliosis of the lumbar spine. LUNG BASES:  No visible pulmonary or pleural disease.  OTHER:  Negative.             CONCLUSION:   1. The nonspecific minimal free fluid in the pelvis.   2. Moderate amount of stool in the colon.  3. Nonspecific minimal free fluid can be seen with enteritis.  Mild thickening of multiple small bowel loops without evidence of bowel obstruction.       1.   LOCATION:  [facility]   Dictated by (CST): Cory Grigsby MD on 6/23/2024 at 4:21 PM     Finalized by (CST): Cory Grigsby MD on 6/23/2024 at 4:26 PM        Medications   sodium chloride 0.9% infusion (1,000 mL Intravenous New Bag 6/23/24 1259)   HYDROmorphone (Dilaudid) 1 MG/ML injection 0.5 mg (0.5 mg Intravenous Given 6/23/24 1537)   sodium chloride 0.9 % IV bolus 1,000 mL (has no administration in time range)   ondansetron (Zofran) 4 MG/2ML injection 4 mg (4 mg Intravenous Given 6/23/24 1305)   iopamidol 76% (ISOVUE-370) injection for power injector (65 mL Intravenous Given 6/23/24 1520)           4:30 PM: Patient feeling a little bit better.  Heart rate still 120.  More fluids ordered.          Spoke with Dr Alberto for admission         Medical Decision Making  Amount and/or Complexity of Data Reviewed  Independent Historian: spouse     Details:  at bedside        Disposition and Plan     Clinical Impression:  1. Gastroenteritis    2. Dehydration    3. Leukocytosis, unspecified type         Disposition:  There is no disposition on file for this visit.  There is no disposition time on file for this visit.    Follow-up:  No follow-up provider specified.        Medications  Prescribed:  Current Discharge Medication List

## 2024-06-23 NOTE — ED INITIAL ASSESSMENT (HPI)
Patient reports c/o abdominal pain, diarrhea, fever, shortness of breath and vomiting x 1 hour. Patient rates pain 8 out of 10 on pain scale.

## 2024-06-24 LAB
ANION GAP SERPL CALC-SCNC: 5 MMOL/L (ref 0–18)
BASOPHILS # BLD AUTO: 0.05 X10(3) UL (ref 0–0.2)
BASOPHILS NFR BLD AUTO: 0.2 %
BUN BLD-MCNC: 7 MG/DL (ref 9–23)
C DIFF TOX B STL QL: NEGATIVE
CALCIUM BLD-MCNC: 8 MG/DL (ref 8.5–10.1)
CHLORIDE SERPL-SCNC: 111 MMOL/L (ref 98–112)
CO2 SERPL-SCNC: 22 MMOL/L (ref 21–32)
CREAT BLD-MCNC: 0.78 MG/DL
EGFRCR SERPLBLD CKD-EPI 2021: 95 ML/MIN/1.73M2 (ref 60–?)
EOSINOPHIL # BLD AUTO: 0.04 X10(3) UL (ref 0–0.7)
EOSINOPHIL NFR BLD AUTO: 0.2 %
ERYTHROCYTE [DISTWIDTH] IN BLOOD BY AUTOMATED COUNT: 12.8 %
GLUCOSE BLD-MCNC: 114 MG/DL (ref 70–99)
GLUCOSE BLD-MCNC: 126 MG/DL (ref 70–99)
GLUCOSE BLD-MCNC: 128 MG/DL (ref 70–99)
GLUCOSE BLD-MCNC: 84 MG/DL (ref 70–99)
GLUCOSE BLD-MCNC: 93 MG/DL (ref 70–99)
HCT VFR BLD AUTO: 32.2 %
HGB BLD-MCNC: 11 G/DL
IMM GRANULOCYTES # BLD AUTO: 0.14 X10(3) UL (ref 0–1)
IMM GRANULOCYTES NFR BLD: 0.7 %
LACTATE SERPL-SCNC: 0.7 MMOL/L (ref 0.4–2)
LYMPHOCYTES # BLD AUTO: 0.71 X10(3) UL (ref 1–4)
LYMPHOCYTES NFR BLD AUTO: 3.5 %
MAGNESIUM SERPL-MCNC: 2 MG/DL (ref 1.6–2.6)
MCH RBC QN AUTO: 30.1 PG (ref 26–34)
MCHC RBC AUTO-ENTMCNC: 34.2 G/DL (ref 31–37)
MCV RBC AUTO: 88 FL
MONOCYTES # BLD AUTO: 0.93 X10(3) UL (ref 0.1–1)
MONOCYTES NFR BLD AUTO: 4.6 %
NEUTROPHILS # BLD AUTO: 18.19 X10 (3) UL (ref 1.5–7.7)
NEUTROPHILS # BLD AUTO: 18.19 X10(3) UL (ref 1.5–7.7)
NEUTROPHILS NFR BLD AUTO: 90.8 %
OSMOLALITY SERPL CALC.SUM OF ELEC: 286 MOSM/KG (ref 275–295)
PLATELET # BLD AUTO: 248 10(3)UL (ref 150–450)
POTASSIUM SERPL-SCNC: 3.4 MMOL/L (ref 3.5–5.1)
RBC # BLD AUTO: 3.66 X10(6)UL
SODIUM SERPL-SCNC: 138 MMOL/L (ref 136–145)
WBC # BLD AUTO: 20.1 X10(3) UL (ref 4–11)

## 2024-06-24 PROCEDURE — 99232 SBSQ HOSP IP/OBS MODERATE 35: CPT | Performed by: INTERNAL MEDICINE

## 2024-06-24 RX ORDER — LORAZEPAM 1 MG/1
1 TABLET ORAL EVERY 4 HOURS PRN
Status: DISCONTINUED | OUTPATIENT
Start: 2024-06-24 | End: 2024-06-30

## 2024-06-24 RX ORDER — LORAZEPAM 2 MG/ML
0.5 INJECTION INTRAMUSCULAR EVERY 6 HOURS PRN
Status: DISCONTINUED | OUTPATIENT
Start: 2024-06-24 | End: 2024-06-24

## 2024-06-24 RX ORDER — POTASSIUM CHLORIDE 20 MEQ/1
40 TABLET, EXTENDED RELEASE ORAL ONCE
Status: COMPLETED | OUTPATIENT
Start: 2024-06-24 | End: 2024-06-24

## 2024-06-24 RX ORDER — LORAZEPAM 0.5 MG/1
0.5 TABLET ORAL EVERY 4 HOURS PRN
Status: DISCONTINUED | OUTPATIENT
Start: 2024-06-24 | End: 2024-06-30

## 2024-06-24 RX ORDER — LORAZEPAM 2 MG/ML
1 INJECTION INTRAMUSCULAR EVERY 6 HOURS PRN
Status: DISCONTINUED | OUTPATIENT
Start: 2024-06-24 | End: 2024-06-24

## 2024-06-24 RX ORDER — ACETAMINOPHEN 500 MG
1000 TABLET ORAL 3 TIMES DAILY
Status: DISCONTINUED | OUTPATIENT
Start: 2024-06-24 | End: 2024-06-30

## 2024-06-24 RX ORDER — HYDROCODONE BITARTRATE AND ACETAMINOPHEN 5; 325 MG/1; MG/1
1 TABLET ORAL EVERY 6 HOURS PRN
Status: DISCONTINUED | OUTPATIENT
Start: 2024-06-24 | End: 2024-06-24

## 2024-06-24 NOTE — PLAN OF CARE
NURSING ADMISSION NOTE      Patient admitted via Cart  Oriented to room.  Safety precautions initiated.  Bed in low position.  Call light in reach.    Pt from home admitted with abd pain, N/V/D and fevers. Navigator in admissions completed. Pt a/o x4. VSS, remained afebrile. Rocephin infusing. IVF infusing. Need to collect GI stool panel and urinalysis. Dilaudid given with minimal relief for abd pain. Fall precautions in place. Call light within reach.

## 2024-06-24 NOTE — PLAN OF CARE
Patient admitted for gastroenteritis. Still in need of GI panel and Cdiff sample. Patient and  are aware, no BM yet on this shift. Patient currently afebrile. IVF as ordered. Earlier in the shift patient was hypotensive with a BP of 84/47  MD informed and an order for a one time bolus was given after bolus /60. IV fluid rate increased per MD order to 125/hour. Patient medicated on a PRN basis for abdominal pain with Dilaudid, relief noted.  at bedside and staying the night. Bed locked and in lowest position, call light within reach. No nausea or vomit noted.   Problem: Diabetes/Glucose Control  Goal: Glucose maintained within prescribed range  Description: INTERVENTIONS:  - Monitor Blood Glucose as ordered  - Assess for signs and symptoms of hyperglycemia and hypoglycemia  - Administer ordered medications to maintain glucose within target range  - Assess barriers to adequate nutritional intake and initiate nutrition consult as needed  - Instruct patient on self management of diabetes  Outcome: Progressing     Problem: PAIN - ADULT  Goal: Verbalizes/displays adequate comfort level or patient's stated pain goal  Description: INTERVENTIONS:  - Encourage pt to monitor pain and request assistance  - Assess pain using appropriate pain scale  - Administer analgesics based on type and severity of pain and evaluate response  - Implement non-pharmacological measures as appropriate and evaluate response  - Consider cultural and social influences on pain and pain management  - Manage/alleviate anxiety  - Utilize distraction and/or relaxation techniques  - Monitor for opioid side effects  - Notify MD/LIP if interventions unsuccessful or patient reports new pain  - Anticipate increased pain with activity and pre-medicate as appropriate  Outcome: Progressing     Problem: RISK FOR INFECTION - ADULT  Goal: Absence of fever/infection during anticipated neutropenic period  Description: INTERVENTIONS  -  Monitor WBC  - Administer growth factors as ordered  - Implement neutropenic guidelines  Outcome: Progressing     Problem: SAFETY ADULT - FALL  Goal: Free from fall injury  Description: INTERVENTIONS:  - Assess pt frequently for physical needs  - Identify cognitive and physical deficits and behaviors that affect risk of falls.  - [place] fall precautions as indicated by assessment.  - Educate pt/family on patient safety including physical limitations  - Instruct pt to call for assistance with activity based on assessment  - Modify environment to reduce risk of injury  - Provide assistive devices as appropriate  - Consider OT/PT consult to assist with strengthening/mobility  - Encourage toileting schedule  Outcome: Progressing     Problem: PAIN - ADULT  Goal: Verbalizes/displays adequate comfort level or patient's stated pain goal  Description: INTERVENTIONS:  - Encourage pt to monitor pain and request assistance  - Assess pain using appropriate pain scale  - Administer analgesics based on type and severity of pain and evaluate response  - Implement non-pharmacological measures as appropriate and evaluate response  - Consider cultural and social influences on pain and pain management  - Manage/alleviate anxiety  - Utilize distraction and/or relaxation techniques  - Monitor for opioid side effects  - Notify MD/LIP if interventions unsuccessful or patient reports new pain  - Anticipate increased pain with activity and pre-medicate as appropriate  Outcome: Progressing     Problem: RISK FOR INFECTION - ADULT  Goal: Absence of fever/infection during anticipated neutropenic period  Description: INTERVENTIONS  - Monitor WBC  - Administer growth factors as ordered  - Implement neutropenic guidelines  Outcome: Progressing  reach.   Problem: SAFETY ADULT - FALL  Goal: Free from fall injury  Description: INTERVENTIONS:  - Assess pt frequently for physical needs  - Identify cognitive and physical deficits and behaviors that  affect risk of falls.  - [place] fall precautions as indicated by assessment.  - Educate pt/family on patient safety including physical limitations  - Instruct pt to call for assistance with activity based on assessment  - Modify environment to reduce risk of injury  - Provide assistive devices as appropriate  - Consider OT/PT consult to assist with strengthening/mobility  - Encourage toileting schedule  Outcome: Progressing

## 2024-06-24 NOTE — CONSULTS
[facility]  GASTROENTEROLOGY NEW CONSULT NOTE   [facility]     Marielos Reid Patient Status:  Inpatient    1980 MRN [id number]   Location [facility] 4NW-A Attending Brie Woods DO   Hosp Day # 1 PCP Giovanna Parker DO       Reason for Consultation:   Abdominal pain    History of Present Illness (HPI):  Marielos Reid is a 43 year old female with chronic medical conditions including IBS that presented to the hospital yesterday with abdominal pain, emesis, diarrhea, fever, and shortness of breath with symptoms that started yesterday.  On admission she was noted to have a fever of 101.1 °F.  She had a CT scan on admission of the abdomen pelvis showing a moderate amount of stool in the colon and nonspecific minimal free fluid that can be seen with enteritis along with mild thickening of multiple small bowel loops without evidence of bowel obstruction.  Lactic acid was ordered today and was normal at 0.7.  She was noted have a leukocytosis of 32 on admission down trended to 20 today.  Does have a prior history of abdominal pain and discomfort which has previously been attributed to IBS with further history as below.      Gastrointestinal History      Family History  - No known family h/o GI malignancy.     2013: OV w/ Dr. Vivas  +rectal bleeding x 2 weeks, occasional rectal pain  Rec: Miralax, high fiber diet, if symptoms persist-colonoscopy     2013: Colonoscopy w/ Dr. Marquez  Redundant sigmoid and transverse colon, internal hemorrhoids, small anal fissure  Rec: fiber supplement, if pain from anal fissure, nitroglycerin 0.2%, hydrocortisone suppositories as needed, Sitz baths, OV in 4 weeks     2013: OV w/ Dr. Marquez  -tried hydrocortisone suppositories x 2 weeks but continues to have small amt of BRBPR, some pain with BMs  Rec: Miralax 2x daily, increase Citrucel to twice daily, exercise, hydrocortisone 2.5% cream twice daily for 2 weeks, consider Rody , consider  anal manometry and imaging r/o pelvic floor     01/2014: OV w/   -Citrucel and Miralax BID, blood on occasion  Rec: continue citrucel and Miralax, restart nitroglycerin 0.2%     02/2021: OV w/ Dr. Vivas  -1 week ago, developed severe abdominal pain with nausea, vomiting and diarrhea.   -Taken to [facility]-eval was unrevealing  -CT with ileal and colon edema-treated with cipro and feeling improving  -still has bloating  Rec: soft, lactose free diet, colonoscopy     02/2021: EGD and colonoscopy at  during hospital stay  -EGD:normal (no H pylori, no celiac)  Rec: Omeprazole 40mg daily  -Colonoscopy: right side of colon with semisolid stool, small, internal hemorrhoids  Rec: outpatient colonoscopy     04/2021: OV with Dr. Pereira  -Hospital f/u  -Diarrhea improved, continued bloating  -stopped PPI and Bentyl   Rec: recall colonoscopy with 2 day bowel prep, SIBO testing, RUQ US     04/2021: Colonoscopy w/ Dr. Pereira  Small, internal hemorrhoids (normal ileal and colon biopsies)  Recall 10 years     05/2021: SIBO suspected on breath test. Increased H2/Ch4  Rec: Xifaxan 550mg TID x 14 days     07/2021: OV w/ Dr. Pereira  -symptoms resolved after Xifaxan,  -recent episode of nauea, emesis, abdominal cramping, and diarrhea-went to ER  -CT: moderate thickened loops of small bowel, focal areas of mild to moderate colonic wall thickening.   -Symptoms resolving   -Suggestive of viral gastroenteritis   Rec: PPI BID, zofran PRN, Bentyl PRN     3/2/2023: OV w/ Silvana, APRN  -return of symptoms prior to being treated with Xifaxan. Nausea, vomiting, epigastric discomfort and bloating  Rec: start rifaximin for 2 weeks, consider restarting Omeprazole, ok to use zofran as needed, f/u in 3 weeks, abdominal US     3/20/2023 - US Abdomen Complete  - There is increased velocity within the SMA at its origin and within the proximal SMA.  There may be a component of SMA stenosis.  This could be best assessed with CTA of the abdomen and pelvis.    - Recommended CTA Abdomen Pelvis     3/22/2023 - OV w/ Silvana, APRN  + Epigastric pain, Nausea, Bloating, Early satiety  - Xifaxan x 2 weeks only helped w/ vomiting; still bloating, epigastric pain, nausea, now early satiety too  - Restarted Omeprazole 40 mg daily; Zofran helps w/ nausea  REC:   - CTA Abd/Pelv. If no findings, recommend repeat EGD vs HIDA vs GES. Continue Omeprazole 40 mg daily until CTA results. Ondansetron 4 mg, Q8H PRN. FUOV in 1 month     3/30/2023 - CTA Abdomen Pelvis  - No SMA stenosis. Mild narrowing of hepatic artery.  - Recommended to see vascular surgeon (no intervention needed) and F/U in clinic for management of GI sx  -GES and HIDA scan ordered but not completed     HIDA Scan (6/5/2023): CONCLUSION:  No scintigraphic evidence for acute cholecystitis.  Reproduction of abdominal pain after CCK administration.   Recommended to see surgery, but did not see    GES (7/19/2023) : CONCLUSION:  Overall unremarkable gastric emptying study.  4 hour emptying is minimally below normal.         Past Medical History:  Past Medical History:    Abdominal pain    Arthritis    Back pain    Back problem    low back pain    Blood in urine    Encounter for insertion of ParaGard IUD    Lot# [id number] Exp 01/2022    General counseling for prescription of oral contraceptives    Heartburn    Irritable bowel syndrome    Nausea    Other screening mammogram    Pain in joints    Prediabetes    Routine Papanicolaou smear    Visual impairment    glasses and contacts    Vomiting    Wears glasses       Past Surgical History:  Past Surgical History:   Procedure Laterality Date    Colonoscopy      Colonoscopy      Colonoscopy N/A 03/01/2021    Procedure: COLONOSCOPY;  Surgeon: Louis Pereira MD;  Location:  ENDOSCOPY    Other surgical history  04/08/2014    cystoscopy- Dr. Abbott    Salpingectomy Right 2020    with oopherectomy    Shoulder arthroscopy Left 12/05/2019    Dr Lozoya       Family History:  No first-degree  relative with a history of colorectal cancer.    Social History:  No IVDU      Medications:    acetaminophen (Tylenol Extra Strength) tab 1,000 mg  1,000 mg Oral TID    magnesium hydroxide (Milk of Magnesia) 400 MG/5ML oral suspension 30 mL  30 mL Oral Daily    [COMPLETED] potassium chloride (Klor-Con M20) tab 40 mEq  40 mEq Oral Once    LORazepam (Ativan) tab 0.5 mg  0.5 mg Oral Q4H PRN    Or    LORazepam (Ativan) tab 1 mg  1 mg Oral Q4H PRN    polyethylene glycol-electrolyte (Golytely) 236 g oral solution 4,000 mL  4,000 mL Oral Once    [COMPLETED] ondansetron (Zofran) 4 MG/2ML injection 4 mg  4 mg Intravenous Once    [COMPLETED] iopamidol 76% (ISOVUE-370) injection for power injector  65 mL Intravenous ONCE PRN    [COMPLETED] sodium chloride 0.9 % IV bolus 1,000 mL  1,000 mL Intravenous Once    [COMPLETED] acetaminophen (Tylenol Extra Strength) tab 1,000 mg  1,000 mg Oral Once    gabapentin (Neurontin) cap 300 mg  300 mg Oral Nightly    [Held by provider] lisinopril (Prinivil; Zestril) tab 5 mg  5 mg Oral Daily    traMADol (Ultram) tab 100 mg  100 mg Oral Q8H PRN    acetaminophen (Tylenol Extra Strength) tab 500 mg  500 mg Oral Q4H PRN    melatonin tab 3 mg  3 mg Oral Nightly PRN    glycerin-hypromellose- (Artificial Tears) 0.2-0.2-1 % ophthalmic solution 1 drop  1 drop Both Eyes QID PRN    sodium chloride (Saline Mist) 0.65 % nasal solution 1 spray  1 spray Each Nare Q3H PRN    sodium chloride 0.9% infusion   Intravenous Continuous    enoxaparin (Lovenox) 40 MG/0.4ML SUBQ injection 40 mg  40 mg Subcutaneous Daily    polyethylene glycol (PEG 3350) (Miralax) 17 g oral packet 17 g  17 g Oral Daily PRN    sennosides (Senokot) tab 17.2 mg  17.2 mg Oral Nightly PRN    bisacodyl (Dulcolax) 10 MG rectal suppository 10 mg  10 mg Rectal Daily PRN    fleet enema (Fleet) 7-19 GM/118ML rectal enema 133 mL  1 enema Rectal Once PRN    ondansetron (Zofran) 4 MG/2ML injection 4 mg  4 mg Intravenous Q6H PRN     prochlorperazine (Compazine) 10 MG/2ML injection 5 mg  5 mg Intravenous Q8H PRN    cefTRIAXone (Rocephin) 1 g in sodium chloride 0.9% 100 mL IVPB-ADDV  1 g Intravenous Q24H    glucose (Dex4) 15 GM/59ML oral liquid 15 g  15 g Oral Q15 Min PRN    Or    glucose (Glutose) 40% oral gel 15 g  15 g Oral Q15 Min PRN    Or    glucose-vitamin C (Dex-4) chewable tab 4 tablet  4 tablet Oral Q15 Min PRN    Or    dextrose 50% injection 50 mL  50 mL Intravenous Q15 Min PRN    Or    glucose (Dex4) 15 GM/59ML oral liquid 30 g  30 g Oral Q15 Min PRN    Or    glucose (Glutose) 40% oral gel 30 g  30 g Oral Q15 Min PRN    Or    glucose-vitamin C (Dex-4) chewable tab 8 tablet  8 tablet Oral Q15 Min PRN    insulin aspart (NovoLOG) 100 Units/mL FlexPen 1-10 Units  1-10 Units Subcutaneous TID AC and HS    [COMPLETED] sodium chloride 0.9 % IV bolus 500 mL  500 mL Intravenous Once       Allergies:  Allergies   Allergen Reactions    Advil [Ibuprofen] SWELLING    Ketorolac SWELLING     Patient received a subacromial right shoulder corticosteroid and ketorolac injection and developed right eye a mild swelling that was self-limited over the course of 24 hours.       Review of Systems  Negative unless otherwise mentioned in HPI.     Physical Exam  /64 (BP Location: Left arm)   Pulse 104   Temp 99.1 °F (37.3 °C) (Oral)   Resp 18   Wt 123 lb (55.8 kg)   LMP 06/08/2024 (Exact Date)   SpO2 99%   BMI 21.11 kg/m²   Body mass index is 21.11 kg/m².      Gen: Sleepy and lethargic, NAD.  Son present in room  Eyes:  no scleral icterus  Cardiovascular: Warm, well-perfused  Respiratory: No respiratory distress  Abd: Soft, tender to palpation diffusely, non-distended.   Neuro:  Alert and oriented to name, location and time.     Diagnostic Data:      Labs:  Recent Labs   Lab 06/23/24  1256 06/24/24  0705   WBC 32.0* 20.1*   HGB 13.4 11.0*   MCV 85.8 88.0   .0 248.0       Recent Labs   Lab 06/23/24  1256 06/24/24  0705   * 128*   BUN 9  7*   CREATSERUM 1.11* 0.78   CA 9.0 8.0*   ALB 3.7  --    * 138   K 3.8 3.4*    111   CO2 23.0 22.0   ALKPHO 71  --    AST 30  --    ALT 16  --    BILT 0.9  --    TP 8.5*  --        No results for input(s): \"PTP\", \"INR\" in the last 168 hours.           Imaging: Imaging data reviewed in Epic.    Medications:    acetaminophen  1,000 mg Oral TID    magnesium hydroxide  30 mL Oral Daily    polyethylene glycol-electrolyte  4,000 mL Oral Once    gabapentin  300 mg Oral Nightly    [Held by provider] lisinopril  5 mg Oral Daily    enoxaparin  40 mg Subcutaneous Daily    cefTRIAXone  1 g Intravenous Q24H    insulin aspart  1-10 Units Subcutaneous TID AC and HS       Allergies:  Allergies   Allergen Reactions    Advil [Ibuprofen] SWELLING    Ketorolac SWELLING     Patient received a subacromial right shoulder corticosteroid and ketorolac injection and developed right eye a mild swelling that was self-limited over the course of 24 hours.       Imaging:  I have personally reviewed all pertinent available imaging.       ASSESSMENT / PLAN:     Marielos Reid is a 43 year old female with chronic medical conditions including IBS that presented to the hospital with abdominal pain, emesis, diarrhea, fever, and shortness of breath.    Abdominal pain, nausea, emesis-this may be secondary to constipation as noted on her most recent CT scan along with mild enteritis.  She could also have an infectious etiology given fevers and leukocytosis on admission.  Unlikely mesenteric ischemia given normal lactic acid.  Constipation noted on CT.  Diarrhea may have been overflow, but will rule out infection as well      Recommendations:   Clear liquid diet  Start GoLytely given constipation noted on her CT that could be contributing to her symptoms  If symptoms persist, consider CTA of the abdomen pelvis  Consider repeat HIDA scan if symptoms persist  Consider general surgery consult if she continues to remain significantly tender  Pain  control per primary  Antiemetics as needed  Follow-up stool studies if able to be collected  Monitor CBC, CMP, lactic acid in the morning    Thank you for the consult and allowing us to participate in patient's care.  GI will follow. Please page with any questions or concerns.     Alexis Thornton,   6/24/2024  [facility]

## 2024-06-24 NOTE — PROGRESS NOTES
[facility]   part of [facility]     Hospitalist Progress Note     Marielos Reid Patient Status:  Inpatient    1980 MRN [id number]   [facility] 4NW-A Attending Brie Woods DO   Hosp Day # 1 PCP Giovanna Parker DO     Chief Complaint: abdominal pain, n/v/d    Subjective:     Patient uncomfortable, moaning.  at bedside with children. Reports she had similar presentation in  and was diagnosed with SIBO/IBS. Also has history of endometriosis with oophorectomy in , but not following with gyne currently and not on medications for endometriosis. Takes tramadol at home for pain contol     Objective:    Review of Systems:   A comprehensive review of systems was completed; pertinent positive and negatives stated in subjective.    Vital signs:  Temp:  [98.1 °F (36.7 °C)-101.1 °F (38.4 °C)] 98.8 °F (37.1 °C)  Pulse:  [102-127] 102  Resp:  [13-33] 18  BP: ()/(48-78) 119/67  SpO2:  [98 %-100 %] 100 %    Physical Exam:    General: No acute distress  Respiratory: No wheezes, no rhonchi  Cardiovascular: S1, S2, regular rate and rhythm  Abdomen: Soft, diffuse TTP, non-distended, positive bowel sounds  Neuro: No new focal deficits.   Extremities: No edema      Diagnostic Data:    Labs:  Recent Labs   Lab 24  1256 24  0705   WBC 32.0* 20.1*   HGB 13.4 11.0*   MCV 85.8 88.0   .0 248.0       Recent Labs   Lab 24  1256 24  0705   * 128*   BUN 9 7*   CREATSERUM 1.11* 0.78   CA 9.0 8.0*   ALB 3.7  --    * 138   K 3.8 3.4*    111   CO2 23.0 22.0   ALKPHO 71  --    AST 30  --    ALT 16  --    BILT 0.9  --    TP 8.5*  --        Estimated Creatinine Clearance: 78.7 mL/min (based on SCr of 0.78 mg/dL).    No results for input(s): \"TROP\", \"TROPHS\", \"CK\" in the last 168 hours.    No results for input(s): \"PTP\", \"INR\" in the last 168 hours.     Microbiology    No results found for this visit on 24.      Imaging: Reviewed in  Epic.    Medications:    acetaminophen  1,000 mg Oral TID    magnesium hydroxide  30 mL Oral Daily    gabapentin  300 mg Oral Nightly    lisinopril  5 mg Oral Daily    enoxaparin  40 mg Subcutaneous Daily    cefTRIAXone  1 g Intravenous Q24H    insulin aspart  1-10 Units Subcutaneous TID AC and HS       Assessment & Plan:      #Abdominal pain, generalized  #Vomiting/Diarrhea  #Constipation  - CT with moderate stool, non obstruction, minimal free fluid in pelvis  - Pain control, anti-emetics, ivf's, symptomatic support  - avoid IV narcotics  - GI PCR, cdiff pending  - bowel regimen started  - GI consulted  - pelvic ultrasound ordered to evaluate for endometriosis      #Sepsis - unknown source; ? Gastroenteritis  #Leukocytosis   - empiric ceftriaxone for now  - lactate ordered   - IVF  - follow cultures    #Hypokalemia  - replace    #Pre - DM, hyperglycemia  - HgbA1c 5.8%  - hyperglycemia protocol  - ICF     #Mild hyponatremia   - improved with IVF     #Chronic pain  #Narcotic dependence   - Patient takes Tramadol 100mg TID daily   - no indication for IV narcotics at this time      Brie Woods,     Supplementary Documentation:     Quality:  DVT Mechanical Prophylaxis:     Early ambuation  DVT Pharmacologic Prophylaxis   Medication    enoxaparin (Lovenox) 40 MG/0.4ML SUBQ injection 40 mg                Code Status: Full Code  Meyer: No urinary catheter in place  Meyer Duration (in days):   Central line:    KEISHA:     Discharge is dependent on: clinical condition   At this point Ms. Reid is expected to be discharge to: home    The 21st Century Cures Act makes medical notes like these available to patients in the interest of transparency. Please be advised this is a medical document. Medical documents are intended to carry relevant information, facts as evident, and the clinical opinion of the practitioner. The medical note is intended as peer to peer communication and may appear blunt or direct. It is written  in medical language and may contain abbreviations or verbiage that are unfamiliar.

## 2024-06-24 NOTE — PLAN OF CARE
Pt A+Ox4, moaning asking for pain meds for \"cramping\" abdominal pain.  IV narcotics discontinued as abd CT shows large stool burden, pt education given increasing mobility to help constipation and reasons why narcotics were discontinued.  Abd soft, distended, pt reports pain with palpation.  Tylenol given for HA with mild relief.  Dr. Woods spoke with  and GI consulted.  Pt tolerating clears without nausea or emesis, tolerates PO meds.  +flatus, has had watery bowel movement, GI panel and Cdiff sent.  Ativan PRN given with pt able to sleep.  Given Milk of Mg, Miralax and now drinking Golytely.  Many family members at bedside.  Fall precautions while drowsy from Ativan.  Has been afebrile and VSS.  Addendum @1900:  Pt had medium sized BM, hard brown balls with brown liquid, feeling \"a little\" better.  Encouraged her to continue Golytely and increase mobility to help constipation.  No N/V.  Problem: PAIN - ADULT  Goal: Verbalizes/displays adequate comfort level or patient's stated pain goal  Description: INTERVENTIONS:  - Encourage pt to monitor pain and request assistance  - Assess pain using appropriate pain scale  - Administer analgesics based on type and severity of pain and evaluate response  - Implement non-pharmacological measures as appropriate and evaluate response  - Consider cultural and social influences on pain and pain management  - Manage/alleviate anxiety  - Utilize distraction and/or relaxation techniques  - Monitor for opioid side effects  - Notify MD/LIP if interventions unsuccessful or patient reports new pain  - Anticipate increased pain with activity and pre-medicate as appropriate  6/24/2024 1812 by Eirca Hearn, RN  Outcome: Progressing  6/24/2024 1801 by Erica Hearn, RN  Outcome: Progressing     Problem: SAFETY ADULT - FALL  Goal: Free from fall injury  Description: INTERVENTIONS:  - Assess pt frequently for physical needs  - Identify cognitive and physical deficits and behaviors that  affect risk of falls.  - Spreckels fall precautions as indicated by assessment.  - Educate pt/family on patient safety including physical limitations  - Instruct pt to call for assistance with activity based on assessment  - Modify environment to reduce risk of injury  - Provide assistive devices as appropriate  - Consider OT/PT consult to assist with strengthening/mobility  - Encourage toileting schedule  Outcome: Progressing     Problem: GASTROINTESTINAL - ADULT  Goal: Minimal or absence of nausea and vomiting  Description: INTERVENTIONS:  - Maintain adequate hydration with IV or PO as ordered and tolerated  - Nasogastric tube to low intermittent suction as ordered  - Evaluate effectiveness of ordered antiemetic medications  - Provide nonpharmacologic comfort measures as appropriate  - Advance diet as tolerated, if ordered  - Obtain nutritional consult as needed  - Evaluate fluid balance  Outcome: Progressing  Goal: Maintains or returns to baseline bowel function  Description: INTERVENTIONS:  - Assess bowel function  - Maintain adequate hydration with IV or PO as ordered and tolerated  - Evaluate effectiveness of GI medications  - Encourage mobilization and activity  - Obtain nutritional consult as needed  - Establish a toileting routine/schedule  - Consider collaborating with pharmacy to review patient's medication profile  Outcome: Not Progressing

## 2024-06-25 ENCOUNTER — APPOINTMENT (OUTPATIENT)
Dept: ULTRASOUND IMAGING | Facility: HOSPITAL | Age: 44
DRG: 872 | End: 2024-06-25
Attending: INTERNAL MEDICINE
Payer: COMMERCIAL

## 2024-06-25 ENCOUNTER — APPOINTMENT (OUTPATIENT)
Dept: CT IMAGING | Facility: HOSPITAL | Age: 44
DRG: 872 | End: 2024-06-25
Attending: NURSE PRACTITIONER
Payer: COMMERCIAL

## 2024-06-25 LAB
ADENOVIRUS F 40/41 PCR: NEGATIVE
ALBUMIN SERPL-MCNC: 2.4 G/DL (ref 3.4–5)
ALBUMIN/GLOB SERPL: 0.6 {RATIO} (ref 1–2)
ALP LIVER SERPL-CCNC: 61 U/L
ALT SERPL-CCNC: 8 U/L
ANION GAP SERPL CALC-SCNC: 7 MMOL/L (ref 0–18)
AST SERPL-CCNC: 10 U/L (ref 15–37)
ASTROVIRUS PCR: NEGATIVE
BASOPHILS # BLD AUTO: 0.05 X10(3) UL (ref 0–0.2)
BASOPHILS NFR BLD AUTO: 0.3 %
BILIRUB SERPL-MCNC: 0.4 MG/DL (ref 0.1–2)
BUN BLD-MCNC: 5 MG/DL (ref 9–23)
C CAYETANENSIS DNA SPEC QL NAA+PROBE: NEGATIVE
CALCIUM BLD-MCNC: 8 MG/DL (ref 8.5–10.1)
CAMPY SP DNA.DIARRHEA STL QL NAA+PROBE: NEGATIVE
CHLORIDE SERPL-SCNC: 113 MMOL/L (ref 98–112)
CO2 SERPL-SCNC: 20 MMOL/L (ref 21–32)
CREAT BLD-MCNC: 0.65 MG/DL
CRYPTOSP DNA SPEC QL NAA+PROBE: NEGATIVE
EAEC PAA PLAS AGGR+AATA ST NAA+NON-PRB: NEGATIVE
EC STX1+STX2 + H7 FLIC SPEC NAA+PROBE: NEGATIVE
EGFRCR SERPLBLD CKD-EPI 2021: 111 ML/MIN/1.73M2 (ref 60–?)
ENTAMOEBA HISTOLYTICA PCR: NEGATIVE
EOSINOPHIL # BLD AUTO: 0.16 X10(3) UL (ref 0–0.7)
EOSINOPHIL NFR BLD AUTO: 1 %
EPEC EAE GENE STL QL NAA+NON-PROBE: NEGATIVE
ERYTHROCYTE [DISTWIDTH] IN BLOOD BY AUTOMATED COUNT: 12.5 %
ETEC LTA+ST1A+ST1B TOX ST NAA+NON-PROBE: NEGATIVE
GIARDIA LAMBLIA PCR: NEGATIVE
GLOBULIN PLAS-MCNC: 3.9 G/DL (ref 2.8–4.4)
GLUCOSE BLD-MCNC: 112 MG/DL (ref 70–99)
GLUCOSE BLD-MCNC: 76 MG/DL (ref 70–99)
GLUCOSE BLD-MCNC: 85 MG/DL (ref 70–99)
GLUCOSE BLD-MCNC: 90 MG/DL (ref 70–99)
GLUCOSE BLD-MCNC: 97 MG/DL (ref 70–99)
HCT VFR BLD AUTO: 28.4 %
HGB BLD-MCNC: 9.7 G/DL
IMM GRANULOCYTES # BLD AUTO: 0.08 X10(3) UL (ref 0–1)
IMM GRANULOCYTES NFR BLD: 0.5 %
LACTATE SERPL-SCNC: 0.8 MMOL/L (ref 0.4–2)
LYMPHOCYTES # BLD AUTO: 0.99 X10(3) UL (ref 1–4)
LYMPHOCYTES NFR BLD AUTO: 6.3 %
MCH RBC QN AUTO: 29.6 PG (ref 26–34)
MCHC RBC AUTO-ENTMCNC: 34.2 G/DL (ref 31–37)
MCV RBC AUTO: 86.6 FL
MONOCYTES # BLD AUTO: 0.86 X10(3) UL (ref 0.1–1)
MONOCYTES NFR BLD AUTO: 5.5 %
NEUTROPHILS # BLD AUTO: 13.54 X10 (3) UL (ref 1.5–7.7)
NEUTROPHILS # BLD AUTO: 13.54 X10(3) UL (ref 1.5–7.7)
NEUTROPHILS NFR BLD AUTO: 86.4 %
NOROVIRUS GI/GII PCR: NEGATIVE
OSMOLALITY SERPL CALC.SUM OF ELEC: 287 MOSM/KG (ref 275–295)
P SHIGELLOIDES DNA STL QL NAA+PROBE: NEGATIVE
PLATELET # BLD AUTO: 206 10(3)UL (ref 150–450)
POTASSIUM SERPL-SCNC: 3.4 MMOL/L (ref 3.5–5.1)
POTASSIUM SERPL-SCNC: 3.4 MMOL/L (ref 3.5–5.1)
PROT SERPL-MCNC: 6.3 G/DL (ref 6.4–8.2)
RBC # BLD AUTO: 3.28 X10(6)UL
ROTAVIRUS A PCR: NEGATIVE
SALMONELLA DNA SPEC QL NAA+PROBE: NEGATIVE
SAPOVIRUS PCR: NEGATIVE
SHIGELLA SP+EIEC IPAH ST NAA+NON-PROBE: NEGATIVE
SODIUM SERPL-SCNC: 140 MMOL/L (ref 136–145)
V CHOLERAE DNA SPEC QL NAA+PROBE: NEGATIVE
VIBRIO DNA SPEC NAA+PROBE: NEGATIVE
WBC # BLD AUTO: 15.7 X10(3) UL (ref 4–11)
YERSINIA DNA SPEC NAA+PROBE: NEGATIVE

## 2024-06-25 PROCEDURE — 76856 US EXAM PELVIC COMPLETE: CPT | Performed by: INTERNAL MEDICINE

## 2024-06-25 PROCEDURE — 99232 SBSQ HOSP IP/OBS MODERATE 35: CPT | Performed by: INTERNAL MEDICINE

## 2024-06-25 PROCEDURE — 74174 CTA ABD&PLVS W/CONTRAST: CPT | Performed by: NURSE PRACTITIONER

## 2024-06-25 NOTE — PROGRESS NOTES
[facility]                       Gastroenterology Follow up Note - [facility]    Marielos Reid Patient Status:  Inpatient    1980 MRN [id number]   Location [facility] 4NW-A Attending Brie Woods DO   Hosp Day # 2 PCP Giovanna Parker DO     Reason for consultation: Abdominal pain, nausea, emesis, constipation, fevers  Subjective: Patient seen and examined.  She continues to have abdominal discomfort, but notes it has been improving slightly with bowel movements.  Review of Systems:   10 point ROS completed and was negative, except for pertinent positive and negatives stated in subjective.    For PMH, PSH, FHx and SHx- please see initial consult note.     Objective: /68 (BP Location: Left arm)   Pulse 105   Temp 98.4 °F (36.9 °C) (Oral)   Resp 18   Wt 123 lb (55.8 kg)   LMP 2024 (Exact Date)   SpO2 98%   BMI 21.11 kg/m²   Gen: No acute distress  Resp: no respiratory distress  Abd: Soft, Generalized abdominal discomfort to palpation, non-distended. No rebound tenderness, no guarding.   Neuro: Aox3.       Labs:   Lab Results   Component Value Date    WBC 15.7 2024    HGB 9.7 2024    HCT 28.4 2024    .0 2024    CREATSERUM 0.65 2024    BUN 5 2024     2024    K 3.4 2024    K 3.4 2024     2024    CO2 20.0 2024    GLU 90 2024    CA 8.0 2024    ALB 2.4 2024    ALKPHO 61 2024    BILT 0.4 2024    AST 10 2024    ALT 8 2024     Recent Labs   Lab 24  1256 24  0705 24  0718   * 128* 90   BUN 9 7* 5*   CREATSERUM 1.11* 0.78 0.65   CA 9.0 8.0* 8.0*   * 138 140   K 3.8 3.4* 3.4*  3.4*    111 113*   CO2 23.0 22.0 20.0*     Recent Labs   Lab 24  0718   RBC 3.28*   HGB 9.7*   HCT 28.4*   MCV 86.6   MCH 29.6   MCHC 34.2   RDW 12.5   NEPRELIM 13.54*   WBC 15.7*   .0       Recent Labs   Lab  06/23/24  1256 06/25/24  0718   ALT 16 8*   AST 30 10*       Assessment:  Abdominal pain, nausea, emesis, constipation.  Etiology is currently unknown. Notes longstanding standing history of this for patient which acutely worsened.  Lactic acid normal x 2.  CT on admission showed moderate stool burden in the colon with possible enteritis.  Rule out infectious etiologies given fevers  Patient also has a history of endometriosis which could be contributing to her pain  Constipation-likely secondary to tramadol use at home    Plan:  Clear liquid diet  Plan for CT enterography for further evaluation of small bowel- did not tolerate PO contrast for this. Will further assess with CTA abdomen and pelvis to re-assess for mesenteric ischemia. Can re-attempt CTE once able to tolerate contrast for this.  Pain control per primary.  Recommend limiting avoiding narcotics if able as this is likely contributing to her constipation  Continue bowel regimen for constipation.  She was given GoLytely yesterday which she can continued to drink today  Antiemetics as needed  Continue to monitor for other signs of infection  We will continue to follow along with you    Thank you for allowing us to participate in patient's care. Please call us with any questions or concerns.  The GI consult service will continue to follow.     Alexsi Thornton DO  [facility]  [phone]

## 2024-06-25 NOTE — PROGRESS NOTES
[facility]   part of [facility]     Hospitalist Progress Note     Marielos Reid Patient Status:  Inpatient    1980 MRN [id number]   Location [facility] 4NW-A Attending Brie Woods DO   Hosp Day # 2 PCP Giovanna Parker DO     Chief Complaint: abdominal pain, n/v/d    Subjective:     Appears more comfortable this morning. Family at bedside. She is asking if she can eat.     Objective:    Review of Systems:   A comprehensive review of systems was completed; pertinent positive and negatives stated in subjective.    Vital signs:  Temp:  [98.4 °F (36.9 °C)-99.5 °F (37.5 °C)] 98.4 °F (36.9 °C)  Pulse:  [] 105  Resp:  [16-22] 18  BP: (104-129)/(62-73) 129/68  SpO2:  [97 %-100 %] 98 %    Physical Exam:    General: No acute distress  Respiratory: No wheezes, no rhonchi  Cardiovascular: S1, S2, regular rate and rhythm  Abdomen: Soft, diffuse TTP, non-distended, positive bowel sounds  Neuro: No new focal deficits.   Extremities: No edema      Diagnostic Data:    Labs:  Recent Labs   Lab 24  1256 24  0705 24  0718   WBC 32.0* 20.1* 15.7*   HGB 13.4 11.0* 9.7*   MCV 85.8 88.0 86.6   .0 248.0 206.0       Recent Labs   Lab 24  1256 24  0705 24  0718   * 128* 90   BUN 9 7* 5*   CREATSERUM 1.11* 0.78 0.65   CA 9.0 8.0* 8.0*   ALB 3.7  --  2.4*   * 138 140   K 3.8 3.4* 3.4*  3.4*    111 113*   CO2 23.0 22.0 20.0*   ALKPHO 71  --  61   AST 30  --  10*   ALT 16  --  8*   BILT 0.9  --  0.4   TP 8.5*  --  6.3*       Estimated Creatinine Clearance: 94.4 mL/min (based on SCr of 0.65 mg/dL).    No results for input(s): \"TROP\", \"TROPHS\", \"CK\" in the last 168 hours.    No results for input(s): \"PTP\", \"INR\" in the last 168 hours.     Microbiology    Hospital Encounter on 24   1. Urine Culture, Routine     Status: None    Collection Time: 24  8:17 PM    Specimen: Urine, clean catch   Result Value Ref Range    Urine Culture No Growth at  18-24 hrs. N/A   2. Blood Culture     Status: None (Preliminary result)    Collection Time: 06/23/24  7:20 PM    Specimen: Blood,peripheral   Result Value Ref Range    Blood Culture Result No Growth 1 Day N/A         Imaging: Reviewed in Epic.    Medications:    acetaminophen  1,000 mg Oral TID    magnesium hydroxide  30 mL Oral Daily    polyethylene glycol-electrolyte  4,000 mL Oral Once    gabapentin  300 mg Oral Nightly    [Held by provider] lisinopril  5 mg Oral Daily    enoxaparin  40 mg Subcutaneous Daily    cefTRIAXone  1 g Intravenous Q24H    insulin aspart  1-10 Units Subcutaneous TID AC and HS       Assessment & Plan:      #Abdominal pain, generalized  #Vomiting/Diarrhea  #Constipation  - CT with moderate stool, non obstruction, minimal free fluid in pelvis  - Pain control, anti-emetics, IVF, symptomatic support  - avoid IV narcotics  - GI PCR, cdiff negative  - bowel regimen started  - GI following  - CTA abdomen today to r/o ischemia  - pelvic ultrasound ordered to evaluate for endometriosis      #Sepsis - unknown source; ? Gastroenteritis  #Leukocytosis   - empiric ceftriaxone for now  - lactate WNL  - WBC's improving   - IVF  - cultures NGTD    #Hypokalemia  - replace    #Pre - DM, hyperglycemia  - HgbA1c 5.8%  - hyperglycemia protocol  - ICF     #Mild hyponatremia   - improved with IVF     #Chronic pain  #Narcotic dependence   - Patient takes Tramadol 100mg TID daily   - no indication for IV narcotics at this time      Brie Woods DO    Supplementary Documentation:     Quality:  DVT Mechanical Prophylaxis:     Early ambuation  DVT Pharmacologic Prophylaxis   Medication    enoxaparin (Lovenox) 40 MG/0.4ML SUBQ injection 40 mg                Code Status: Full Code  Meyer: No urinary catheter in place  Meyer Duration (in days):   Central line:    KEISHA:     Discharge is dependent on: clinical condition   At this point Ms. Reid is expected to be discharge to: home    The 21st Century Cures Act  makes medical notes like these available to patients in the interest of transparency. Please be advised this is a medical document. Medical documents are intended to carry relevant information, facts as evident, and the clinical opinion of the practitioner. The medical note is intended as peer to peer communication and may appear blunt or direct. It is written in medical language and may contain abbreviations or verbiage that are unfamiliar.

## 2024-06-25 NOTE — BH PROGRESS NOTE
Pt was scheduled for CT oral/IV enterography.  Pt was not able to tolerate the oral contrast due to vomiting. Pt had some solid food prior the contrast. GI notified. Will attempt the scan at the later time.

## 2024-06-25 NOTE — PLAN OF CARE
Pt Aox4. Temp 100.4F and headache. Managed by scheduled Tylenol 1000mg.  Pt also reported abdominal pain . Tramadol 100mg given x1.   Pt was NPO for the most of the day.   BS trending down. Last reading 76. GI notified. Clear liquid diet resumed.  Pt weak, up with supervision.   Family at the bedside.     Potassium replaced per protocol.   CTA and US abd completed today.     Problem: PAIN - ADULT  Goal: Verbalizes/displays adequate comfort level or patient's stated pain goal  Description: INTERVENTIONS:  - Encourage pt to monitor pain and request assistance  - Assess pain using appropriate pain scale  - Administer analgesics based on type and severity of pain and evaluate response  - Implement non-pharmacological measures as appropriate and evaluate response  - Consider cultural and social influences on pain and pain management  - Manage/alleviate anxiety  - Utilize distraction and/or relaxation techniques  - Monitor for opioid side effects  - Notify MD/LIP if interventions unsuccessful or patient reports new pain  - Anticipate increased pain with activity and pre-medicate as appropriate  Outcome: Progressing     Problem: GASTROINTESTINAL - ADULT  Goal: Minimal or absence of nausea and vomiting  Description: INTERVENTIONS:  - Maintain adequate hydration with IV or PO as ordered and tolerated  - Nasogastric tube to low intermittent suction as ordered  - Evaluate effectiveness of ordered antiemetic medications  - Provide nonpharmacologic comfort measures as appropriate  - Advance diet as tolerated, if ordered  - Obtain nutritional consult as needed  - Evaluate fluid balance  Outcome: Progressing  Goal: Maintains or returns to baseline bowel function  Description: INTERVENTIONS:  - Assess bowel function  - Maintain adequate hydration with IV or PO as ordered and tolerated  - Evaluate effectiveness of GI medications  - Encourage mobilization and activity  - Obtain nutritional consult as needed  - Establish a toileting  routine/schedule  - Consider collaborating with pharmacy to review patient's medication profile  Outcome: Progressing

## 2024-06-25 NOTE — PLAN OF CARE
Pt a/o x4. Tachycardic at times, low 100s. Tmax 99.5. Other VSS on RA. C/o pain & nausea. Meds per MAR. IVF per order. Encouraged to drink Golytely. Pt reports having stools.     Call light within reach.

## 2024-06-26 LAB
GLUCOSE BLD-MCNC: 87 MG/DL (ref 70–99)
GLUCOSE BLD-MCNC: 94 MG/DL (ref 70–99)
POTASSIUM SERPL-SCNC: 3.4 MMOL/L (ref 3.5–5.1)

## 2024-06-26 PROCEDURE — 99233 SBSQ HOSP IP/OBS HIGH 50: CPT | Performed by: INTERNAL MEDICINE

## 2024-06-26 PROCEDURE — 99232 SBSQ HOSP IP/OBS MODERATE 35: CPT | Performed by: OBSTETRICS & GYNECOLOGY

## 2024-06-26 RX ORDER — POTASSIUM CHLORIDE 20 MEQ/1
40 TABLET, EXTENDED RELEASE ORAL ONCE
Status: COMPLETED | OUTPATIENT
Start: 2024-06-26 | End: 2024-06-26

## 2024-06-26 RX ORDER — MORPHINE SULFATE 2 MG/ML
1 INJECTION, SOLUTION INTRAMUSCULAR; INTRAVENOUS ONCE
Status: COMPLETED | OUTPATIENT
Start: 2024-06-26 | End: 2024-06-26

## 2024-06-26 RX ORDER — MORPHINE SULFATE 2 MG/ML
1 INJECTION, SOLUTION INTRAMUSCULAR; INTRAVENOUS EVERY 4 HOURS PRN
Status: DISCONTINUED | OUTPATIENT
Start: 2024-06-26 | End: 2024-06-30

## 2024-06-26 RX ORDER — BUTALBITAL, ACETAMINOPHEN AND CAFFEINE 50; 325; 40 MG/1; MG/1; MG/1
1 TABLET ORAL EVERY 4 HOURS PRN
Status: DISCONTINUED | OUTPATIENT
Start: 2024-06-26 | End: 2024-06-30

## 2024-06-26 NOTE — CONSULTS
[facility]    PATIENT'S NAME: ELLIE CORDOVA   ATTENDING PHYSICIAN: Brie Woods DO   CONSULTING PHYSICIAN: Irving Lee M.D.   PATIENT ACCOUNT#:   [id number]    LOCATION:  [address] [place] [address]  MEDICAL RECORD #:   [id number]       YOB: 1980  ADMISSION DATE:       06/23/2024      CONSULT DATE:  06/25/2024    REPORT OF CONSULTATION    HISTORY OF PRESENT ILLNESS:  This is 43-year-old [place] female consulted for a CT angiogram showing a median arcuate ligament compression.  The patient is in the hospital having GI symptoms with abdominal discomfort, some diarrhea, loose stools, and temperature was as high as 101.1 on 06/23/2024.  She has had episodes in the past of irritable bowel syndrome and has been evaluated multiple times since [id number], being followed by Dr. Parker.  The patient was seen in Ultrasound, as she is getting an ultrasound of her pelvic area.  She has severe, diffuse abdominal pain starting on 06/23/2024, going on for about 1 day before she came in to the hospital.  She has had loose stools.  She said there was no blood in it.  She currently still has some abdominal discomfort.     PAST MEDICAL HISTORY:  She has had a diagnosis of overgrowth of bowel bacteria, treated with antibiotics years ago here at [facility].  The patient has been followed by GI.  There are notes from Dr. Thornton about extensive colonoscopy study by Dr. Vivas back in 2013 where they saw diverticulosis, hemorrhoids, anal fissures.  There was some edema and swelling in the area of the colon.  The patient states she has never been on steroids, but mostly was treated with antibiotics.  She was on rifaximin for 2 weeks back in 03/2023 and also used omeprazole.  She carries a diagnosis of irritable bowel syndrome.  There was a report of having some hematuria in the distant past.    PAST SURGICAL HISTORY:  Oophorectomy and left rotator cuff surgery with multiple colonoscopies.     MEDICATIONS:  She is on  gabapentin, tramadol, lisinopril, metformin 500 mg b.i.d., Zofran.  The patient's medications included lisinopril also, and she has been on ceftriaxone since admission.    ALLERGIES:  Advil and ketorolac, causes swelling.    SOCIAL HISTORY:  She has no EtOH abuse, drug abuse, or tobacco abuse.  She is still working as a  for [employer] in [place].  She is  with 3 children.      FAMILY HISTORY:  Both parents are alive, her mother was actually in her room when I went to see her, with diabetes and hypertension.    REVIEW OF SYSTEMS:  She denies weight loss.  She has no specific epigastric discomfort.  She denies any nausea, fullness, or vomiting after having a meal.  She says that the abdominal pain occurs almost all the time.  She denies any chest pain, shortness of breath, or previous MI.  She has no CVA, TIA, visual field defect, or aphasia.  She denies any gangrene, tissue loss, ulceration, or rest pain in the lower legs.  She has no hematuria or dysuria.  No hemoptysis, cough, or sputum production.    PHYSICAL EXAMINATION:    GENERAL:  She currently is awake and alert.  She is in some distress because she is still uncomfortable with abdominal discomfort.    VITAL SIGNS:  Her last blood pressure was 115/66, pulse between 90 to 100, respirations 18.  She currently is afebrile.  HEENT:  Pupils equal and round.  Sclerae clear.  Mouth without lesions.  NECK:  No cervical bruit.  LUNGS:  Grossly normal.  HEART:  Grossly normal.  ABDOMEN:  Tender in the abdomen, soft, not distended.  EXTREMITIES:  Femoral, popliteal, pedal, and upper extremity pulses are all palpable.  No gangrene or tissue loss.  No swelling or tenderness in the joints.  No clubbing of the fingertips.  NEUROLOGIC:  She is intact, moving all 4 extremities.      LABORATORY DATA:  The patient's lactic acid was within normal limits.  Her last creatinine was 0.65 with a BUN of 5.  Her last CBC had a white count of 15.7, it has been  elevated since she has been in, hematocrit 28.4.    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  The mesenteric vessels beyond that look normal.  I do not see any other specific pathology related to the blood vessels.    IMPRESSION:  Patient with a history of irritable bowel syndrome for at least 10 years, who has diffuse abdominal discomfort with fever, elevated white count, with x-ray imaging suggesting that there is compression of the celiac artery by the diaphragmatic juan.  At this time, I do not think the median arcuate ligament syndrome is clinically apparent.  There is radiographic evidence for compression, but it does not correlate with the symptoms of this disease.  Most of her stuff seems to apply to a bowel etiology.  The CT angiogram showed some fluid distention and mucosal hyperemia throughout the small bowel and colon, suggesting acute enterocolitis, and I think that this is probably the etiology.  She has no symptoms related to the median arcuate ligament, and at this time, it would just be something to observe.  I discussed what would be done if a surgical intervention was needed, but no intervention needed at this time because it is not symptomatic, but she should follow up with me in the future.  I gave her mother my card.  All questions answered for the patient.    Dictated By Irving Lee M.D.  d: 06/25/2024 17:31:45  t: 06/25/2024 18:21:37  [facility] [id number]  JJW/    cc: Irving Lee M.D.

## 2024-06-26 NOTE — CONSULTS
[facility]  Report of Consultation    Marielos Reid Patient Status:  Inpatient    1980 MRN [id number]   Location [facility] 4NW-A Attending Kingsley Ross DO   Hosp Day # 3 PCP Giovanna Parker DO     Reason for Consultation:  Suspected left hydrosalpinx on ultrasound    History of Present Illness:  Marielos Reid is a a(n) 43 year old  admitted since  for abdominal pain and sepsis. Pt reports fever and loss of appetite starting Saturday (), then acute onset of severe abdominal pain with vomiting on . She has been seen in the ED for similar pain multiple times previously - most significantly was several visits within a month in , then was maintained on tramadol and didn't really have a significant exacerbation of her pain again until this week. She does report dysmenorrhea, but this pain has been very different.  Menstrual cycles have just started to become irregular this year, heavy bleeding, no contraception.   Patient's last menstrual period was 2024 (exact date).     OB/GYN History:   laparoscopic right salpingo-oophorectomy with diagnosis of endometriosis  H/o FT  x3, SAB x1.    History:  Past Medical History:    Abdominal pain    Arthritis    Back pain    Back problem    low back pain    Blood in urine    Encounter for insertion of ParaGard IUD    Lot# [id number] Exp 2022    General counseling for prescription of oral contraceptives    Heartburn    Irritable bowel syndrome    Nausea    Other screening mammogram    Pain in joints    Prediabetes    Routine Papanicolaou smear    Visual impairment    glasses and contacts    Vomiting    Wears glasses     Past Surgical History:   Procedure Laterality Date    Colonoscopy      Colonoscopy      Colonoscopy N/A 2021    Procedure: COLONOSCOPY;  Surgeon: Louis Pereira MD;  Location:  ENDOSCOPY    Other surgical history  2014    cystoscopy- Dr. Abbott    Salpingectomy Right     with oopherectomy    Shoulder  arthroscopy Left 12/05/2019    Dr Lozoya     Family History   Problem Relation Age of Onset    Diabetes Father     Hypertension Mother       reports that she has never smoked. She has never used smokeless tobacco. She reports that she does not drink alcohol and does not use drugs.      Allergies:  Allergies   Allergen Reactions    Advil [Ibuprofen] SWELLING    Ketorolac SWELLING     Patient received a subacromial right shoulder corticosteroid and ketorolac injection and developed right eye a mild swelling that was self-limited over the course of 24 hours.       Medications:    Current Facility-Administered Medications:     butalbital-acetaminophen-caffeine (Fioricet) -40 MG per tab 1 tablet, 1 tablet, Oral, Q4H PRN    morphINE PF 2 MG/ML injection 1 mg, 1 mg, Intravenous, Q4H PRN    acetaminophen (Tylenol Extra Strength) tab 1,000 mg, 1,000 mg, Oral, TID    magnesium hydroxide (Milk of Magnesia) 400 MG/5ML oral suspension 30 mL, 30 mL, Oral, Daily    LORazepam (Ativan) tab 0.5 mg, 0.5 mg, Oral, Q4H PRN **OR** LORazepam (Ativan) tab 1 mg, 1 mg, Oral, Q4H PRN    gabapentin (Neurontin) cap 300 mg, 300 mg, Oral, Nightly    [Held by provider] lisinopril (Prinivil; Zestril) tab 5 mg, 5 mg, Oral, Daily    traMADol (Ultram) tab 100 mg, 100 mg, Oral, Q8H PRN    acetaminophen (Tylenol Extra Strength) tab 500 mg, 500 mg, Oral, Q4H PRN    melatonin tab 3 mg, 3 mg, Oral, Nightly PRN    glycerin-hypromellose- (Artificial Tears) 0.2-0.2-1 % ophthalmic solution 1 drop, 1 drop, Both Eyes, QID PRN    sodium chloride (Saline Mist) 0.65 % nasal solution 1 spray, 1 spray, Each Nare, Q3H PRN    sodium chloride 0.9% infusion, , Intravenous, Continuous    enoxaparin (Lovenox) 40 MG/0.4ML SUBQ injection 40 mg, 40 mg, Subcutaneous, Daily    polyethylene glycol (PEG 3350) (Miralax) 17 g oral packet 17 g, 17 g, Oral, Daily PRN    sennosides (Senokot) tab 17.2 mg, 17.2 mg, Oral, Nightly PRN    bisacodyl (Dulcolax) 10 MG rectal  suppository 10 mg, 10 mg, Rectal, Daily PRN    fleet enema (Fleet) 7-19 GM/118ML rectal enema 133 mL, 1 enema, Rectal, Once PRN    ondansetron (Zofran) 4 MG/2ML injection 4 mg, 4 mg, Intravenous, Q6H PRN    prochlorperazine (Compazine) 10 MG/2ML injection 5 mg, 5 mg, Intravenous, Q8H PRN    cefTRIAXone (Rocephin) 1 g in sodium chloride 0.9% 100 mL IVPB-ADDV, 1 g, Intravenous, Q24H    glucose (Dex4) 15 GM/59ML oral liquid 15 g, 15 g, Oral, Q15 Min PRN **OR** glucose (Glutose) 40% oral gel 15 g, 15 g, Oral, Q15 Min PRN **OR** glucose-vitamin C (Dex-4) chewable tab 4 tablet, 4 tablet, Oral, Q15 Min PRN **OR** dextrose 50% injection 50 mL, 50 mL, Intravenous, Q15 Min PRN **OR** glucose (Dex4) 15 GM/59ML oral liquid 30 g, 30 g, Oral, Q15 Min PRN **OR** glucose (Glutose) 40% oral gel 30 g, 30 g, Oral, Q15 Min PRN **OR** glucose-vitamin C (Dex-4) chewable tab 8 tablet, 8 tablet, Oral, Q15 Min PRN    Review of Systems:  Pertinent items are noted in HPI.    Physical Exam:  Vitals:    24 0100 24 0625 24 0731 24 1153   BP: 116/72 130/80 129/77 127/77   BP Location:   Left arm Left arm   Pulse: 83 94 94 88   Resp: 20 18 20 18   Temp: 98.1 °F (36.7 °C) 98.6 °F (37 °C) 98.2 °F (36.8 °C) 97.7 °F (36.5 °C)   TempSrc: Oral Oral Oral Oral   SpO2: 99% 98% 99% 97%   Weight:            General: Alert, orientated x3.  Cooperative.  No apparent distress.  Abdomen:  Soft, non-distended, non-tender, with no rebound or guarding.  No peritoneal signs.  PELVIC: deferred  Rectal:  deferred    Extremities:  No lower extremity edema noted.  Without clubbing or cyanosis.    Skin: Normal texture and turgor.  Neurologic: Cranial nerves are grossly intact. No focal neurologic deficit.    Laboratory Data/Imagin24 12:56 24 07:05 24 14:12 24 07:18   Glucose 166 (H) 128 (H)  90   Sodium 135 (L) 138  140   Potassium 3.8 3.4 (L)  3.4 (L)  3.4 (L)   Chloride 102 111  113 (H)   Carbon Dioxide, Total 23.0  22.0  20.0 (L)   BUN 9 7 (L)  5 (L)   CREATININE 1.11 (H) 0.78  0.65   CALCIUM 9.0 8.0 (L)  8.0 (L)   EGFR 62 95  111   ANION GAP 10 5  7   CALCULATED OSMOLALITY 282 286  287   ALKALINE PHOSPHATASE 71   61   AST (SGOT) 30   10 (L)   ALT (SGPT) 16   8 (L)   Total Bilirubin 0.9   0.4   Globulin 4.8 (H)   3.9   Magnesium, Serum  2.0     Lipase 22      LACTIC ACID   0.7 0.8   HCG QUANTITATIVE <1.0      A/G Ratio 0.8 (L)   0.6 (L)   PROTEIN, TOTAL 8.5 (H)   6.3 (L)   Albumin 3.7   2.4 (L)   WBC 32.0 (H) 20.1 (H)  15.7 (H)   Hemoglobin 13.4 11.0 (L)  9.7 (L)   Hematocrit 39.2 32.2 (L)  28.4 (L)   Platelet Count 374.0 248.0  206.0         Pelvic TA US 6/25/24  FINDINGS:     The uterus measures 8.7 x 7.0 x 5.0 cm.  Endometrial stripe measures 7 mm.     Right ovary measures 2.7 x 2.7 x 2.0 cm.  There is arterial and venous flow to the right ovary.  No adnexal masses.     Left ovary measures 3.3 x 2.5 x 3.5 cm.  There is a tubular fluid-filled structure in the left adnexa abutting the left ovary suspicious for hydrosalpinx.  There is arterial and venous flow 2 of the left ovary.  No evidence of ovarian torsion.      CUL-DE-SAC:  Small amount of free fluid               Impression   CONCLUSION:  Tubular fluid-filled structure in the left adnexa abutting the left ovary suspicious for left-sided hydrosalpinx.     No sonographic evidence for ovarian torsion.     There is a small amount of free fluid in the cul de sac.       Impression and Plan:  Patient Active Problem List   Diagnosis    Pain in joint, multiple sites    Esophageal reflux    Lateral epicondylitis  of elbow    Carpal tunnel syndrome    Neuropathic pain    History of rheumatic fever    Herpes simplex with unspecified ophthalmic complication    Abdominal pain    Eczema    Microscopic hematuria    Vitamin D deficiency    Chronic bilateral low back pain without sciatica    History of herpes simplex infection    Spondylosis of lumbosacral region    Positive SHANA  (antinuclear antibody)    Sublluxation of lendon of long head of biceps  Global 06/13/2019    Chronic left shoulder pain    Biceps tendinopathy, left    Bursitis of shoulder, left    Neck pain    Myofascial pain    Pre-op testing    Abdominal pain, acute    Nausea and vomiting in adult    Enteritis    Dyspnea on exertion    Special screening for malignant neoplasm of colon    colitis    HPV in female    Opioid dependence with unspecified opioid-induced disorder (HCC)    Narcotic dependence (HCC)    Prediabetes    Fibromyalgia    Sicca syndrome, Sjogren's (HCC)    Primary osteoarthritis involving multiple joints    History of IBS    Raynaud's disease without gangrene    Hamstring tightness of both lower extremities    Neuropathy    Gastroenteritis    Dehydration    Leukocytosis, unspecified type       Reviewed imaging and recommend repeating pelvic US with transvaginal approach for better detail. Even so, I do not think that the patient's HPI correlates with a gynecologic etiology of her pain or admission findings of sepsis. She does have h/o endometriosis, which may be contributing to her chronic pain, but would not explain an acute exacerbation.   If decision is made by another consultant to perform diagnostic/exploratory surgery, GYN would be happy to be involved in order to evaluate the pelvis, however I would not plan to do so myself.   Evaluation and management of irregular and heavy menstrual cycles can be done outpatient.     All of the findings and plan were discussed with the patient.  Questions were answered to her satisfaction. She notes understanding and agrees with the plan of care.  Total time was 20 minutes, more than 50% of the time was spent in face-to-face counseling and/or coordination of care.      Keren Calderon MD  [facility]  6/26/2024 3:13 PM

## 2024-06-26 NOTE — PROGRESS NOTES
[facility]                       Gastroenterology Follow up Note - [facility]    Marielos Reid Patient Status:  Inpatient    1980 MRN [id number]   Location [facility] 4NW-A Attending Kingsley Ross DO   Hosp Day # 3 PCP Giovanna Parker DO     Reason for consultation: Abdominal pain  Subjective: Patient with continued abdominal pain that comes and goes in waves.  Underwent a CTA of the abdomen pelvis yesterday showing possible MALS, but evaluation by vascular surgeon did not think this was contributing to her current symptoms.  Also underwent ultrasound pelvis yesterday showing possible left hydrosalpinx.  Review of Systems:   10 point ROS completed and was negative, except for pertinent positive and negatives stated in subjective.    For PMH, PSH, FHx and SHx- please see initial consult note.     Objective: /77 (BP Location: Left arm)   Pulse 94   Temp 98.2 °F (36.8 °C) (Oral)   Resp 20   Wt 123 lb (55.8 kg)   LMP 2024 (Exact Date)   SpO2 99%   BMI 21.11 kg/m²   Gen: No acute distress  Resp: no respiratory distress  Abd: Soft, non-tender, non-distended. No rebound tenderness, no guarding.   Neuro: Aox3.   Continued diffuse abdominal tenderness to palpation    Labs:   Lab Results   Component Value Date    K 3.4 2024     Recent Labs   Lab 24  1256 24  0705 24  0718 24  0602   * 128* 90  --    BUN 9 7* 5*  --    CREATSERUM 1.11* 0.78 0.65  --    CA 9.0 8.0* 8.0*  --    * 138 140  --    K 3.8 3.4* 3.4*  3.4* 3.4*    111 113*  --    CO2 23.0 22.0 20.0*  --      Recent Labs   Lab 24  0718   RBC 3.28*   HGB 9.7*   HCT 28.4*   MCV 86.6   MCH 29.6   MCHC 34.2   RDW 12.5   NEPRELIM 13.54*   WBC 15.7*   .0       Recent Labs   Lab 24  1256 24  0718   ALT 16 8*   AST 30 10*       Assessment:  Diffuse abdominal pain-etiology remains unknown at this time.  She had a relatively unremarkable CTA of  the abdomen pelvis.  She was evaluated for mild by a vascular surgeon did not think it was contributing to her current symptoms.  May have some component from the left hydrosalpinx noted on pelvic ultrasound.  Will also assess for intestinal angioedema given ACE inhibitor use.  Plan:  Clear liquid diet    Plan for endoscopic evaluation with EGD and colonoscopy tomorrow  Will defer any need for gynecologic consult to hospitalist  Pain control per primary.  Recommend try to limit narcotics as able  Labs for intestinal angioedema workup. Recommend stopping lisinopril if OK with hospitalist. This has been held since admission  Patient and  requesting exploratory laparotomy to assess for additional etiologies.  Advised that I recommend this given no obvious extraintestinal etiologies noted on CT, and risk/any surgeries would likely outweigh benefits.  Will defer any need for general surgery consultation to hospitalist.  Monitor CBC, CMP, CRP, ESR in the morning  Will continue to follow along with you    Thank you for allowing us to participate in patient's care. Please call us with any questions or concerns.  The GI consult service will continue to follow.     Alexis Thornton DO  [facility]  [phone]

## 2024-06-26 NOTE — PLAN OF CARE
Problem: Diabetes/Glucose Control  Goal: Glucose maintained within prescribed range  Description: INTERVENTIONS:  - Monitor Blood Glucose as ordered  - Assess for signs and symptoms of hyperglycemia and hypoglycemia  - Administer ordered medications to maintain glucose within target range  - Assess barriers to adequate nutritional intake and initiate nutrition consult as needed  - Instruct patient on self management of diabetes  Outcome: Progressing     Problem: PAIN - ADULT  Goal: Verbalizes/displays adequate comfort level or patient's stated pain goal  Description: INTERVENTIONS:  - Encourage pt to monitor pain and request assistance  - Assess pain using appropriate pain scale  - Administer analgesics based on type and severity of pain and evaluate response  - Implement non-pharmacological measures as appropriate and evaluate response  - Consider cultural and social influences on pain and pain management  - Manage/alleviate anxiety  - Utilize distraction and/or relaxation techniques  - Monitor for opioid side effects  - Notify MD/LIP if interventions unsuccessful or patient reports new pain  - Anticipate increased pain with activity and pre-medicate as appropriate  Outcome: Progressing     Problem: RISK FOR INFECTION - ADULT  Goal: Absence of fever/infection during anticipated neutropenic period  Description: INTERVENTIONS  - Monitor WBC  - Administer growth factors as ordered  - Implement neutropenic guidelines  Outcome: Progressing     Problem: SAFETY ADULT - FALL  Goal: Free from fall injury  Description: INTERVENTIONS:  - Assess pt frequently for physical needs  - Identify cognitive and physical deficits and behaviors that affect risk of falls.  - [place] fall precautions as indicated by assessment.  - Educate pt/family on patient safety including physical limitations  - Instruct pt to call for assistance with activity based on assessment  - Modify environment to reduce risk of injury  - Provide assistive  devices as appropriate  - Consider OT/PT consult to assist with strengthening/mobility  - Encourage toileting schedule  Outcome: Progressing     Problem: GASTROINTESTINAL - ADULT  Goal: Minimal or absence of nausea and vomiting  Description: INTERVENTIONS:  - Maintain adequate hydration with IV or PO as ordered and tolerated  - Nasogastric tube to low intermittent suction as ordered  - Evaluate effectiveness of ordered antiemetic medications  - Provide nonpharmacologic comfort measures as appropriate  - Advance diet as tolerated, if ordered  - Obtain nutritional consult as needed  - Evaluate fluid balance  Outcome: Progressing  Goal: Maintains or returns to baseline bowel function  Description: INTERVENTIONS:  - Assess bowel function  - Maintain adequate hydration with IV or PO as ordered and tolerated  - Evaluate effectiveness of GI medications  - Encourage mobilization and activity  - Obtain nutritional consult as needed  - Establish a toileting routine/schedule  - Consider collaborating with pharmacy to review patient's medication profile  Outcome: Progressing

## 2024-06-26 NOTE — PLAN OF CARE
Patient is alert and oriented, still complains of abdominal pain. Tramadol given for pain. IVF infusing at 125 ml/hr. WBC trending down, no fever overnight. Safety precautions in place, call light within reach, plan of care ongoing.     Problem: Diabetes/Glucose Control  Goal: Glucose maintained within prescribed range  Description: INTERVENTIONS:  - Monitor Blood Glucose as ordered  - Assess for signs and symptoms of hyperglycemia and hypoglycemia  - Administer ordered medications to maintain glucose within target range  - Assess barriers to adequate nutritional intake and initiate nutrition consult as needed  - Instruct patient on self management of diabetes  Outcome: Progressing     Problem: Patient/Family Goals  Goal: Patient/Family Long Term Goal  Description: Patient's Long Term Goal: Patient will report relief of pain    Interventions:  - Nurse will educate patient about pain management.  - See additional Care Plan goals for specific interventions  Outcome: Progressing  Goal: Patient/Family Short Term Goal  Description: Patient's Short Term Goal: patient will describe satisfactory pain control at a level less than 3.    Interventions:   Nurse will Provide measures to relieve pain before it becomes severe.   - See additional Care Plan goals for specific interventions  Outcome: Progressing     Problem: PAIN - ADULT  Goal: Verbalizes/displays adequate comfort level or patient's stated pain goal  Description: INTERVENTIONS:  - Encourage pt to monitor pain and request assistance  - Assess pain using appropriate pain scale  - Administer analgesics based on type and severity of pain and evaluate response  - Implement non-pharmacological measures as appropriate and evaluate response  - Consider cultural and social influences on pain and pain management  - Manage/alleviate anxiety  - Utilize distraction and/or relaxation techniques  - Monitor for opioid side effects  - Notify MD/LIP if interventions unsuccessful or  patient reports new pain  - Anticipate increased pain with activity and pre-medicate as appropriate  Outcome: Progressing     Problem: RISK FOR INFECTION - ADULT  Goal: Absence of fever/infection during anticipated neutropenic period  Description: INTERVENTIONS  - Monitor WBC  - Administer growth factors as ordered  - Implement neutropenic guidelines  Outcome: Progressing     Problem: SAFETY ADULT - FALL  Goal: Free from fall injury  Description: INTERVENTIONS:  - Assess pt frequently for physical needs  - Identify cognitive and physical deficits and behaviors that affect risk of falls.  - [place] fall precautions as indicated by assessment.  - Educate pt/family on patient safety including physical limitations  - Instruct pt to call for assistance with activity based on assessment  - Modify environment to reduce risk of injury  - Provide assistive devices as appropriate  - Consider OT/PT consult to assist with strengthening/mobility  - Encourage toileting schedule  Outcome: Progressing     Problem: GASTROINTESTINAL - ADULT  Goal: Minimal or absence of nausea and vomiting  Description: INTERVENTIONS:  - Maintain adequate hydration with IV or PO as ordered and tolerated  - Nasogastric tube to low intermittent suction as ordered  - Evaluate effectiveness of ordered antiemetic medications  - Provide nonpharmacologic comfort measures as appropriate  - Advance diet as tolerated, if ordered  - Obtain nutritional consult as needed  - Evaluate fluid balance  Outcome: Progressing  Goal: Maintains or returns to baseline bowel function  Description: INTERVENTIONS:  - Assess bowel function  - Maintain adequate hydration with IV or PO as ordered and tolerated  - Evaluate effectiveness of GI medications  - Encourage mobilization and activity  - Obtain nutritional consult as needed  - Establish a toileting routine/schedule  - Consider collaborating with pharmacy to review patient's medication profile  Outcome: Progressing

## 2024-06-26 NOTE — PROGRESS NOTES
[facility]   part of [facility]     Hospitalist Progress Note     Marielos Reid Patient Status:  Inpatient    1980 MRN [id number]   Location [facility] 4NW-A Attending Brie Woods DO   Hosp Day # 3 PCP Giovanna Parker DO     Chief Complaint: Abdominal pain, n/v/d    Subjective:     Patient continues to have waxing and waning abdominal pain. Received IV morphine 6:45 AM. Also reports a headache. Having BM.    Objective:    Review of Systems:   A comprehensive review of systems was completed; pertinent positive and negatives stated in subjective.    Vital signs:  Temp:  [97.7 °F (36.5 °C)-100.4 °F (38 °C)] 97.7 °F (36.5 °C)  Pulse:  [] 88  Resp:  [16-20] 18  BP: ()/(65-80) 127/77  SpO2:  [97 %-99 %] 97 %    Physical Exam:    General: No acute distress, awake and alert  Respiratory: No wheezes, no rhonchi  Cardiovascular: S1, S2, regular rate and rhythm  Abdomen: Soft, diffuse TTP, non-distended, positive bowel sounds  Extremities: No edema    Diagnostic Data:    Labs:  Recent Labs   Lab 24  1256 24  0705 24  0718   WBC 32.0* 20.1* 15.7*   HGB 13.4 11.0* 9.7*   MCV 85.8 88.0 86.6   .0 248.0 206.0     Recent Labs   Lab 24  1256 24  0705 24  0718 24  0602   * 128* 90  --    BUN 9 7* 5*  --    CREATSERUM 1.11* 0.78 0.65  --    CA 9.0 8.0* 8.0*  --    ALB 3.7  --  2.4*  --    * 138 140  --    K 3.8 3.4* 3.4*  3.4* 3.4*    111 113*  --    CO2 23.0 22.0 20.0*  --    ALKPHO 71  --  61  --    AST 30  --  10*  --    ALT 16  --  8*  --    BILT 0.9  --  0.4  --    TP 8.5*  --  6.3*  --      Estimated Creatinine Clearance: 94.4 mL/min (based on SCr of 0.65 mg/dL).    No results for input(s): \"TROP\", \"TROPHS\", \"CK\" in the last 168 hours.    No results for input(s): \"PTP\", \"INR\" in the last 168 hours.     Microbiology  Hospital Encounter on 24   1. Urine Culture, Routine     Status: None    Collection Time: 24   8:17 PM    Specimen: Urine, clean catch   Result Value Ref Range    Urine Culture No Growth at 18-24 hrs. N/A   2. Blood Culture     Status: None (Preliminary result)    Collection Time: 06/23/24  7:20 PM    Specimen: Blood,peripheral   Result Value Ref Range    Blood Culture Result No Growth 2 Days N/A     Imaging: Reviewed in Epic.    Medications:    acetaminophen  1,000 mg Oral TID    magnesium hydroxide  30 mL Oral Daily    polyethylene glycol-electrolyte  4,000 mL Oral Once    gabapentin  300 mg Oral Nightly    [Held by provider] lisinopril  5 mg Oral Daily    enoxaparin  40 mg Subcutaneous Daily    cefTRIAXone  1 g Intravenous Q24H    insulin aspart  1-10 Units Subcutaneous TID AC and HS       Assessment & Plan:      #Abdominal pain, generalized  #Vomiting/Diarrhea  #Constipation  -CT with moderate stool, non obstruction, minimal free fluid in pelvis  -CTA with possible MALS > vascular surgery consulted and did not think it was contributing to her symptoms  -Pain control, anti-emetics, IVF decreased, symptomatic support  -Minimize IV narcotics  -GI PCR, cdiff negative  -Bowel regimen and now having BM  -GI following, plan for EGD/colonoscopy tomorrow  -Pelvic ultrasound with hydrosalpinx and has hx of endometriosis > consult ob/gyn     #Sepsis present on arrival - unknown source; ?Gastroenteritis  #Leukocytosis   -Empiric ceftriaxone for now  -Lactate WNL  -WBC's improving  -IVF  -Cultures NGTD    #Hypokalemia  -Replace    #Pre - DM, hyperglycemia  -HgbA1c 5.8%  -Glucose controlled, stop accuchecks     #Mild hyponatremia   -Resolved with IVF     #Chronic pain  #Narcotic dependence   -Patient takes Tramadol 100mg TID daily   -Minimize IV narcotics    Kingsley Ross DO    Supplementary Documentation:     Quality:  DVT Mechanical Prophylaxis:     Early ambuation  DVT Pharmacologic Prophylaxis   Medication    enoxaparin (Lovenox) 40 MG/0.4ML SUBQ injection 40 mg     Code Status: Full Code  Meyer: No urinary  catheter in place  KEISHA: TBD    Discharge is dependent on: Clinical state, consultant recs   At this point Ms. Reid is expected to be discharge to: Home    The 21st Century Cures Act makes medical notes like these available to patients in the interest of transparency. Please be advised this is a medical document. Medical documents are intended to carry relevant information, facts as evident, and the clinical opinion of the practitioner. The medical note is intended as peer to peer communication and may appear blunt or direct. It is written in medical language and may contain abbreviations or verbiage that are unfamiliar.

## 2024-06-27 ENCOUNTER — ANESTHESIA (OUTPATIENT)
Dept: ENDOSCOPY | Facility: HOSPITAL | Age: 44
DRG: 872 | End: 2024-06-27
Payer: COMMERCIAL

## 2024-06-27 ENCOUNTER — ANESTHESIA EVENT (OUTPATIENT)
Dept: ENDOSCOPY | Facility: HOSPITAL | Age: 44
DRG: 872 | End: 2024-06-27
Payer: COMMERCIAL

## 2024-06-27 LAB
ALBUMIN SERPL-MCNC: 2.3 G/DL (ref 3.4–5)
ALBUMIN/GLOB SERPL: 0.5 {RATIO} (ref 1–2)
ALP LIVER SERPL-CCNC: 71 U/L
ALT SERPL-CCNC: 8 U/L
ANION GAP SERPL CALC-SCNC: 7 MMOL/L (ref 0–18)
AST SERPL-CCNC: 10 U/L (ref 15–37)
BASOPHILS # BLD AUTO: 0.04 X10(3) UL (ref 0–0.2)
BASOPHILS NFR BLD AUTO: 0.5 %
BILIRUB SERPL-MCNC: 0.3 MG/DL (ref 0.1–2)
BUN BLD-MCNC: 4 MG/DL (ref 9–23)
CALCIUM BLD-MCNC: 8.2 MG/DL (ref 8.5–10.1)
CHLORIDE SERPL-SCNC: 108 MMOL/L (ref 98–112)
CO2 SERPL-SCNC: 23 MMOL/L (ref 21–32)
CREAT BLD-MCNC: 0.64 MG/DL
CRP SERPL-MCNC: 17.9 MG/DL (ref ?–0.3)
EGFRCR SERPLBLD CKD-EPI 2021: 111 ML/MIN/1.73M2 (ref 60–?)
EOSINOPHIL # BLD AUTO: 0.31 X10(3) UL (ref 0–0.7)
EOSINOPHIL NFR BLD AUTO: 3.5 %
ERYTHROCYTE [DISTWIDTH] IN BLOOD BY AUTOMATED COUNT: 12.5 %
ERYTHROCYTE [SEDIMENTATION RATE] IN BLOOD: 67 MM/HR
GLOBULIN PLAS-MCNC: 4.3 G/DL (ref 2.8–4.4)
GLUCOSE BLD-MCNC: 113 MG/DL (ref 70–99)
GLUCOSE BLD-MCNC: 156 MG/DL (ref 70–99)
GLUCOSE BLD-MCNC: 158 MG/DL (ref 70–99)
GLUCOSE BLD-MCNC: 58 MG/DL (ref 70–99)
GLUCOSE BLD-MCNC: 62 MG/DL (ref 70–99)
GLUCOSE BLD-MCNC: 88 MG/DL (ref 70–99)
HCT VFR BLD AUTO: 28.4 %
HGB BLD-MCNC: 9.4 G/DL
IMM GRANULOCYTES # BLD AUTO: 0.03 X10(3) UL (ref 0–1)
IMM GRANULOCYTES NFR BLD: 0.3 %
LYMPHOCYTES # BLD AUTO: 1.43 X10(3) UL (ref 1–4)
LYMPHOCYTES NFR BLD AUTO: 16.1 %
MAGNESIUM SERPL-MCNC: 1.8 MG/DL (ref 1.6–2.6)
MCH RBC QN AUTO: 29.4 PG (ref 26–34)
MCHC RBC AUTO-ENTMCNC: 33.1 G/DL (ref 31–37)
MCV RBC AUTO: 88.8 FL
MONOCYTES # BLD AUTO: 0.81 X10(3) UL (ref 0.1–1)
MONOCYTES NFR BLD AUTO: 9.1 %
NEUTROPHILS # BLD AUTO: 6.24 X10 (3) UL (ref 1.5–7.7)
NEUTROPHILS # BLD AUTO: 6.24 X10(3) UL (ref 1.5–7.7)
NEUTROPHILS NFR BLD AUTO: 70.5 %
OSMOLALITY SERPL CALC.SUM OF ELEC: 282 MOSM/KG (ref 275–295)
PLATELET # BLD AUTO: 287 10(3)UL (ref 150–450)
POTASSIUM SERPL-SCNC: 3.4 MMOL/L (ref 3.5–5.1)
POTASSIUM SERPL-SCNC: 3.4 MMOL/L (ref 3.5–5.1)
PROT SERPL-MCNC: 6.6 G/DL (ref 6.4–8.2)
RBC # BLD AUTO: 3.2 X10(6)UL
SODIUM SERPL-SCNC: 138 MMOL/L (ref 136–145)
WBC # BLD AUTO: 8.9 X10(3) UL (ref 4–11)

## 2024-06-27 PROCEDURE — 0DB68ZX EXCISION OF STOMACH, VIA NATURAL OR ARTIFICIAL OPENING ENDOSCOPIC, DIAGNOSTIC: ICD-10-PCS | Performed by: STUDENT IN AN ORGANIZED HEALTH CARE EDUCATION/TRAINING PROGRAM

## 2024-06-27 PROCEDURE — 0DBP8ZX EXCISION OF RECTUM, VIA NATURAL OR ARTIFICIAL OPENING ENDOSCOPIC, DIAGNOSTIC: ICD-10-PCS | Performed by: STUDENT IN AN ORGANIZED HEALTH CARE EDUCATION/TRAINING PROGRAM

## 2024-06-27 PROCEDURE — 0DB38ZX EXCISION OF LOWER ESOPHAGUS, VIA NATURAL OR ARTIFICIAL OPENING ENDOSCOPIC, DIAGNOSTIC: ICD-10-PCS | Performed by: STUDENT IN AN ORGANIZED HEALTH CARE EDUCATION/TRAINING PROGRAM

## 2024-06-27 PROCEDURE — 99233 SBSQ HOSP IP/OBS HIGH 50: CPT | Performed by: INTERNAL MEDICINE

## 2024-06-27 PROCEDURE — 0DB98ZX EXCISION OF DUODENUM, VIA NATURAL OR ARTIFICIAL OPENING ENDOSCOPIC, DIAGNOSTIC: ICD-10-PCS | Performed by: STUDENT IN AN ORGANIZED HEALTH CARE EDUCATION/TRAINING PROGRAM

## 2024-06-27 PROCEDURE — 0DBG8ZX EXCISION OF LEFT LARGE INTESTINE, VIA NATURAL OR ARTIFICIAL OPENING ENDOSCOPIC, DIAGNOSTIC: ICD-10-PCS | Performed by: STUDENT IN AN ORGANIZED HEALTH CARE EDUCATION/TRAINING PROGRAM

## 2024-06-27 PROCEDURE — 0DBF8ZX EXCISION OF RIGHT LARGE INTESTINE, VIA NATURAL OR ARTIFICIAL OPENING ENDOSCOPIC, DIAGNOSTIC: ICD-10-PCS | Performed by: STUDENT IN AN ORGANIZED HEALTH CARE EDUCATION/TRAINING PROGRAM

## 2024-06-27 PROCEDURE — 0DB28ZX EXCISION OF MIDDLE ESOPHAGUS, VIA NATURAL OR ARTIFICIAL OPENING ENDOSCOPIC, DIAGNOSTIC: ICD-10-PCS | Performed by: STUDENT IN AN ORGANIZED HEALTH CARE EDUCATION/TRAINING PROGRAM

## 2024-06-27 RX ORDER — LIDOCAINE HYDROCHLORIDE 10 MG/ML
INJECTION, SOLUTION EPIDURAL; INFILTRATION; INTRACAUDAL; PERINEURAL AS NEEDED
Status: DISCONTINUED | OUTPATIENT
Start: 2024-06-27 | End: 2024-06-27 | Stop reason: SURG

## 2024-06-27 RX ORDER — ONDANSETRON 4 MG/1
4 TABLET, FILM COATED ORAL EVERY 8 HOURS PRN
Qty: 20 TABLET | Refills: 0 | Status: SHIPPED | OUTPATIENT
Start: 2024-06-27

## 2024-06-27 RX ORDER — SODIUM CHLORIDE, SODIUM LACTATE, POTASSIUM CHLORIDE, CALCIUM CHLORIDE 600; 310; 30; 20 MG/100ML; MG/100ML; MG/100ML; MG/100ML
INJECTION, SOLUTION INTRAVENOUS CONTINUOUS PRN
Status: DISCONTINUED | OUTPATIENT
Start: 2024-06-27 | End: 2024-06-27 | Stop reason: SURG

## 2024-06-27 RX ORDER — MAGNESIUM OXIDE 400 MG/1
400 TABLET ORAL ONCE
Status: COMPLETED | OUTPATIENT
Start: 2024-06-27 | End: 2024-06-27

## 2024-06-27 RX ORDER — POLYETHYLENE GLYCOL 3350 17 G/17G
17 POWDER, FOR SOLUTION ORAL DAILY PRN
Qty: 20 EACH | Refills: 0 | Status: SHIPPED | OUTPATIENT
Start: 2024-06-27

## 2024-06-27 RX ADMIN — SODIUM CHLORIDE, SODIUM LACTATE, POTASSIUM CHLORIDE, CALCIUM CHLORIDE: 600; 310; 30; 20 INJECTION, SOLUTION INTRAVENOUS at 14:22:00

## 2024-06-27 RX ADMIN — LIDOCAINE HYDROCHLORIDE 50 MG: 10 INJECTION, SOLUTION EPIDURAL; INFILTRATION; INTRACAUDAL; PERINEURAL at 14:25:00

## 2024-06-27 NOTE — PROGRESS NOTES
[facility]  Obstetrics and Gynecology Consultation   Progress Note    Marielos Reid Patient Status:  Inpatient    1980 MRN [id number]   Location [facility] 4NW-A Attending Kingsley Ross DO   Hospital Day 4 PCP Giovanna Parker DO     Reason for Consultation: Suspected left hydrosalpinx on ultrasound       Subjective:     Marielos Reid is a 43 year old  female who was admitted on 2024 with c/o abdominal pain and sepsis. The patient reports feeling better this AM. She did have an episode of abdominal pain earlier this AM but improved. Denies fever, chills, chest pain and SOB. Had an episode of emesis yesterday. Denies abnormal vaginal discharge or vaginal bleeding.        Objective:     Vitals:    24 0804   BP: 128/78   Pulse: 82   Resp: 14   Temp: 98.2 °F (36.8 °C)       General: AAO. NAD.   CVS exam: not examined  Chest: no tachypnea, retractions or cyanosis  Abdominal exam: tender in bilateral upper quadrants and mild tender inferior to umbilicus. No rebound. +Voluntary guarding diffusely.   Pelvic exam:   VULVA: normal appearing vulva with no masses, tenderness or lesions  PERINEUM:  normal appearing, no lesions   URETHRAL MEATUS:  normal appearing, no lesions   VAGINA: non-tender. No masses.   CERVIX: cervical motion tenderness absent  UTERUS: uterus is normal size, shape, consistency and nontender  ADNEXA: normal adnexa in size, nontender and no masses  PERIRECTAL: normal appearing, no lesions   Ext: non-tender, no edemaa    Labs:  Lab Results   Component Value Date    WBC 8.9 2024    HGB 9.4 2024    HCT 28.4 2024    .0 2024    CREATSERUM 0.64 2024    BUN 4 2024     2024    K 3.4 2024    K 3.4 2024     2024    CO2 23.0 2024    GLU 88 2024    CA 8.2 2024    ALB 2.3 2024    ALKPHO 71 2024    BILT 0.3 2024    TP 6.6 2024    AST 10 2024    ALT 8  2024    ESRML 67 2024    CRP 17.90 2024    MG 1.8 2024       Imaging:  FINDINGS:     The uterus measures 8.7 x 7.0 x 5.0 cm.  Endometrial stripe measures 7 mm.     Right ovary measures 2.7 x 2.7 x 2.0 cm.  There is arterial and venous flow to the right ovary.  No adnexal masses.     Left ovary measures 3.3 x 2.5 x 3.5 cm.  There is a tubular fluid-filled structure in the left adnexa abutting the left ovary suspicious for hydrosalpinx.  There is arterial and venous flow 2 of the left ovary.  No evidence of ovarian torsion.      CUL-DE-SAC:  Small amount of free fluid                   Impression   CONCLUSION:  Tubular fluid-filled structure in the left adnexa abutting the left ovary suspicious for left-sided hydrosalpinx.     No sonographic evidence for ovarian torsion.     There is a small amount of free fluid in the cul de sac.        LOCATION:  [id number]        Dictated by (CST): Karlie Rayo MD on 2024 at 5:23 PM      Finalized by (CST): Karlie Rayo MD on 2024 at 5:25 PM          Assessment:     Marielos Reid is a 43 year old  female who was admitted on 2024 with c/o abdominal pain and sepsis.     Patient Active Problem List   Diagnosis    Pain in joint, multiple sites    Esophageal reflux    Lateral epicondylitis  of elbow    Carpal tunnel syndrome    Neuropathic pain    History of rheumatic fever    Herpes simplex with unspecified ophthalmic complication    Abdominal pain    Eczema    Microscopic hematuria    Vitamin D deficiency    Chronic bilateral low back pain without sciatica    History of herpes simplex infection    Spondylosis of lumbosacral region    Positive SHANA (antinuclear antibody)    Sublluxation of lendon of long head of biceps  Global 2019    Chronic left shoulder pain    Biceps tendinopathy, left    Bursitis of shoulder, left    Neck pain    Myofascial pain    Pre-op testing    Abdominal pain, acute    Nausea and vomiting in adult     Enteritis    Dyspnea on exertion    Special screening for malignant neoplasm of colon    colitis    HPV in female    Opioid dependence with unspecified opioid-induced disorder (HCC)    Narcotic dependence (HCC)    Prediabetes    Fibromyalgia    Sicca syndrome, Sjogren's (HCC)    Primary osteoarthritis involving multiple joints    History of IBS    Raynaud's disease without gangrene    Hamstring tightness of both lower extremities    Neuropathy    Gastroenteritis    Dehydration    Leukocytosis, unspecified type         Plan:     Suspected left hydrosalpinx  -pelvic exam benign and nontender on exam    -reviewed trans-abdominal pelvic US  -recommend to complete transvaginal pelvic US  -hx of endometriosis with hx of chronic pelvic pain, confirmed by surgical pathology   -recommend outpatient follow up with minimally invasive gynecological surgeon regarding endometriosis management. Reviewed and d/w patient. Referrals provided.   -recommend outpatient follow up for routine OBGYN gcare   -if patient were to undergo abdominal/pelvic surgery then recommend to contact OBGYN service and we will attempt to be present at time of surgical intervention to elevate pelvic anatomy    Sepsis  - continue management per primary care team  - unlikely caused by pelvic etiology     All of the findings and plan were discussed with the patient.  She notes understanding and agrees with the plan of care.  All questions were answered to the best of my ability at this time.      Total patient time was 50 minutes in evaluation, consultation, and coordination of care. This included face to face and non-face to face actions. The patient's questions and concerns were addressed.      Radha Smith MD   [facility] - OBGYN        Discussed with patient that there will not be further notification of normal or benign results other than receiving results on mychart. A [employer] message or telephone call will be placed by the physician and/or office staff if  results are abnormal.     Note to patient and family   The 21st Century Cures Act makes medical notes available to patients in the interest of transparency.  However, please be advised that this is a medical document.  It is intended as [id number] communication.  It is written and medical language may contain abbreviations or verbiage that are technical and unfamiliar.  It may appear blunt or direct.  Medical documents are intended to carry relevant information, facts as evident, and the clinical opinion of the practitioner.        This note could include assistance by Dragon voice recognition. Errors in content may be related to improper recognition by the system; efforts to review and correct have been done but errors may still exist.

## 2024-06-27 NOTE — CM/SW NOTE
SW discussed patient in morning RN rounds. There are currently no identified discharge planning or case management needs at this time. Patient plan is to return home at DC.     SW will continue to follow for plan of care changes and remain available for any additional DC needs or concerns.     Michelle Unger MSW, LSW  Discharge Planner   [phone]

## 2024-06-27 NOTE — DIETARY NOTE
[facility]   part of [facility]    NUTRITION ASSESSMENT    Pt does not meet malnutrition criteria at this time.    NUTRITION INTERVENTION:    Meal and Snacks - Continue NPO. Advance diet as appropriate per MD.    .   Medical Food Supplements -  Will evaluate need when diet is advanced. Rationale/use for oral supplements discussed.    PATIENT STATUS: 06/27/24 44 y/o F w hx of IBS admitted for abdominal pain, vomiting, and diarrhea. Pt NPO/CLD x 5. Currently NPO for colonoscopy. PTA pt reports a normal appetite with no issue. Denies weight loss. Reports being of small stature. Weight has been stable. Continue to monitor for diet advancement and need for supplements.     ANTHROPOMETRICS:  Ht: 162.6 cm   Wt: 55.8 kg (123 lb).   BMI: Body mass index is 21.11 kg/m².  IBW: 54.5 kg      WEIGHT HISTORY:   Weight loss: No    Wt Readings from Last 10 Encounters:   06/23/24 55.8 kg (123 lb)   06/11/24 55.8 kg (123 lb)   06/10/24 54.9 kg (121 lb)   04/18/24 52.2 kg (115 lb)   03/05/24 54 kg (119 lb)   01/22/24 56.2 kg (124 lb)   12/18/23 54 kg (119 lb)   11/29/23 53.5 kg (118 lb)   09/14/23 54.9 kg (121 lb)   09/08/23 54.9 kg (121 lb)        NUTRITION:  Diet:       Procedures    NPO      Food Allergies: No  Cultural/Ethnic/[place] Preferences Addressed: Yes    Percent Meals Eaten (last 3 days)       Date/Time Percent Meals Eaten (%)    06/25/24 2057 0 %            GI system review: diarrhea and nausea Last BM: 6/26  Skin and wounds: wnl    NUTRITION RELATED PHYSICAL FINDINGS:     1. Body Fat/Muscle Mass: no wasting noted     2. Fluid Accumulation: none     NUTRITION PRESCRIPTION: 55.8 kg  Calories: 1674 - 1953 calories/day (30-35 kcal/kg)  Protein: 83 - 111 grams protein/day (1.5-2.0 grams protein per kg)  Fluid: ~1 ml/kcal or per MD discretion    NUTRITION DIAGNOSIS/PROBLEM:  Inadequate energy intake related to inability to take or tolerate as evidenced by need for NPO status      MONITOR AND EVALUATE/NUTRITION  GOALS:  Weight stable within 1 to 2 lbs during admission - New  Return to PO intake or advance diet in 24-48 hrs - New      MEDICATIONS:  Reviewed    LABS:  Reviewed    Pt is at High nutrition risk    Lou \"Toyin\" LISETH Chandler, LDN  Clinical Dietitian

## 2024-06-27 NOTE — PROGRESS NOTES
Pt A&Ox4 on room air, complaints of abdomen pain, prn pain medication given as needed. Pt also complaining of nausea and did vomit once, prn nausea medication given. Pt to completed prep for egd/colonoscopy not tolerating well but encouraging to finish. Completed 1/2 prep but bowels are clear. Call light within reach, frequent checks made, needs met.

## 2024-06-27 NOTE — PLAN OF CARE
Problem: Diabetes/Glucose Control  Goal: Glucose maintained within prescribed range  Description: INTERVENTIONS:  - Monitor Blood Glucose as ordered  - Assess for signs and symptoms of hyperglycemia and hypoglycemia  - Administer ordered medications to maintain glucose within target range  - Assess barriers to adequate nutritional intake and initiate nutrition consult as needed  - Instruct patient on self management of diabetes  Outcome: Completed     Problem: Patient/Family Goals  Goal: Patient/Family Long Term Goal  Description: Patient's Long Term Goal: Patient will report relief of pain    Interventions:  - Nurse will educate patient about pain management.  - See additional Care Plan goals for specific interventions  Outcome: Progressing  Goal: Patient/Family Short Term Goal  Description: Patient's Short Term Goal: patient will describe satisfactory pain control at a level less than 3.    Interventions:   Nurse will Provide measures to relieve pain before it becomes severe.   - See additional Care Plan goals for specific interventions  Outcome: Progressing     Problem: PAIN - ADULT  Goal: Verbalizes/displays adequate comfort level or patient's stated pain goal  Description: INTERVENTIONS:  - Encourage pt to monitor pain and request assistance  - Assess pain using appropriate pain scale  - Administer analgesics based on type and severity of pain and evaluate response  - Implement non-pharmacological measures as appropriate and evaluate response  - Consider cultural and social influences on pain and pain management  - Manage/alleviate anxiety  - Utilize distraction and/or relaxation techniques  - Monitor for opioid side effects  - Notify MD/LIP if interventions unsuccessful or patient reports new pain  - Anticipate increased pain with activity and pre-medicate as appropriate  Outcome: Progressing     Problem: RISK FOR INFECTION - ADULT  Goal: Absence of fever/infection during anticipated neutropenic  period  Description: INTERVENTIONS  - Monitor WBC  - Administer growth factors as ordered  - Implement neutropenic guidelines  Outcome: Progressing     Problem: SAFETY ADULT - FALL  Goal: Free from fall injury  Description: INTERVENTIONS:  - Assess pt frequently for physical needs  - Identify cognitive and physical deficits and behaviors that affect risk of falls.  - [place] fall precautions as indicated by assessment.  - Educate pt/family on patient safety including physical limitations  - Instruct pt to call for assistance with activity based on assessment  - Modify environment to reduce risk of injury  - Provide assistive devices as appropriate  - Consider OT/PT consult to assist with strengthening/mobility  - Encourage toileting schedule  Outcome: Progressing     Problem: GASTROINTESTINAL - ADULT  Goal: Minimal or absence of nausea and vomiting  Description: INTERVENTIONS:  - Maintain adequate hydration with IV or PO as ordered and tolerated  - Nasogastric tube to low intermittent suction as ordered  - Evaluate effectiveness of ordered antiemetic medications  - Provide nonpharmacologic comfort measures as appropriate  - Advance diet as tolerated, if ordered  - Obtain nutritional consult as needed  - Evaluate fluid balance  Outcome: Progressing  Goal: Maintains or returns to baseline bowel function  Description: INTERVENTIONS:  - Assess bowel function  - Maintain adequate hydration with IV or PO as ordered and tolerated  - Evaluate effectiveness of GI medications  - Encourage mobilization and activity  - Obtain nutritional consult as needed  - Establish a toileting routine/schedule  - Consider collaborating with pharmacy to review patient's medication profile  Outcome: Progressing     Pt c/o abd pain not managed with acetaminophen and ultram (pt declined ultram stating that it has been ineffective in managing her pain). Page to Dr. Ross to inform. Updated orders for pain medications received, pt reports relief  but not resolution of pain s/p IV morphine. IV antiemetic given for nausea with no emesis, relief reported. Pt ambulates in room with standby assist. CLD in place. Plan for EGD/colon tomorrow, procedure consents signed by patient. Prep started, pt encouraged to consume prep as ordered, lemon flavor added. NPO MN in place. OB consulted. Pt reports generalized abb pain. Patient and family updated and in agreement with POC. Patient progressing per POC. Fall and safety precautions in place.

## 2024-06-27 NOTE — PLAN OF CARE
Pt a/o x4. VSS, remained afebrile. Meds given per MAR. EGD/Colonoscopy performed today. Zofran and morphine given with relief. Potassium and magnesium replaced per protocol.     1730 - Blood glucose 58. MD notified, hypoglycemic protocol followed. Recheck in 15 was 62. See MAR. Recheck was 158. Will endorse to nightshift RN to check again in two hours at 2030.     Problem: GASTROINTESTINAL - ADULT  Goal: Minimal or absence of nausea and vomiting  Description: INTERVENTIONS:  - Maintain adequate hydration with IV or PO as ordered and tolerated  - Nasogastric tube to low intermittent suction as ordered  - Evaluate effectiveness of ordered antiemetic medications  - Provide nonpharmacologic comfort measures as appropriate  - Advance diet as tolerated, if ordered  - Obtain nutritional consult as needed  - Evaluate fluid balance  Outcome: Progressing  Goal: Maintains or returns to baseline bowel function  Description: INTERVENTIONS:  - Assess bowel function  - Maintain adequate hydration with IV or PO as ordered and tolerated  - Evaluate effectiveness of GI medications  - Encourage mobilization and activity  - Obtain nutritional consult as needed  - Establish a toileting routine/schedule  - Consider collaborating with pharmacy to review patient's medication profile  Outcome: Progressing     Problem: RISK FOR INFECTION - ADULT  Goal: Absence of fever/infection during anticipated neutropenic period  Description: INTERVENTIONS  - Monitor WBC  - Administer growth factors as ordered  - Implement neutropenic guidelines  Outcome: Progressing     Problem: PAIN - ADULT  Goal: Verbalizes/displays adequate comfort level or patient's stated pain goal  Description: INTERVENTIONS:  - Encourage pt to monitor pain and request assistance  - Assess pain using appropriate pain scale  - Administer analgesics based on type and severity of pain and evaluate response  - Implement non-pharmacological measures as appropriate and evaluate  response  - Consider cultural and social influences on pain and pain management  - Manage/alleviate anxiety  - Utilize distraction and/or relaxation techniques  - Monitor for opioid side effects  - Notify MD/LIP if interventions unsuccessful or patient reports new pain  - Anticipate increased pain with activity and pre-medicate as appropriate  Outcome: Progressing

## 2024-06-27 NOTE — ADDENDUM NOTE
Addendum  created 06/27/24 1524 by Cam Hugo MD    Intraprocedure Event edited, Intraprocedure Staff edited

## 2024-06-27 NOTE — PLAN OF CARE
Problem: Patient/Family Goals  Goal: Patient/Family Long Term Goal  Description: Patient's Long Term Goal: Patient will report relief of pain    Interventions:  - Nurse will educate patient about pain management.  - See additional Care Plan goals for specific interventions  Outcome: Progressing  Goal: Patient/Family Short Term Goal  Description: Patient's Short Term Goal: patient will describe satisfactory pain control at a level less than 3.    Interventions:   Nurse will Provide measures to relieve pain before it becomes severe.   - See additional Care Plan goals for specific interventions  Outcome: Progressing     Problem: PAIN - ADULT  Goal: Verbalizes/displays adequate comfort level or patient's stated pain goal  Description: INTERVENTIONS:  - Encourage pt to monitor pain and request assistance  - Assess pain using appropriate pain scale  - Administer analgesics based on type and severity of pain and evaluate response  - Implement non-pharmacological measures as appropriate and evaluate response  - Consider cultural and social influences on pain and pain management  - Manage/alleviate anxiety  - Utilize distraction and/or relaxation techniques  - Monitor for opioid side effects  - Notify MD/LIP if interventions unsuccessful or patient reports new pain  - Anticipate increased pain with activity and pre-medicate as appropriate  Outcome: Progressing     Problem: RISK FOR INFECTION - ADULT  Goal: Absence of fever/infection during anticipated neutropenic period  Description: INTERVENTIONS  - Monitor WBC  - Administer growth factors as ordered  - Implement neutropenic guidelines  Outcome: Progressing     Problem: SAFETY ADULT - FALL  Goal: Free from fall injury  Description: INTERVENTIONS:  - Assess pt frequently for physical needs  - Identify cognitive and physical deficits and behaviors that affect risk of falls.  - [place] fall precautions as indicated by assessment.  - Educate pt/family on patient safety  including physical limitations  - Instruct pt to call for assistance with activity based on assessment  - Modify environment to reduce risk of injury  - Provide assistive devices as appropriate  - Consider OT/PT consult to assist with strengthening/mobility  - Encourage toileting schedule  Outcome: Progressing     Problem: GASTROINTESTINAL - ADULT  Goal: Minimal or absence of nausea and vomiting  Description: INTERVENTIONS:  - Maintain adequate hydration with IV or PO as ordered and tolerated  - Nasogastric tube to low intermittent suction as ordered  - Evaluate effectiveness of ordered antiemetic medications  - Provide nonpharmacologic comfort measures as appropriate  - Advance diet as tolerated, if ordered  - Obtain nutritional consult as needed  - Evaluate fluid balance  Outcome: Progressing  Goal: Maintains or returns to baseline bowel function  Description: INTERVENTIONS:  - Assess bowel function  - Maintain adequate hydration with IV or PO as ordered and tolerated  - Evaluate effectiveness of GI medications  - Encourage mobilization and activity  - Obtain nutritional consult as needed  - Establish a toileting routine/schedule  - Consider collaborating with pharmacy to review patient's medication profile  Outcome: Progressing

## 2024-06-27 NOTE — OPERATIVE REPORT
EGD Operative Report  Patient Name: Marielos Reid  YOB: 1980  MRN: [id number]  Procedure: Esophagogastroduodenoscopy (EGD) with biopsy  Date of Service: 6/27/2024  Pre-operative Diagnosis & Indication: Abdominal pain  Post-operative Diagnosis:   Normal-appearing esophagus with GE junction at 34 cm status post mid esophageal and distal esophageal biopsies to assess for reflux esophagitis and eosinophilic esophagitis  2 [place] grade 3 hiatal hernia  Gastritis status post biopsy to assess for H. pylori  Normal-appearing duodenum status post biopsy to assess for celiac disease  Attending Endoscopist: Alexis Thornton D.O.  Informed Consent: The planned procedure(s), the explanation of the procedure, its expected benefits, the potential complications and risks and possible alternatives and their benefits and risks were discussed with the patient or the patient's surrogate. The discussion of risks, not limited to but including bleeding, infection, perforation, adverse effects from anesthesia, need for emergency surgery/prolonged hospitalization,  cardiac arrhythmias,  and aspiration were discussed with patient.  Pt and/or surrogate understood the proposed procedure(s), its risks, benefits and alternatives and wish to proceed with procedure(s). All questions answered in full.  After all questions were answered to their satisfaction, a signed, informed, and witnessed consent was obtained.  A TIME OUT WAS COMPLETED prior to the procedure to confirm the patient, procedure(s) and complete endoscopy safety procedure.  Sedation: Monitored Anesthesia Care; ASA class per anesthesiology team   Monitoring: Pulsoximetry, pulse, respirations, and blood pressure , vitals were monitored throughout the entire procedure under monitored anesthesia care.   Procedure: The patient was then brought to the endoscopy suite where his/her pulse, pulse oximetry and blood pressure were monitored. The patient was placed in  the left lateral decubitus position and deep sedation was administered. Once adequate sedation was achieved, a bite block was placed and a lubricated tip of an Olympus video upper endoscope was inserted through the oropharynx and gently manipulated through the esophagus into the stomach and the second portion of the duodenum. Upon withdrawal of the endoscope, careful visualization of the mucosa was performed. The endoscope was then withdrawn into the gastric antrum and placed in a retroflexed position.  The endoscope was then righted, and air was suctioned from the stomach.  The endoscope was then withdrawn from the patient, with careful visual inspection of the mucosa. The patient left the procedure room in stable condition for recovery. Findings and endotherapy as listed below  Toleration: Patient tolerated procedure well  Complications: No immediate complications  Technical Difficulty: The procedure was not technically difficult   Estimated Blood Loss: Minimal, less than 5mL of estimated blood loss.   Findings and Therapeutics:  Esophagus:   The mucosa was normal.   There were no strictures or stenosis. GEJ junction traversed with endoscope without resistance.  The Z-line was irregular, appreciated at 34 cm from the incisors.    Biopsies were obtained with cold forceps in the mid and distal esophagus to assess for reflux esophagitis and eosinophilic esophagitis.   Stomach:    The gastric body, antrum, fundus, cardia, and angularis were erythematous endoscopically consistent with mild gastritis. No ulcers, erosions or masses visualized. Endoscope was placed in a retroflexion view in the stomach. There was evidence of a Hill grade 3 hiatal hernia. Biopsies with cold forceps were obtained to assess for H pylori.  Duodenum:   The entire examined duodenum was normal. Biopsies with cold forceps were obtained to assess for Celiac disease.   Recommendations:  Post EGD precautions, watch for bleeding, infection,  perforation, adverse drug reactions   Follow-up biopsies  Avoid non-aspirin NSAID  Proceed with colonoscopy    Alexis Thornton DO  6/27/2024  3:07 PM

## 2024-06-27 NOTE — ANESTHESIA POSTPROCEDURE EVALUATION
[facility]    Marielos Reid Patient Status:  Inpatient   Age/Gender 43 year old female MRN [id number]   Location [facility] Attending Kingsley Ross DO   Hosp Day # 4 PCP Giovanna Parker DO       Anesthesia Post-op Note    ESOPHAGOGASTRODUODENOSCOPY (EGD) with biopsies , COLONOSCOPY with biopsies    Procedure Summary       Date: 06/27/24 Room / Location:  ENDOSCOPY 02 / [facility] END[facility]COPY    Anesthesia Start: 1422 Anesthesia Stop: 1515    Procedures:       ESOPHAGOGASTRODUODENOSCOPY (EGD) with biopsies , COLONOSCOPY with biopsies      COLONOSCOPY Diagnosis: (egd: hiata hernia, gastritis,    colon : hemorrhoids)    Surgeons: Alexis Thornton DO Anesthesiologist: Cam Hugo MD    Anesthesia Type: MAC ASA Status: 2            Anesthesia Type: MAC    Vitals Value Taken Time   /89 06/27/24 1518   Temp 98.0 06/27/24 1518   Pulse 70 06/27/24 1518   Resp 16 06/27/24 1518   SpO2 99 % 06/27/24 1518   Vitals shown include unfiled device data.    Patient Location: Endoscopy    Anesthesia Type: MAC    Airway Patency: patent    Postop Pain Control: adequate    Mental Status: mildly sedated but able to meaningfully participate in the post-anesthesia evaluation    Nausea/Vomiting: none    Cardiopulmonary/Hydration status: stable euvolemic    Complications: no apparent anesthesia related complications    Postop vital signs: stable    Dental Exam: Unchanged from Preop    Patient to be discharged from PACU when criteria met.

## 2024-06-27 NOTE — ANESTHESIA PREPROCEDURE EVALUATION
PRE-OP EVALUATION    Patient Name: Marielos Reid    Admit Diagnosis: Dehydration [E86.0]  Gastroenteritis [K52.9]  Leukocytosis, unspecified type [D72.829]    Pre-op Diagnosis: INPATIENT    ESOPHAGOGASTRODUODENOSCOPY (EGD), COLONOSCOPY    Anesthesia Procedure: ESOPHAGOGASTRODUODENOSCOPY (EGD), COLONOSCOPY  COLONOSCOPY    Surgeons and Role:     * Alexis Thornton, DO - Primary    Pre-op vitals reviewed.  Temp: 98 °F (36.7 °C)  Pulse: 69  Resp: 16  BP: 136/71  SpO2: 100 %  Body mass index is 21.11 kg/m².    Current medications reviewed.  Hospital Medications:   [COMPLETED] potassium chloride 40 mEq in 250mL sodium chloride 0.9% IVPB premix  40 mEq Intravenous Once    [COMPLETED] magnesium oxide (Mag-Ox) tab 400 mg  400 mg Oral Once    [COMPLETED] morphINE PF 2 MG/ML injection 1 mg  1 mg Intravenous Once    [COMPLETED] potassium chloride (Klor-Con M20) tab 40 mEq  40 mEq Oral Once    butalbital-acetaminophen-caffeine (Fioricet) -40 MG per tab 1 tablet  1 tablet Oral Q4H PRN    morphINE PF 2 MG/ML injection 1 mg  1 mg Intravenous Q4H PRN    [COMPLETED] polyethylene glycol-electrolyte (Golytely) 236 g oral solution 4,000 mL  4,000 mL Oral Once    [COMPLETED] potassium chloride 40 mEq in 250mL sodium chloride 0.9% IVPB premix  40 mEq Intravenous Once    [COMPLETED] iopamidol 76% (ISOVUE-370) injection for power injector  100 mL Intravenous ONCE PRN    acetaminophen (Tylenol Extra Strength) tab 1,000 mg  1,000 mg Oral TID    magnesium hydroxide (Milk of Magnesia) 400 MG/5ML oral suspension 30 mL  30 mL Oral Daily    [COMPLETED] potassium chloride (Klor-Con M20) tab 40 mEq  40 mEq Oral Once    LORazepam (Ativan) tab 0.5 mg  0.5 mg Oral Q4H PRN    Or    LORazepam (Ativan) tab 1 mg  1 mg Oral Q4H PRN    [COMPLETED] ondansetron (Zofran) 4 MG/2ML injection 4 mg  4 mg Intravenous Once    [COMPLETED] iopamidol 76% (ISOVUE-370) injection for power injector  65 mL Intravenous ONCE PRN    [COMPLETED] sodium chloride 0.9 %  IV bolus 1,000 mL  1,000 mL Intravenous Once    [COMPLETED] acetaminophen (Tylenol Extra Strength) tab 1,000 mg  1,000 mg Oral Once    gabapentin (Neurontin) cap 300 mg  300 mg Oral Nightly    [Held by provider] lisinopril (Prinivil; Zestril) tab 5 mg  5 mg Oral Daily    traMADol (Ultram) tab 100 mg  100 mg Oral Q8H PRN    acetaminophen (Tylenol Extra Strength) tab 500 mg  500 mg Oral Q4H PRN    melatonin tab 3 mg  3 mg Oral Nightly PRN    glycerin-hypromellose- (Artificial Tears) 0.2-0.2-1 % ophthalmic solution 1 drop  1 drop Both Eyes QID PRN    sodium chloride (Saline Mist) 0.65 % nasal solution 1 spray  1 spray Each Nare Q3H PRN    sodium chloride 0.9% infusion   Intravenous Continuous    enoxaparin (Lovenox) 40 MG/0.4ML SUBQ injection 40 mg  40 mg Subcutaneous Daily    polyethylene glycol (PEG 3350) (Miralax) 17 g oral packet 17 g  17 g Oral Daily PRN    sennosides (Senokot) tab 17.2 mg  17.2 mg Oral Nightly PRN    bisacodyl (Dulcolax) 10 MG rectal suppository 10 mg  10 mg Rectal Daily PRN    fleet enema (Fleet) 7-19 GM/118ML rectal enema 133 mL  1 enema Rectal Once PRN    ondansetron (Zofran) 4 MG/2ML injection 4 mg  4 mg Intravenous Q6H PRN    prochlorperazine (Compazine) 10 MG/2ML injection 5 mg  5 mg Intravenous Q8H PRN    cefTRIAXone (Rocephin) 1 g in sodium chloride 0.9% 100 mL IVPB-ADDV  1 g Intravenous Q24H    glucose (Dex4) 15 GM/59ML oral liquid 15 g  15 g Oral Q15 Min PRN    Or    glucose (Glutose) 40% oral gel 15 g  15 g Oral Q15 Min PRN    Or    glucose-vitamin C (Dex-4) chewable tab 4 tablet  4 tablet Oral Q15 Min PRN    Or    dextrose 50% injection 50 mL  50 mL Intravenous Q15 Min PRN    Or    glucose (Dex4) 15 GM/59ML oral liquid 30 g  30 g Oral Q15 Min PRN    Or    glucose (Glutose) 40% oral gel 30 g  30 g Oral Q15 Min PRN    Or    glucose-vitamin C (Dex-4) chewable tab 8 tablet  8 tablet Oral Q15 Min PRN    [COMPLETED] sodium chloride 0.9 % IV bolus 500 mL  500 mL Intravenous Once        Outpatient Medications:     Medications Prior to Admission   Medication Sig Dispense Refill Last Dose    traMADol 50 MG Oral Tab Take 2 tablets (100 mg total) by mouth every 8 (eight) hours as needed for Pain. 180 tablet 2 6/22/2024    metFORMIN 500 MG Oral Tab Take 1 tablet (500 mg total) by mouth 2 (two) times daily. 60 tablet 2 6/23/2024    ondansetron (ZOFRAN) 4 mg tablet Take 1 tablet (4 mg total) by mouth every 8 (eight) hours as needed for Nausea. 30 tablet 0 6/23/2024    gabapentin 100 MG Oral Cap Take 3 capsules (300 mg total) by mouth nightly. 100mg at bedtime x 1 week then 200mg at bedtime x 1 week then 300mg at bedtime 90 capsule 2     lisinopril 5 MG Oral Tab Take 1 tablet (5 mg total) by mouth daily. 30 tablet 2 More than a month       Allergies: Advil [ibuprofen] and Ketorolac      Anesthesia Evaluation    Patient summary reviewed.    Anesthetic Complications  (-) history of anesthetic complications         GI/Hepatic/Renal      (+) GERD                      (+) irritable bowel syndrome     Cardiovascular    Negative cardiovascular ROS.    Exercise tolerance: good     MET: >4                                           Endo/Other    Negative endo/other ROS.                              Pulmonary                           Neuro/Psych    Negative neuro/psych ROS.                                  Past Surgical History:   Procedure Laterality Date    Colonoscopy      Colonoscopy      Colonoscopy N/A 03/01/2021    Procedure: COLONOSCOPY;  Surgeon: Louis Pereira MD;  Location:  ENDOSCOPY    Other surgical history  04/08/2014    cystoscopy- Dr. Abbott    Salpingectomy Right 2020    with oopherectomy    Shoulder arthroscopy Left 12/05/2019    Dr Lozoya     Social History     Socioeconomic History    Marital status:    Tobacco Use    Smoking status: Never    Smokeless tobacco: Never   Vaping Use    Vaping status: Never Used   Substance and Sexual Activity    Alcohol use: No    Drug use: No    Sexual  activity: Yes     Partners: Male   Other Topics Concern    Caffeine Concern Yes     Comment: 2 cups tea or coffee/day    Exercise Yes     Comment: rare    Seat Belt Yes     History   Drug Use No     Available pre-op labs reviewed.  Lab Results   Component Value Date    WBC 8.9 06/27/2024    RBC 3.20 (L) 06/27/2024    HGB 9.4 (L) 06/27/2024    HCT 28.4 (L) 06/27/2024    MCV 88.8 06/27/2024    MCH 29.4 06/27/2024    MCHC 33.1 06/27/2024    RDW 12.5 06/27/2024    .0 06/27/2024     Lab Results   Component Value Date     06/27/2024    K 3.4 (L) 06/27/2024    K 3.4 (L) 06/27/2024     06/27/2024    CO2 23.0 06/27/2024    BUN 4 (L) 06/27/2024    CREATSERUM 0.64 06/27/2024    GLU 88 06/27/2024    CA 8.2 (L) 06/27/2024            Airway      Mallampati: II  Mouth opening: >3 FB  TM distance: > 6 cm  Neck ROM: full Cardiovascular    Cardiovascular exam normal.  Rhythm: regular  Rate: normal     Dental    Dentition appears grossly intact         Pulmonary    Pulmonary exam normal.  Breath sounds clear to auscultation bilaterally.               Other findings              ASA: 2   Plan: MAC  NPO status verified and patient meets guidelines.  Patient has not taken beta blockers in last 24 hours.  Post-procedure pain management plan discussed with surgeon and patient.      Plan/risks discussed with: patient                Present on Admission:  **None**

## 2024-06-27 NOTE — OPERATIVE REPORT
Colonoscopy Operative Report  Patient Name: Marielos Reid  YOB: 1980  MRN: [id number]  Procedure: Colonoscopy with biopsy  Date of Service: 6/27/2024  Pre-operative Diagnosis & Indication: Abdominal pain, abnormal CT scan with enteritis and colitis  Post-operative Diagnosis:   Normal-appearing colonic mucosa status post right colon, left colon, and rectal biopsies to assess for microscopic colitis and IBD  Normal-appearing terminal ileum status post biopsy to assess for ileitis  Internal hemorrhoids  Fair prep  Attending Endoscopist: Alexis Thornton D.O.  Informed Consent: The planned procedure(s), the explanation of the procedure, its expected benefits, the potential complications and risks and possible alternatives and their benefits and risks were discussed with the patient or the patient's surrogate. The discussion of risks, not limited to but including bleeding, infection, perforation, adverse effects from anesthesia, need for emergency surgery, medication effects, cardiac arrhythmias, missed polyps, and aspiration and death, were discussed with patient.  Pt and/or surrogate understood the proposed procedure(s), its risks, benefits and alternatives and wish to proceed with procedure(s). All questions answered in full.  After all questions were answered to their satisfaction, a signed, informed, and witnessed consent was obtained.  A TIME OUT WAS COMPLETED prior to the procedure to confirm the patient, procedure and complete endoscopy safety procedure.   Sedation: Monitored Anesthesia Care; ASA class per anesthesiology team   Monitoring: Pulsoximetry, pulse, respirations, and blood pressure, vitals were monitored throughout the entire procedure under monitored anesthesia care.   Preparation Quality:  Moira Bowel Prep Score: 6  [Right 2/ Transverse 2/ Left 2]   Procedure: After achieving adequate sedation, and placing the patient in the left lateral decubitus position, a digital rectal  examination was performed.  The lubricated tip of the pediatric colonoscope was then introduced into the rectum and advanced to the cecum.  The appendiceal orifice and ileocecal valve were clearly and distinctly visualized, thus verifying the cecum.  The terminal ileum was intubated.  The endoscope was then carefully withdrawn from the patient with careful visualization of the colonic mucosa to the best of my ability, with bowel preparation quality as noted above.  Air was suctioned to the best of my ability, during withdrawal of the endoscope.  When the endoscope reached the rectum, it was placed in a retroflexed position, and the rectal bulb was thus visualized.  The endoscope was righted, and air was suctioned from the colon to the best of my ability, as it was during withdrawn from the colon.  The endoscope was then removed from the patient.  The patient tolerated the procedure without apparent procedural complications.  The patient left the procedure room in stable condition for recovery.  Toleration: Patient tolerated procedure well   Complications: No immediate complications   Technical Difficulty: The procedure was not technically difficult   Estimated Blood Loss: Minimal, less than 5mL of estimated blood loss.   Findings and Therapeutics:  Terminal Ileum: The entire examined ileum was normal.  Biopsies obtained using cold forceps to assess for ileitis.  Colon: The entire visualized colon was normal. There were no signs of polyps or masses. No signs of inflammation, ulceration or erosions. There were no diverticula noted throughout the entire visualized colon.  Random biopsies were obtained from the right colon, left colon, and rectum using cold forceps to assess for microscopic colitis and IBD.  Rectum: There were small sized, internal hemorrhoids seen on retroflexion.   Recommendations:    Post Colonoscopy/polypectomy precautions, watch for bleeding, infection, perforation, adverse drug reactions.   High  fiber diet  If tolerating diet, and no significant abdominal pain, can consider discharge from GI perspective.  If discharged will need follow-up with nurse practitioner or Dr. Pereira in 1 to 2 weeks.  Please call with any questions or concerns.    Alexis Thornton,   6/27/2024  3:13 PM

## 2024-06-27 NOTE — DISCHARGE INSTRUCTIONS
Minimally invasive gynecological surgeons who specializes in complex pelvic surgery  Dr. Kristen Beckford, Dr. Lamberto Brennan, Dr. Rachel Oakley  Telephone [phone]    Or     Dr. Sandra Menchaca  [place], [place]  [phone]    Or    Dr. Aimee Moya  Telephone [phone]    ----------------------------------------------------------------------------------------

## 2024-06-27 NOTE — PROGRESS NOTES
[facility]   part of [facility]     Hospitalist Progress Note     Marielos Reid Patient Status:  Inpatient    1980 MRN [id number]   Location [facility] 4NW-A Attending Brie Woods DO   Hosp Day # 4 PCP Giovanna Parker DO     Chief Complaint: Abdominal pain, n/v/d    Subjective:     Patient did have an episode of vomiting overnight. She completed only half of her prep but stool clear. Feeling better today. Pain better controlled. Tmax 100.2F overnight.    Objective:    Review of Systems:   A comprehensive review of systems was completed; pertinent positive and negatives stated in subjective.    Vital signs:  Temp:  [97.7 °F (36.5 °C)-100.2 °F (37.9 °C)] 98.2 °F (36.8 °C)  Pulse:  [69-90] 82  Resp:  [14-24] 14  BP: (117-145)/(72-83) 128/78  SpO2:  [97 %-100 %] 100 %    Physical Exam:    General: No acute distress, awake and alert  Respiratory: No wheezes, no rhonchi  Cardiovascular: S1, S2, regular rate and rhythm  Abdomen: Soft, diffuse TTP, non-distended, positive bowel sounds  Extremities: No edema    Diagnostic Data:    Labs:  Recent Labs   Lab 24  1256 24  0705 24  0718 24  0621   WBC 32.0* 20.1* 15.7* 8.9   HGB 13.4 11.0* 9.7* 9.4*   MCV 85.8 88.0 86.6 88.8   .0 248.0 206.0 287.0     Recent Labs   Lab 24  1256 24  0705 24  0718 24  0602 24  0621   * 128* 90  --  88   BUN 9 7* 5*  --  4*   CREATSERUM 1.11* 0.78 0.65  --  0.64   CA 9.0 8.0* 8.0*  --  8.2*   ALB 3.7  --  2.4*  --  2.3*   * 138 140  --  138   K 3.8 3.4* 3.4*  3.4* 3.4* 3.4*  3.4*    111 113*  --  108   CO2 23.0 22.0 20.0*  --  23.0   ALKPHO 71  --  61  --  71   AST 30  --  10*  --  10*   ALT 16  --  8*  --  8*   BILT 0.9  --  0.4  --  0.3   TP 8.5*  --  6.3*  --  6.6     Estimated Creatinine Clearance: 95.9 mL/min (based on SCr of 0.64 mg/dL).    No results for input(s): \"TROP\", \"TROPHS\", \"CK\" in the last 168 hours.    No results for  input(s): \"PTP\", \"INR\" in the last 168 hours.     Microbiology  Hospital Encounter on 06/23/24   1. Urine Culture, Routine     Status: None    Collection Time: 06/23/24  8:17 PM    Specimen: Urine, clean catch   Result Value Ref Range    Urine Culture No Growth at 18-24 hrs. N/A   2. Blood Culture     Status: None (Preliminary result)    Collection Time: 06/23/24  7:20 PM    Specimen: Blood,peripheral   Result Value Ref Range    Blood Culture Result No Growth 3 Days N/A     Imaging: Reviewed in Epic.    Medications:    potassium chloride  40 mEq Intravenous Once    acetaminophen  1,000 mg Oral TID    magnesium hydroxide  30 mL Oral Daily    gabapentin  300 mg Oral Nightly    [Held by provider] lisinopril  5 mg Oral Daily    enoxaparin  40 mg Subcutaneous Daily    cefTRIAXone  1 g Intravenous Q24H       Assessment & Plan:      #Abdominal pain, generalized  #Vomiting/Diarrhea  #Constipation  -CT with moderate stool, non obstruction, minimal free fluid in pelvis  -CTA with possible MALS > vascular surgery consulted and did not think it was contributing to her symptoms  -Pain control, anti-emetics, IVF, symptomatic support  -Minimize IV narcotics  -GI PCR, cdiff negative. ESR/CRP elevated  -Bowel regimen and now having BM  -GI following, plan for EGD/colonoscopy today  -Pelvic ultrasound with hydrosalpinx and has hx of endometriosis > consult ob/gyn. They doubt gynecologic etiology of her pain. Plan for transvaginal pelvic US  -Consideration of intestinal angioedema, holding lisinopril     #Sepsis present on arrival - unknown source; ?Gastroenteritis  #Leukocytosis, resolved  -Empiric ceftriaxone for now  -Lactate WNL  -IVF  -Cultures NGTD    #Hypokalemia  -Replace     #Chronic pain  #Narcotic dependence   -Patient takes Tramadol 100mg TID daily   -Minimize IV narcotics    Kingsley Ross DO    Supplementary Documentation:     Quality:  DVT Mechanical Prophylaxis:     Early ambuation  DVT Pharmacologic Prophylaxis    Medication    enoxaparin (Lovenox) 40 MG/0.4ML SUBQ injection 40 mg     Code Status: Full Code  Meyer: No urinary catheter in place  KEISHA: TBD    Discharge is dependent on: Clinical state, consultant recs   At this point Ms. Reid is expected to be discharge to: Home    The 21st Century Cures Act makes medical notes like these available to patients in the interest of transparency. Please be advised this is a medical document. Medical documents are intended to carry relevant information, facts as evident, and the clinical opinion of the practitioner. The medical note is intended as peer to peer communication and may appear blunt or direct. It is written in medical language and may contain abbreviations or verbiage that are unfamiliar.

## 2024-06-28 ENCOUNTER — APPOINTMENT (OUTPATIENT)
Dept: ULTRASOUND IMAGING | Facility: HOSPITAL | Age: 44
DRG: 872 | End: 2024-06-28
Attending: OBSTETRICS & GYNECOLOGY
Payer: COMMERCIAL

## 2024-06-28 LAB
GLUCOSE BLD-MCNC: 100 MG/DL (ref 70–99)
GLUCOSE BLD-MCNC: 91 MG/DL (ref 70–99)
GLUCOSE BLD-MCNC: 93 MG/DL (ref 70–99)
GLUCOSE BLD-MCNC: 99 MG/DL (ref 70–99)
MAGNESIUM SERPL-MCNC: 2 MG/DL (ref 1.6–2.6)
POTASSIUM SERPL-SCNC: 3.4 MMOL/L (ref 3.5–5.1)

## 2024-06-28 PROCEDURE — 93975 VASCULAR STUDY: CPT | Performed by: OBSTETRICS & GYNECOLOGY

## 2024-06-28 PROCEDURE — 99232 SBSQ HOSP IP/OBS MODERATE 35: CPT | Performed by: INTERNAL MEDICINE

## 2024-06-28 PROCEDURE — 76856 US EXAM PELVIC COMPLETE: CPT | Performed by: OBSTETRICS & GYNECOLOGY

## 2024-06-28 PROCEDURE — 99222 1ST HOSP IP/OBS MODERATE 55: CPT | Performed by: SURGERY

## 2024-06-28 RX ORDER — POTASSIUM CHLORIDE 1.5 G/1.58G
40 POWDER, FOR SOLUTION ORAL ONCE
Status: COMPLETED | OUTPATIENT
Start: 2024-06-28 | End: 2024-06-28

## 2024-06-28 NOTE — PROGRESS NOTES
Consult dictated. Discussed with patient/  this AM- DR. Day to see. Tender on abdomen/ periumbilical/ epigastric pain. 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

## 2024-06-28 NOTE — PLAN OF CARE
Pt. A&O x4. VSS. Afebrile. Pt. C/o nausea; PRN zofran given with relief. Pt. C/o abdominal pain; PRN medication given per MAR. Pelvic US completed. IV antibiotics continued. Call light within reach.    Problem: PAIN - ADULT  Goal: Verbalizes/displays adequate comfort level or patient's stated pain goal  Description: INTERVENTIONS:  - Encourage pt to monitor pain and request assistance  - Assess pain using appropriate pain scale  - Administer analgesics based on type and severity of pain and evaluate response  - Implement non-pharmacological measures as appropriate and evaluate response  - Consider cultural and social influences on pain and pain management  - Manage/alleviate anxiety  - Utilize distraction and/or relaxation techniques  - Monitor for opioid side effects  - Notify MD/LIP if interventions unsuccessful or patient reports new pain  - Anticipate increased pain with activity and pre-medicate as appropriate  Outcome: Progressing     Problem: GASTROINTESTINAL - ADULT  Goal: Minimal or absence of nausea and vomiting  Description: INTERVENTIONS:  - Maintain adequate hydration with IV or PO as ordered and tolerated  - Nasogastric tube to low intermittent suction as ordered  - Evaluate effectiveness of ordered antiemetic medications  - Provide nonpharmacologic comfort measures as appropriate  - Advance diet as tolerated, if ordered  - Obtain nutritional consult as needed  - Evaluate fluid balance  Outcome: Progressing     Problem: GASTROINTESTINAL - ADULT  Goal: Maintains or returns to baseline bowel function  Description: INTERVENTIONS:  - Assess bowel function  - Maintain adequate hydration with IV or PO as ordered and tolerated  - Evaluate effectiveness of GI medications  - Encourage mobilization and activity  - Obtain nutritional consult as needed  - Establish a toileting routine/schedule  - Consider collaborating with pharmacy to review patient's medication profile  Outcome: Progressing

## 2024-06-28 NOTE — PROGRESS NOTES
[facility]                       Gastroenterology Follow up Note - [facility]    Marielos Reid Patient Status:  Inpatient    1980 MRN [id number]   Location [facility] 4NW-A Attending Kingsley Ross DO   Hosp Day # 5 PCP Giovanna Parker DO     Reason for consultation: Abdominal pain  Subjective: Patient notes continued abdominal discomfort especially when eating.  CRP and ESR elevated last night and she did have another fever yesterday.  Review of Systems:   10 point ROS completed and was negative, except for pertinent positive and negatives stated in subjective.    For PMH, PSH, FHx and SHx- please see initial consult note.     Objective: /77 (BP Location: Right arm)   Pulse 83   Temp 98.3 °F (36.8 °C) (Oral)   Resp 18   Wt 123 lb (55.8 kg)   LMP 2024 (Exact Date)   SpO2 97%   BMI 21.11 kg/m²   Gen: No acute distress  Resp: no respiratory distress  Abd: Soft, Continued upper abdominal pain and discomfort, non-distended. No rebound tenderness, no guarding.   Neuro: Aox3.     Labs:   Lab Results   Component Value Date    K 3.4 2024    MG 2.0 2024     Recent Labs   Lab 24  0705 24  0718 24  0602 24  0621 24  0639   * 90  --  88  --    BUN 7* 5*  --  4*  --    CREATSERUM 0.78 0.65  --  0.64  --    CA 8.0* 8.0*  --  8.2*  --     140  --  138  --    K 3.4* 3.4*  3.4* 3.4* 3.4*  3.4* 3.4*    113*  --  108  --    CO2 22.0 20.0*  --  23.0  --      Recent Labs   Lab 24  0621   RBC 3.20*   HGB 9.4*   HCT 28.4*   MCV 88.8   MCH 29.4   MCHC 33.1   RDW 12.5   NEPRELIM 6.24   WBC 8.9   .0       Recent Labs   Lab 24  1256 24  0718 24  0621   ALT 16 8* 8*   AST 30 10* 10*       Assessment:  Acute on chronic abdominal pain-etiology remains unknown at this time.  She has had extensive workup thus far with EGD, colonoscopy, CT scans noted to have possible MALS on CT angiogram may or  may not be contributing to her symptoms  Fevers-etiology is currently unknown at this time-infectious workup thus far has been negative  Elevated inflammatory markers  Plan:  Okay for diet as tolerated  Vascular surgery and general surgery following.  Appreciate their recommendations  Follow-up C1 and C4 complement levels  Consider rheumatology workup/consultation to assess for additional rheumatological conditions given continued significant inflammation to assess for potential vasculitis or autoimmune conditions, but will defer to primary  Pain control per primary  Any need for antibiotics will defer to primary  CBC, CMP, CRP in the morning  Will continue to follow along with you.    Thank you for allowing us to participate in patient's care. Please call us with any questions or concerns.  The GI consult service will continue to follow.     Alexis Thornton DO  [facility]  [phone]

## 2024-06-28 NOTE — PROGRESS NOTES
Pt A&Ox4 on room air, complaining of pain, prn pain medication given as needed. Family members at bedside. Tolerating medication well per mar. Call light within reach, frequent checks made, needs met.

## 2024-06-28 NOTE — CONSULTS
[facility]  Report of Consultation    Marielos Reid Patient Status:  Inpatient    1980 MRN [id number]   Location [facility] 4NW-A Attending Kingsley Ross DO   Hosp Day # 5 PCP Giovanna Parker DO     Requesting Physician:  Dr. Lee     Reason for Consultation:  Abdominal pain    The patient is seen in conjunction with my physician assistant.  I reviewed all data from this admission.  I concur with radiologist interpretation.    I evaluated the patient at bedside and her  joined the conversation via cell phone.  Care plan and rational thereof was discussed in detail.  I have modified this document to reflect my data interpretation, my physical exam findings, and my care plan.    Chief Complaint:  Abdominal pain     History of Present Illness:  Marielos Reid is a 43 year old female with a history of irritable bowel syndrome, endometriosis who presented to [facility] on 2024 with concerns of abdominal pain, nausea, vomiting and diarrhea. She reported associated fever and chills. She reports pain occurs postprandially and felt like it was getting stuck in her abdomen. She has a history of similar pain in the past that was worked but without findings of etiology.     Upon presentation to the hospital, she was afebrile and tachycardic (118). Laboratory workup revealed leukocytosis, WBC 32, hemoglobin 13.4, platelets 374,000. CMP revealed hyponatremia (135), no other gross electrolyte abnormalities, creatinine elevated at 1.11. No elevation in LFTs. Lipase within normal limits. CT abdomen and pelvis revealed minimal free fluid in pelvis and stool in colon.  There is also possible compression or narrowing of the celiac axis of uncertain etiology.        History:  Past Medical History:    Abdominal pain    Arthritis    Back pain    Back problem    low back pain    Blood in urine    Encounter for insertion of ParaGard IUD    Lot# [id number] Exp 2022    General counseling for prescription of  oral contraceptives    Heartburn    Irritable bowel syndrome    Nausea    Other screening mammogram    Pain in joints    Prediabetes    Routine Papanicolaou smear    Visual impairment    glasses and contacts    Vomiting    Wears glasses     Past Surgical History:   Procedure Laterality Date    Colonoscopy      Colonoscopy      Colonoscopy N/A 03/01/2021    Procedure: COLONOSCOPY;  Surgeon: Louis Pereira MD;  Location:  ENDOSCOPY    Colonoscopy N/A 6/27/2024    Procedure: COLONOSCOPY;  Surgeon: Alexis Thornton DO;  Location:  ENDOSCOPY    Other surgical history  04/08/2014    cystoscopy- Dr. Abbott    Salpingectomy Right 2020    with oopherectomy    Shoulder arthroscopy Left 12/05/2019    Dr Lozoya     Family History   Problem Relation Age of Onset    Diabetes Father     Hypertension Mother       reports that she has never smoked. She has never used smokeless tobacco. She reports that she does not drink alcohol and does not use drugs.    Allergies:  Allergies   Allergen Reactions    Advil [Ibuprofen] SWELLING    Ketorolac SWELLING     Patient received a subacromial right shoulder corticosteroid and ketorolac injection and developed right eye a mild swelling that was self-limited over the course of 24 hours.       Medications:  Medications Prior to Admission   Medication Sig    traMADol 50 MG Oral Tab Take 2 tablets (100 mg total) by mouth every 8 (eight) hours as needed for Pain.    metFORMIN 500 MG Oral Tab Take 1 tablet (500 mg total) by mouth 2 (two) times daily.    ondansetron (ZOFRAN) 4 mg tablet Take 1 tablet (4 mg total) by mouth every 8 (eight) hours as needed for Nausea.    gabapentin 100 MG Oral Cap Take 3 capsules (300 mg total) by mouth nightly. 100mg at bedtime x 1 week then 200mg at bedtime x 1 week then 300mg at bedtime    lisinopril 5 MG Oral Tab Take 1 tablet (5 mg total) by mouth daily.         Current Facility-Administered Medications:     butalbital-acetaminophen-caffeine (Fioricet)  -40 MG per tab 1 tablet, 1 tablet, Oral, Q4H PRN    morphINE PF 2 MG/ML injection 1 mg, 1 mg, Intravenous, Q4H PRN    acetaminophen (Tylenol Extra Strength) tab 1,000 mg, 1,000 mg, Oral, TID    magnesium hydroxide (Milk of Magnesia) 400 MG/5ML oral suspension 30 mL, 30 mL, Oral, Daily    LORazepam (Ativan) tab 0.5 mg, 0.5 mg, Oral, Q4H PRN **OR** LORazepam (Ativan) tab 1 mg, 1 mg, Oral, Q4H PRN    gabapentin (Neurontin) cap 300 mg, 300 mg, Oral, Nightly    [Held by provider] lisinopril (Prinivil; Zestril) tab 5 mg, 5 mg, Oral, Daily    traMADol (Ultram) tab 100 mg, 100 mg, Oral, Q8H PRN    acetaminophen (Tylenol Extra Strength) tab 500 mg, 500 mg, Oral, Q4H PRN    melatonin tab 3 mg, 3 mg, Oral, Nightly PRN    glycerin-hypromellose- (Artificial Tears) 0.2-0.2-1 % ophthalmic solution 1 drop, 1 drop, Both Eyes, QID PRN    sodium chloride (Saline Mist) 0.65 % nasal solution 1 spray, 1 spray, Each Nare, Q3H PRN    enoxaparin (Lovenox) 40 MG/0.4ML SUBQ injection 40 mg, 40 mg, Subcutaneous, Daily    polyethylene glycol (PEG 3350) (Miralax) 17 g oral packet 17 g, 17 g, Oral, Daily PRN    sennosides (Senokot) tab 17.2 mg, 17.2 mg, Oral, Nightly PRN    bisacodyl (Dulcolax) 10 MG rectal suppository 10 mg, 10 mg, Rectal, Daily PRN    fleet enema (Fleet) 7-19 GM/118ML rectal enema 133 mL, 1 enema, Rectal, Once PRN    ondansetron (Zofran) 4 MG/2ML injection 4 mg, 4 mg, Intravenous, Q6H PRN    prochlorperazine (Compazine) 10 MG/2ML injection 5 mg, 5 mg, Intravenous, Q8H PRN    cefTRIAXone (Rocephin) 1 g in sodium chloride 0.9% 100 mL IVPB-ADDV, 1 g, Intravenous, Q24H    glucose (Dex4) 15 GM/59ML oral liquid 15 g, 15 g, Oral, Q15 Min PRN **OR** glucose (Glutose) 40% oral gel 15 g, 15 g, Oral, Q15 Min PRN **OR** glucose-vitamin C (Dex-4) chewable tab 4 tablet, 4 tablet, Oral, Q15 Min PRN **OR** dextrose 50% injection 50 mL, 50 mL, Intravenous, Q15 Min PRN **OR** glucose (Dex4) 15 GM/59ML oral liquid 30 g, 30 g, Oral,  Q15 Min PRN **OR** glucose (Glutose) 40% oral gel 30 g, 30 g, Oral, Q15 Min PRN **OR** glucose-vitamin C (Dex-4) chewable tab 8 tablet, 8 tablet, Oral, Q15 Min PRN    Review of Systems:    Allergic/Immuno:  Review of patient's allergies performed.  Cardiovascular:  Negative for cool extremity and irregular heartbeat/palpitations  Constitutional:  Negative for decreased activity, fever, irritability and lethargy  Endocrine:  Negative for abnormal sleep patterns, increased activity, polydipsia and polyphagia  ENMT:  Negative for ear drainage, hearing loss and nasal drainage  Eyes:  Negative for eye discharge and vision loss  Gastrointestinal:   Chronic abdominal pain,   Genitourinary:  Negative for dysuria and hematuria  Hema/Lymph:  Negative for easy bleeding and easy bruising  Integumentary:  Negative for pruritus and rash  Musculoskeletal:  Negative for bone/joint symptoms  Neurological:  Negative for gait disturbance  Psychiatric:  Negative for inappropriate interaction and psychiatric symptoms  Respiratory:  Negative for cough, dyspnea and wheezing     Physical Exam:  /79 (BP Location: Right arm)   Pulse 84   Temp 98.2 °F (36.8 °C) (Oral)   Resp 18   Wt 123 lb   LMP 06/08/2024 (Exact Date)   SpO2 98%   BMI 21.11 kg/m²     General: Alert, orientated x3.  Cooperative.  No apparent distress.  HEENT: Exam is unremarkable.  Without scleral icterus.  Mucous membranes are moist. Oropharynx is clear.  Neck: No tenderness to palpitation.  Full range of motion to flexion and extension, lateral rotation and lateral flexion of cervical spine.  No JVD. Supple.   Lungs: Clear to auscultation bilaterally.  Cardiac: Regular rate and rhythm. No murmur.  Abdomen:  Soft, non-distended, non-tender, with no rebound or guarding.  No peritoneal signs. No ascites.  Liver is within normal limits.  Spleen is not palpable.    Extremities:  No lower extremity edema noted.  Without clubbing or cyanosis.    Skin: Normal texture  and turgor.  Lymphatic:  No palpable cervical lymphadenopathy.  Neurologic: Cranial nerves are grossly intact.  Motor strength and sensory examination is grossly normal.  No focal neurologic deficit.    Laboratory Data:  Recent Labs   Lab 06/24/24  0705 06/25/24  0718 06/27/24  0621   RBC 3.66* 3.28* 3.20*   HGB 11.0* 9.7* 9.4*   HCT 32.2* 28.4* 28.4*   MCV 88.0 86.6 88.8   MCH 30.1 29.6 29.4   MCHC 34.2 34.2 33.1   RDW 12.8 12.5 12.5   NEPRELIM 18.19* 13.54* 6.24   WBC 20.1* 15.7* 8.9   .0 206.0 287.0       Recent Labs   Lab 06/23/24  1256 06/24/24  0705 06/25/24  0718 06/26/24  0602 06/27/24  0621 06/28/24  0639   * 128* 90  --  88  --    BUN 9 7* 5*  --  4*  --    CREATSERUM 1.11* 0.78 0.65  --  0.64  --    CA 9.0 8.0* 8.0*  --  8.2*  --    ALB 3.7  --  2.4*  --  2.3*  --    * 138 140  --  138  --    K 3.8 3.4* 3.4*  3.4* 3.4* 3.4*  3.4* 3.4*    111 113*  --  108  --    CO2 23.0 22.0 20.0*  --  23.0  --    ALKPHO 71  --  61  --  71  --    AST 30  --  10*  --  10*  --    ALT 16  --  8*  --  8*  --    BILT 0.9  --  0.4  --  0.3  --    TP 8.5*  --  6.3*  --  6.6  --          No results for input(s): \"PTP\", \"INR\", \"PTT\" in the last 168 hours.      No results found.      Rosalva Marrero PA-C  6/28/2024  3:11 PM      Medical Decision Making         Impression:  Patient Active Problem List   Diagnosis    Pain in joint, multiple sites    Esophageal reflux    Lateral epicondylitis  of elbow    Carpal tunnel syndrome    Neuropathic pain    History of rheumatic fever    Herpes simplex with unspecified ophthalmic complication    Abdominal pain    Eczema    Microscopic hematuria    Vitamin D deficiency    Chronic bilateral low back pain without sciatica    History of herpes simplex infection    Spondylosis of lumbosacral region    Positive SHANA (antinuclear antibody)    Sublluxation of lendon of long head of biceps  Global 06/13/2019    Chronic left shoulder pain    Biceps tendinopathy, left     Bursitis of shoulder, left    Neck pain    Myofascial pain    Pre-op testing    Abdominal pain, acute    Nausea and vomiting in adult    Enteritis    Dyspnea on exertion    Special screening for malignant neoplasm of colon    colitis    HPV in female    Opioid dependence with unspecified opioid-induced disorder (HCC)    Narcotic dependence (HCC)    Prediabetes    Fibromyalgia    Sicca syndrome, Sjogren's (HCC)    Primary osteoarthritis involving multiple joints    History of IBS    Raynaud's disease without gangrene    Hamstring tightness of both lower extremities    Neuropathy    Gastroenteritis    Dehydration    Leukocytosis, unspecified type       Acute on chronic abdominal pain uncertain etiology.    Endoscopy overall unrevealing  Possible MALS on CT angiogram of uncertain significance.  Elevated inflammatory markers.  Possible enteritis seen on most recent imaging.  No acute surgical intervention required.  The possibility of median arcuate ligament syndrome is raised based on CT scan, however, I am uncertain if release of the median arcuate ligament will increase the patient's pain symptoms.  The constellation of symptoms seems chronic and multifactorial and may not significantly improve by release of the median arcuate ligament.  Recommend continued workup from gastroenterology and primary team.  Defer recommendation for stenting of the SMA to the vascular surgery service.  If patient continues to have symptoms and there is concern for median arcuate ligament syndrome, evaluation may be undertaken at a tertiary care facility for multi disciplinary intervention and consideration.  Discussed with patient and spouse.  All questions answered.  General surgery will sign off the care of this patient.  Please reconsult should the need arise.      The patient was provided ample opportunity to ask questions.  All of the patient's questions were answered in detail.  The patient voiced understanding of the care  plan.    Natalio Day MD FACS  Trauma Medical Director, [facility] General Surgery    The 21st Century Cures Act makes medical notes like these available to patients in the interest of transparency. Please be advised this is a medical document. Medical documents are intended to carry relevant information, facts as evident, and the clinical opinion of the practitioner. The medical note is intended as peer to peer communication and may appear blunt or direct. It is written in medical language and may contain abbreviations or verbiage that are unfamiliar.    This note was prepared using Dragon Medical voice recognition dictation software. As a result, errors may occur. When identified, these errors have been corrected. While every attempt is made to correct errors during dictation, discrepancies may still exist.            Is this a shared or split note between Advanced Practice Provider and Physician? Yes

## 2024-06-28 NOTE — PROGRESS NOTES
[facility]   part of [facility]     Hospitalist Progress Note     Marielos Reid Patient Status:  Inpatient    1980 MRN [id number]   Location [facility] 4NW-A Attending Brie Woods DO   Hosp Day # 5 PCP Giovanna Parker DO     Chief Complaint: Abdominal pain, n/v/d    Subjective:     Patient had an episode of abdominal pain and nausea after eating post endoscopy yesterday. Today, she feels better.    Objective:    Review of Systems:   A comprehensive review of systems was completed; pertinent positive and negatives stated in subjective.    Vital signs:  Temp:  [98 °F (36.7 °C)-99.3 °F (37.4 °C)] 98.3 °F (36.8 °C)  Pulse:  [69-93] 83  Resp:  [8-19] 18  BP: (103-138)/(47-89) 122/77  SpO2:  [95 %-100 %] 97 %    Physical Exam:    General: No acute distress, awake and alert  Respiratory: No wheezes, no rhonchi  Cardiovascular: S1, S2, regular rate and rhythm  Abdomen: Soft, diffuse TTP, non-distended, positive bowel sounds  Extremities: No edema    Diagnostic Data:    Labs:  Recent Labs   Lab 24  1256 24  0705 24  0718 24  0621   WBC 32.0* 20.1* 15.7* 8.9   HGB 13.4 11.0* 9.7* 9.4*   MCV 85.8 88.0 86.6 88.8   .0 248.0 206.0 287.0     Recent Labs   Lab 24  1256 24  0705 24  0718 24  0602 24  0621 24  0639   * 128* 90  --  88  --    BUN 9 7* 5*  --  4*  --    CREATSERUM 1.11* 0.78 0.65  --  0.64  --    CA 9.0 8.0* 8.0*  --  8.2*  --    ALB 3.7  --  2.4*  --  2.3*  --    * 138 140  --  138  --    K 3.8 3.4* 3.4*  3.4* 3.4* 3.4*  3.4* 3.4*    111 113*  --  108  --    CO2 23.0 22.0 20.0*  --  23.0  --    ALKPHO 71  --  61  --  71  --    AST 30  --  10*  --  10*  --    ALT 16  --  8*  --  8*  --    BILT 0.9  --  0.4  --  0.3  --    TP 8.5*  --  6.3*  --  6.6  --      Estimated Creatinine Clearance: 95.9 mL/min (based on SCr of 0.64 mg/dL).    No results for input(s): \"TROP\", \"TROPHS\", \"CK\" in the last 168  hours.    No results for input(s): \"PTP\", \"INR\" in the last 168 hours.     Microbiology  Hospital Encounter on 06/23/24   1. Urine Culture, Routine     Status: None    Collection Time: 06/23/24  8:17 PM    Specimen: Urine, clean catch   Result Value Ref Range    Urine Culture No Growth at 18-24 hrs. N/A   2. Blood Culture     Status: None (Preliminary result)    Collection Time: 06/23/24  7:20 PM    Specimen: Blood,peripheral   Result Value Ref Range    Blood Culture Result No Growth 4 Days N/A     Imaging: Reviewed in Epic.    Medications:    acetaminophen  1,000 mg Oral TID    magnesium hydroxide  30 mL Oral Daily    gabapentin  300 mg Oral Nightly    [Held by provider] lisinopril  5 mg Oral Daily    enoxaparin  40 mg Subcutaneous Daily    cefTRIAXone  1 g Intravenous Q24H       Assessment & Plan:      #Abdominal pain, generalized  #Vomiting/Diarrhea  #Constipation  -CT with moderate stool, non obstruction, minimal free fluid in pelvis  -CTA with possible MALS > vascular surgery consulted and did not think it was contributing to her symptoms. General surgery consulted  -GI following  -EGD with gastritis and grade 3 hiatal hernia. Colonoscopy overall unremarkable. Did not explain her pain   -Pain control, anti-emetics, symptomatic support. Minimize IV narcotics  -GI PCR, cdiff negative. ESR/CRP elevated  -Bowel regimen and now having BM  -Pelvic ultrasound with hydrosalpinx and has hx of endometriosis > consult ob/gyn. They doubt gynecologic etiology of her pain. Plan for transvaginal pelvic US  -Consideration of intestinal angioedema, holding lisinopril     #Sepsis present on arrival - unknown source; ?Gastroenteritis  #Leukocytosis, resolved  -Empiric ceftriaxone x 7 days  -Lactate WNL  -Stop IVF  -Cultures negative    #Hypokalemia  -Replace     #Chronic pain  #Narcotic dependence   -Patient takes Tramadol 100mg TID daily   -Minimize IV narcotics    Kingsley Ross,     Supplementary Documentation:      Quality:  DVT Mechanical Prophylaxis:     Early ambuation  DVT Pharmacologic Prophylaxis   Medication    enoxaparin (Lovenox) 40 MG/0.4ML SUBQ injection 40 mg     Code Status: Full Code  Meyer: No urinary catheter in place  KEISHA: TBD    Discharge is dependent on: Clinical state, consultant recs   At this point Ms. Reid is expected to be discharge to: Home    The 21st Century Cures Act makes medical notes like these available to patients in the interest of transparency. Please be advised this is a medical document. Medical documents are intended to carry relevant information, facts as evident, and the clinical opinion of the practitioner. The medical note is intended as peer to peer communication and may appear blunt or direct. It is written in medical language and may contain abbreviations or verbiage that are unfamiliar.

## 2024-06-28 NOTE — CONSULTS
[facility]    PATIENT'S NAME: ELLIE CORDOVA   ATTENDING PHYSICIAN: Kingsley Ross DO   CONSULTING PHYSICIAN: Irving Lee M.D.   PATIENT ACCOUNT#:   [id number]    LOCATION:  [address] [place] [address]  MEDICAL RECORD #:   [id number]       YOB: 1980  ADMISSION DATE:       06/23/2024      CONSULT DATE:  06/28/2024    REPORT OF CONSULTATION    FOLLOWUP CONSULTATION    I had a discussion last night with the patient's  who has been reading online from the [facility] about the possibility of median arcuate ligament.  I think that this is causing his wife's problems.  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  I told him that most of the time the symptoms of median arcuate ligament syndrome is from nerve compression around the celiac axis, and I think the artery is secondary.  However, it would be best if he wanted it to be treated surgically to have a laparoscopic procedure to lyse the fibrous muscles of the diaphragmatic juan.  I have discussed also with Dr. Natalio Day who will see the patient today and then do a stent of the celiac artery.  However, I do not think her symptoms correspond with this problem at this time.  She currently is afebrile, but she had a temperature when she came in of 101, and she usually does not have pain associated with postprandial symptoms.  It is mostly all the time, and she is tender on her abdomen all the time, and their CT scan suggested edema and fluid of the small bowel intestines suggestive of an inflammatory process, in exact words acute enterocolitis.  Although no etiology or problems have been identified by GI, I think doing a surgical procedure for a median arcuate ligament with a laparoscopic procedure and stenting, if it is not necessary at this time, may just make things worse for her.  It may be that there is a symptom in the future, and it would be dealt with later on.  We will have Dr. Day have his input to see what he thinks  about this.  I discussed this with the patient's , and he is aware to be careful what you wish for and it may cause more problems.  If it is not the true diagnosis, that could make things worse to what is exactly going on with her.  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  It is mostly a radiographic finding and be careful corresponding the treatment with the true symptoms of median arcuate ligament syndrome.    Dictated By Irving Lee M.D.  d: 06/28/2024 06:28:11  t: 06/28/2024 08:09:05  [facility] [id number]  JJW/    cc: Irving Lee M.D.

## 2024-06-28 NOTE — PLAN OF CARE
Problem: Patient/Family Goals  Goal: Patient/Family Long Term Goal  Description: Patient's Long Term Goal: Patient will report relief of pain    Interventions:  - Nurse will educate patient about pain management.  - See additional Care Plan goals for specific interventions  Outcome: Progressing  Goal: Patient/Family Short Term Goal  Description: Patient's Short Term Goal: patient will describe satisfactory pain control at a level less than 3.    Interventions:   Nurse will Provide measures to relieve pain before it becomes severe.   - See additional Care Plan goals for specific interventions  Outcome: Progressing     Problem: PAIN - ADULT  Goal: Verbalizes/displays adequate comfort level or patient's stated pain goal  Description: INTERVENTIONS:  - Encourage pt to monitor pain and request assistance  - Assess pain using appropriate pain scale  - Administer analgesics based on type and severity of pain and evaluate response  - Implement non-pharmacological measures as appropriate and evaluate response  - Consider cultural and social influences on pain and pain management  - Manage/alleviate anxiety  - Utilize distraction and/or relaxation techniques  - Monitor for opioid side effects  - Notify MD/LIP if interventions unsuccessful or patient reports new pain  - Anticipate increased pain with activity and pre-medicate as appropriate  Outcome: Progressing     Problem: RISK FOR INFECTION - ADULT  Goal: Absence of fever/infection during anticipated neutropenic period  Description: INTERVENTIONS  - Monitor WBC  - Administer growth factors as ordered  - Implement neutropenic guidelines  Outcome: Progressing     Problem: SAFETY ADULT - FALL  Goal: Free from fall injury  Description: INTERVENTIONS:  - Assess pt frequently for physical needs  - Identify cognitive and physical deficits and behaviors that affect risk of falls.  - [place] fall precautions as indicated by assessment.  - Educate pt/family on patient safety  including physical limitations  - Instruct pt to call for assistance with activity based on assessment  - Modify environment to reduce risk of injury  - Provide assistive devices as appropriate  - Consider OT/PT consult to assist with strengthening/mobility  - Encourage toileting schedule  Outcome: Progressing     Problem: GASTROINTESTINAL - ADULT  Goal: Minimal or absence of nausea and vomiting  Description: INTERVENTIONS:  - Maintain adequate hydration with IV or PO as ordered and tolerated  - Nasogastric tube to low intermittent suction as ordered  - Evaluate effectiveness of ordered antiemetic medications  - Provide nonpharmacologic comfort measures as appropriate  - Advance diet as tolerated, if ordered  - Obtain nutritional consult as needed  - Evaluate fluid balance  Outcome: Progressing  Goal: Maintains or returns to baseline bowel function  Description: INTERVENTIONS:  - Assess bowel function  - Maintain adequate hydration with IV or PO as ordered and tolerated  - Evaluate effectiveness of GI medications  - Encourage mobilization and activity  - Obtain nutritional consult as needed  - Establish a toileting routine/schedule  - Consider collaborating with pharmacy to review patient's medication profile  Outcome: Progressing

## 2024-06-29 VITALS
OXYGEN SATURATION: 100 % | RESPIRATION RATE: 17 BRPM | WEIGHT: 123 LBS | BODY MASS INDEX: 21 KG/M2 | HEART RATE: 82 BPM | TEMPERATURE: 98 F | SYSTOLIC BLOOD PRESSURE: 117 MMHG | DIASTOLIC BLOOD PRESSURE: 76 MMHG

## 2024-06-29 LAB
ALBUMIN SERPL-MCNC: 2.6 G/DL (ref 3.4–5)
ALBUMIN/GLOB SERPL: 0.5 {RATIO} (ref 1–2)
ALP LIVER SERPL-CCNC: 78 U/L
ALT SERPL-CCNC: 11 U/L
ANION GAP SERPL CALC-SCNC: 7 MMOL/L (ref 0–18)
AST SERPL-CCNC: 13 U/L (ref 15–37)
BASOPHILS # BLD AUTO: 0.08 X10(3) UL (ref 0–0.2)
BASOPHILS NFR BLD AUTO: 0.8 %
BILIRUB SERPL-MCNC: 0.2 MG/DL (ref 0.1–2)
BUN BLD-MCNC: 6 MG/DL (ref 9–23)
CALCIUM BLD-MCNC: 8.6 MG/DL (ref 8.5–10.1)
CHLORIDE SERPL-SCNC: 103 MMOL/L (ref 98–112)
CO2 SERPL-SCNC: 26 MMOL/L (ref 21–32)
CREAT BLD-MCNC: 0.79 MG/DL
CRP SERPL-MCNC: 7.92 MG/DL (ref ?–0.3)
EGFRCR SERPLBLD CKD-EPI 2021: 94 ML/MIN/1.73M2 (ref 60–?)
EOSINOPHIL # BLD AUTO: 0.49 X10(3) UL (ref 0–0.7)
EOSINOPHIL NFR BLD AUTO: 4.6 %
ERYTHROCYTE [DISTWIDTH] IN BLOOD BY AUTOMATED COUNT: 12.5 %
GLOBULIN PLAS-MCNC: 5.1 G/DL (ref 2.8–4.4)
GLUCOSE BLD-MCNC: 115 MG/DL (ref 70–99)
GLUCOSE BLD-MCNC: 120 MG/DL (ref 70–99)
HCT VFR BLD AUTO: 33.2 %
HGB BLD-MCNC: 11 G/DL
IMM GRANULOCYTES # BLD AUTO: 0.12 X10(3) UL (ref 0–1)
IMM GRANULOCYTES NFR BLD: 1.1 %
LYMPHOCYTES # BLD AUTO: 1.96 X10(3) UL (ref 1–4)
LYMPHOCYTES NFR BLD AUTO: 18.5 %
MCH RBC QN AUTO: 28.9 PG (ref 26–34)
MCHC RBC AUTO-ENTMCNC: 33.1 G/DL (ref 31–37)
MCV RBC AUTO: 87.1 FL
MONOCYTES # BLD AUTO: 0.85 X10(3) UL (ref 0.1–1)
MONOCYTES NFR BLD AUTO: 8 %
NEUTROPHILS # BLD AUTO: 7.1 X10 (3) UL (ref 1.5–7.7)
NEUTROPHILS # BLD AUTO: 7.1 X10(3) UL (ref 1.5–7.7)
NEUTROPHILS NFR BLD AUTO: 67 %
OSMOLALITY SERPL CALC.SUM OF ELEC: 281 MOSM/KG (ref 275–295)
PLATELET # BLD AUTO: 390 10(3)UL (ref 150–450)
POTASSIUM SERPL-SCNC: 3.5 MMOL/L (ref 3.5–5.1)
POTASSIUM SERPL-SCNC: 3.5 MMOL/L (ref 3.5–5.1)
PROT SERPL-MCNC: 7.7 G/DL (ref 6.4–8.2)
RBC # BLD AUTO: 3.81 X10(6)UL
SODIUM SERPL-SCNC: 136 MMOL/L (ref 136–145)
WBC # BLD AUTO: 10.6 X10(3) UL (ref 4–11)

## 2024-06-29 PROCEDURE — 99239 HOSP IP/OBS DSCHRG MGMT >30: CPT | Performed by: INTERNAL MEDICINE

## 2024-06-29 RX ORDER — DIPHENHYDRAMINE HYDROCHLORIDE, ZINC ACETATE 2; .1 G/100G; G/100G
1 CREAM TOPICAL 3 TIMES DAILY PRN
Qty: 28 G | Refills: 0 | Status: SHIPPED | OUTPATIENT
Start: 2024-06-29

## 2024-06-29 RX ORDER — DIPHENHYDRAMINE HYDROCHLORIDE, ZINC ACETATE 2; .1 G/100G; G/100G
1 CREAM TOPICAL 3 TIMES DAILY PRN
Status: DISCONTINUED | OUTPATIENT
Start: 2024-06-29 | End: 2024-06-30

## 2024-06-29 NOTE — DISCHARGE SUMMARY
[facility]     Marielos Reid Patient Status:  Inpatient    1980 MRN [id number]   Location [facility] 4NW-A Attending Kingsley Ross DO   Hosp Day # 6 PCP Giovanna Parker DO     Date of Admission: 2024  Date of Discharge: 2024  Discharge Disposition: Home or Self Care    Discharge Diagnosis:   Acute on chronic abdominal pain, unclear etiology  MALS  Endometriosis  IBS  Sepsis present on arrival, resolved    History of Present Illness: Marielos Reid is a 43 year old female with a past medical history of irritable bowel syndrome who presents emergency department with abdominal pain, vomiting, diarrhea.  Patient symptoms started suddenly this morning around 10 AM.  She was feeling unwell yesterday evening prior to bed but was not having any pain at that time and no vomiting.  Around 10 AM she began having severe and diffuse abdominal pain, she vomited, nonbloody nonbilious 5 times.  She also had loose stools x 2.  Symptoms associated with a fever and chills. Patient endorses having symptoms like this a few years ago, she had an extensive workup done at that time without any findings of the cause.  She occasionally gets pains like this but typically goes away on its own after a few minutes.  Patient's last menstrual period was a week ago, it was heavier than normal.  Currently denies any vaginal discharge or bleeding. Denies any chest pain, shortness of breath, no other acute complaints.    Brief Synopsis: Patient presented with acute on chronic abdominal pain.  She was septic on arrival with leukocytosis and fever.  CT scan concerning for gastroenteritis and she completed a 7-day empiric course of ceftriaxone.  Cultures, C. difficile and GI PCR negative.  She was started on bowel regimen with improvement in her constipation.  EGD showed gastritis and grade 3 hiatal hernia.  Colonoscopy overall unremarkable.  The endoscopies did not explain her pain.  Lactic acid normal.   CTA with possible MALS.  Vascular surgery consulted and did not think it was contributing to her symptoms.  They recommended follow-up at tertiary care center such as [facility] or [facility].  General surgery was consulted and there is no plan for intervention regarding MALS and they also recommended outpatient tertiary care follow-up.  She does have history of endometriosis and gynecology does not think this was the etiology of her pain.  Transvaginal ultrasound was overall unremarkable.  She was discharged home in stable condition with outpatient GI follow-up.    Lace+ Score: 75  59-90 High Risk  29-58 Medium Risk  0-28   Low Risk       TCM Follow-Up Recommendation:  LACE > 58: High Risk of readmission after discharge from the hospital.    Procedures during hospitalization:   EGD  Colonoscopy    Incidental or significant findings and recommendations (brief descriptions):  Please see brief synopsis above    Lab/Test results pending at Discharge:   C1/C4 levels    Consultants:  Gastroenterology  Gynecology  Vascular surgery  General surgery    Discharge Medication List:     Discharge Medications        START taking these medications        Instructions Prescription details   diphenhydrAMINE-zinc 2-0.1 % Crea  Commonly known as: Benadryl-Zinc      Apply 1 Application topically 3 (three) times daily as needed for Itching.   Quantity: 28 g  Refills: 0     Polyethylene Glycol 3350 17 g Pack  Commonly known as: MIRALAX      Take 17 g by mouth daily as needed (If no bowel movement in last 24 hours).   Quantity: 20 each  Refills: 0            CONTINUE taking these medications        Instructions Prescription details   gabapentin 100 MG Caps  Commonly known as: Neurontin      Take 3 capsules (300 mg total) by mouth nightly. 100mg at bedtime x 1 week then 200mg at bedtime x 1 week then 300mg at bedtime   Quantity: 90 capsule  Refills: 2     metFORMIN 500 MG Tabs  Commonly known as: Glucophage      Take 1 tablet  (500 mg total) by mouth 2 (two) times daily.   Quantity: 60 tablet  Refills: 2     ondansetron 4 mg tablet  Commonly known as: Zofran      Take 1 tablet (4 mg total) by mouth every 8 (eight) hours as needed for Nausea.   Quantity: 20 tablet  Refills: 0     traMADol 50 MG Tabs  Commonly known as: Ultram      Take 2 tablets (100 mg total) by mouth every 8 (eight) hours as needed for Pain.   Stop taking on: September 8, 2024  Quantity: 180 tablet  Refills: 2            STOP taking these medications      lisinopril 5 MG Tabs  Commonly known as: Prinivil; Zestril                  Where to Get Your Medications        These medications were sent to [id number] - [place], [place] - [address] AT [facility] [address], [phone], [phone] [address], [facility] [address]      Phone: [phone]   diphenhydrAMINE-zinc 2-0.1 % Crea  ondansetron 4 mg tablet  Polyethylene Glycol 3350 17 g Pack         [place] No controlled substances prescribed at discharge.    Follow-up appointment:   Giovanna Parker DO  [address]  [facility] [address] [phone]    Follow up in 1 week(s)      Louis Pereira MD  [address]   [facility] [address] [phone]    Follow up in 1 week(s)  Please call the office to schedule f/u in 1 week    Appointments for Next 30 Days 6/29/2024 - 7/29/2024      None            Vital signs:  Temp:  [98.1 °F (36.7 °C)-98.6 °F (37 °C)] 98.3 °F (36.8 °C)  Pulse:  [68-87] 77  Resp:  [15-18] 15  BP: (114-147)/(68-89) 130/80  SpO2:  [92 %-98 %] 96 %    Physical Exam:    See progress note dated same day.  -----------------------------------------------------------------------------------------------  PATIENT DISCHARGE INSTRUCTIONS: See electronic chart    Kingsley Ross DO    Time spent:  33 minutes

## 2024-06-29 NOTE — PLAN OF CARE
Patient is alert and oriented, independent with ADLs. Vitals stable, no fever overnight. Patient still complains of abdominal pain that comes and goes. PRN Morphine given for pain. No other complaints overnight, patient feels slightly better. Family at the bedside. Safety precautions in place, call light within reach, plan of care ongoing.     Problem: PAIN - ADULT  Goal: Verbalizes/displays adequate comfort level or patient's stated pain goal  Description: INTERVENTIONS:  - Encourage pt to monitor pain and request assistance  - Assess pain using appropriate pain scale  - Administer analgesics based on type and severity of pain and evaluate response  - Implement non-pharmacological measures as appropriate and evaluate response  - Consider cultural and social influences on pain and pain management  - Manage/alleviate anxiety  - Utilize distraction and/or relaxation techniques  - Monitor for opioid side effects  - Notify MD/LIP if interventions unsuccessful or patient reports new pain  - Anticipate increased pain with activity and pre-medicate as appropriate  Outcome: Progressing     Problem: SAFETY ADULT - FALL  Goal: Free from fall injury  Description: INTERVENTIONS:  - Assess pt frequently for physical needs  - Identify cognitive and physical deficits and behaviors that affect risk of falls.  - [place] fall precautions as indicated by assessment.  - Educate pt/family on patient safety including physical limitations  - Instruct pt to call for assistance with activity based on assessment  - Modify environment to reduce risk of injury  - Provide assistive devices as appropriate  - Consider OT/PT consult to assist with strengthening/mobility  - Encourage toileting schedule  Outcome: Progressing     Problem: GASTROINTESTINAL - ADULT  Goal: Minimal or absence of nausea and vomiting  Description: INTERVENTIONS:  - Maintain adequate hydration with IV or PO as ordered and tolerated  - Nasogastric tube to low intermittent  suction as ordered  - Evaluate effectiveness of ordered antiemetic medications  - Provide nonpharmacologic comfort measures as appropriate  - Advance diet as tolerated, if ordered  - Obtain nutritional consult as needed  - Evaluate fluid balance  Outcome: Progressing  Goal: Maintains or returns to baseline bowel function  Description: INTERVENTIONS:  - Assess bowel function  - Maintain adequate hydration with IV or PO as ordered and tolerated  - Evaluate effectiveness of GI medications  - Encourage mobilization and activity  - Obtain nutritional consult as needed  - Establish a toileting routine/schedule  - Consider collaborating with pharmacy to review patient's medication profile  Outcome: Progressing

## 2024-06-29 NOTE — PROGRESS NOTES
Inpatient Follow up Note    Marielos Reid Patient Status:  Inpatient    1980 MRN [id number]   Location [facility] 4NW-A Attending Kingsley Ross DO   Hosp Day # 6 PCP Giovanna Parker DO     Reason for Consultation   Abdominal pain     Subjective   Feels better today. No n/v, tolerated solid food. Still has intermittent pain,  better than before.             Objective:     /84 (BP Location: Left arm)   Pulse 87   Temp 98.4 °F (36.9 °C) (Oral)   Resp 18   Wt 123 lb (55.8 kg)   LMP 2024 (Exact Date)   SpO2 98%   BMI 21.11 kg/m²   Gen: AAOx3  CV: RRR with normal S1 / S2  Resp: CTA bilaterally  Abd: (+)BS, soft, non-tender, non-distended; no rebound or guarding  Ext: No edema or cyanosis  Skin: Warm and dry     Labs/Imaging     LABRCNTIP[PGLU:5@  No results for input(s): \"INR\" in the last 168 hours.      Recent Labs   Lab 24  1256 24  0705 24  0718 24  0621 24  0704   WBC 32.0* 20.1* 15.7* 8.9 10.6   HGB 13.4 11.0* 9.7* 9.4* 11.0*   .0 248.0 206.0 287.0 390.0       Recent Labs   Lab 24  1256 24  0705 24  0718 24  0602 24  0621 24  0639 24  0704   * 138 140  --  138  --  136   K 3.8 3.4* 3.4*  3.4* 3.4* 3.4*  3.4* 3.4* 3.5  3.5    111 113*  --  108  --  103   CO2 23.0 22.0 20.0*  --  23.0  --  26.0   BUN 9 7* 5*  --  4*  --  6*   CREATSERUM 1.11* 0.78 0.65  --  0.64  --  0.79       Recent Labs   Lab 24  1256 24  0718 24  0621 24  0704   ALT 16 8* 8* 11*   AST 30 10* 10* 13*     US PELVIS W DOPPLER (CLS=[id number])    Result Date: 2024  CONCLUSION:  1. Unremarkable uterus and left ovary for a menstruating woman.  Likely small ruptured follicle or involuting functional cyst.  No evidence of dilated tube/hydrosalpinx. 2. Surgically absent right ovary.  No adnexal abnormality. 3. Details as above.    LOCATION:  [place]    Dictated by (CST): Andrey Tavarez MD on  6/28/2024 at 5:13 PM     Finalized by (CST): Andrey Tavarez MD on 6/28/2024 at 5:17 PM      AST   Date/Time Value Ref Range Status   06/29/2024 07:04 AM 13 (L) 15 - 37 U/L Final   01/07/2019 03:01 PM 27 15 - 41 U/L Final   09/11/2015 03:18 PM 15 10 - 30 U/L Final     ALT   Date/Time Value Ref Range Status   06/29/2024 07:04 AM 11 (L) 13 - 56 U/L Final   01/07/2019 03:01 PM 17 14 - 54 U/L Final   09/11/2015 03:18 PM 12 6 - 29 U/L Final     BUN   Date/Time Value Ref Range Status   06/29/2024 07:04 AM 6 (L) 9 - 23 mg/dL Final     Blood Urea Nitrogen   Date/Time Value Ref Range Status   01/07/2019 03:01 PM 12 8 - 20 mg/dL Final     UREA NITROGEN (BUN)   Date/Time Value Ref Range Status   09/11/2015 03:18 PM 9 7 - 25 mg/dL Final              Assessment   Ms Reid is a 43 year old female who presents with acute on chronic abdominal pain of unclear etiology, Ddx includes IBS, endometriosis, MALS, or autoimmune disorder.             Plan   -appreciate vascular/general surgeyr consults  -f/u C1/C4 levels  -ok to DC from GI perspective. We will arrange outpatient GI f/u             Star Jones MD  1:01 PM  6/29/2024  [facility]  [phone]

## 2024-06-29 NOTE — PROGRESS NOTES
Reviewed Dr. Day note. Patient seen with son at bedside- discussed again my opinion of incidental median arcuate ligament compression- but believe this is an inflammatory disease of the small intestines. Offered second opinion if she wishes with Dr. Cristobal Verduzco at [facility]- and/ or [facility] gastroenterology who  deal with IBS/ autoimmune diseases of the intestines

## 2024-06-29 NOTE — PLAN OF CARE
Patient alert oriented times four, on room air, patient complaining of abdominal pain and receving prn pain meds, rates relief score at a  4. Voiding without difficulty.

## 2024-06-29 NOTE — PROGRESS NOTES
[facility]   [facility]     Marielos Reid Patient Status:  Inpatient    1980 MRN [id number]   [facility] 4NW-A Attending Brie Woods DO   Hosp Day # 6 PCP Giovanna Parker DO     Chief Complaint: Abdominal pain, n/v/d    Subjective:     Patient feels about the same.    Objective:    Review of Systems:   A comprehensive review of systems was completed; pertinent positive and negatives stated in subjective.    Vital signs:  Temp:  [98.1 °F (36.7 °C)-98.6 °F (37 °C)] 98.4 °F (36.9 °C)  Pulse:  [68-87] 87  Resp:  [16-18] 18  BP: (114-147)/(68-89) 125/84  SpO2:  [92 %-98 %] 98 %    Physical Exam:    General: No acute distress, awake and alert  Respiratory: No wheezes, no rhonchi  Cardiovascular: S1, S2, regular rate and rhythm  Abdomen: Soft, diffuse TTP, non-distended, positive bowel sounds  Extremities: No edema    Diagnostic Data:    Labs:  Recent Labs   Lab 24  1256 24  0705 24  0718 24  0621 24  0704   WBC 32.0* 20.1* 15.7* 8.9 10.6   HGB 13.4 11.0* 9.7* 9.4* 11.0*   MCV 85.8 88.0 86.6 88.8 87.1   .0 248.0 206.0 287.0 390.0     Recent Labs   Lab 24  0718 24  0602 24  0621 24  0639 24  0704   GLU 90  --  88  --  115*   BUN 5*  --  4*  --  6*   CREATSERUM 0.65  --  0.64  --  0.79   CA 8.0*  --  8.2*  --  8.6   ALB 2.4*  --  2.3*  --  2.6*     --  138  --  136   K 3.4*  3.4*   < > 3.4*  3.4* 3.4* 3.5  3.5   *  --  108  --  103   CO2 20.0*  --  23.0  --  26.0   ALKPHO 61  --  71  --  78   AST 10*  --  10*  --  13*   ALT 8*  --  8*  --  11*   BILT 0.4  --  0.3  --  0.2   TP 6.3*  --  6.6  --  7.7    < > = values in this interval not displayed.     Estimated Creatinine Clearance: 77.7 mL/min (based on SCr of 0.79 mg/dL).    No results for input(s): \"TROP\", \"TROPHS\", \"CK\" in the last 168 hours.    No results for input(s): \"PTP\", \"INR\" in the last 168 hours.      Microbiology  Hospital Encounter on 06/23/24   1. Urine Culture, Routine     Status: None    Collection Time: 06/23/24  8:17 PM    Specimen: Urine, clean catch   Result Value Ref Range    Urine Culture No Growth at 18-24 hrs. N/A   2. Blood Culture     Status: None    Collection Time: 06/23/24  7:20 PM    Specimen: Blood,peripheral   Result Value Ref Range    Blood Culture Result No Growth 5 Days N/A     Imaging: Reviewed in Epic.    Medications:    acetaminophen  1,000 mg Oral TID    magnesium hydroxide  30 mL Oral Daily    gabapentin  300 mg Oral Nightly    [Held by provider] lisinopril  5 mg Oral Daily    enoxaparin  40 mg Subcutaneous Daily    cefTRIAXone  1 g Intravenous Q24H       Assessment & Plan:      #Abdominal pain, generalized  #Vomiting/Diarrhea  #Constipation  -CT with moderate stool, no obstruction, minimal free fluid in pelvis  -CTA with possible MALS > vascular surgery consulted and did not think it was contributing to her symptoms. Consideration of celiac stent in future? General surgery consulted, no plan for intervention  -GI following  -EGD with gastritis and grade 3 hiatal hernia. Colonoscopy overall unremarkable. Did not explain her pain   -Pain control, anti-emetics, symptomatic support. Minimize IV narcotics  -GI PCR, cdiff negative. ESR/CRP elevated  -Bowel regimen and now having BM  -Pelvic ultrasound with hydrosalpinx and has hx of endometriosis > consult ob/gyn. They doubt gynecologic etiology of her pain. TVUS overall unremarkable  -Consideration of intestinal angioedema, holding lisinopril. Follow C1 and C4 complement levels     #Sepsis present on arrival - unknown source; ?Gastroenteritis  #Leukocytosis, resolved  -Empiric ceftriaxone x 7 days, finish today  -Lactate WNL  -Off IVF  -Cultures negative    #Hypokalemia  -Replace     #Chronic pain  #Narcotic dependence   -Patient takes Tramadol 100mg TID daily   -Minimize IV narcotics    Kingsley Ross,     Supplementary Documentation:      Quality:  DVT Mechanical Prophylaxis:     Early ambuation  DVT Pharmacologic Prophylaxis   Medication    enoxaparin (Lovenox) 40 MG/0.4ML SUBQ injection 40 mg     Code Status: Full Code  Meyer: No urinary catheter in place  KEISHA: Possibly today?    Discharge is dependent on: Clinical state, consultant recs   At this point Ms. Reid is expected to be discharge to: Home    The 21st Century Cures Act makes medical notes like these available to patients in the interest of transparency. Please be advised this is a medical document. Medical documents are intended to carry relevant information, facts as evident, and the clinical opinion of the practitioner. The medical note is intended as peer to peer communication and may appear blunt or direct. It is written in medical language and may contain abbreviations or verbiage that are unfamiliar.

## 2024-06-30 NOTE — PLAN OF CARE
Patient discharged, denies having any questions concerning discharge meds. See discharge records. Patient waititng for  to pick her up.

## 2024-07-01 ENCOUNTER — PATIENT OUTREACH (OUTPATIENT)
Dept: CASE MANAGEMENT | Age: 44
End: 2024-07-01

## 2024-07-01 ENCOUNTER — TELEPHONE (OUTPATIENT)
Dept: FAMILY MEDICINE CLINIC | Facility: CLINIC | Age: 44
End: 2024-07-01

## 2024-07-01 DIAGNOSIS — K58.9 IRRITABLE BOWEL SYNDROME, UNSPECIFIED TYPE: ICD-10-CM

## 2024-07-01 DIAGNOSIS — G89.29 CHRONIC ABDOMINAL PAIN: Primary | ICD-10-CM

## 2024-07-01 DIAGNOSIS — R10.9 CHRONIC ABDOMINAL PAIN: Primary | ICD-10-CM

## 2024-07-01 LAB
C1 EST INHIB FUNC: 101 %MEAN NORMAL
C1 ESTERASE INHIB: 23 MG/DL
C4 COMPLEMENT: 44 MG/DL

## 2024-07-01 NOTE — TELEPHONE ENCOUNTER
Patient was recently in hospital and is asking for a referral to Dr. Pereira, gastro.    PROVIDER: Louis Pereira    DX: Gastroenteritis  K52.9

## 2024-07-01 NOTE — PROGRESS NOTES
Attempted to contact pt for TCM however call rang once and then disconnected. Unable to leave a VM at this time. NCM to try again at a later time.

## 2024-07-01 NOTE — TELEPHONE ENCOUNTER
Pt admitted to hospital for abd pain, diarrhea  Seen by [facility], needs referral to f/u    LOV 6/10/24  Referral pended if ok

## 2024-07-02 NOTE — PROGRESS NOTES
Attempted to contact pt for TCM however call rang twice and then seemed to answer but no response. Call then ended. Will await a returned phone call.

## 2024-07-03 ENCOUNTER — OFFICE VISIT (OUTPATIENT)
Dept: FAMILY MEDICINE CLINIC | Facility: CLINIC | Age: 44
End: 2024-07-03
Payer: COMMERCIAL

## 2024-07-03 VITALS
SYSTOLIC BLOOD PRESSURE: 118 MMHG | HEIGHT: 64 IN | HEART RATE: 91 BPM | BODY MASS INDEX: 20.23 KG/M2 | WEIGHT: 118.5 LBS | OXYGEN SATURATION: 99 % | TEMPERATURE: 98 F | DIASTOLIC BLOOD PRESSURE: 72 MMHG | RESPIRATION RATE: 16 BRPM

## 2024-07-03 DIAGNOSIS — G89.29 CHRONIC ABDOMINAL PAIN: ICD-10-CM

## 2024-07-03 DIAGNOSIS — E78.5 DYSLIPIDEMIA: ICD-10-CM

## 2024-07-03 DIAGNOSIS — E11.9 TYPE 2 DIABETES MELLITUS WITHOUT COMPLICATION, WITHOUT LONG-TERM CURRENT USE OF INSULIN (HCC): ICD-10-CM

## 2024-07-03 DIAGNOSIS — Z09 HOSPITAL DISCHARGE FOLLOW-UP: Primary | ICD-10-CM

## 2024-07-03 DIAGNOSIS — R10.9 CHRONIC ABDOMINAL PAIN: ICD-10-CM

## 2024-07-03 DIAGNOSIS — Z79.899 MEDICATION MANAGEMENT: ICD-10-CM

## 2024-07-03 DIAGNOSIS — I77.4 MEDIAN ARCUATE LIGAMENT SYNDROME (HCC): ICD-10-CM

## 2024-07-03 DIAGNOSIS — G89.4 CHRONIC PAIN SYNDROME: ICD-10-CM

## 2024-07-03 DIAGNOSIS — R10.2 PELVIC PAIN: ICD-10-CM

## 2024-07-03 DIAGNOSIS — I10 ESSENTIAL HYPERTENSION: ICD-10-CM

## 2024-07-03 DIAGNOSIS — F11.20 NARCOTIC DEPENDENCE (HCC): ICD-10-CM

## 2024-07-03 DIAGNOSIS — K58.9 IRRITABLE BOWEL SYNDROME, UNSPECIFIED TYPE: ICD-10-CM

## 2024-07-03 PROCEDURE — 3078F DIAST BP <80 MM HG: CPT | Performed by: FAMILY MEDICINE

## 2024-07-03 PROCEDURE — 3074F SYST BP LT 130 MM HG: CPT | Performed by: FAMILY MEDICINE

## 2024-07-03 PROCEDURE — 3008F BODY MASS INDEX DOCD: CPT | Performed by: FAMILY MEDICINE

## 2024-07-03 PROCEDURE — 99496 TRANSJ CARE MGMT HIGH F2F 7D: CPT | Performed by: FAMILY MEDICINE

## 2024-07-05 NOTE — PROGRESS NOTES
Multiple attempts to reach pt and messages left with no return call.  Patient went in for HFU appt with PCP on 7/3/24.  Encounter closing.

## 2024-09-03 DIAGNOSIS — F11.20 NARCOTIC DEPENDENCE (HCC): ICD-10-CM

## 2024-09-03 DIAGNOSIS — R10.2 PELVIC PAIN: ICD-10-CM

## 2024-09-03 DIAGNOSIS — G89.4 CHRONIC PAIN SYNDROME: ICD-10-CM

## 2024-09-03 RX ORDER — TRAMADOL HYDROCHLORIDE 50 MG/1
100 TABLET ORAL EVERY 8 HOURS PRN
Qty: 180 TABLET | Refills: 2 | Status: SHIPPED | OUTPATIENT
Start: 2024-09-08 | End: 2024-12-07

## 2024-09-03 NOTE — TELEPHONE ENCOUNTER
Last office visit: 7/3/2024   Labs last completed: 4/20/2024  Requested medication(s) are due for refill today: yes  Requested medication(s) are on the active medication list same strength, form, dose/ sig: yes  Requested medication(s) are managed by provider: yes  Patient has already received a courtsey refill: no     NOV: 9/10/2024  Asked to Return: 9/3/2024

## 2024-09-10 ENCOUNTER — OFFICE VISIT (OUTPATIENT)
Dept: FAMILY MEDICINE CLINIC | Facility: CLINIC | Age: 44
End: 2024-09-10
Payer: COMMERCIAL

## 2024-09-10 VITALS
DIASTOLIC BLOOD PRESSURE: 74 MMHG | HEART RATE: 72 BPM | HEIGHT: 64 IN | RESPIRATION RATE: 16 BRPM | BODY MASS INDEX: 20.66 KG/M2 | SYSTOLIC BLOOD PRESSURE: 122 MMHG | OXYGEN SATURATION: 99 % | WEIGHT: 121 LBS

## 2024-09-10 DIAGNOSIS — Z71.85 VACCINE COUNSELING: ICD-10-CM

## 2024-09-10 DIAGNOSIS — Z79.899 MEDICATION MANAGEMENT: ICD-10-CM

## 2024-09-10 DIAGNOSIS — I77.4 MEDIAN ARCUATE LIGAMENT SYNDROME (HCC): Primary | ICD-10-CM

## 2024-09-10 DIAGNOSIS — E11.9 TYPE 2 DIABETES MELLITUS WITHOUT COMPLICATION, WITHOUT LONG-TERM CURRENT USE OF INSULIN (HCC): ICD-10-CM

## 2024-09-10 DIAGNOSIS — G89.29 CHRONIC ABDOMINAL PAIN: ICD-10-CM

## 2024-09-10 DIAGNOSIS — K21.9 GASTROESOPHAGEAL REFLUX DISEASE, UNSPECIFIED WHETHER ESOPHAGITIS PRESENT: ICD-10-CM

## 2024-09-10 DIAGNOSIS — G89.4 CHRONIC PAIN SYNDROME: ICD-10-CM

## 2024-09-10 DIAGNOSIS — R11.2 NAUSEA AND VOMITING IN ADULT: ICD-10-CM

## 2024-09-10 DIAGNOSIS — E78.5 DYSLIPIDEMIA: ICD-10-CM

## 2024-09-10 DIAGNOSIS — R10.9 CHRONIC ABDOMINAL PAIN: ICD-10-CM

## 2024-09-10 DIAGNOSIS — F11.20 NARCOTIC DEPENDENCE (HCC): ICD-10-CM

## 2024-09-10 DIAGNOSIS — I10 ESSENTIAL HYPERTENSION: ICD-10-CM

## 2024-09-10 DIAGNOSIS — K58.9 IRRITABLE BOWEL SYNDROME, UNSPECIFIED TYPE: ICD-10-CM

## 2024-09-10 PROCEDURE — 3074F SYST BP LT 130 MM HG: CPT | Performed by: FAMILY MEDICINE

## 2024-09-10 PROCEDURE — 3078F DIAST BP <80 MM HG: CPT | Performed by: FAMILY MEDICINE

## 2024-09-10 PROCEDURE — 3008F BODY MASS INDEX DOCD: CPT | Performed by: FAMILY MEDICINE

## 2024-09-10 PROCEDURE — 99214 OFFICE O/P EST MOD 30 MIN: CPT | Performed by: FAMILY MEDICINE

## 2024-09-10 NOTE — PROGRESS NOTES
Marielos Reid is a 43 year old female.  HPI:   Pt. Is here for med check.  DM -- on metformin.  Pt. Complains of neck pain with 5/10. And back is calm now for the mooment  Her joints are bothering her lately.  Has appt in 12/2024 with rheum.  She was recently in [place] and was vomtiing.  Saw Dr. Pereira on Friday and he would like her to see a vascular and GI specialist.        Current Outpatient Medications   Medication Sig Dispense Refill    traMADol 50 MG Oral Tab Take 2 tablets (100 mg total) by mouth every 8 (eight) hours as needed for Pain. 180 tablet 0    HYDROcodone-acetaminophen (NORCO)  MG Oral Tab Take 1 tablet by mouth every 8 (eight) hours as needed for Pain. 90 tablet 0    metFORMIN 500 MG Oral Tab TAKE 1 TABLET BY MOUTH TWICE DAILY 60 tablet 2    ondansetron (ZOFRAN) 4 mg tablet TAKE 1 TABLET(4 MG) BY MOUTH EVERY 8 HOURS AS NEEDED FOR NAUSEA 30 tablet 0     No current facility-administered medications for this visit.      Allergies   Allergen Reactions    Advil [Ibuprofen] SWELLING    Ketorolac SWELLING     Patient received a subacromial right shoulder corticosteroid and ketorolac injection and developed right eye a mild swelling that was self-limited over the course of 24 hours.      Past Medical History:    Abdominal pain    Arthritis    Back pain    Back problem    low back pain    Blood in urine    Chronic pelvic pain in female    Encounter for insertion of ParaGard IUD    Lot# [id number] Exp 01/2022    Endometriosis determined by laparoscopy    2/19/2020 Laparoscopic right salpingo-oophorectomy. Endometrioma of right ovary. Fallopian tube with endometriosis and acute and chronic salpingitis. Dr Jazmyne Whittaker    General counseling for prescription of oral contraceptives    Heartburn    Hemorrhoids    Irritable bowel syndrome    Nausea    Other screening mammogram    Pain in joints    Prediabetes    Routine Papanicolaou smear    Visual impairment    glasses and contacts    Vomiting    Wears glasses       Social History:  Social History     Socioeconomic History    Marital status:    Tobacco Use    Smoking status: Never    Smokeless tobacco: Never   Vaping Use    Vaping status: Never Used   Substance and Sexual Activity    Alcohol use: No    Drug use: No    Sexual activity: Yes     Partners: Male   Other Topics Concern    Caffeine Concern Yes     Comment: 2 cups tea or coffee/day    Exercise Yes     Comment: rare    Seat Belt Yes     Social Determinants of Health     Food Insecurity: No Food Insecurity (6/23/2024)    Food Insecurity     Food Insecurity: Never true   Transportation Needs: No Transportation Needs (6/23/2024)    Transportation Needs     Lack of Transportation: No   Housing Stability: Low Risk  (6/23/2024)    Housing Stability     Housing Instability: No        Results for orders placed or performed during the hospital encounter of 06/23/24   Comp Metabolic Panel (14)   Result Value Ref Range    Glucose 166 (H) 70 - 99 mg/dL    Sodium 135 (L) 136 - 145 mmol/L    Potassium 3.8 3.5 - 5.1 mmol/L    Chloride 102 98 - 112 mmol/L    CO2 23.0 21.0 - 32.0 mmol/L    Anion Gap 10 0 - 18 mmol/L    BUN 9 9 - 23 mg/dL    Creatinine 1.11 (H) 0.55 - 1.02 mg/dL    Calcium, Total 9.0 8.5 - 10.1 mg/dL    Calculated Osmolality 282 275 - 295 mOsm/kg    eGFR-Cr 62 >=60 mL/min/1.73m2    AST 30 15 - 37 U/L    ALT 16 13 - 56 U/L    Alkaline Phosphatase 71 37 - 98 U/L    Bilirubin, Total 0.9 0.1 - 2.0 mg/dL    Total Protein 8.5 (H) 6.4 - 8.2 g/dL    Albumin 3.7 3.4 - 5.0 g/dL    Globulin  4.8 (H) 2.8 - 4.4 g/dL    A/G Ratio 0.8 (L) 1.0 - 2.0   Lipase   Result Value Ref Range    Lipase 22 13 - 75 U/L   Urinalysis with Culture Reflex    Specimen: Urine, clean catch   Result Value Ref Range    Urine Color Colorless (A) Yellow    Clarity Urine Clear Clear    Spec Gravity >1.030 (H) 1.005 - 1.030    Glucose Urine 200 (A) Normal mg/dL    Bilirubin Urine Negative Negative    Ketones Urine 40 (A) Negative mg/dL    Blood  Urine 1+ (A) Negative    pH Urine 7.0 5.0 - 8.0    Protein Urine Negative Negative mg/dL    Urobilinogen Urine Normal Normal mg/dL    Nitrite Urine Negative Negative    Leukocyte Esterase Urine 25 (A) Negative    WBC Urine 1-5 0 - 5 /HPF    RBC Urine 6-10 (A) 0 - 2 /HPF    Bacteria Urine None Seen None Seen /HPF    Squamous Epi. Cells Few (A) None Seen /HPF    Renal Tubular Epithelial Cells None Seen None Seen /HPF    Transitional Cells None Seen None Seen /HPF    Yeast Urine None Seen None Seen /HPF   Scan slide   Result Value Ref Range    Slide Review       Slide reviewed, normal WBC, RBC, and platelet morphology.   HCG, Beta Subunit (Quant Pregnancy Test)   Result Value Ref Range    Hcg Quantitative <1.0 <=3.0 mIU/mL   Basic Metabolic Panel (8)   Result Value Ref Range    Glucose 128 (H) 70 - 99 mg/dL    Sodium 138 136 - 145 mmol/L    Potassium 3.4 (L) 3.5 - 5.1 mmol/L    Chloride 111 98 - 112 mmol/L    CO2 22.0 21.0 - 32.0 mmol/L    Anion Gap 5 0 - 18 mmol/L    BUN 7 (L) 9 - 23 mg/dL    Creatinine 0.78 0.55 - 1.02 mg/dL    Calcium, Total 8.0 (L) 8.5 - 10.1 mg/dL    Calculated Osmolality 286 275 - 295 mOsm/kg    eGFR-Cr 95 >=60 mL/min/1.73m2   Magnesium   Result Value Ref Range    Magnesium 2.0 1.6 - 2.6 mg/dL   Lactic Acid, Plasma   Result Value Ref Range    Lactic Acid 0.7 0.4 - 2.0 mmol/L   Potassium   Result Value Ref Range    Potassium 3.4 (L) 3.5 - 5.1 mmol/L   Comp Metabolic Panel (14)   Result Value Ref Range    Glucose 90 70 - 99 mg/dL    Sodium 140 136 - 145 mmol/L    Potassium 3.4 (L) 3.5 - 5.1 mmol/L    Chloride 113 (H) 98 - 112 mmol/L    CO2 20.0 (L) 21.0 - 32.0 mmol/L    Anion Gap 7 0 - 18 mmol/L    BUN 5 (L) 9 - 23 mg/dL    Creatinine 0.65 0.55 - 1.02 mg/dL    Calcium, Total 8.0 (L) 8.5 - 10.1 mg/dL    Calculated Osmolality 287 275 - 295 mOsm/kg    eGFR-Cr 111 >=60 mL/min/1.73m2    AST 10 (L) 15 - 37 U/L    ALT 8 (L) 13 - 56 U/L    Alkaline Phosphatase 61 37 - 98 U/L    Bilirubin, Total 0.4 0.1 -  2.0 mg/dL    Total Protein 6.3 (L) 6.4 - 8.2 g/dL    Albumin 2.4 (L) 3.4 - 5.0 g/dL    Globulin  3.9 2.8 - 4.4 g/dL    A/G Ratio 0.6 (L) 1.0 - 2.0   Lactic Acid, Plasma   Result Value Ref Range    Lactic Acid 0.8 0.4 - 2.0 mmol/L   Potassium   Result Value Ref Range    Potassium 3.4 (L) 3.5 - 5.1 mmol/L   Potassium   Result Value Ref Range    Potassium 3.4 (L) 3.5 - 5.1 mmol/L   Comp Metabolic Panel (14)   Result Value Ref Range    Glucose 88 70 - 99 mg/dL    Sodium 138 136 - 145 mmol/L    Potassium 3.4 (L) 3.5 - 5.1 mmol/L    Chloride 108 98 - 112 mmol/L    CO2 23.0 21.0 - 32.0 mmol/L    Anion Gap 7 0 - 18 mmol/L    BUN 4 (L) 9 - 23 mg/dL    Creatinine 0.64 0.55 - 1.02 mg/dL    Calcium, Total 8.2 (L) 8.5 - 10.1 mg/dL    Calculated Osmolality 282 275 - 295 mOsm/kg    eGFR-Cr 111 >=60 mL/min/1.73m2    AST 10 (L) 15 - 37 U/L    ALT 8 (L) 13 - 56 U/L    Alkaline Phosphatase 71 37 - 98 U/L    Bilirubin, Total 0.3 0.1 - 2.0 mg/dL    Total Protein 6.6 6.4 - 8.2 g/dL    Albumin 2.3 (L) 3.4 - 5.0 g/dL    Globulin  4.3 2.8 - 4.4 g/dL    A/G Ratio 0.5 (L) 1.0 - 2.0   Magnesium   Result Value Ref Range    Magnesium 1.8 1.6 - 2.6 mg/dL   Sed Rate, Westergren (Automated)   Result Value Ref Range    Sed Rate 67 (H) 0 - 20 mm/Hr   C-Reactive Protein   Result Value Ref Range    C-Reactive Protein 17.90 (H) <0.30 mg/dL   Complement C4 and C1 Esterase Serum plus C1 Esterase Inhibitor Function   Result Value Ref Range    C1 Est Inhib Func 101 %mean normal    C1 Esterase Inhib 23 21 - 39 mg/dL    C4 Complement 44 (H) 12 - 38 mg/dL   Potassium   Result Value Ref Range    Potassium 3.4 (L) 3.5 - 5.1 mmol/L   Magnesium   Result Value Ref Range    Magnesium 2.0 1.6 - 2.6 mg/dL   Comp Metabolic Panel (14)   Result Value Ref Range    Glucose 115 (H) 70 - 99 mg/dL    Sodium 136 136 - 145 mmol/L    Potassium 3.5 3.5 - 5.1 mmol/L    Chloride 103 98 - 112 mmol/L    CO2 26.0 21.0 - 32.0 mmol/L    Anion Gap 7 0 - 18 mmol/L    BUN 6 (L) 9 - 23  mg/dL    Creatinine 0.79 0.55 - 1.02 mg/dL    Calcium, Total 8.6 8.5 - 10.1 mg/dL    Calculated Osmolality 281 275 - 295 mOsm/kg    eGFR-Cr 94 >=60 mL/min/1.73m2    AST 13 (L) 15 - 37 U/L    ALT 11 (L) 13 - 56 U/L    Alkaline Phosphatase 78 37 - 98 U/L    Bilirubin, Total 0.2 0.1 - 2.0 mg/dL    Total Protein 7.7 6.4 - 8.2 g/dL    Albumin 2.6 (L) 3.4 - 5.0 g/dL    Globulin  5.1 (H) 2.8 - 4.4 g/dL    A/G Ratio 0.5 (L) 1.0 - 2.0   C-Reactive Protein   Result Value Ref Range    C-Reactive Protein 7.92 (H) <0.30 mg/dL   Potassium   Result Value Ref Range    Potassium 3.5 3.5 - 5.1 mmol/L   Scan slide   Result Value Ref Range    Slide Review       Slide reviewed, normal WBC, RBC, and platelet morphology.   EKG   Result Value Ref Range    Ventricular rate 123 BPM    Atrial rate 123 BPM    P-R Interval 120 ms    QRS Duration 66 ms    Q-T Interval 414 ms    QTC Calculation (Bezet) 592 ms    P Axis  degrees    R Axis 81 degrees    T Axis 70 degrees   Specimen to Pathology Tissue   Result Value Ref Range    Case Report       Surgical Pathology                                Case: [id number]                                   Authorizing Provider:  Alexis Thornton DO     Collected:           06/27/2024 02:32 PM          Ordering Location:     [facility]  Received:            06/27/2024 04:15 PM                                 [facility]                                                                  Pathologist:           Philip Stewart MD                                                             Specimens:   A) - Duodenum, Duodenum bx                                                                          B) - Gastric biopsy                                                                                 C) - Esophagus distal, Esophagus distal bx                                                          D) - Esophagus mid, Esophagus mid bx                                                                E) -  Ileum, terminal ileum                                                                           F) - Colon right, Colon right bx                                                                    G) - Colon left, Colon left bx                                                                      H) - Rectum                                                                                Final Diagnosis:       A. Duodenal biopsy:  -Duodenal mucosa with no significant pathologic change.  -No morphologic evidence of celiac disease.    B. Gastric biopsy:  -Gastric antral mucosa with reactive gastropathy.  -Additional fragments of gastric type mucosa with rare true goblet cells.  -No Helicobacter pylori organisms seen.   -See comment.    C. Distal esophagus biopsy  -Esophageal mucosa with no significant pathologic change.  -No significant eosinophilic infiltrate seen.    D. Mid esophagus biopsy:  -Esophageal mucosa with no significant pathologic change.  -No significant eosinophilic infiltrate seen.    E. Ileum biopsy:  -Ileal mucosa with no significant pathology change.  -No granulomatous inflammation or significant acute neutrophilic activity seen.    F. Right colon biopsy:  -Colonic mucosa with preserved glandular architecture, acute cryptitis and rare crypt associated granuloma.  -No dysplasia seen.  -See comment.    G. Left colon biopsy:  -Colonic mucosa with no significant pathologic change.  -No significant acute neutrophilic activity seen.  -No dysplasia seen.    H. Rectum biopsy:  -Colonic mucosa with no significant pathologic change.  -No significant acute neutrophilic activity seen.  -No dysplasia seen.        Embedded Images      Final Diagnosis Comment       The gastric biopsy (part B) is remarkable for gastric type mucosa with rare true goblet cells involving one of the tissue fragments.  Immunoperoxidase stain for Helicobacter pylori is negative.   There is no evidence of dysplasia.  Differential  considerations for the rare true goblet cells include intestinal metaplasia of the stomach and sampling of distal pylorus/transitional mucosa.     The right colon biopsy (part F) is remarkable for focal acute cryptitis and a rare crypt associated granuloma.  There is no significant glandular architectural distortion or lamina propria lymphoplasmacytic chronic inflammatory infiltrate.  Immunoperoxidase stain for CMV is negative.  Differential considerations for the focal acute colitis include changes related to medications, infection, bowel preparation, and inflammatory bowel disease.      There is no evidence of malignancy in the material examined.  Clinical and endoscopic correlation is suggested.        Ancillary Studies       Immunohistochemistry:    Material:  Block B and F  Population:  Tissue    Antibody  Result  CMV                               Negative  H Pylori   Negative          Medical Necessity    Immunohistochemical stains were performed:    To evaluate for the presence of microorganisms     Positive tissue controls were utilized in the staining process.  These slides were reviewed by the signout Pathologist and showed appropriate staining results.    Interpreted by:  Philip Stewart MD    Methodology: Immunohistochemical stains are performed on formalin-fixed, paraffin-embedded tissue sections.  Deparaffinization, antigen retrieval, and staining utilizes the automated Le[employer] III immunohistochemistry platform.  A proprietary, non-biotin, polymer-based detection system (Bond Polymer Refine DetectionTM ) is employed.  All antibodies are validated by [facility] Department of Pathology to document appropriate staining reactions.  Positive controls are utilized and show appropriate reactivity.      Clinical Information       Inpatient.  Abdominal pain. Abnormal CT scan with enteritis and colitis.      Gross Description       A. Received in formalin labeled with the patient´s name and `` duodenum  biopsy´´. It consists of 9 pieces of soft tan-brown tissue ranging from less than 0.1 to 0.4 cm in greatest dimension. The specimen is submitted entirely in A1.     B. Received in formalin labeled with the patient´s name and `` gastric biopsy´´. It consists of 7 pieces of soft tan-brown tissue ranging from less than 0.1 to 0.4 cm in greatest dimension. The specimen is submitted entirely in B1.     C. Received in formalin labeled with the patient´s name and `` esophagus distal biopsy´´. It consists of 3 pieces of soft tan-white tissue ranging from 0.2 to 0.3 cm in greatest dimension. The specimen is submitted entirely in C1.     D. Received in formalin labeled with the patient´s name and `` esophagus mid biopsy´´. It consists of 3 pieces of soft tan-white tissue ranging from 0.1 to 0.3 cm in greatest dimension. The specimen is submitted entirely in D1.     E. Received in formalin labeled with the patient´s name and `` terminal ileum´´. It consists of 5 pieces of soft tan-brown tissue ranging from 0.1 to 0.4 cm in greatest dimension. The specimen is submitted entirely in E1.     F. Received in formalin labeled with the patient´s name and `` colon right biopsy´´. It consists of 9 pieces of soft tan-brown tissue ranging from less than 0.1 to 0.4 cm in greatest dimension. The specimen is submitted entirely in F1.     G. Received in formalin labeled with the patient´s name and `` colon left biopsy´´. It consists of 6 pieces of soft tan-brown tissue ranging from 0.1 to 0.3 cm in greatest dimension. The specimen is submitted entirely in G1.     H. Received in formalin labeled with the patient´s name and `` rectum´´. It consists of 6 pieces of soft tan-brown tissue ranging from less than 0.1 to 0.3 cm in greatest dimension. The specimen is submitted entirely in H1.    TB)     POCT Glucose   Result Value Ref Range    POC Glucose 126 (H) 70 - 99 mg/dL   POCT Glucose   Result Value Ref Range    POC Glucose 126 (H) 70 - 99 mg/dL    POCT Glucose   Result Value Ref Range    POC Glucose 84 70 - 99 mg/dL   POCT Glucose   Result Value Ref Range    POC Glucose 93 70 - 99 mg/dL   POCT Glucose   Result Value Ref Range    POC Glucose 114 (H) 70 - 99 mg/dL   POCT Glucose   Result Value Ref Range    POC Glucose 97 70 - 99 mg/dL   POCT Glucose   Result Value Ref Range    POC Glucose 85 70 - 99 mg/dL   POCT Glucose   Result Value Ref Range    POC Glucose 76 70 - 99 mg/dL   POCT Glucose   Result Value Ref Range    POC Glucose 112 (H) 70 - 99 mg/dL   POCT Glucose   Result Value Ref Range    POC Glucose 94 70 - 99 mg/dL   POCT Glucose   Result Value Ref Range    POC Glucose 87 70 - 99 mg/dL   POCT Glucose   Result Value Ref Range    POC Glucose 58 (L) 70 - 99 mg/dL   POCT Glucose   Result Value Ref Range    POC Glucose 62 (L) 70 - 99 mg/dL   POCT Glucose   Result Value Ref Range    POC Glucose 158 (H) 70 - 99 mg/dL   POCT Glucose   Result Value Ref Range    POC Glucose 113 (H) 70 - 99 mg/dL   POCT Glucose   Result Value Ref Range    POC Glucose 156 (H) 70 - 99 mg/dL   POCT Glucose   Result Value Ref Range    POC Glucose 100 (H) 70 - 99 mg/dL   POCT Glucose   Result Value Ref Range    POC Glucose 91 70 - 99 mg/dL   POCT Glucose   Result Value Ref Range    POC Glucose 93 70 - 99 mg/dL   POCT Glucose   Result Value Ref Range    POC Glucose 99 70 - 99 mg/dL   POCT Glucose   Result Value Ref Range    POC Glucose 120 (H) 70 - 99 mg/dL   Clostridium difficile(toxigenic)PCR    Specimen: Stool   Result Value Ref Range    C. Difficile Toxin B Gene Negative Negative   GI Stool panel by PCR    Specimen: Stool   Result Value Ref Range    GI Panel Comment:       Please Note: This test no longer includes C.difficile toxin. If clinically indicated order separate test for C.difficile toxin.    Campylobacter Pcr Negative Negative    Plesiomonas Shigelloides Pcr Negative Negative    Salmonella Pcr Negative Negative    Vibrio Pcr Negative Negative    Vibrio Cholera Pcr  Negative Negative    Yersinia Entercolitica Pcr Negative Negative    Enteroaggregative E. Coli Pcr Negative Negative    Enteropathogenic E. Coli Pcr Negative Negative    Enterotoxigenic E. Coli Pcr Negative Negative    Shig Txn 1/2 Prod E. Coli Pcr Negative Negative    Shig/Enteroinvasive E. Coli Pcr Negative Negative    Cryptosporidium Pcr Negative Negative    Cyclospora Cayetanensis Pcr Negative Negative    Entamoeba Histolytica Pcr Negative Negative    Giardia Lamblia Pcr Negative Negative    Adenovirus F 40/41 Pcr Negative Negative    Astrovirus Pcr Negative Negative    Norovirus Gi/Gii Pcr Negative Negative    Rotavirus A Pcr Negative Negative    Sapovirus Pcr Negative Negative   Blood Culture    Specimen: Blood,peripheral   Result Value Ref Range    Blood Culture Result No Growth 5 Days    Blood Culture    Specimen: Blood,peripheral   Result Value Ref Range    Blood Culture Result No Growth 5 Days    Urine Culture, Routine    Specimen: Urine, clean catch   Result Value Ref Range    Urine Culture No Growth at 18-24 hrs.    CBC W/ DIFFERENTIAL   Result Value Ref Range    WBC 32.0 (H) 4.0 - 11.0 x10(3) uL    RBC 4.57 3.80 - 5.30 x10(6)uL    HGB 13.4 12.0 - 16.0 g/dL    HCT 39.2 35.0 - 48.0 %    .0 150.0 - 450.0 10(3)uL    MCV 85.8 80.0 - 100.0 fL    MCH 29.3 26.0 - 34.0 pg    MCHC 34.2 31.0 - 37.0 g/dL    RDW 12.5 %    Neutrophil Absolute Prelim 29.62 (H) 1.50 - 7.70 x10 (3) uL    Neutrophil Absolute 29.62 (H) 1.50 - 7.70 x10(3) uL    Lymphocyte Absolute 1.27 1.00 - 4.00 x10(3) uL    Monocyte Absolute 0.66 0.10 - 1.00 x10(3) uL    Eosinophil Absolute 0.04 0.00 - 0.70 x10(3) uL    Basophil Absolute 0.10 0.00 - 0.20 x10(3) uL    Immature Granulocyte Absolute 0.27 0.00 - 1.00 x10(3) uL    Neutrophil % 92.7 %    Lymphocyte % 4.0 %    Monocyte % 2.1 %    Eosinophil % 0.1 %    Basophil % 0.3 %    Immature Granulocyte % 0.8 %   CBC W/ DIFFERENTIAL   Result Value Ref Range    WBC 20.1 (H) 4.0 - 11.0 x10(3) uL     RBC 3.66 (L) 3.80 - 5.30 x10(6)uL    HGB 11.0 (L) 12.0 - 16.0 g/dL    HCT 32.2 (L) 35.0 - 48.0 %    .0 150.0 - 450.0 10(3)uL    MCV 88.0 80.0 - 100.0 fL    MCH 30.1 26.0 - 34.0 pg    MCHC 34.2 31.0 - 37.0 g/dL    RDW 12.8 %    Neutrophil Absolute Prelim 18.19 (H) 1.50 - 7.70 x10 (3) uL    Neutrophil Absolute 18.19 (H) 1.50 - 7.70 x10(3) uL    Lymphocyte Absolute 0.71 (L) 1.00 - 4.00 x10(3) uL    Monocyte Absolute 0.93 0.10 - 1.00 x10(3) uL    Eosinophil Absolute 0.04 0.00 - 0.70 x10(3) uL    Basophil Absolute 0.05 0.00 - 0.20 x10(3) uL    Immature Granulocyte Absolute 0.14 0.00 - 1.00 x10(3) uL    Neutrophil % 90.8 %    Lymphocyte % 3.5 %    Monocyte % 4.6 %    Eosinophil % 0.2 %    Basophil % 0.2 %    Immature Granulocyte % 0.7 %   CBC W/ DIFFERENTIAL   Result Value Ref Range    WBC 15.7 (H) 4.0 - 11.0 x10(3) uL    RBC 3.28 (L) 3.80 - 5.30 x10(6)uL    HGB 9.7 (L) 12.0 - 16.0 g/dL    HCT 28.4 (L) 35.0 - 48.0 %    .0 150.0 - 450.0 10(3)uL    MCV 86.6 80.0 - 100.0 fL    MCH 29.6 26.0 - 34.0 pg    MCHC 34.2 31.0 - 37.0 g/dL    RDW 12.5 %    Neutrophil Absolute Prelim 13.54 (H) 1.50 - 7.70 x10 (3) uL    Neutrophil Absolute 13.54 (H) 1.50 - 7.70 x10(3) uL    Lymphocyte Absolute 0.99 (L) 1.00 - 4.00 x10(3) uL    Monocyte Absolute 0.86 0.10 - 1.00 x10(3) uL    Eosinophil Absolute 0.16 0.00 - 0.70 x10(3) uL    Basophil Absolute 0.05 0.00 - 0.20 x10(3) uL    Immature Granulocyte Absolute 0.08 0.00 - 1.00 x10(3) uL    Neutrophil % 86.4 %    Lymphocyte % 6.3 %    Monocyte % 5.5 %    Eosinophil % 1.0 %    Basophil % 0.3 %    Immature Granulocyte % 0.5 %   CBC W/ DIFFERENTIAL   Result Value Ref Range    WBC 8.9 4.0 - 11.0 x10(3) uL    RBC 3.20 (L) 3.80 - 5.30 x10(6)uL    HGB 9.4 (L) 12.0 - 16.0 g/dL    HCT 28.4 (L) 35.0 - 48.0 %    .0 150.0 - 450.0 10(3)uL    MCV 88.8 80.0 - 100.0 fL    MCH 29.4 26.0 - 34.0 pg    MCHC 33.1 31.0 - 37.0 g/dL    RDW 12.5 %    Neutrophil Absolute Prelim 6.24 1.50 - 7.70 x10 (3)  uL    Neutrophil Absolute 6.24 1.50 - 7.70 x10(3) uL    Lymphocyte Absolute 1.43 1.00 - 4.00 x10(3) uL    Monocyte Absolute 0.81 0.10 - 1.00 x10(3) uL    Eosinophil Absolute 0.31 0.00 - 0.70 x10(3) uL    Basophil Absolute 0.04 0.00 - 0.20 x10(3) uL    Immature Granulocyte Absolute 0.03 0.00 - 1.00 x10(3) uL    Neutrophil % 70.5 %    Lymphocyte % 16.1 %    Monocyte % 9.1 %    Eosinophil % 3.5 %    Basophil % 0.5 %    Immature Granulocyte % 0.3 %   CBC W/ DIFFERENTIAL   Result Value Ref Range    WBC 10.6 4.0 - 11.0 x10(3) uL    RBC 3.81 3.80 - 5.30 x10(6)uL    HGB 11.0 (L) 12.0 - 16.0 g/dL    HCT 33.2 (L) 35.0 - 48.0 %    .0 150.0 - 450.0 10(3)uL    MCV 87.1 80.0 - 100.0 fL    MCH 28.9 26.0 - 34.0 pg    MCHC 33.1 31.0 - 37.0 g/dL    RDW 12.5 %    Neutrophil Absolute Prelim 7.10 1.50 - 7.70 x10 (3) uL    Neutrophil Absolute 7.10 1.50 - 7.70 x10(3) uL    Lymphocyte Absolute 1.96 1.00 - 4.00 x10(3) uL    Monocyte Absolute 0.85 0.10 - 1.00 x10(3) uL    Eosinophil Absolute 0.49 0.00 - 0.70 x10(3) uL    Basophil Absolute 0.08 0.00 - 0.20 x10(3) uL    Immature Granulocyte Absolute 0.12 0.00 - 1.00 x10(3) uL    Neutrophil % 67.0 %    Lymphocyte % 18.5 %    Monocyte % 8.0 %    Eosinophil % 4.6 %    Basophil % 0.8 %    Immature Granulocyte % 1.1 %     *Note: Due to a large number of results and/or encounters for the requested time period, some results have not been displayed. A complete set of results can be found in Results Review.       REVIEW OF SYSTEMS:   GENERAL: feels well otherwise  SKIN: denies any unusual skin lesions  LUNGS: denies shortness of breath with exertion  CARDIOVASCULAR: denies chest pain on exertion  GI: denies abdominal pain,denies heartburn  MUSCULOSKELETAL: denies back pain; neck pain  EXTREMITIES:  No pain or numbness    EXAM:   /74 (BP Location: Left arm, Patient Position: Sitting, Cuff Size: adult)   Pulse 72   Resp 16   Ht 5' 4\" (1.626 m)   Wt 121 lb (54.9 kg)   LMP 09/01/2024  (Exact Date)   SpO2 99%   BMI 20.77 kg/m²   GENERAL: well developed, well nourished,in no apparent distress  SKIN: no rashes,no suspicious lesions; grooved in thumb nails  HEENT: atraumatic, normocephalic  NECK: supple,no adenopathy,no bruits  LUNGS: clear to auscultation  CARDIO: RRR without murmur  GI: soft, good BS's; no HSM; tender all over -- worse in the upper quadrants  EXTREMITIES: no cyanosis, clubbing or edema; swollen PIP joints    ASSESSMENT AND PLAN:     Encounter Diagnoses   Name Primary?    Median arcuate ligament syndrome (HCC) Yes    Chronic abdominal pain     Nausea and vomiting in adult     Chronic pain syndrome     Narcotic dependence (HCC)     Type 2 diabetes mellitus without complication, without long-term current use of insulin (HCC)     Dyslipidemia     Essential hypertension     Irritable bowel syndrome, unspecified type     Gastroesophageal reflux disease, unspecified whether esophagitis present     Medication management     Vaccine counseling        No orders of the defined types were placed in this encounter.      Meds & Refills for this Visit:  Requested Prescriptions      No prescriptions requested or ordered in this encounter       Imaging & Consults:  VASCULAR SURGERY - INTERNAL  GASTRO - INTERNAL    Referrals placed.  Due for physical.  Med check in 3 months.  Advised COVID and flu.  Labs ordered in the system.  Due for eye exam.      The patient indicates understanding of these issues and agrees to the plan.  No follow-ups on file.

## 2024-09-13 ENCOUNTER — TELEPHONE (OUTPATIENT)
Dept: FAMILY MEDICINE CLINIC | Facility: CLINIC | Age: 44
End: 2024-09-13

## 2024-09-13 NOTE — TELEPHONE ENCOUNTER
Received fax from [facility], requesting clinical documentation supporting medical necessity for out of network provider (Dr Landon)  Dr. Pereira's office note from 9/6/24 faxed to department

## 2024-09-27 ENCOUNTER — PATIENT MESSAGE (OUTPATIENT)
Dept: FAMILY MEDICINE CLINIC | Facility: CLINIC | Age: 44
End: 2024-09-27

## 2024-09-27 DIAGNOSIS — R11.2 NAUSEA AND VOMITING IN ADULT: Primary | ICD-10-CM

## 2024-09-27 NOTE — TELEPHONE ENCOUNTER
From: Marielos Reid  To: Giovanna Parker  Sent: 9/27/2024 11:03 AM CDT  Subject: Refill on Zofran    Hello,  Could you please send a prescription for zofran? I am running out of it.   Thanks,   Marielos

## 2024-09-27 NOTE — TELEPHONE ENCOUNTER
Last office visit: 9/10/2024   Labs last completed: 4/20/2024  Requested medication(s) are due for refill today: yes  Requested medication(s) are on the active medication list same strength, form, dose/ sig: yes  Requested medication(s) are managed by provider: yes  Patient has already received a courtsey refill: no     NOV: 10/22/2024  Asked to Return: none on file

## 2024-09-29 RX ORDER — ONDANSETRON 4 MG/1
4 TABLET, FILM COATED ORAL EVERY 8 HOURS PRN
Qty: 30 TABLET | Refills: 2 | Status: SHIPPED | OUTPATIENT
Start: 2024-09-29

## 2024-10-03 ENCOUNTER — HOSPITAL ENCOUNTER (OUTPATIENT)
Dept: MRI IMAGING | Facility: HOSPITAL | Age: 44
Discharge: HOME | End: 2024-10-03
Payer: COMMERCIAL

## 2024-10-03 DIAGNOSIS — R93.3 ABNORMAL CT SCAN, SMALL BOWEL: ICD-10-CM

## 2024-10-03 PROCEDURE — A9575 INJ GADOTERATE MEGLUMI 0.1ML: HCPCS

## 2024-10-03 PROCEDURE — 72197 MRI PELVIS W/O & W/DYE: CPT

## 2024-10-03 PROCEDURE — 74183 MRI ABD W/O CNTR FLWD CNTR: CPT

## 2024-10-03 RX ORDER — DIPHENHYDRAMINE HYDROCHLORIDE 50 MG/ML
10 INJECTION, SOLUTION INTRAMUSCULAR; INTRAVENOUS
Status: COMPLETED | OUTPATIENT
Start: 2024-10-03 | End: 2024-10-03

## 2024-10-03 RX ADMIN — DIPHENHYDRAMINE HYDROCHLORIDE 10 ML: 50 INJECTION, SOLUTION INTRAMUSCULAR; INTRAVENOUS at 09:08:00

## 2024-10-22 ENCOUNTER — OFFICE VISIT (OUTPATIENT)
Dept: FAMILY MEDICINE CLINIC | Facility: CLINIC | Age: 44
End: 2024-10-22
Payer: COMMERCIAL

## 2024-10-22 VITALS
HEART RATE: 67 BPM | SYSTOLIC BLOOD PRESSURE: 124 MMHG | DIASTOLIC BLOOD PRESSURE: 72 MMHG | BODY MASS INDEX: 20.37 KG/M2 | HEIGHT: 63.5 IN | OXYGEN SATURATION: 100 % | RESPIRATION RATE: 16 BRPM | WEIGHT: 116.38 LBS

## 2024-10-22 DIAGNOSIS — Z23 NEED FOR VACCINATION: ICD-10-CM

## 2024-10-22 DIAGNOSIS — Z00.00 ROUTINE GENERAL MEDICAL EXAMINATION AT A HEALTH CARE FACILITY: Primary | ICD-10-CM

## 2024-10-22 DIAGNOSIS — N91.2 AMENORRHEA: ICD-10-CM

## 2024-10-22 DIAGNOSIS — R63.4 WEIGHT LOSS: ICD-10-CM

## 2024-10-22 DIAGNOSIS — I77.4 MEDIAN ARCUATE LIGAMENT SYNDROME (HCC): ICD-10-CM

## 2024-10-22 DIAGNOSIS — Z12.4 SCREENING FOR MALIGNANT NEOPLASM OF CERVIX: ICD-10-CM

## 2024-10-22 LAB
CONTROL LINE PRESENT WITH A CLEAR BACKGROUND (YES/NO): YES YES/NO
KIT LOT #: NORMAL NUMERIC
PREGNANCY TEST, URINE: NEGATIVE

## 2024-10-22 PROCEDURE — 88175 CYTOPATH C/V AUTO FLUID REDO: CPT | Performed by: FAMILY MEDICINE

## 2024-10-22 NOTE — H&P
CC: Annual Physical Exam    HPI:   Marielos Reid is a 44 year old female who presents for a complete physical exam. Symptoms: periods are regular. Patient complains of chronic pain.    Wt Readings from Last 6 Encounters:   10/22/24 116 lb 6 oz (52.8 kg)   09/10/24 121 lb (54.9 kg)   09/06/24 127 lb (57.6 kg)   07/09/24 120 lb 12.8 oz (54.8 kg)   07/03/24 118 lb 8 oz (53.8 kg)   06/23/24 123 lb (55.8 kg)     Body mass index is 20.29 kg/m².     Results for orders placed or performed in visit on 10/22/24   Urine Preg Test    Collection Time: 10/22/24  4:16 PM   Result Value Ref Range    Pregnancy Test, Urine negative     Control Line Present with a clear background (yes/no) yes Yes/No    Kit Lot # [id number] Numeric    Kit Expiration Date 11/12/2025 Date     *Note: Due to a large number of results and/or encounters for the requested time period, some results have not been displayed. A complete set of results can be found in Results Review.       Current Outpatient Medications   Medication Sig Dispense Refill    ondansetron (ZOFRAN) 4 mg tablet Take 1 tablet (4 mg total) by mouth every 8 (eight) hours as needed for Nausea. 30 tablet 2    traMADol 50 MG Oral Tab Take 2 tablets (100 mg total) by mouth every 8 (eight) hours as needed for Pain. 180 tablet 2    metFORMIN 500 MG Oral Tab Take 1 tablet (500 mg total) by mouth 2 (two) times daily. 60 tablet 2      Allergies   Allergen Reactions    Advil [Ibuprofen] SWELLING    Ketorolac SWELLING     Patient received a subacromial right shoulder corticosteroid and ketorolac injection and developed right eye a mild swelling that was self-limited over the course of 24 hours.      Past Medical History:    Abdominal pain    Arthritis    Back pain    Back problem    low back pain    Blood in urine    Chronic pelvic pain in female    Encounter for insertion of ParaGard IUD    Lot# [id number] Exp 01/2022    Endometriosis determined by laparoscopy    2/19/2020 Laparoscopic right  salpingo-oophorectomy. Endometrioma of right ovary. Fallopian tube with endometriosis and acute and chronic salpingitis. Dr Jazmyne Whittaker    General counseling for prescription of oral contraceptives    Heartburn    Hemorrhoids    Irritable bowel syndrome    Nausea    Other screening mammogram    Pain in joints    Prediabetes    Routine Papanicolaou smear    Visual impairment    glasses and contacts    Vomiting    Wears glasses      Past Surgical History:   Procedure Laterality Date    Colonoscopy N/A 03/01/2021    Procedure: COLONOSCOPY;  Surgeon: Louis Pereira MD;  Location:  ENDOSCOPY    Colonoscopy N/A 06/27/2024    with biopsy - done for Abdominal pain, abnormal CT scan with enteritis and colitis. Alexis Thornton DO;  Location:  ENDOSCOPY    Egd  06/27/2024 6/27/24 EGD with biopsy -for abdominal pain - 2 cm Hill grade 3 hiatal hernia, gastritis, normal appearing duodenum & esophagus.    Oophorectomy Right 02/19/2020 2/19/2020 Laparoscopic right salpingo-oophorectomy. Endometrioma of right ovary. Fallopian tube with endometriosis and acute and chronic salpingitis. Dr Jazmyne Whittaker    Other surgical history  04/08/2014    cystoscopy- Dr. Abbott    Salpingectomy Right 02/19/2020 2/19/2020 Laparoscopic right salpingo-oophorectomy. Endometrioma of right ovary. Fallopian tube with endometriosis and acute and chronic salpingitis. Dr Jazmyne Whittaker    Shoulder arthroscopy Left 12/05/2019    Dr Lozoya      Family History   Problem Relation Age of Onset    Diabetes Father     Hypertension Mother       Social History:   Social History     Socioeconomic History    Marital status:    Tobacco Use    Smoking status: Never    Smokeless tobacco: Never   Vaping Use    Vaping status: Never Used   Substance and Sexual Activity    Alcohol use: No    Drug use: No    Sexual activity: Yes     Partners: Male   Other Topics Concern    Caffeine Concern Yes     Comment: 2 cups tea or coffee/day    Exercise Yes      Comment: rare    Seat Belt Yes     Social Drivers of Health     Food Insecurity: No Food Insecurity (6/23/2024)    Food Insecurity     Food Insecurity: Never true   Transportation Needs: No Transportation Needs (6/23/2024)    Transportation Needs     Lack of Transportation: No   Housing Stability: Low Risk  (6/23/2024)    Housing Stability     Housing Instability: No     Occ:   : y. Children: 3.   Exercise: minimal, walking.  Diet: watches fats closely, watches sugar closely and watches calories closely     REVIEW OF SYSTEMS:   GENERAL: feels well otherwise  SKIN: denies any unusual skin lesions  EYES:denies blurred vision or double vision  HEENT: denies nasal congestion, sinus pain or ST  LUNGS: denies shortness of breath with exertion  CARDIOVASCULAR: denies chest pain on exertion  GI: denies abdominal pain,denies heartburn  : denies dysuria, vaginal discharge or itching,periods regular   MUSCULOSKELETAL:  back pain; joint pain  NEURO: denies headaches  PSYCHE: denies depression or anxiety  HEMATOLOGIC:  hx of anemia  ENDOCRINE: denies thyroid history  ALL/ASTHMA: denies hx of allergy or asthma    EXAM:   /72 (BP Location: Left arm, Patient Position: Sitting, Cuff Size: adult)   Pulse 67   Resp 16   Ht 5' 3.5\" (1.613 m)   Wt 116 lb 6 oz (52.8 kg)   LMP 09/01/2024 (Exact Date)   SpO2 100%   BMI 20.29 kg/m²   Body mass index is 20.29 kg/m².   GENERAL: well developed, well nourished,in no apparent distress  SKIN: no rashes,no suspicious lesions  HEENT: atraumatic, normocephalic,ears and throat are clear  EYES:PERRLA, EOMI, conjunctiva are clear  NECK: supple,no adenopathy,no bruits; no thyromegaly  CHEST: no chest tenderness  BREAST: no dominant or suspicious mass, no nipple discharge; tender left breast; left breast mildly larger than right breast  LUNGS: clear to auscultation  CARDIO: RRR without murmur  GI: good BS's,no masses, HSM or tenderness  :introitus is normal,scant  discharge,cervix is pink,no adnexal masses or tenderness, PAP was done   MUSCULOSKELETAL: back is not tender,FROM of the back   EXTREMITIES: no cyanosis, clubbing or edema  NEURO: Oriented times three,cranial nerves are intact,motor and sensory are grossly intact    ASSESSMENT AND PLAN:   Marielos Reid is a 44 year old female who presents for a complete physical exam.   Pap and pelvic done.    Self breast exam explained.       Encounter Diagnoses   Name Primary?    Routine general medical examination at a health care facility Yes    Median arcuate ligament syndrome (HCC)     Weight loss     Amenorrhea     Screening for malignant neoplasm of cervix     Need for vaccination        Orders Placed This Encounter   Procedures    Urine Preg Test    INFLUENZA VACCINE, TRI, PRESERV FREE, 0.5 ML    ThinPrep PAP with HPV Reflex Request       Meds & Refills for this Visit:  Requested Prescriptions      No prescriptions requested or ordered in this encounter       Imaging & Consults:  VASCULAR SURGERY - INTERNAL  INFLUENZA VACCINE, TRI, PRESERV FREE, 0.5 ML    Last eye exam -- 9/2023  Last dental exam -- 9/2024  Given flu shot.  Labs and mammo ordered.  Trying to see GI specialist but having insurance issues.      Pt' s weight is Body mass index is 20.29 kg/m²., recommended low fat diet and aerobic exercise 30 minutes three times weekly.    The patient indicates understanding of these issues and agrees to the plan.  The patient is asked to return in Return in about 6 weeks (around 12/3/2024) for med check 30.

## 2024-10-28 LAB
.: NORMAL
.: NORMAL

## 2024-12-02 DIAGNOSIS — R10.2 PELVIC PAIN: ICD-10-CM

## 2024-12-02 DIAGNOSIS — G89.4 CHRONIC PAIN SYNDROME: ICD-10-CM

## 2024-12-02 DIAGNOSIS — F11.20 NARCOTIC DEPENDENCE (HCC): ICD-10-CM

## 2024-12-04 ENCOUNTER — APPOINTMENT (OUTPATIENT)
Dept: GENERAL RADIOLOGY | Facility: HOSPITAL | Age: 44
End: 2024-12-04
Attending: EMERGENCY MEDICINE
Payer: COMMERCIAL

## 2024-12-04 ENCOUNTER — APPOINTMENT (OUTPATIENT)
Dept: CT IMAGING | Facility: HOSPITAL | Age: 44
End: 2024-12-04
Attending: EMERGENCY MEDICINE
Payer: COMMERCIAL

## 2024-12-04 ENCOUNTER — HOSPITAL ENCOUNTER (OUTPATIENT)
Facility: HOSPITAL | Age: 44
Setting detail: OBSERVATION
Discharge: HOME | End: 2024-12-08
Attending: EMERGENCY MEDICINE | Admitting: HOSPITALIST
Payer: COMMERCIAL

## 2024-12-04 ENCOUNTER — NURSE ONLY (OUTPATIENT)
Dept: ELECTROPHYSIOLOGY | Facility: HOSPITAL | Age: 44
End: 2024-12-04
Attending: HOSPITALIST
Payer: COMMERCIAL

## 2024-12-04 ENCOUNTER — NURSE ONLY (OUTPATIENT)
Dept: ELECTROPHYSIOLOGY | Facility: HOSPITAL | Age: 44
End: 2024-12-04
Attending: INTERNAL MEDICINE
Payer: COMMERCIAL

## 2024-12-04 DIAGNOSIS — M79.7 FIBROMYALGIA: ICD-10-CM

## 2024-12-04 DIAGNOSIS — F40.8 OTHER PHOBIC ANXIETY DISORDERS: ICD-10-CM

## 2024-12-04 DIAGNOSIS — R06.02 SHORTNESS OF BREATH: Primary | ICD-10-CM

## 2024-12-04 PROBLEM — E11.9 TYPE 2 DIABETES MELLITUS WITHOUT COMPLICATION, WITHOUT LONG-TERM CURRENT USE OF INSULIN (HCC): Chronic | Status: ACTIVE | Noted: 2024-12-04

## 2024-12-04 PROBLEM — G93.40 ACUTE ENCEPHALOPATHY: Status: ACTIVE | Noted: 2024-12-04

## 2024-12-04 LAB
ALBUMIN SERPL-MCNC: 4.3 G/DL (ref 3.2–4.8)
ALBUMIN/GLOB SERPL: 1.2 {RATIO} (ref 1–2)
ALP LIVER SERPL-CCNC: 71 U/L
ALT SERPL-CCNC: 15 U/L
AMPHET UR QL SCN: NEGATIVE
ANION GAP SERPL CALC-SCNC: 12 MMOL/L (ref 0–18)
AST SERPL-CCNC: 26 U/L (ref ?–34)
ATRIAL RATE: 110 BPM
B-HCG SERPL-ACNC: <2.6 MIU/ML
BASOPHILS # BLD AUTO: 0.08 X10(3) UL (ref 0–0.2)
BASOPHILS NFR BLD AUTO: 0.9 %
BENZODIAZ UR QL SCN: NEGATIVE
BILIRUB SERPL-MCNC: 0.2 MG/DL (ref 0.3–1.2)
BUN BLD-MCNC: 11 MG/DL (ref 9–23)
CALCIUM BLD-MCNC: 9.4 MG/DL (ref 8.7–10.4)
CANNABINOIDS UR QL SCN: NEGATIVE
CHLORIDE SERPL-SCNC: 102 MMOL/L (ref 98–112)
CO2 SERPL-SCNC: 23 MMOL/L (ref 21–32)
COCAINE UR QL: NEGATIVE
CREAT BLD-MCNC: 1.05 MG/DL
CREAT UR-SCNC: 22.9 MG/DL
EGFRCR SERPLBLD CKD-EPI 2021: 66 ML/MIN/1.73M2 (ref 60–?)
EOSINOPHIL # BLD AUTO: 0.36 X10(3) UL (ref 0–0.7)
EOSINOPHIL NFR BLD AUTO: 4.1 %
ERYTHROCYTE [DISTWIDTH] IN BLOOD BY AUTOMATED COUNT: 15.4 %
ETHANOL SERPL-MCNC: <3 MG/DL (ref ?–3)
FENTANYL UR QL SCN: NEGATIVE
GLOBULIN PLAS-MCNC: 3.7 G/DL (ref 2–3.5)
GLUCOSE BLD-MCNC: 112 MG/DL (ref 70–99)
GLUCOSE BLD-MCNC: 117 MG/DL (ref 70–99)
HCT VFR BLD AUTO: 37.6 %
HGB BLD-MCNC: 12.2 G/DL
IMM GRANULOCYTES # BLD AUTO: 0.01 X10(3) UL (ref 0–1)
IMM GRANULOCYTES NFR BLD: 0.1 %
LYMPHOCYTES # BLD AUTO: 3.34 X10(3) UL (ref 1–4)
LYMPHOCYTES NFR BLD AUTO: 37.7 %
MCH RBC QN AUTO: 26.8 PG (ref 26–34)
MCHC RBC AUTO-ENTMCNC: 32.4 G/DL (ref 31–37)
MCV RBC AUTO: 82.6 FL
MDMA UR QL SCN: NEGATIVE
MONOCYTES # BLD AUTO: 0.97 X10(3) UL (ref 0.1–1)
MONOCYTES NFR BLD AUTO: 10.9 %
NEUTROPHILS # BLD AUTO: 4.1 X10 (3) UL (ref 1.5–7.7)
NEUTROPHILS # BLD AUTO: 4.1 X10(3) UL (ref 1.5–7.7)
NEUTROPHILS NFR BLD AUTO: 46.3 %
OPIATES UR QL SCN: NEGATIVE
OSMOLALITY SERPL CALC.SUM OF ELEC: 284 MOSM/KG (ref 275–295)
OXYCODONE UR QL SCN: NEGATIVE
P AXIS: 62 DEGREES
P-R INTERVAL: 158 MS
PLATELET # BLD AUTO: 291 10(3)UL (ref 150–450)
POTASSIUM SERPL-SCNC: 3.5 MMOL/L (ref 3.5–5.1)
PROT SERPL-MCNC: 8 G/DL (ref 5.7–8.2)
Q-T INTERVAL: 324 MS
QRS DURATION: 80 MS
QTC CALCULATION (BEZET): 438 MS
R AXIS: 36 DEGREES
RBC # BLD AUTO: 4.55 X10(6)UL
SODIUM SERPL-SCNC: 137 MMOL/L (ref 136–145)
T AXIS: -11 DEGREES
TROPONIN I SERPL HS-MCNC: <3 NG/L
VENTRICULAR RATE: 110 BPM
WBC # BLD AUTO: 8.9 X10(3) UL (ref 4–11)

## 2024-12-04 PROCEDURE — 99223 1ST HOSP IP/OBS HIGH 75: CPT | Performed by: INTERNAL MEDICINE

## 2024-12-04 PROCEDURE — 71045 X-RAY EXAM CHEST 1 VIEW: CPT | Performed by: EMERGENCY MEDICINE

## 2024-12-04 PROCEDURE — 70450 CT HEAD/BRAIN W/O DYE: CPT | Performed by: EMERGENCY MEDICINE

## 2024-12-04 PROCEDURE — 99223 1ST HOSP IP/OBS HIGH 75: CPT | Performed by: HOSPITALIST

## 2024-12-04 RX ORDER — SODIUM CHLORIDE 9 MG/ML
INJECTION, SOLUTION INTRAVENOUS CONTINUOUS
Status: DISCONTINUED | OUTPATIENT
Start: 2024-12-04 | End: 2024-12-04

## 2024-12-04 RX ORDER — BISACODYL 10 MG
10 SUPPOSITORY, RECTAL RECTAL
Status: DISCONTINUED | OUTPATIENT
Start: 2024-12-04 | End: 2024-12-08

## 2024-12-04 RX ORDER — SENNOSIDES 8.6 MG
17.2 TABLET ORAL NIGHTLY PRN
Status: DISCONTINUED | OUTPATIENT
Start: 2024-12-04 | End: 2024-12-08

## 2024-12-04 RX ORDER — NALOXONE HYDROCHLORIDE 0.4 MG/ML
0.4 INJECTION, SOLUTION INTRAMUSCULAR; INTRAVENOUS; SUBCUTANEOUS ONCE
Status: COMPLETED | OUTPATIENT
Start: 2024-12-04 | End: 2024-12-04

## 2024-12-04 RX ORDER — TRAMADOL HYDROCHLORIDE 50 MG/1
100 TABLET ORAL EVERY 8 HOURS PRN
Qty: 180 TABLET | Refills: 2 | OUTPATIENT
Start: 2024-12-04 | End: 2025-03-04

## 2024-12-04 RX ORDER — PROCHLORPERAZINE EDISYLATE 5 MG/ML
5 INJECTION INTRAMUSCULAR; INTRAVENOUS EVERY 8 HOURS PRN
Status: DISCONTINUED | OUTPATIENT
Start: 2024-12-04 | End: 2024-12-08

## 2024-12-04 RX ORDER — HEPARIN SODIUM 5000 [USP'U]/ML
5000 INJECTION, SOLUTION INTRAVENOUS; SUBCUTANEOUS EVERY 8 HOURS SCHEDULED
Status: DISCONTINUED | OUTPATIENT
Start: 2024-12-04 | End: 2024-12-04

## 2024-12-04 RX ORDER — ONDANSETRON 2 MG/ML
4 INJECTION INTRAMUSCULAR; INTRAVENOUS EVERY 6 HOURS PRN
Status: DISCONTINUED | OUTPATIENT
Start: 2024-12-04 | End: 2024-12-08

## 2024-12-04 RX ORDER — POLYETHYLENE GLYCOL 3350 17 G/17G
17 POWDER, FOR SOLUTION ORAL DAILY PRN
Status: DISCONTINUED | OUTPATIENT
Start: 2024-12-04 | End: 2024-12-08

## 2024-12-04 RX ORDER — SODIUM PHOSPHATE, DIBASIC AND SODIUM PHOSPHATE, MONOBASIC 7; 19 G/230ML; G/230ML
1 ENEMA RECTAL ONCE AS NEEDED
Status: DISCONTINUED | OUTPATIENT
Start: 2024-12-04 | End: 2024-12-08

## 2024-12-04 RX ORDER — ACETAMINOPHEN 500 MG
500 TABLET ORAL EVERY 4 HOURS PRN
Status: DISCONTINUED | OUTPATIENT
Start: 2024-12-04 | End: 2024-12-08

## 2024-12-04 NOTE — TELEPHONE ENCOUNTER
Last office visit: 10/22/2024   Labs last completed: 4/20/2024  Requested medication(s) are due for refill today: yes  Requested medication(s) are on the active medication list same strength, form, dose/ sig: yes  Requested medication(s) are managed by provider: yes  Patient has already received a courtsey refill: no    NOV: none   Asked to Return: 12/3/2024

## 2024-12-04 NOTE — CONSULTS
[facility]  Neurocritical Care Consult Note    Marielos Reid Patient Status:  Observation    1980 MRN [id number]   Location [facility] 2NE-A Attending Mani Pereira MD   Hosp Day # 0 PCP Giovanna Parker DO       Reason for Consultation:   seizure-like activity    HPI:   Patient is a 44 year old female with a h/o dm2, endometriosis, chronic back pain p/w AMS . Pt reportedly took 3 tramadol tablets then found by family to be staring off and unresponsive then started grabbing hands of family members very tightly, and family briefly gave CPR out of c/f not breathing and brought to ED where mentation improved after narcan, ct head no acute changes and pt was admitted for further eval.   Pt now back to baseline this AM.    Past Medical History:    Abdominal pain    Arthritis    Back pain    Back problem    low back pain    Blood in urine    Chronic pelvic pain in female    Encounter for insertion of ParaGard IUD    Lot# [id number] Exp 2022    Endometriosis determined by laparoscopy    2020 Laparoscopic right salpingo-oophorectomy. Endometrioma of right ovary. Fallopian tube with endometriosis and acute and chronic salpingitis. Dr Jazmyne Whittaker    General counseling for prescription of oral contraceptives    Heartburn    Hemorrhoids    Irritable bowel syndrome    Nausea    Other screening mammogram    Pain in joints    Prediabetes    Routine Papanicolaou smear    Visual impairment    glasses and contacts    Vomiting    Wears glasses       Past Surgical History:   Procedure Laterality Date    Colonoscopy N/A 2021    Procedure: COLONOSCOPY;  Surgeon: Louis Pereira MD;  Location:  ENDOSCOPY    Colonoscopy N/A 2024    with biopsy - done for Abdominal pain, abnormal CT scan with enteritis and colitis. Alexis Thornton DO;  Location:  ENDOSCOPY    Egd  2024 EGD with biopsy -for abdominal pain - 2 cm Hill grade 3 hiatal hernia, gastritis, normal appearing  duodenum & esophagus.    Oophorectomy Right 02/19/2020 2/19/2020 Laparoscopic right salpingo-oophorectomy. Endometrioma of right ovary. Fallopian tube with endometriosis and acute and chronic salpingitis. Dr Jazmyne Whittaker    Other surgical history  04/08/2014    cystoscopy- Dr. Abbott    Salpingectomy Right 02/19/2020 2/19/2020 Laparoscopic right salpingo-oophorectomy. Endometrioma of right ovary. Fallopian tube with endometriosis and acute and chronic salpingitis. Dr Jazmyne Whittaker    Shoulder arthroscopy Left 12/05/2019    Dr Lozoya       Prescriptions Prior to Admission[1]  Allergies[2]  Social History     Socioeconomic History    Marital status:    Tobacco Use    Smoking status: Never    Smokeless tobacco: Never   Vaping Use    Vaping status: Never Used   Substance and Sexual Activity    Alcohol use: No    Drug use: No    Sexual activity: Yes     Partners: Male   Other Topics Concern    Caffeine Concern Yes     Comment: 2 cups tea or coffee/day    Exercise Yes     Comment: rare    Seat Belt Yes     Family History   Problem Relation Age of Onset    Diabetes Father     Hypertension Mother        Current Meds:  Current Facility-Administered Medications   Medication Dose Route Frequency    melatonin tab 3 mg  3 mg Oral Nightly PRN    acetaminophen (Tylenol Extra Strength) tab 500 mg  500 mg Oral Q4H PRN    ondansetron (Zofran) 4 MG/2ML injection 4 mg  4 mg Intravenous Q6H PRN    prochlorperazine (Compazine) 10 MG/2ML injection 5 mg  5 mg Intravenous Q8H PRN    polyethylene glycol (PEG 3350) (Miralax) 17 g oral packet 17 g  17 g Oral Daily PRN    sennosides (Senokot) tab 17.2 mg  17.2 mg Oral Nightly PRN    bisacodyl (Dulcolax) 10 MG rectal suppository 10 mg  10 mg Rectal Daily PRN    fleet enema (Fleet) rectal enema 133 mL  1 enema Rectal Once PRN       Review of Systems:     Constitutional:    Denies unusual weight loss or weight gain, fever/chills or night sweats.  HEENT:                Denies changes in  vision or difficulty swallowing.  Pulm:                    Denies dyspnea, cough, or sputum production  Cardiac:               Denies chest pain, palpitations or lower extremity edema.  GI:                         Denies constipation, heartburn or melena.  :                       Denies dysuria or hematuria.  Skin:                     Denies rashes or open areas.  Neuro:                  As per HPI    All other systems were reviewed and are negative.    Vitals:   Temp:  [98 °F (36.7 °C)-98.5 °F (36.9 °C)] 98.5 °F (36.9 °C)  Pulse:  [] 85  Resp:  [19-27] 20  BP: (113-158)/() 113/84  SpO2:  [97 %-100 %] 97 %  Body mass index is 19.99 kg/m².    Intake/Output:  No intake or output data in the 24 hours ending 12/04/24 1040    Physical Examination:   General- No acute distress  CV- RRR  Resp- CTA Bilat  Neuro-  Mental status- awake and alert, regards and follows commands, oriented x3, speech fluent  Cranial nerves- pupils equally round and reactive to light, extraocular muscles intact, face symmetric, visual fields full  Motor- 5/5 throughout  Sens-  Intact to light touch    Diagnostics:   XR CHEST AP PORTABLE  (CPT=71045)    Result Date: 12/4/2024  CONCLUSION:  See above.   LOCATION:  [facility]      Dictated by (CST): Sree Dallas MD on 12/04/2024 at 8:10 AM     Finalized by (CST): Sree Dallas MD on 12/04/2024 at 8:12 AM       CT BRAIN OR HEAD (CPT=70450)    Result Date: 12/4/2024  CONCLUSION:  No acute process or significant disease identified. The preliminary report was reviewed.     LOCATION:  [facility]   Dictated by (CST): Bill Love DO on 12/04/2024 at 7:02 AM     Finalized by (CST): Bill Love DO on 12/04/2024 at 7:04 AM        Lab Review     Recent Labs   Lab 12/04/24  0142      K 3.5      CO2 23.0   *   BUN 11   CREATSERUM 1.05*     Recent Labs   Lab 12/04/24 0142   WBC 8.9   HGB 12.2   .0       Assesment/Plan:     Neuro:  AMS- differential includes likely iatrogenic 2/2 pain  meds vs neurogenic (eg seizure).   Exam nonfocal and ct head no acute changes.   Will check eeg for further eval.   Cont neurochecks/pt/ot/st.    Goals of the Day: neurochecks, eeg   A total of 45 minutes of care time (exclusive of billable procedures) was administered to manage and/or prevent neurologic instability. This involved direct patient intervention, complex decision making, and/or extensive discussions with the patient, family, and clinical staff.    Thank you for allowing me to participate in the care of this patient.     Sarabjit Santos MD, [facility]  Medical Director of System Neurosciences  Chief, Section of Neurocritical Care  [facility]           [1]   Medications Prior to Admission   Medication Sig Dispense Refill Last Dose/Taking    ondansetron (ZOFRAN) 4 mg tablet Take 1 tablet (4 mg total) by mouth every 8 (eight) hours as needed for Nausea. 30 tablet 2 Past Week    traMADol 50 MG Oral Tab Take 2 tablets (100 mg total) by mouth every 8 (eight) hours as needed for Pain. 180 tablet 2 12/3/2024 Evening   [2]   Allergies  Allergen Reactions    Advil [Ibuprofen] SWELLING    Ketorolac SWELLING     Patient received a subacromial right shoulder corticosteroid and ketorolac injection and developed right eye a mild swelling that was self-limited over the course of 24 hours.

## 2024-12-04 NOTE — ED QUICK NOTES
Poison Control contacted. Spoke with Manuel. Run the usual labs, 8 hour watch and supportive care.

## 2024-12-04 NOTE — H&P
[facility]     Marielos Reid Patient Status:  Emergency    1980 MRN [id number]   Location [facility] EMERGENCY DEPARTMENT Attending Alf Guillaume MD   Hosp Day # 0 PCP Giovanna Parker DO     Chief Complaint: tramadol overdose    Subjective:    History of Present Illness:     Marielos Reid is a 44 year old female with history of type 2 diabetes, chronic back pain presents to the emergency room with altered mental status after taking 3 tramadol.  Patient was in an argument with family but she took the tramadol.  Patient denies suicidal or homicidal thoughts.  Reported that CPR was initiated with family reported that she stopped breathing but patient states she was awake the whole time.  No fevers, chills, nausea, vomiting, diarrhea or constipation.  Patient having some chest soreness after receiving the compressions.  No dizziness, lightheadedness, or syncope.    History/Other:    Past Medical History:  Past Medical History:    Abdominal pain    Arthritis    Back pain    Back problem    low back pain    Blood in urine    Chronic pelvic pain in female    Encounter for insertion of ParaGard IUD    Lot# [id number] Exp 2022    Endometriosis determined by laparoscopy    2020 Laparoscopic right salpingo-oophorectomy. Endometrioma of right ovary. Fallopian tube with endometriosis and acute and chronic salpingitis. Dr Jazmyne Whittaker    General counseling for prescription of oral contraceptives    Heartburn    Hemorrhoids    Irritable bowel syndrome    Nausea    Other screening mammogram    Pain in joints    Prediabetes    Routine Papanicolaou smear    Visual impairment    glasses and contacts    Vomiting    Wears glasses     Past Surgical History:   Past Surgical History:   Procedure Laterality Date    Colonoscopy N/A 2021    Procedure: COLONOSCOPY;  Surgeon: Louis Pereira MD;  Location:  ENDOSCOPY    Colonoscopy N/A 2024    with biopsy - done for Abdominal pain,  abnormal CT scan with enteritis and colitis. Alexis Thornton, DO;  Location:  ENDOSCOPY    Egd  06/27/2024 6/27/24 EGD with biopsy -for abdominal pain - 2 cm Hill grade 3 hiatal hernia, gastritis, normal appearing duodenum & esophagus.    Oophorectomy Right 02/19/2020 2/19/2020 Laparoscopic right salpingo-oophorectomy. Endometrioma of right ovary. Fallopian tube with endometriosis and acute and chronic salpingitis. Dr Jazmyne Whittaker    Other surgical history  04/08/2014    cystoscopy- Dr. Abbott    Salpingectomy Right 02/19/2020 2/19/2020 Laparoscopic right salpingo-oophorectomy. Endometrioma of right ovary. Fallopian tube with endometriosis and acute and chronic salpingitis. Dr Jazmyne Whittaker    Shoulder arthroscopy Left 12/05/2019    Dr Lozoya      Family History:   Family History   Problem Relation Age of Onset    Diabetes Father     Hypertension Mother      Social History:    reports that she has never smoked. She has never used smokeless tobacco. She reports that she does not drink alcohol and does not use drugs.     Allergies: Allergies[1]    Medications:  Medications Ordered Prior to Encounter[2]    Review of Systems:   A comprehensive review of systems was completed.    Pertinent positives and negatives noted in the HPI.    Objective:   Physical Exam:    BP (!) 143/95   Pulse 101   Temp 98 °F (36.7 °C) (Temporal)   Resp (!) 27   Wt 114 lb 10.2 oz (52 kg)   LMP 09/30/2024 (Exact Date)   SpO2 100%   BMI 19.99 kg/m²   General: No acute distress, Alert  Respiratory: No rhonchi, no wheezes  Cardiovascular: S1, S2. Regular rate and rhythm  Abdomen: Soft, Non-tender, non-distended, positive bowel sounds  Neuro: No new focal deficits  Extremities: No edema      Results:    Labs:      Labs Last 24 Hours:    Recent Labs   Lab 12/04/24  0142   RBC 4.55   HGB 12.2   HCT 37.6   MCV 82.6   MCH 26.8   MCHC 32.4   RDW 15.4   NEPRELIM 4.10   WBC 8.9   .0       Recent Labs   Lab 12/04/24  0142   *    BUN 11   CREATSERUM 1.05*   EGFRCR 66   CA 9.4   ALB 4.3      K 3.5      CO2 23.0   ALKPHO 71   AST 26   ALT 15   BILT 0.2*   TP 8.0       No results found for: \"PT\", \"INR\"    Recent Labs   Lab 12/04/24  0142   TROPHS <3       No results for input(s): \"TROP\", \"PBNP\" in the last 168 hours.    No results for input(s): \"PCT\" in the last 168 hours.    Imaging: Imaging data reviewed in Epic.    Assessment & Plan:      # Acute metabolic encephalopathy  - Unsure of etiology  - patient only took 3 tablets  - possible seizure  - Will monitor on telemetry overnight.  - continue IVF.  - Psychiatry consult in the am.  - Neurology consult    # Type 2 diabetes  -Placed on hypoglycemia protocol with correction factor insulin.    Plan of care discussed with patient at bedside.    Osorio Booker, DO    Supplementary Documentation:     The 21st Century Cures Act makes medical notes like these available to patients in the interest of transparency. Please be advised this is a medical document. Medical documents are intended to carry relevant information, facts as evident, and the clinical opinion of the practitioner. The medical note is intended as peer to peer communication and may appear blunt or direct. It is written in medical language and may contain abbreviations or verbiage that are unfamiliar.                                     [1]   Allergies  Allergen Reactions    Advil [Ibuprofen] SWELLING    Ketorolac SWELLING     Patient received a subacromial right shoulder corticosteroid and ketorolac injection and developed right eye a mild swelling that was self-limited over the course of 24 hours.   [2]   Current Facility-Administered Medications on File Prior to Encounter   Medication Dose Route Frequency Provider Last Rate Last Admin    [COMPLETED] glucagon (GlucaGen) injection 1 mg  1 mg Intramuscular Once Edgar Humphrey, APRN   1 mg at 10/03/24 0841    [COMPLETED] gadoterate meglumine (Dotarem) 5  MMOL/10ML injection 10 mL  10 mL Intravenous ONCE PRN Edgar Humphrey, APRN   10 mL at 10/03/24 0908     Current Outpatient Medications on File Prior to Encounter   Medication Sig Dispense Refill    ondansetron (ZOFRAN) 4 mg tablet Take 1 tablet (4 mg total) by mouth every 8 (eight) hours as needed for Nausea. 30 tablet 2    traMADol 50 MG Oral Tab Take 2 tablets (100 mg total) by mouth every 8 (eight) hours as needed for Pain. 180 tablet 2    metFORMIN 500 MG Oral Tab Take 1 tablet (500 mg total) by mouth 2 (two) times daily. 60 tablet 2

## 2024-12-04 NOTE — ED QUICK NOTES
Pt's family updated with POC. Pt gave permission to share with family. Young adult children to go home and  to stay.

## 2024-12-04 NOTE — PROGRESS NOTES
[facility]   part of [facility]     Hospitalist Progress Note     Marielos Reid Patient Status:  Observation    1980 MRN [id number]   Location [facility] 2NE-A Attending Mani Pereira MD   Hosp Day # 0 PCP Giovanna Parker DO     Chief Complaint: confusion/weakness    Subjective:     Patient feels a bit better today.     Objective:    Review of Systems:   ROS completed; pertinent positive and negatives stated in subjective.    Vital signs:  Temp:  [98 °F (36.7 °C)-98.8 °F (37.1 °C)] 98.7 °F (37.1 °C)  Pulse:  [] 84  Resp:  [18-24] 24  BP: (126-143)/(77-90) 126/81  SpO2:  [97 %-100 %] 100 %    Physical Exam:    General: No acute distress  Respiratory: Clear to auscultation bilaterally  Cardiovascular: S1, S2.  Abdomen: Soft  Neuro: No new focal deficits      Diagnostic Data:    Labs:  Recent Labs   Lab 24  0142   WBC 8.9   HGB 12.2   MCV 82.6   .0       Recent Labs   Lab 24  0142   *   BUN 11   CREATSERUM 1.05*   CA 9.4   ALB 4.3      K 3.5      CO2 23.0   ALKPHO 71   AST 26   ALT 15   BILT 0.2*   TP 8.0       Estimated Creatinine Clearance: 55 mL/min (A) (based on SCr of 1.05 mg/dL (H)).     Recent Labs   Lab 24  0142   TROPHS <3       No results for input(s): \"PTP\", \"INR\" in the last 168 hours.           Imaging: Imaging data reviewed in Epic.    Medications:    levETIRAcetam  1,000 mg Intravenous Q12H       Assessment & Plan:     #Episode of unresponsiveness.  Per  was staring,unresponsive and then had b/l hand tremors.  Confused a bit afterward.       #Chronic back pain/chronic pelvic pain     #Endometriosis     #IBS    Dispo: as above.  History concerning for SZ.  Will check EEG    Mani Pereira MD    Supplementary Documentation:     Quality:    DVT Mechanical Prophylaxis:   SCDs,    DVT Pharmacologic Prophylaxis   Medication   None                Code Status: Full Code  Meyer: No urinary catheter in place  Meyer Duration (in days):    Central line:    KEISHA: 12/6/2024      Discharge is dependent on: clinical stability  At this point Ms. Reid is expected to be discharge to: home    The 21st Century Cures Act makes medical notes like these available to patients in the interest of transparency. Please be advised this is a medical document. Medical documents are intended to carry relevant information, facts as evident, and the clinical opinion of the practitioner. The medical note is intended as peer to peer communication and may appear blunt or direct. It is written in medical language and may contain abbreviations or verbiage that are unfamiliar.

## 2024-12-04 NOTE — BH LEVEL OF CARE ASSESSMENT
Crisis Evaluation Assessment    Marielos Reid YOB: 1980   Age 44 year old MRN [id number]   [facility] EMERGENCY DEPARTMENT Attending Alf Guillaume MD      Patient's legal sex: female  Patient identifies as: female  Patient's birth sex: female  Preferred pronouns: She/Her    Date of Service: 12/4/2024    Referral Source:  Referral Source  Where was crisis eval performed?: On-site  Referral Source: Friend/Relative  Referral Source Info: Patient's  called 911 when he walked into the patient's room while she appeared to be in the middle of a medical emergency    Reason for Crisis Evaluation   \"I do not know what happened, I went to sleep and then I woke up at some point with EMS around me\"  Patient reports she has chronic back pain and stomach issues therefore she is prescribed medications which one of them is tramadol.  Patient reports she takes tramadol 3 times a day as needed.  Patient reports sometimes she does not need to take it 3 times a day depending on how good or bad her back pain is.  Patient reports yesterday her back pain was severe so she took her tramadol and then went and laid down after dinner.  Patient reports she does not know what happened after that but at some point she woke up in a daze with EMS personnel around her and thought she was in a dream.  Patient reports and then after that, she started waking up more while she was in the ER.  Patient reports she does not know what happened or how she even ended up here.  Patient reports she does not recall getting up and taking more medication so she is not even sure if she did take more medication but does not know if even the medication has anything to do with what may have happened last night.  Patient reports no history of suicidal thoughts and denies feeling suicidal in the past 30 days including last night.  Patient reports she does not even suffer with depression or anxiety and reports she has a pretty  good life with her kids and she works full-time from home.  Patient reports her only issue is her back pain.  Patient reports she is concerned with what happened according to her , as he told her he thought she was having a seizure and he perform CPR on her because she was appearing unconscious which their children witnessed which prompted 911 to be called.        Collateral  Pt's , Robert: Patient's  reports he came home from work and everyone had dinner and then he was downstairs watching TV and she and the kids went to bed.  Patient's  reports at 1 point he heard a scream or some sort of noise which he paused the TV to listen, and then made his way upstairs to check on the kids and when he was checked on his wife that is when he saw her somewhat sitting up with her eyes open but appeared to be seizing.  Patient's  reports at that point he attended to her and after the seizing ended she appeared almost lifeless so he started performing CPR which the children had at that point walked in on given the commotion of what he walked into and how he reacted.  Patient's  reports he did call 911 for obvious reasons and they transported her here.  Patient's  denies any concern for suicide with patient, denies any concern for history of suicide with patient, and reports patient has never disclose nor appear depressed to him.  Patient's  denies feeling this is any type of suicide attempt and believes this is definitely something medically going on with her but understands psychiatry being involved to clear her.        Suicide Crisis Syndrome:  Suicide Crisis Syndrome  Do you feel trapped with no good options left?: No  Are you overwhelmed, or have you lost control by negative thoughts filling your head? : No    Suicide Risk Screening:  Source of information for CSSR: Patient  In what setting is the screener performed?: in person  1. Have you wished you were dead or wished  you could go to sleep and not wake up? (past 30 days): No  2. Have you actually had any thoughts of killing yourself? (past 30 days): No              6. Have you ever done anything, started to do anything, or prepared to do anything to end your life? (lifetime): No     Score - BH OV: No Risk    Suicide Risk Assessments:  Suicidal Thoughts, Plan and Intent (this information to be used in conjunction with CSSR-S Suicide Screening)  Describe thoughts, ideation and intent:: Patient denies ever having suicidal thoughts, and denies ever suffering from depression.  Frequency: How many times have you had these thoughts?: Other (comment) (Patient denies SI.)  Duration: When you have the thoughts, how long do they last?: Other (comment) (Patient denies SI.)  Controllability: Could/can you stop thinking about killing yourself or wanting to die if you want to?: Other (comment) (Patient denies SI.)  Identify Risk Factors  Do you have access to lethal methods to attempt suicide?: No  Clinical Status:: Chronic physical pain or other acute medical problem (e.g. CNS disorders) (Patient reports chronic back pain and stomach issues)  Activating Events/Recent Stressors:: Other (comment) (Patient reports medical issues)  Identify Protective Factors  Internal: Ability to cope with stress;Frustration tolerance;Fear of death or dying due to pain and suffering;Identifies reasons for living  External: Belief that suicide is immoral, high spirituality;Supportive social network of family or friends;Engaged in work or school;Responsibility to family or others, living with family  Risk Stratification  Risk Level: Low       Active thoughts: Patient denies any thoughts of suicide.  Helpless/Hopeless/Worthless: Patient denies feeling any sort of helplessness, hopelessness, and worthlessness.  Significant losses: Patient denies any recent significant losses.  Stressors: Patient reports her only current stressor right now is figuring out what  happened last night and how she ended up in the ER requiring medical admission.  Patient reports she does not know what happened with her medically but she is hopeful for some answers.        Non-Suicidal Self-Injury:   Patient denies any history, or current, engaging in self injurious behavior.        Risk to Others  Aggression/Agitation: Patient denies.  Patient's  also denies.  Destruction of property: Patient denies.  Patient's  also denies.  HI: Patient denies.        Access to Means:  Access to Means  Has access to means to attempt suicide, self-injure, harm others, or damage property?: No  Access to Firearm/Weapon: No  Do you have a firearm owner identification (FOID) card?: No        Protective Factors:   Patient reports she has multiple protective factors, including her family, pets, friends, and employment.        Review of Psychiatric Systems:  Depression: Patient reports he has never suffered from depression.  Patient's  also reporting he has never witnessed patient experiencing any depressive episodes, seeming withdrawn, or overly sad.  Anxiety: Patient reports she has never suffered from extreme anxiety.  Patient's  also reporting the same, reporting she has never really seemed that anxious.  Patient denies any history of panic attacks.  Patient denies OCD.  Psychosis: Patient denies.  Patient's  also denies.  Sleep: Patient reports she sleeps about 6 to 8 hours a night.  Patient reports her back pain can cause some issues with this but overall she sleeps okay.  Appetite: Patient denies any issues with her appetite.        Substance Use:  Patient denies.  Patient's  also denies.   Withdrawal Symptoms  Current Withdrawal Symptoms: No        Functional Achievement:   Work: Patient reports she works full-time from home.  Patient denies any issues with attendance or performance.  School: Patient reports she completed college 2 years ago.  Home: Patient is able to  perform own ADLs.  Patient reports no issues with motivation, caring for herself for day-to-day activities, and denies any issues keeping up with daily hygiene needs.  Patient's  also reports no issues that he has noticed and she is always appeared well-kempt and keeps up with things and tasks appropriately.        Ability to Care for Self::   See above.        Current Treatment and Treatment History:  Prior diagnoses:  -Patient denies.    Inpatient hx:  -Patient denies.    Outpatient hx:  -Patient denies.    Therapist:  -Patient denies.    Psychiatrist:  -Patient denies.    Medications:  -Patient denies being prescribed any psychiatric medications.  Patient reports she is prescribed medical related medications which she is compliant with.        School/Work Performance:  See above.        Relevant Social History:  Family hx: Patient denies any family history of mental health to her knowledge.  Trauma/Abuse hx: Patient denies any history of any traumatic events or experiencing any types of abuse.  Marital status/Children: Patient reports she is  and has 3 children.  Living situation: Patient reports she resides at home with her , 3 children, and pets.  Legal hx: Patient denies.  Supports: Patient reports she has multiple protective factors, including her family, pets, friends, and employment.        Jan and Complex (as applicable):  N/A    Current Medical (as applicable):  Current Medical  Medical Problems Under Current Treatment that will need to be continued after psychiatric admission: See H&P  Do you have a Primary Care Physician?: Yes  Primary Care Physician Name: Giovanna Parker, DO  Does the Patient Have: None  Active Eating Disorder: No    EDP Assessment (as applicable):  IBW Calculations  Weight: 114 lb 10.2 oz    Abuse Assessment:  Abuse Assessment  Physical Abuse: Denies  Verbal Abuse: Denies  Sexual Abuse: Denies  Neglect: Denies  Does anyone say or do something to you that makes you  feel unsafe?: No  Have You Ever Been Harmed by a Partner/Caregiver?: No  Health Concerns r/t Abuse: No  Possible Abuse Reportable to:: Not appropriate for reporting to authorities    Mental Status Exam:   General Appearance  Characteristics: Appropriate clothing;Good hygiene  Eye Contact: Direct  Psychomotor Behavior  Gait/Movement: Other (comment) (Patient was lying on hospital cot during assessment, writer has not observed patient's gait/movement)  Abnormal movements: None  Posture: Relaxed  Rate of Movement: Normal  Mood and Affect  Mood or Feelings: Calm;Content;Confused (Patient reports she is confused by what happened earlier with her medically)  Appropriateness of Affect: Congruent to mood;Appropriate to situation  Range of Affect: Normal  Stability of Affect: Stable  Attitude toward staff: Pleasant;Friendly;Co-operative;Warm;Open  Speech  Rate of Speech: Appropriate  Flow of Speech: Appropriate  Intensity of Volume: Soft  Clarity: Clear  Cognition  Concentration: Unimpaired  Memory: Recent memory intact;Remote memory intact  Orientation Level: Oriented X4;Appropriate for developmental age  Insight: Good  Judgment: Good  Thought Patterns  Clarity/Relevance: Logical;Relevant to topic;Coherent  Flow: Organized  Content: Ordinary  Level of Consciousness: Alert  Level of Consciousness: Alert  Behavior  Exhibited behavior: Participated      Disposition:  Writer consulted with patient's attending ER physician, Dr. Guillaume, who is in agreement patient does not require any psychiatric attention and is to be treated for medical only.  Patient was assessed by writer due to hospitalist's request, however there is no psychiatric need for patient to be on a med psych unit at this time.  Writer consulted with patient's attending a.m. ER physician, Dr. Brennan, who is also in agreement.  Patient is attending RN called patient's hospitalist to inform that patient was psychiatrically cleared.      Rationale for Treatment  Recommendation:   Patient is a 44-year-old female presenting to the ER after experiencing a medical emergency while at home overnight.  Patient denies SI/HI/AVH/SIB.  Patient denies any history of suicide, denies any mental health concerns or history, and reports she has absolutely no idea what happened last night and how it got to where it was.  Patient reports she has chronic back pain and stomach issues therefore she is prescribed medications which one of them is tramadol.  Patient reports she takes tramadol 3 times a day as needed.  Patient reports sometimes she does not need to take it 3 times a day depending on how good or bad her back pain is.  Patient reports yesterday her back pain was severe so she took her tramadol and then went and laid down after dinner.  Patient reports she does not know what happened after that but at some point she woke up in a daze with EMS personnel around her and thought she was in a dream.  Patient reports and then after that, she started waking up more while she was in the ER.  Patient reports she does not know what happened or how she even ended up here.  Patient reports she does not recall getting up and taking more medication so she is not even sure if she did take more medication but does not know if even the medication has anything to do with what may have happened last night.  Patient reports no history of suicidal thoughts and denies feeling suicidal in the past 30 days including last night.  Patient reports she does not even suffer with depression or anxiety and reports she has a pretty good life with her kids and she works full-time from home.  Patient reports her only issue is her back pain.  Patient reports she is concerned with what happened according to her , as he told her he thought she was having a seizure and he perform CPR on her because she was appearing unconscious which their children witnessed which prompted 911 to be called.  Patient's   reports he came home from work and everyone had dinner and then he was downstairs watching TV and she and the kids went to bed.  Patient's  reports at 1 point he heard a scream or some sort of noise which he paused the TV to listen, and then made his way upstairs to check on the kids and when he was checked on his wife that is when he saw her somewhat sitting up with her eyes open but appeared to be seizing.  Patient's  reports at that point he attended to her and after the seizing ended she appeared almost lifeless so he started performing CPR which the children had at that point walked in on given the commotion of what he walked into and how he reacted.  Patient's  reports he did call 911 for obvious reasons and they transported her here.  Patient's  denies any concern for suicide with patient, denies any concern for history of suicide with patient, and reports patient has never disclose nor appear depressed to him.  Patient's  denies feeling this is any type of suicide attempt and believes this is definitely something medically going on with her but understands psychiatry being involved to clear her.      Level of Care Recommendations  Consulted with: Dr. Guillaume, Dr. Brennan  Level of Care Recommendation: Outpatient  Outpatient Criteria: Support needed  Outpatient Recommendations: Other (comment) (Patient declines needing any outpatient mental health resources as she reports she does not have any need for them)  Refused Treatment: No  Education Provided: Call 911 in an Emergency;Advised to call with questions;[facility] Crisis Line Number;Advised to call if condition worsens         Diagnoses with F-Codes:  Primary Psychiatric Diagnosis  F43.2 Adjustment disorder     Secondary Psychiatric Diagnoses  Deferred  Pervasive Diagnoses (as applicable)  Deferred  Pertinent Non-Psychiatric Diagnoses  Deferred        Carie MACE LPC

## 2024-12-04 NOTE — TELEPHONE ENCOUNTER
Last office visit: 10/22/24   Protocol: fail  Requested medication(s) are due for refill today: yes  Requested medication(s) are on the active medication list same strength, form, dose/ sig: yes  Requested medication(s) are managed by provider: yes  Patient has already received a courtsey refill: no    NOV: none    Asked to Return: 12/3/24

## 2024-12-04 NOTE — ED INITIAL ASSESSMENT (HPI)
Pt arrives with medics after an argument with family pt appears groggy, and not answering questions willingly. Pt was able to walk off of cart from medics.

## 2024-12-04 NOTE — PLAN OF CARE
Received patient from ED. Skin check done with Pravin PCT.  Patient sleepy awakes to questions, oriented x4, denies suicidal ideation, RA, NSR, continent.  at bedside.     POC:  Psych liaison cleared  Neurology to see

## 2024-12-04 NOTE — ED PROVIDER NOTES
Patient Seen in: [facility] Emergency Department      History     Chief Complaint   Patient presents with    Altered Mental Status    Eval-P     Stated Complaint: AMS after arguement with family    Subjective:   HPI      44-year-old female presenting to the emergency department for altered mental status.  There is apparently an argument with family patient's reportedly took 3 tablets of tramadol.  She says she is not suicidal she has not tried to commit suicide she denies homicidal ideation or any complaints.  Patient was brought in for further evaluation.  There is a report from family that CPR was initiated but the patient was awake.  She has no complaints of chest pain shortness of breath or any other complaints    Objective:     Past Medical History:    Abdominal pain    Arthritis    Back pain    Back problem    low back pain    Blood in urine    Chronic pelvic pain in female    Encounter for insertion of ParaGard IUD    Lot# [id number] Exp 01/2022    Endometriosis determined by laparoscopy    2/19/2020 Laparoscopic right salpingo-oophorectomy. Endometrioma of right ovary. Fallopian tube with endometriosis and acute and chronic salpingitis. Dr Jazmyne Whittaker    General counseling for prescription of oral contraceptives    Heartburn    Hemorrhoids    Irritable bowel syndrome    Nausea    Other screening mammogram    Pain in joints    Prediabetes    Routine Papanicolaou smear    Visual impairment    glasses and contacts    Vomiting    Wears glasses              Past Surgical History:   Procedure Laterality Date    Colonoscopy N/A 03/01/2021    Procedure: COLONOSCOPY;  Surgeon: Louis Pereira MD;  Location:  ENDOSCOPY    Colonoscopy N/A 06/27/2024    with biopsy - done for Abdominal pain, abnormal CT scan with enteritis and colitis. Alexis Thornton DO;  Location:  ENDOSCOPY    Egd  06/27/2024 6/27/24 EGD with biopsy -for abdominal pain - 2 cm Hill grade 3 hiatal hernia, gastritis, normal appearing  duodenum & esophagus.    Oophorectomy Right 02/19/2020 2/19/2020 Laparoscopic right salpingo-oophorectomy. Endometrioma of right ovary. Fallopian tube with endometriosis and acute and chronic salpingitis. Dr Jazmyne Whittaker    Other surgical history  04/08/2014    cystoscopy- Dr. Abbott    Salpingectomy Right 02/19/2020 2/19/2020 Laparoscopic right salpingo-oophorectomy. Endometrioma of right ovary. Fallopian tube with endometriosis and acute and chronic salpingitis. Dr Jazmyne Whittaker    Shoulder arthroscopy Left 12/05/2019    Dr Lozoya                Social History     Socioeconomic History    Marital status:    Tobacco Use    Smoking status: Never    Smokeless tobacco: Never   Vaping Use    Vaping status: Never Used   Substance and Sexual Activity    Alcohol use: No    Drug use: No    Sexual activity: Yes     Partners: Male   Other Topics Concern    Caffeine Concern Yes     Comment: 2 cups tea or coffee/day    Exercise Yes     Comment: rare    Seat Belt Yes     Social Drivers of Health     Food Insecurity: No Food Insecurity (6/23/2024)    Food Insecurity     Food Insecurity: Never true   Transportation Needs: No Transportation Needs (6/23/2024)    Transportation Needs     Lack of Transportation: No   Housing Stability: Low Risk  (6/23/2024)    Housing Stability     Housing Instability: No                  Physical Exam     ED Triage Vitals [12/04/24 0140]   BP (!) 158/108   Pulse 112   Resp 19   Temp 98 °F (36.7 °C)   Temp src Temporal   SpO2 100 %   O2 Device None (Room air)       Current Vitals:   Vital Signs  BP: (!) 143/95  Pulse: 101  Resp: (!) 27  Temp: 98 °F (36.7 °C)  Temp src: Temporal  MAP (mmHg): (!) 109    Oxygen Therapy  SpO2: 100 %  O2 Device: None (Room air)        Physical Exam  Nausea HEENT exam is normal lungs clear cardiovascular exam tachycardic abdomen soft nontender extremities no Cyanosis or edema no rash    ED Course     Labs Reviewed   COMP METABOLIC PANEL (14) - Abnormal; Notable  for the following components:       Result Value    Glucose 117 (*)     Creatinine 1.05 (*)     Bilirubin, Total 0.2 (*)     Globulin  3.7 (*)     All other components within normal limits   POCT GLUCOSE - Abnormal; Notable for the following components:    POC Glucose 112 (*)     All other components within normal limits   ETHYL ALCOHOL - Normal   HCG, BETA SUBUNIT (QUANT PREGNANCY TEST) - Normal   TROPONIN I HIGH SENSITIVITY - Normal   CBC WITH DIFFERENTIAL WITH PLATELET   DRUG SCREEN 8 W/OUT CONFIRMATION, URINE     EKG    Rate, intervals and axes as noted on EKG Report.  Rate: 110  Rhythm: Sinus Rhythm  Reading: No areas of acute ST segment elevation or depression                Differential diagnosis includes suicide attempt, medication overdose       MDM              Medical Decision Making  44-year-old female with part of medication overdose.  IV established cardiac monitor shows a sinus rhythm pulse ox shows no signs of hypoxia.  Narcan given in the emergency department patient becomes much more awake and alert.  CBC shows a stable hemoglobin metabolic panel stable renal function alcohol level is normal troponin shows elevation independent interpretation by ED physician chest x-ray shows no pneumothorax.  Patient was discussed with poison control who recommended an 8-hour watch here in the emergency department.  Independent interpretation by ED physician CT scan shows no acute hemorrhage.   is now at bedside and is reporting that she felt that she was not breathing started CPR until EMS arrived on the scene.  Is unclear whether or not this may have been medication related or not or cardiac etiology patient will be admitted for further evaluation    Problems Addressed:  Shortness of breath: acute illness or injury    Amount and/or Complexity of Data Reviewed  Labs: ordered. Decision-making details documented in ED Course.  Radiology: ordered and independent interpretation performed. Decision-making  details documented in ED Course.  ECG/medicine tests: ordered and independent interpretation performed. Decision-making details documented in ED Course.        Disposition and Plan     Clinical Impression:  1. Shortness of breath         Disposition:  There is no disposition on file for this visit.  There is no disposition time on file for this visit.    Follow-up:  No follow-up provider specified.        Medications Prescribed:  Current Discharge Medication List              Supplementary Documentation:

## 2024-12-04 NOTE — ED QUICK NOTES
Orders for admission, patient is aware of plan and ready to go upstairs. Any questions, please call ED RAVI Laura  at extension [phone].     Vaccinated?  Type of COVID test sent:  COVID Suspicion level: Low/High      Titratable drug(s) infusing:  Rate:  none  LOC at time of transport:A+Ox4    Other pertinent information:denies SI/HI    CIWA score=  NIH score=

## 2024-12-05 LAB — THYROPEROXIDASE AB SERPL-ACNC: 36 U/ML (ref ?–60)

## 2024-12-05 PROCEDURE — 99232 SBSQ HOSP IP/OBS MODERATE 35: CPT

## 2024-12-05 PROCEDURE — 99233 SBSQ HOSP IP/OBS HIGH 50: CPT | Performed by: HOSPITALIST

## 2024-12-05 RX ORDER — HYDROCODONE BITARTRATE AND ACETAMINOPHEN 5; 325 MG/1; MG/1
1 TABLET ORAL EVERY 4 HOURS PRN
Status: DISCONTINUED | OUTPATIENT
Start: 2024-12-05 | End: 2024-12-05

## 2024-12-05 RX ORDER — HYDROCODONE BITARTRATE AND ACETAMINOPHEN 5; 325 MG/1; MG/1
2 TABLET ORAL EVERY 4 HOURS PRN
Status: DISCONTINUED | OUTPATIENT
Start: 2024-12-05 | End: 2024-12-05

## 2024-12-05 RX ORDER — LEVETIRACETAM 500 MG/5ML
1000 INJECTION, SOLUTION, CONCENTRATE INTRAVENOUS EVERY 12 HOURS
Status: DISCONTINUED | OUTPATIENT
Start: 2024-12-05 | End: 2024-12-07

## 2024-12-05 RX ORDER — HYDROCODONE BITARTRATE AND ACETAMINOPHEN 5; 325 MG/1; MG/1
1 TABLET ORAL EVERY 4 HOURS PRN
Status: DISCONTINUED | OUTPATIENT
Start: 2024-12-05 | End: 2024-12-08

## 2024-12-05 NOTE — PLAN OF CARE
Received patient at 0730. Alert and oriented x4. Tele rhythm NSR. O2 saturation 97%. Breath sounds clear. Bed is locked and in low position. Call light and personal belongings in reach. Seizure precaution in place. No c/o chest pain or shortness of breath. Pt voiding with no issue. Pt ambulated in room with no issues. Skin dry and intact. Neuro to see, MRI brain ordered. Care plan reviewed, pt verbalizes understanding.

## 2024-12-05 NOTE — PLAN OF CARE
Assumed care of patient at 1930, family at the bedside.  Family stated concerns with increased lethargy while doing bedside report.  Assessment was done with day shift RN, day shift RN agreed pt was showing some increased lethargy.  Neurologist was paged with update on condition and ordered received for continuous EEG  monitoring. Patient and family were updated on plan of care.  Pt is Aox4, will awake to answers questions.  Neuro checks completed every 4 hours (see flowsheet). She is on room air, lung sounds are clear. Normas sinus rhythm on tele. She is continent of bowel and bladder.  She complains of no pain.  Pt is ambulating as a standby assist. Skin in unremarkable.   Bed at lowest position, room cleared of clutter, bed alarm is on, and call light is within reach.   POC discussed, fall precautions reviewed, and questions answered.

## 2024-12-05 NOTE — PROGRESS NOTES
[facility]  SKYE Neurology Progress Note    Marielos Reid Patient Status:  Observation    1980 MRN [id number]   Location [facility] 2NE-A Attending Mani Pereira MD   Hosp Day # 0 PCP Giovanna Parker DO     CC: Seizure-like activity    Subjective:  Marielos Reid is a 44 year old female with PMHx significant for DM2, endometriosis, chronic back pain who presented to the ED and was admitted with AMS . Pt reportedly took 3 tramadol tablets then was found by family to be staring off and unresponsive then started grabbing hands of family members very tightly, and family briefly gave CPR out of c/f not breathing and brought to ED where mentation improved after narcan. Work up in ED included a CT head  with no acute changes and pt was admitted for further eval. Neurology was consulted, an EEG was ordered for further evaluation. Some sharp waves seen, likely seizure tendency,  converted to long term monitoring EEG, currently ongoing. Started on Keppra 1 gram BID.  MRI brain w/wo ordered for further investigations.     Patient seen for a follow up visit today. In bed, eating. Awake, alert and oriented x 4. Son and daughter are at bedside. Patient reports feeling tired, mild headache, feels it is like from the LTM \"hat\". Denies nausea, no light sensitivity, no diplopia, blurry vision, numbness and tingling or focal weakness. Intermittent left eye twitching noted, lasting a few seconds. No other seizure like activity at this time. LTM EEG ongoing.        MEDICATIONS:  No current outpatient medications on file.     Current Facility-Administered Medications   Medication Dose Route Frequency    HYDROcodone-acetaminophen (Norco) 5-325 MG per tab 1 tablet  1 tablet Oral Q4H PRN    levETIRAcetam (Keppra) 500 mg/5mL injection 1,000 mg  1,000 mg Intravenous Q12H    melatonin tab 3 mg  3 mg Oral Nightly PRN    acetaminophen (Tylenol Extra Strength) tab 500 mg  500 mg Oral Q4H PRN    ondansetron (Zofran) 4  MG/2ML injection 4 mg  4 mg Intravenous Q6H PRN    prochlorperazine (Compazine) 10 MG/2ML injection 5 mg  5 mg Intravenous Q8H PRN    polyethylene glycol (PEG 3350) (Miralax) 17 g oral packet 17 g  17 g Oral Daily PRN    sennosides (Senokot) tab 17.2 mg  17.2 mg Oral Nightly PRN    bisacodyl (Dulcolax) 10 MG rectal suppository 10 mg  10 mg Rectal Daily PRN    fleet enema (Fleet) rectal enema 133 mL  1 enema Rectal Once PRN       REVIEW OF SYSTEMS:  A 10-point system was reviewed.  Pertinent positives and negatives are noted in HPI.      PHYSICAL EXAMINATION:  VITAL SIGNS: /80 (BP Location: Left arm)   Pulse 83   Temp 98.7 °F (37.1 °C) (Oral)   Resp 18   Wt 114 lb 10.2 oz (52 kg)   LMP 09/30/2024 (Exact Date)   SpO2 100%   BMI 19.99 kg/m²   GENERAL:  Patient is a 44 year old female in no acute distress.  HEENT:  Normocephalic, atraumatic  ABD: Soft, non tender  SKIN: Warm, dry, no rashes    NEUROLOGICAL:   Mental status: Oriented to person, place, situation and time , regards and follows all simple commands  Speech: Fluent, no obvious aphasia or dysarthria noted, slow to respond at times  Memory and comprehension: Intact   Cranial Nerves: VFF, PERRL 3mm brisk, EOMI, no nystagmus, facial sensation intact, face symmetric, tongue midline, shoulder shrug equal, remainder CN intact  Motor: Able to lift both arms in a \"pizza holding position\", not able to keep up for more than 5 seconds, reports feeling tired. Hand  is 5 out of 5 bilaterally. Motor exam of BLE 5/5.  Sensory: Intact to light touch bilaterally  Coordination: FTN intact bilaterally  Gait: Deferred      Imaging/Diagnostics:  XR CHEST AP PORTABLE  (CPT=71045)    Result Date: 12/4/2024  CONCLUSION:  See above.   LOCATION:  [place]      Dictated by (CST): Sree Dallas MD on 12/04/2024 at 8:10 AM     Finalized by (CST): Sree Dallas MD on 12/04/2024 at 8:12 AM       CT BRAIN OR HEAD (CPT=70450)    Result Date: 12/4/2024  CONCLUSION:  No acute process  or significant disease identified. The preliminary report was reviewed.     LOCATION:  [place]   Dictated by (CST): Bill Love DO on 12/04/2024 at 7:02 AM     Finalized by (CST): Bill Love DO on 12/04/2024 at 7:04 AM          Labs:  Recent Labs   Lab 12/04/24  0142   RBC 4.55   HGB 12.2   HCT 37.6   MCV 82.6   MCH 26.8   MCHC 32.4   RDW 15.4   NEPRELIM 4.10   WBC 8.9   .0         Recent Labs   Lab 12/04/24  0142   *   BUN 11   CREATSERUM 1.05*   EGFRCR 66   CA 9.4      K 3.5      CO2 23.0       Pre-morbid mRS 0      Assessment and Plan:    A 44 years old female with:    Seizure-like activity with starring and not responding, AMS - concerning for seizure vs medication caused, took tramadol 3 tabs for back pain, states she forgot and took it again. Work up:  Exam is non focal  CT head - no acute pathology  Routine EEG converted to LTM v EEG - per preliminary reading by Dr. Mendoza, \"sharp wave discharges frontally in both  wakefulness and sleep, sleep cycle is off\".   MRI brain w/wo ordered for further investigations - pending  Started on Keppra 1000 mg IV twice daily for tendency for seizures. Will continue to monitor closely, LTM EEG continues for now. Will check TPO levels - pending  Seizure precautions  Discussed with patient and family at bedside, agreeable to plan. Discussed with dr. Santos. Will continue to follow.  Is this a shared or split note between Advanced Practice Provider and Physician? No       Luanne GRANGER  [facility]  12/5/2024, 10:50 AM   [place] # [id number]

## 2024-12-06 ENCOUNTER — APPOINTMENT (OUTPATIENT)
Dept: MRI IMAGING | Facility: HOSPITAL | Age: 44
End: 2024-12-06
Payer: COMMERCIAL

## 2024-12-06 PROBLEM — G40.909 SEIZURE DISORDER (HCC): Status: ACTIVE | Noted: 2024-12-04

## 2024-12-06 LAB
EST. AVERAGE GLUCOSE BLD GHB EST-MCNC: 128 MG/DL (ref 68–126)
HBA1C MFR BLD: 6.1 % (ref ?–5.7)

## 2024-12-06 PROCEDURE — 99233 SBSQ HOSP IP/OBS HIGH 50: CPT | Performed by: OTHER

## 2024-12-06 PROCEDURE — 70553 MRI BRAIN STEM W/O & W/DYE: CPT

## 2024-12-06 PROCEDURE — 99233 SBSQ HOSP IP/OBS HIGH 50: CPT | Performed by: HOSPITALIST

## 2024-12-06 RX ORDER — DIPHENHYDRAMINE HYDROCHLORIDE 50 MG/ML
10 INJECTION, SOLUTION INTRAMUSCULAR; INTRAVENOUS
Status: COMPLETED | OUTPATIENT
Start: 2024-12-06 | End: 2024-12-06

## 2024-12-06 NOTE — PLAN OF CARE
Assumed care of patient at 1930. Aox3-4, forgetful, lethargic, restless. RA. ST/NSR. Pt reports chronic back pain. SBA. Continent of bowel and bladder. Skin CDI.  Bed locked and in lowest position. Call light and personal items within reach.        -Q4H Neuro  -Continuous EEG  -Keppra BID  -MRI 12/6?  -Seizure precautions in place      Problem: PAIN - ADULT  Goal: Verbalizes/displays adequate comfort level or patient's stated pain goal  Description: INTERVENTIONS:  - Encourage pt to monitor pain and request assistance  - Assess pain using appropriate pain scale  - Administer analgesics based on type and severity of pain and evaluate response  - Implement non-pharmacological measures as appropriate and evaluate response  - Consider cultural and social influences on pain and pain management  - Manage/alleviate anxiety  - Utilize distraction and/or relaxation techniques  - Monitor for opioid side effects  - Notify MD/LIP if interventions unsuccessful or patient reports new pain  - Anticipate increased pain with activity and pre-medicate as appropriate  Outcome: Progressing

## 2024-12-06 NOTE — PLAN OF CARE
NURSING NOTES:  Assumed care at 7am. Pt AOx4. Neuro check Q4hrs. Room air. SR. Saline lock. Chronic back pain. Pt's  at bedside.  1130: Dr. Vasquez came and discontinued continues EEG.  1230: Pt's advocate Flakito will follow up with pt in regards to pt's concern.   1700: Neuro check WNL-unchanged. Alert all day. Norco for back pain-satisfied.    Problem: NEUROLOGICAL - ADULT  Goal: Achieves stable or improved neurological status  Description: INTERVENTIONS  - Assess for and report changes in neurological status  - Initiate measures to prevent increased intracranial pressure  - Maintain blood pressure and fluid volume within ordered parameters to optimize cerebral perfusion and minimize risk of hemorrhage  - Monitor temperature, glucose, and sodium. Initiate appropriate interventions as ordered  Outcome: Progressing  Goal: Absence of seizures  Description: INTERVENTIONS  - Monitor for seizure activity  - Administer anti-seizure medications as ordered  - Monitor neurological status  Outcome: Progressing

## 2024-12-06 NOTE — PROGRESS NOTES
[facility]   part of [facility]     Hospitalist Progress Note     Marielos Reid Patient Status:  Observation    1980 MRN [id number]   Location [facility] 2NE-A Attending Mani Pereira MD   Hosp Day # 0 PCP Giovanna Parker DO     Chief Complaint: confusion/weakness    Subjective:     Patient is lethargic again today.  vEEG on going    Objective:    Review of Systems:   ROS completed; pertinent positive and negatives stated in subjective.    Vital signs:  Temp:  [98 °F (36.7 °C)-98.8 °F (37.1 °C)] 98.5 °F (36.9 °C)  Pulse:  [] 109  Resp:  [18-24] 22  BP: (113-143)/(72-90) 113/72  SpO2:  [97 %-100 %] 100 %    Physical Exam:    General: No acute distress  Respiratory: Clear to auscultation bilaterally  Cardiovascular: S1, S2.  Abdomen: Soft  Neuro: No new focal deficits      Diagnostic Data:    Labs:  Recent Labs   Lab 24  014   WBC 8.9   HGB 12.2   MCV 82.6   .0       Recent Labs   Lab 24  014   *   BUN 11   CREATSERUM 1.05*   CA 9.4   ALB 4.3      K 3.5      CO2 23.0   ALKPHO 71   AST 26   ALT 15   BILT 0.2*   TP 8.0       Estimated Creatinine Clearance: 55 mL/min (A) (based on SCr of 1.05 mg/dL (H)).     Recent Labs   Lab 24  014   TROPHS <3       No results for input(s): \"PTP\", \"INR\" in the last 168 hours.           Imaging: Imaging data reviewed in Epic.    Medications:    levETIRAcetam  1,000 mg Intravenous Q12H       Assessment & Plan:     #New onset seizure disorder  vEEG on going  Cont. Keppra  MRI brain pending    #Acute Encephalopathy  Likely due to above/post ictal state     #Chronic back pain/chronic pelvic pain  Cont. Pain meds  No suspicion of inappropriate drug use  Takes meds as needed only at home  Off tramadol for now due to above  Consider robaxin once MS better/stable     #Endometriosis    #Pre DM  Repeat A1c     #IBS    Dispo: as above.  Cont. Keppra and vEEG.  MRI brain today    Mani Pereira MD      .      Supplementary  Documentation:     Quality:    DVT Mechanical Prophylaxis:   SCDs,    DVT Pharmacologic Prophylaxis   Medication   None                Code Status: Full Code  Meyer: No urinary catheter in place  Meyer Duration (in days):   Central line:    KEISHA: 12/6/2024      Discharge is dependent on: clinical stability  At this point Ms. Reid is expected to be discharge to: home    The 21st Century Cures Act makes medical notes like these available to patients in the interest of transparency. Please be advised this is a medical document. Medical documents are intended to carry relevant information, facts as evident, and the clinical opinion of the practitioner. The medical note is intended as peer to peer communication and may appear blunt or direct. It is written in medical language and may contain abbreviations or verbiage that are unfamiliar.

## 2024-12-06 NOTE — PROGRESS NOTES
[facility]   part of [facility]     Hospitalist Progress Note     Marielos Reid Patient Status:  Observation    1980 MRN [id number]   Location [facility] 2NE-A Attending Mani Pereira MD   Hosp Day # 0 PCP Giovanna Parker DO     Chief Complaint: confusion/weakness    Subjective:     Patient continues to feel better.  Weakness improving.  Much less confused.     Objective:    Review of Systems:   ROS completed; pertinent positive and negatives stated in subjective.    Vital signs:  Temp:  [98 °F (36.7 °C)-98.8 °F (37.1 °C)] 98.7 °F (37.1 °C)  Pulse:  [] 84  Resp:  [18-24] 24  BP: (126-143)/(77-90) 126/81  SpO2:  [97 %-100 %] 100 %    Physical Exam:    General: No acute distress  Respiratory: Clear to auscultation bilaterally  Cardiovascular: S1, S2.  Abdomen: Soft  Neuro: No new focal deficits      Diagnostic Data:    Labs:  Recent Labs   Lab 24  0142   WBC 8.9   HGB 12.2   MCV 82.6   .0       Recent Labs   Lab 24  0142   *   BUN 11   CREATSERUM 1.05*   CA 9.4   ALB 4.3      K 3.5      CO2 23.0   ALKPHO 71   AST 26   ALT 15   BILT 0.2*   TP 8.0       Estimated Creatinine Clearance: 55 mL/min (A) (based on SCr of 1.05 mg/dL (H)).     Recent Labs   Lab 24  0142   TROPHS <3       No results for input(s): \"PTP\", \"INR\" in the last 168 hours.           Imaging: Imaging data reviewed in Epic.    Medications:    levETIRAcetam  1,000 mg Intravenous Q12H       Assessment & Plan:     #Episode of unresponsiveness  EEG converted to vEEG     #Chronic back pain/chronic pelvic pain     #Endometriosis     #IBS    Dispo: as above.  Await vEEG report    Mani Pereira MD    Addendum:  discussed with neuro.  vEEG with sharp spikes.  Start keppra.  Check MRI.  Re-rounded on patient with family. Many questions discussed and answered.     Mani Pereira MD  .      Supplementary Documentation:   Total time:  53 minutes  Quality:    DVT Mechanical Prophylaxis:   SCDs,     DVT Pharmacologic Prophylaxis   Medication   None                Code Status: Full Code  Meyer: No urinary catheter in place  Meyer Duration (in days):   Central line:    KEISHA: 12/6/2024      Discharge is dependent on: clinical stability  At this point Ms. Reid is expected to be discharge to: home    The 21st Century Cures Act makes medical notes like these available to patients in the interest of transparency. Please be advised this is a medical document. Medical documents are intended to carry relevant information, facts as evident, and the clinical opinion of the practitioner. The medical note is intended as peer to peer communication and may appear blunt or direct. It is written in medical language and may contain abbreviations or verbiage that are unfamiliar.

## 2024-12-06 NOTE — PROGRESS NOTES
[facility] Neurology Progress Note    Marielos Reid Patient Status:  Observation    1980 MRN [id number]   Location [facility] 2NE-A Attending Mani Pereira MD   Hosp Day # 0 PCP Giovanna Parker DO     CC: Seizure    Subjective:  Marielos Reid is a 44 year old female with PMHx significant for DM2, endometriosis, chronic back pain who presented to the ED and was admitted with AMS . Work up in ED included a CT head  with no acute changes and pt was admitted for further eval. Neurology was consulted, an EEG was ordered for further evaluation. Some sharp waves seen, likely seizure tendency,  converted to long term monitoring EEG, currently ongoing. Stable, will stop. Started on Keppra 1 gram BID . To continue same dose for now.  MRI brain w/wo ordered for further investigations, still pending.    Seen for a follow up visit today. Doing well, no new complaints, denies any headache, no blurry or double vision. No new numbness, weakness or dizziness. No muscle twitching or seizure-like activity observed/reported.         MEDICATIONS:  No current outpatient medications on file.     Current Facility-Administered Medications   Medication Dose Route Frequency    HYDROcodone-acetaminophen (Norco) 5-325 MG per tab 1 tablet  1 tablet Oral Q4H PRN    levETIRAcetam (Keppra) 500 mg/5mL injection 1,000 mg  1,000 mg Intravenous Q12H    melatonin tab 3 mg  3 mg Oral Nightly PRN    acetaminophen (Tylenol Extra Strength) tab 500 mg  500 mg Oral Q4H PRN    ondansetron (Zofran) 4 MG/2ML injection 4 mg  4 mg Intravenous Q6H PRN    prochlorperazine (Compazine) 10 MG/2ML injection 5 mg  5 mg Intravenous Q8H PRN    polyethylene glycol (PEG 3350) (Miralax) 17 g oral packet 17 g  17 g Oral Daily PRN    sennosides (Senokot) tab 17.2 mg  17.2 mg Oral Nightly PRN    bisacodyl (Dulcolax) 10 MG rectal suppository 10 mg  10 mg Rectal Daily PRN    fleet enema (Fleet) rectal enema 133 mL  1 enema Rectal Once PRN        REVIEW OF SYSTEMS:  A 10-point system was reviewed.  Pertinent positives and negatives are noted in HPI.      PHYSICAL EXAMINATION:  VITAL SIGNS: /72 (BP Location: Left arm)   Pulse 109   Temp 98.5 °F (36.9 °C) (Oral)   Resp 22   Wt 114 lb 10.2 oz (52 kg)   LMP 09/30/2024 (Exact Date)   SpO2 100%   BMI 19.99 kg/m²   GENERAL:  Patient is a 44 year old female in no acute distress.  HEENT:  Normocephalic, atraumatic  ABD: Soft, non tender  SKIN: Warm, dry, no rashes    NEUROLOGICAL:   Mental status: Oriented to person, place, situation and time , regards and follows all simple commands  Speech: Fluent, no obvious aphasia or dysarthria noted, slow to respond at times  Memory and comprehension: Intact   Cranial Nerves: VFF, PERRL 3mm brisk, EOMI, no nystagmus, facial sensation intact, face symmetric, tongue midline, shoulder shrug equal, remainder CN intact  Motor: Able to lift both arms in a \"pizza holding position\", not able to keep up for more than 5 seconds, reports feeling tired. Hand  is 5 out of 5 bilaterally. Motor exam of BLE 5/5.  Sensory: Intact to light touch bilaterally  Coordination: FTN intact bilaterally  Gait: Deferred      Imaging/Diagnostics:  XR CHEST AP PORTABLE  (CPT=71045)    Result Date: 12/4/2024  CONCLUSION:  See above.   LOCATION:  [facility]      Dictated by (CST): Sree Dallas MD on 12/04/2024 at 8:10 AM     Finalized by (CST): Sree Dallas MD on 12/04/2024 at 8:12 AM       CT BRAIN OR HEAD (CPT=70450)    Result Date: 12/4/2024  CONCLUSION:  No acute process or significant disease identified. The preliminary report was reviewed.     LOCATION:  [facility]   Dictated by (CST): Bill Love DO on 12/04/2024 at 7:02 AM     Finalized by (CST): Bill Love DO on 12/04/2024 at 7:04 AM          Labs:  Recent Labs   Lab 12/04/24  0142   RBC 4.55   HGB 12.2   HCT 37.6   MCV 82.6   MCH 26.8   MCHC 32.4   RDW 15.4   NEPRELIM 4.10   WBC 8.9   .0         Recent Labs   Lab  12/04/24  0142   *   BUN 11   CREATSERUM 1.05*   EGFRCR 66   CA 9.4      K 3.5      CO2 23.0       Pre-morbid mRS 0        Assessment and Plan:     A 44 years old female with:     Seizure-like activity with starring and not responding, AMS - concerning for seizure vs medication caused, took tramadol 3 tabs for back pain, states she forgot and took it again. Work up:  Exam is non focal  CT head - no acute pathology  Routine EEG converted to LTM v EEG - per preliminary reading by Dr. Mendoza, \"tendency for seizures, sharp wave discharges frontally in both  wakefulness and sleep, sleep cycle is off\".   MRI brain w/wo ordered for further investigations - pending  Started on Keppra 1000 mg IV twice daily for tendency for seizures. Will continue to monitor closely.  LTM EEG 12/5-12/6 - preliminary reading by dr. Mendoza - \" intermittent bursts of frontally predominant rhythmic theta/delta. No clear seizures but possible seizure tendency with evidence of slowing/encephalopathy\". TPO level is normal. Discussed with dr. Vasquez - vEEG discontinued.   Continue seizure precautions  Discussed with patient and family at bedside, agreeable to plan. Discussed with dr. Vasquez. To follow with further recommendations if indicated.       Is this a shared or split note between Advanced Practice Provider and Physician? Yes       Luanne Trimble APRCHRISSIE  [facility]  12/6/2024, 9:20 AM   [facility] # [id number]  Neurology Attending Addendum:  I have seen patient independently, reviewed history, labs and imaging, and agree with above note with following additions:  Assessment:  Overall condition:  stable   Principal Problem:    Shortness of breath  Active Problems:    Seizure disorder (HCC)    Type 2 diabetes mellitus without complication, without long-term current use of insulin (HCC)    Status epilepticus    Recommendations:  Dc LTM  Cont current ASM  MRI pending  PT/OT  I discussed with patient/family regarding all  studies' results, assessment, treatment options and care plan.    A total of 35 minutes of critical care time (exclusive of billable procedures) was administered to manage and/or prevent neurologic instability. This involved direct patient intervention, complex decision making, and/or discussion with clinical staff.  Subjective:   No new complaints  Objective:   O: /67 (BP Location: Right arm)   Pulse 91   Temp 98.6 °F (37 °C) (Oral)   Resp 18   Wt 114 lb 10.2 oz (52 kg)   LMP 09/30/2024 (Exact Date)   SpO2 99%   BMI 19.99 kg/m²   Neuro exam unchanged, see above for detail    Ginette Vasquez MD (Michael)   Neurology  [facility]  12/6/2024

## 2024-12-07 LAB
ALBUMIN SERPL-MCNC: 3.7 G/DL (ref 3.2–4.8)
ALP LIVER SERPL-CCNC: 70 U/L
ALT SERPL-CCNC: 11 U/L
AST SERPL-CCNC: 19 U/L (ref ?–34)
BILIRUB DIRECT SERPL-MCNC: <0.1 MG/DL (ref ?–0.3)
BILIRUB SERPL-MCNC: 0.2 MG/DL (ref 0.3–1.2)
PROT SERPL-MCNC: 7.4 G/DL (ref 5.7–8.2)

## 2024-12-07 PROCEDURE — 99233 SBSQ HOSP IP/OBS HIGH 50: CPT | Performed by: OTHER

## 2024-12-07 RX ORDER — HYDROCODONE BITARTRATE AND ACETAMINOPHEN 5; 325 MG/1; MG/1
1 TABLET ORAL EVERY 8 HOURS PRN
Qty: 12 TABLET | Refills: 0 | Status: SHIPPED | OUTPATIENT
Start: 2024-12-07

## 2024-12-07 NOTE — PROGRESS NOTES
[facility]   part of [facility]     Hospitalist Progress Note     Marielos Reid Patient Status:  Observation    1980 MRN [id number]   Location [facility] 2NE-A Attending Yesenia Anne MD   Hosp Day # 0 PCP Giovanna Parker DO     Chief Complaint: seizures     Subjective:     Patient  having nausea.     Objective:    Review of Systems:   A comprehensive review of systems was completed; pertinent positive and negatives stated in subjective.    Vital signs:  Temp:  [97.5 °F (36.4 °C)-98.5 °F (36.9 °C)] 98.4 °F (36.9 °C)  Pulse:  [] 73  Resp:  [14-16] 16  BP: (100-119)/(65-79) 117/78  SpO2:  [96 %-100 %] 97 %    Physical Exam:    General: No acute distress  Respiratory: No wheezes, no rhonchi  Cardiovascular: S1, S2, regular rate and rhythm  Abdomen: Soft, Non-tender, non-distended, positive bowel sounds  Neuro: No new focal deficits.   Extremities: No edema      Diagnostic Data:    Labs:  Recent Labs   Lab 24  0142   WBC 8.9   HGB 12.2   MCV 82.6   .0       Recent Labs   Lab 24  0142   *   BUN 11   CREATSERUM 1.05*   CA 9.4   ALB 4.3      K 3.5      CO2 23.0   ALKPHO 71   AST 26   ALT 15   BILT 0.2*   TP 8.0       Estimated Creatinine Clearance: 55 mL/min (A) (based on SCr of 1.05 mg/dL (H)).    Recent Labs   Lab 24  0142   TROPHS <3       No results for input(s): \"PTP\", \"INR\" in the last 168 hours.               Microbiology    No results found for this visit on 24.      Imaging: Reviewed in Epic.    Medications:    levETIRAcetam  750 mg Oral BID       Assessment & Plan:      #New onset seizure disorder  vEEG on going  Cont. Keppra- decrease dose to 750 BID to see if Nausea improves   MRI brain no acute findings      #Acute Encephalopathy  Likely due to above/post ictal state     #Chronic back pain/chronic pelvic pain  Cont. Pain meds  No suspicion of inappropriate drug use     #Endometriosis     #Pre DM  Repeat A1c     #IBS    Possible DC  today     Yesenia Anne MD    Supplementary Documentation:     Quality:  DVT Mechanical Prophylaxis:   SCDs,    DVT Pharmacologic Prophylaxis   Medication   None                Code Status: Full Code  Meyer: No urinary catheter in place  Meyer Duration (in days):   Central line:    KEISHA: 12/7/2024    Discharge is dependent on: Neuro  At this point Ms. Reid is expected to be discharge to: home    The 21st Century Cures Act makes medical notes like these available to patients in the interest of transparency. Please be advised this is a medical document. Medical documents are intended to carry relevant information, facts as evident, and the clinical opinion of the practitioner. The medical note is intended as peer to peer communication and may appear blunt or direct. It is written in medical language and may contain abbreviations or verbiage that are unfamiliar.

## 2024-12-07 NOTE — PROGRESS NOTES
INPATIENT NEUROLOGY PROGRESS NOTE    Date: 12/7/2024    Marielos Reid is a 44 year old female at Hospital Day ( LOS: 0 days )    Assessment:   Overall condition:  stable   Principal Problem:    Shortness of breath  Active Problems:    Seizure disorder (HCC)    Type 2 diabetes mellitus without complication, without long-term current use of insulin (HCC)      Plan:   Decrease keppra to 750  mg bid (? Nausea on higher dose)  Seizure precaution reviewed  MRI brain reviewed, normal  I discussed with patient/family regarding all studies' results, assessment, treatment options and care plan.  Ok to dc once pt 's nausea improvied, follow up with Dr Daniel as outpt  I spent a total time of 35 minutes, with >50 % of the time was spent counseling patient/family and/or coordination of care regarding all studies' results, assessment, treatment options and care plan.    Subjective:   Subjective:   Ms. Reid c/o nausea, no seizure reported    Meds & History   MEDICATIONS:  Current Facility-Administered Medications   Medication Dose Route Frequency    levETIRAcetam (Keppra) tab 750 mg  750 mg Oral BID    HYDROcodone-acetaminophen (Norco) 5-325 MG per tab 1 tablet  1 tablet Oral Q4H PRN    melatonin tab 3 mg  3 mg Oral Nightly PRN    acetaminophen (Tylenol Extra Strength) tab 500 mg  500 mg Oral Q4H PRN    ondansetron (Zofran) 4 MG/2ML injection 4 mg  4 mg Intravenous Q6H PRN    prochlorperazine (Compazine) 10 MG/2ML injection 5 mg  5 mg Intravenous Q8H PRN    polyethylene glycol (PEG 3350) (Miralax) 17 g oral packet 17 g  17 g Oral Daily PRN    sennosides (Senokot) tab 17.2 mg  17.2 mg Oral Nightly PRN    bisacodyl (Dulcolax) 10 MG rectal suppository 10 mg  10 mg Rectal Daily PRN    fleet enema (Fleet) rectal enema 133 mL  1 enema Rectal Once PRN        Objective:    Objective:   Physical Exam:  /78 (BP Location: Left arm)   Pulse 73   Temp 98.4 °F (36.9 °C) (Oral)   Resp 16   Wt 114 lb 10.2 oz (52 kg)   LMP 09/30/2024  (Exact Date)   SpO2 97%   BMI 19.99 kg/m²   Neurological Examination:  Mental status: A & O X 3  Language: no aphasia  Speech: no dysarthria  CN II-XII: intact   Motor strength: 5/5 all extremities  Tone: normal  Plantar response: bilateral flexor  Coordination: normal  Sensory: intact  Gait: deferred    Labs and imaging/Diagnostic studies on 12/7/2024      Ginette \"Carlos\" MD Pedro   Neurology  [facility]  12/7/2024, 1:20 PM  Giovanna Parker DO

## 2024-12-07 NOTE — PLAN OF CARE
A&Ox4. Pt c/o of back pain, requested PRN [place]. Feeling nauseas. Requested Zofran, this did not work. Compazine was given and this worked better for patient. RA, lung sounds are clear B/L. NSR on tele, denies any chest pain or shortness of breath. Continent of bowel and bladder. Last BM 12/06/24. SBA.     Bed is locked and in low position. Call light and personal items within reach.  Pt and family updated with plan of care    Problem: PAIN - ADULT  Goal: Verbalizes/displays adequate comfort level or patient's stated pain goal  Description: INTERVENTIONS:  - Encourage pt to monitor pain and request assistance  - Assess pain using appropriate pain scale  - Administer analgesics based on type and severity of pain and evaluate response  - Implement non-pharmacological measures as appropriate and evaluate response  - Consider cultural and social influences on pain and pain management  - Manage/alleviate anxiety  - Utilize distraction and/or relaxation techniques  - Monitor for opioid side effects  - Notify MD/LIP if interventions unsuccessful or patient reports new pain  - Anticipate increased pain with activity and pre-medicate as appropriate  Outcome: Progressing     Problem: NEUROLOGICAL - ADULT  Goal: Achieves stable or improved neurological status  Description: INTERVENTIONS  - Assess for and report changes in neurological status  - Initiate measures to prevent increased intracranial pressure  - Maintain blood pressure and fluid volume within ordered parameters to optimize cerebral perfusion and minimize risk of hemorrhage  - Monitor temperature, glucose, and sodium. Initiate appropriate interventions as ordered  Outcome: Progressing  Goal: Absence of seizures  Description: INTERVENTIONS  - Monitor for seizure activity  - Administer anti-seizure medications as ordered  - Monitor neurological status  Outcome: Progressing  Goal: Remains free of injury related to seizure activity  Description: INTERVENTIONS:  -  Maintain airway, patient safety  and administer oxygen as ordered  - Monitor patient for seizure activity, document and report duration and description of seizure to MD/LIP  - If seizure occurs, turn patient to side and suction secretions as needed  - Reorient patient post seizure  - Seizure pads on all 4 side rails  - Instruct patient/family to notify RN of any seizure activity  - Instruct patient/family to call for assistance with activity based on assessment  Outcome: Progressing  Goal: Achieves maximal functionality and self care  Description: INTERVENTIONS  - Monitor swallowing and airway patency with patient fatigue and changes in neurological status  - Encourage and assist patient to increase activity and self care with guidance from PT/OT  - Encourage visually impaired, hearing impaired and aphasic patients to use assistive/communication devices  Outcome: Progressing     Problem: Patient/Family Goals  Goal: Patient/Family Long Term Goal  Description: Patient's Long Term Goal: Stay out of hospital    Interventions:  - medication compliance  -attend follow up apts   - See additional Care Plan goals for specific interventions  Outcome: Progressing  Goal: Patient/Family Short Term Goal  Description: Patient's Short Term Goal: discharge home     Interventions:   - MD clearance   - See additional Care Plan goals for specific interventions  Outcome: Progressing

## 2024-12-07 NOTE — PLAN OF CARE
Received pt at 0730.   Pt is A&Ox4, complaints of back pain after waking up- denies pain medication. On room air, lungs are clear/no coughing. NSR, no chest pain. Neuro checks Q4, round with neuro to review MRI. Continent of B&B, active bowel sounds, last BM 12-6. Pt updated with plan of care.       Problem: PAIN - ADULT  Goal: Verbalizes/displays adequate comfort level or patient's stated pain goal  Description: INTERVENTIONS:  - Encourage pt to monitor pain and request assistance  - Assess pain using appropriate pain scale  - Administer analgesics based on type and severity of pain and evaluate response  - Implement non-pharmacological measures as appropriate and evaluate response  - Consider cultural and social influences on pain and pain management  - Manage/alleviate anxiety  - Utilize distraction and/or relaxation techniques  - Monitor for opioid side effects  - Notify MD/LIP if interventions unsuccessful or patient reports new pain  - Anticipate increased pain with activity and pre-medicate as appropriate  Outcome: Progressing     Problem: NEUROLOGICAL - ADULT  Goal: Achieves stable or improved neurological status  Description: INTERVENTIONS  - Assess for and report changes in neurological status  - Initiate measures to prevent increased intracranial pressure  - Maintain blood pressure and fluid volume within ordered parameters to optimize cerebral perfusion and minimize risk of hemorrhage  - Monitor temperature, glucose, and sodium. Initiate appropriate interventions as ordered  Outcome: Progressing  Goal: Absence of seizures  Description: INTERVENTIONS  - Monitor for seizure activity  - Administer anti-seizure medications as ordered  - Monitor neurological status  Outcome: Progressing  Goal: Remains free of injury related to seizure activity  Description: INTERVENTIONS:  - Maintain airway, patient safety  and administer oxygen as ordered  - Monitor patient for seizure activity, document and report duration  and description of seizure to MD/LIP  - If seizure occurs, turn patient to side and suction secretions as needed  - Reorient patient post seizure  - Seizure pads on all 4 side rails  - Instruct patient/family to notify RN of any seizure activity  - Instruct patient/family to call for assistance with activity based on assessment  Outcome: Progressing  Goal: Achieves maximal functionality and self care  Description: INTERVENTIONS  - Monitor swallowing and airway patency with patient fatigue and changes in neurological status  - Encourage and assist patient to increase activity and self care with guidance from PT/OT  - Encourage visually impaired, hearing impaired and aphasic patients to use assistive/communication devices  Outcome: Progressing     Problem: Patient/Family Goals  Goal: Patient/Family Long Term Goal  Description: Patient's Long Term Goal: Stay out of hospital    Interventions:  - medication compliance  -attend follow up apts   - See additional Care Plan goals for specific interventions  Outcome: Progressing  Goal: Patient/Family Short Term Goal  Description: Patient's Short Term Goal: discharge home   No pain 12-7    Interventions:   - MD clearance   - Prn meds, hot/cold packs, tell nurse if in pain     - See additional Care Plan goals for specific interventions  Outcome: Progressing

## 2024-12-08 ENCOUNTER — PATIENT MESSAGE (OUTPATIENT)
Dept: FAMILY MEDICINE CLINIC | Facility: CLINIC | Age: 44
End: 2024-12-08

## 2024-12-08 VITALS
RESPIRATION RATE: 18 BRPM | DIASTOLIC BLOOD PRESSURE: 67 MMHG | BODY MASS INDEX: 20 KG/M2 | HEART RATE: 103 BPM | WEIGHT: 114.63 LBS | TEMPERATURE: 99 F | OXYGEN SATURATION: 99 % | SYSTOLIC BLOOD PRESSURE: 104 MMHG

## 2024-12-08 DIAGNOSIS — G40.909 SEIZURE DISORDER (HCC): Primary | ICD-10-CM

## 2024-12-08 DIAGNOSIS — G93.41 METABOLIC ENCEPHALOPATHY: ICD-10-CM

## 2024-12-08 PROCEDURE — 99239 HOSP IP/OBS DSCHRG MGMT >30: CPT | Performed by: STUDENT IN AN ORGANIZED HEALTH CARE EDUCATION/TRAINING PROGRAM

## 2024-12-08 RX ORDER — LEVETIRACETAM 750 MG/1
750 TABLET ORAL 2 TIMES DAILY
Qty: 180 TABLET | Refills: 0 | Status: SHIPPED | OUTPATIENT
Start: 2024-12-08 | End: 2025-03-08

## 2024-12-08 RX ORDER — LEVETIRACETAM 750 MG/1
750 TABLET ORAL 2 TIMES DAILY
Qty: 120 TABLET | Refills: 0 | Status: SHIPPED | OUTPATIENT
Start: 2024-12-08 | End: 2024-12-08

## 2024-12-08 NOTE — PLAN OF CARE
NURSING DISCHARGE NOTE    Discharged Home via Ambulatory.  Accompanied by Family member and Friend  Belongings Taken by patient/family.      IV removed. Discharge instructions discussed with patient and spouse, all questions answered. Left unit in stable condition.

## 2024-12-08 NOTE — DISCHARGE SUMMARY
[facility]     Marielos Reid Patient Status:  Observation    1980 MRN [id number]   Location [facility] 2NE-A Attending Yesenia Anne MD   Hosp Day # 0 PCP Giovanna Parker DO     Date of Admission: 2024  Date of Discharge:   24    Discharge Disposition: Home or Self Care    Discharge Diagnosis:  # Acute metabolic encephalopathy  - Unsure of etiology  - patient only took 3 tablets  - possible seizure  - Will monitor on telemetry overnight.  - continue IVF.  - Psychiatry consult in the am.  - Neurology consult     # Type 2 diabetes    History of Present Illness:   Marielos Reid is a 44 year old female with history of type 2 diabetes, chronic back pain presents to the emergency room with altered mental status after taking 3 tramadol.  Patient was in an argument with family but she took the tramadol.  Patient denies suicidal or homicidal thoughts.  Reported that CPR was initiated with family reported that she stopped breathing but patient states she was awake the whole time.  No fevers, chills, nausea, vomiting, diarrhea or constipation.  Patient having some chest soreness after receiving the compressions.  No dizziness, lightheadedness, or syncope.     Brief Synopsis:   PT admitted , EEG low threshold for seizure. Started on keppra. DC home with OP neurology follow up.     Lace+ Score: 76  59-90 High Risk  29-58 Medium Risk  0-28   Low Risk       TCM Follow-Up Recommendation:  LACE > 58: High Risk of readmission after discharge from the hospital.      Procedures during hospitalization:   EEG    Incidental or significant findings and recommendations (brief descriptions):  no    Lab/Test results pending at Discharge:   no    Consultants:  Neuro     Discharge Medication List:     Discharge Medications        START taking these medications        Instructions Prescription details   HYDROcodone-acetaminophen 5-325 MG Tabs  Commonly known as: Norco      Take 1 tablet by mouth  every 8 (eight) hours as needed for Pain.   Quantity: 12 tablet  Refills: 0     levetiracetam 750 MG Tabs  Commonly known as: KEPPRA      Take 1 tablet (750 mg total) by mouth 2 (two) times daily.   Stop taking on: March 8, 2025  Quantity: 180 tablet  Refills: 0            CONTINUE taking these medications        Instructions Prescription details   ondansetron 4 mg tablet  Commonly known as: Zofran      Take 1 tablet (4 mg total) by mouth every 8 (eight) hours as needed for Nausea.   Quantity: 30 tablet  Refills: 2            STOP taking these medications      traMADol 50 MG Tabs  Commonly known as: Ultram                  Where to Get Your Medications        These medications were sent to [id number] - [place], [place] - [address] AT [facility] [address], [phone], [phone]  [address], [facility] [address]      Phone: [phone]   HYDROcodone-acetaminophen 5-325 MG Tabs  levetiracetam 750 MG Tabs         [place] reviewed[place] n[place]a    Follow-up appointment:   Giovanna Parker DO  [address]  [facility] [id number] [phone]    Go on 12/11/2024  WED 12/11 @1PM    Angel Daniel MD  [address]  [facility] [id number] [phone]    Schedule an appointment as soon as possible for a visit today      Appointments for Next 30 Days 12/8/2024 - 1/7/2025        Date Arrival Time Visit Type Length Department Provider     12/11/2024  9:40 AM  FOLLOW UP VISIT [2828] 20 min [facility], [address] [place] [address] An Gallego MD    Patient Instructions:         Location Instructions:     Your appointment is located at [address] [place] [address] [address] [place] [id number]. The facility is approximately 1/2 mile west of [address] on [address] [place] [address] at the corner of [address] [place] [address] and [address]. Please park in the Black Lot, enter through Building [address] and follow the posted signs for guidance.  Masks are optional for all patients and visitors, unless otherwise indicated.                2024  1:00 PM  [facility] HOSPITAL FOLLOW UP [5060] 30 min [facility], [facility], GiovannaDO    Patient Instructions:         Location Instructions:     Masks are optional for all patients and visitors, unless otherwise indicated.                      Vital signs:  Temp:  [97.7 °F (36.5 °C)-98.7 °F (37.1 °C)] 98.7 °F (37.1 °C)  Pulse:  [] 103  Resp:  [14-18] 18  BP: ()/(62-78) 104/67  SpO2:  [96 %-99 %] 99 %    Physical Exam:    General: No acute distress   Lungs: clear to auscultation  Cardiovascular: S1, S2  Abdomen: Soft      -----------------------------------------------------------------------------------------------  PATIENT DISCHARGE INSTRUCTIONS: See electronic chart    Yesenia Anne MD    Total time spent on discharge plannin minutes     The  [employer] Cures Act makes medical notes like these available to patients in the interest of transparency. Please be advised this is a medical document. Medical documents are intended to carry relevant information, facts as evident, and the clinical opinion of the practitioner. The medical note is intended as peer to peer communication and may appear blunt or direct. It is written in medical language and may contain abbreviations or verbiage that are unfamiliar.

## 2024-12-08 NOTE — PLAN OF CARE
Problem: PAIN - ADULT  Goal: Verbalizes/displays adequate comfort level or patient's stated pain goal  Description: INTERVENTIONS:  - Encourage pt to monitor pain and request assistance  - Assess pain using appropriate pain scale  - Administer analgesics based on type and severity of pain and evaluate response  - Implement non-pharmacological measures as appropriate and evaluate response  - Consider cultural and social influences on pain and pain management  - Manage/alleviate anxiety  - Utilize distraction and/or relaxation techniques  - Monitor for opioid side effects  - Notify MD/LIP if interventions unsuccessful or patient reports new pain  - Anticipate increased pain with activity and pre-medicate as appropriate  Outcome: Adequate for Discharge     Problem: NEUROLOGICAL - ADULT  Goal: Achieves stable or improved neurological status  Description: INTERVENTIONS  - Assess for and report changes in neurological status  - Initiate measures to prevent increased intracranial pressure  - Maintain blood pressure and fluid volume within ordered parameters to optimize cerebral perfusion and minimize risk of hemorrhage  - Monitor temperature, glucose, and sodium. Initiate appropriate interventions as ordered  Outcome: Adequate for Discharge  Goal: Absence of seizures  Description: INTERVENTIONS  - Monitor for seizure activity  - Administer anti-seizure medications as ordered  - Monitor neurological status  Outcome: Adequate for Discharge  Goal: Remains free of injury related to seizure activity  Description: INTERVENTIONS:  - Maintain airway, patient safety  and administer oxygen as ordered  - Monitor patient for seizure activity, document and report duration and description of seizure to MD/LIP  - If seizure occurs, turn patient to side and suction secretions as needed  - Reorient patient post seizure  - Seizure pads on all 4 side rails  - Instruct patient/family to notify RN of any seizure activity  - Instruct  patient/family to call for assistance with activity based on assessment  Outcome: Adequate for Discharge  Goal: Achieves maximal functionality and self care  Description: INTERVENTIONS  - Monitor swallowing and airway patency with patient fatigue and changes in neurological status  - Encourage and assist patient to increase activity and self care with guidance from PT/OT  - Encourage visually impaired, hearing impaired and aphasic patients to use assistive/communication devices  Outcome: Adequate for Discharge     Problem: Patient/Family Goals  Goal: Patient/Family Long Term Goal  Description: Patient's Long Term Goal: Stay out of hospital    Interventions:  - medication compliance  -attend follow up apts   - See additional Care Plan goals for specific interventions  Outcome: Adequate for Discharge  Goal: Patient/Family Short Term Goal  Description: Patient's Short Term Goal: discharge home   No pain 12-7    Interventions:   - MD clearance   - Prn meds, hot/cold packs, tell nurse if in pain     - See additional Care Plan goals for specific interventions  Outcome: Adequate for Discharge

## 2024-12-08 NOTE — PLAN OF CARE
A&Ox4. Pt denies any pain. RA, lung sounds are clear B/L. NSR on tele, denies any chest pain or shortness of breath. Continent of bowel and bladder. Last BM 12/06/24. Neuro checks Q4hrs. SBA.     Bed is locked and in low position. Call light and personal items within reach.  Pt updated with plan of care    Problem: PAIN - ADULT  Goal: Verbalizes/displays adequate comfort level or patient's stated pain goal  Description: INTERVENTIONS:  - Encourage pt to monitor pain and request assistance  - Assess pain using appropriate pain scale  - Administer analgesics based on type and severity of pain and evaluate response  - Implement non-pharmacological measures as appropriate and evaluate response  - Consider cultural and social influences on pain and pain management  - Manage/alleviate anxiety  - Utilize distraction and/or relaxation techniques  - Monitor for opioid side effects  - Notify MD/LIP if interventions unsuccessful or patient reports new pain  - Anticipate increased pain with activity and pre-medicate as appropriate  Outcome: Progressing     Problem: NEUROLOGICAL - ADULT  Goal: Achieves stable or improved neurological status  Description: INTERVENTIONS  - Assess for and report changes in neurological status  - Initiate measures to prevent increased intracranial pressure  - Maintain blood pressure and fluid volume within ordered parameters to optimize cerebral perfusion and minimize risk of hemorrhage  - Monitor temperature, glucose, and sodium. Initiate appropriate interventions as ordered  Outcome: Progressing  Goal: Absence of seizures  Description: INTERVENTIONS  - Monitor for seizure activity  - Administer anti-seizure medications as ordered  - Monitor neurological status  Outcome: Progressing  Goal: Remains free of injury related to seizure activity  Description: INTERVENTIONS:  - Maintain airway, patient safety  and administer oxygen as ordered  - Monitor patient for seizure activity, document and report  duration and description of seizure to MD/LIP  - If seizure occurs, turn patient to side and suction secretions as needed  - Reorient patient post seizure  - Seizure pads on all 4 side rails  - Instruct patient/family to notify RN of any seizure activity  - Instruct patient/family to call for assistance with activity based on assessment  Outcome: Progressing  Goal: Achieves maximal functionality and self care  Description: INTERVENTIONS  - Monitor swallowing and airway patency with patient fatigue and changes in neurological status  - Encourage and assist patient to increase activity and self care with guidance from PT/OT  - Encourage visually impaired, hearing impaired and aphasic patients to use assistive/communication devices  Outcome: Progressing     Problem: Patient/Family Goals  Goal: Patient/Family Long Term Goal  Description: Patient's Long Term Goal: Stay out of hospital    Interventions:  - medication compliance  -attend follow up apts   - See additional Care Plan goals for specific interventions  Outcome: Progressing  Goal: Patient/Family Short Term Goal  Description: Patient's Short Term Goal: discharge home   No pain 12-7    Interventions:   - MD clearance   - Prn meds, hot/cold packs, tell nurse if in pain     - See additional Care Plan goals for specific interventions  Outcome: Progressing

## 2024-12-09 NOTE — TELEPHONE ENCOUNTER
12/8 --Discharge from [facility] for possible seizure, metabolic encephalopathy    LOV 12/4/24   NOV 12/11/24

## 2024-12-10 ENCOUNTER — PATIENT OUTREACH (OUTPATIENT)
Dept: CASE MANAGEMENT | Age: 44
End: 2024-12-10

## 2024-12-10 DIAGNOSIS — R06.02 SHORTNESS OF BREATH: Primary | ICD-10-CM

## 2024-12-10 DIAGNOSIS — Z02.9 ENCOUNTERS FOR ADMINISTRATIVE PURPOSES: ICD-10-CM

## 2024-12-10 NOTE — PROGRESS NOTES
Transitional Care Management   Discharge Date: 24  Contact Date: 12/10/2024    Assessment:  TCM Initial Assessment    General:  Assessment completed with: Patient  Patient Subjective: I did get discharged on . Theres still some little issues going on that I want to discuss with Dr. Parker. In general, I'm okay just tired, just some numbness still in the back of my head, I felt that before I had this issue and didn't take it seriously and am thinking it may be related now.  Chief Complaint: Shortness of breath  Verify patient name and  with patient/ caregiver: Yes    Hospital Stay/Discharge:  Tell me what you understand of why you were in the hospital or emergency department: Pt went unconscious and had CPR started at home.  Prior to leaving the hospital were your Discharge Instructions reviewed with you?: Yes  Did you receive a copy of your written Discharge Instructions?: Yes  What questions do you have about your Discharge Instructions?: none  Do you feel better or worse since you left the hospital or emergency department?: Better    Follow - Up Appointment:  Do you have a follow-up appointment?: Yes  Date: 24  Physician: PCP  Are there any barriers to getting to your follow-up appointment?: No    Home Health/DME:  Prior to leaving the hospital was Home Health (HH) arranged for you?: No     Prior to leaving the hospital or emergency department was Durable Medical Equipment (DME), medical supplies, or infusions arranged for you?: No  Are DME/medical supply/infusions needs identified by staff during this assessment?: No     Medications/Diet:  Did any of your medications change, during or after your hospital stay or ED visit?: Yes  Do you have your new or updated medications?: No (Pt picking up today)  Do you understand what your medications are for and possible side effects?: Yes  Are there any reasons that keep you from taking your medication as prescribed?: No  Any concerns about medication  refills?: No    Were you given a different diet per your Discharge Instructions?: No     Questions/Concerns:  Do you have any questions or concerns that have not been discussed?: No       Nursing Interventions: [facility] s/w patient for HFU. She states that she still has some issues going on that she would like to discuss with PCP. She states that she still has numbness in the back of her head and neck pain as well as her usual back pain. She states that the numbness also happened 2 days prior to her episode and she thinks it may be related. She states that she does get lightheaded at times. She has nausea at times but states that she has stomach issues and does have zofran if needed. She denied having any vomiting. She denied having any fever, c/d, sob or any other new or worsening symptoms. Med review completed. She states that the Keppra is ready today and she will start it today. She denied having any other questions or concerns and will discuss more with PCP at tomorrow's appt.     Medication Review:   Current Outpatient Medications   Medication Sig Dispense Refill    levetiracetam 750 MG Oral Tab Take 1 tablet (750 mg total) by mouth 2 (two) times daily. 180 tablet 0    HYDROcodone-acetaminophen 5-325 MG Oral Tab Take 1 tablet by mouth every 8 (eight) hours as needed for Pain. 12 tablet 0    ondansetron (ZOFRAN) 4 mg tablet Take 1 tablet (4 mg total) by mouth every 8 (eight) hours as needed for Nausea. 30 tablet 2     Did patient review medications using current pill bottles and not just a medication list?  No  Discharge medications reviewed/discussed/and reconciled against outpatient medications with patient.  Any changes or updates to medications sent to primary care provider.  Patient Acknowledged    SDOH:   Transportation Needs: No Transportation Needs (12/4/2024)    Transportation Needs     Lack of Transportation: No     Car Seat: Not on file       Follow-up Appointments:  Your appointments       Date & Time  Appointment Department ([place])    Dec 11, 2024 9:40 AM CST Follow Up Visit with An Gallego MD [address] ([address])        Dec 11, 2024 1:00 PM CST Hospital Follow Up with Giovanna Parker DO [facility] ([facility])        Jan 20, 2025 9:00 AM CST Exam - New Patient with Angel Daniel MD [facility] ([facility] )              [address]  [address] [address] [place] [address] [address]  [facility] [address]  [phone] [facility]  [facility]   [address] [facility] [address]  [phone] [facility]  [address]  [facility] [address]  [phone]            Transitional Care Clinic  Was TCC Ordered: No      Primary Care Provider (If no TCC appointment)  Does patient already have a PCP appointment scheduled? Yes  Nurse Care Manager Confirmed PCP office TCM appointment with patient       Specialist  Does the patient have any other follow-up appointment(s) that need to be scheduled? Yes   -If yes: Nurse Care Manager reviewed upcoming specialist appointments with patient: Yes   -Does the patient need assistance scheduling appointment(s): No, pt has neuro appt on 1/20/24    CCM referral placed:  No    Book By Date: 12/22/24

## 2024-12-11 ENCOUNTER — OFFICE VISIT (OUTPATIENT)
Dept: FAMILY MEDICINE CLINIC | Facility: CLINIC | Age: 44
End: 2024-12-11
Payer: COMMERCIAL

## 2024-12-11 ENCOUNTER — OFFICE VISIT (OUTPATIENT)
Dept: RHEUMATOLOGY | Facility: CLINIC | Age: 44
End: 2024-12-11
Payer: COMMERCIAL

## 2024-12-11 VITALS
BODY MASS INDEX: 21.17 KG/M2 | DIASTOLIC BLOOD PRESSURE: 80 MMHG | TEMPERATURE: 98 F | RESPIRATION RATE: 16 BRPM | OXYGEN SATURATION: 99 % | HEIGHT: 63.5 IN | WEIGHT: 121 LBS | HEART RATE: 75 BPM | SYSTOLIC BLOOD PRESSURE: 120 MMHG

## 2024-12-11 VITALS
HEART RATE: 81 BPM | HEIGHT: 63.5 IN | BODY MASS INDEX: 21.06 KG/M2 | RESPIRATION RATE: 16 BRPM | WEIGHT: 120.38 LBS | DIASTOLIC BLOOD PRESSURE: 72 MMHG | SYSTOLIC BLOOD PRESSURE: 120 MMHG | OXYGEN SATURATION: 98 %

## 2024-12-11 DIAGNOSIS — G40.909 SEIZURE DISORDER (HCC): ICD-10-CM

## 2024-12-11 DIAGNOSIS — Z79.899 MEDICATION MANAGEMENT: ICD-10-CM

## 2024-12-11 DIAGNOSIS — F11.20 NARCOTIC DEPENDENCE (HCC): ICD-10-CM

## 2024-12-11 DIAGNOSIS — M15.0 PRIMARY OSTEOARTHRITIS INVOLVING MULTIPLE JOINTS: ICD-10-CM

## 2024-12-11 DIAGNOSIS — M35.00 SJOGREN'S SYNDROME, WITH UNSPECIFIED ORGAN INVOLVEMENT (HCC): ICD-10-CM

## 2024-12-11 DIAGNOSIS — R23.2 HOT FLASHES: ICD-10-CM

## 2024-12-11 DIAGNOSIS — Z09 HOSPITAL DISCHARGE FOLLOW-UP: Primary | ICD-10-CM

## 2024-12-11 DIAGNOSIS — Z00.00 LABORATORY EXAMINATION ORDERED AS PART OF A ROUTINE GENERAL MEDICAL EXAMINATION: ICD-10-CM

## 2024-12-11 DIAGNOSIS — F40.8 OTHER PHOBIC ANXIETY DISORDERS: ICD-10-CM

## 2024-12-11 DIAGNOSIS — N91.2 AMENORRHEA: ICD-10-CM

## 2024-12-11 DIAGNOSIS — I73.00 RAYNAUD'S DISEASE WITHOUT GANGRENE: ICD-10-CM

## 2024-12-11 DIAGNOSIS — G89.4 CHRONIC PAIN SYNDROME: ICD-10-CM

## 2024-12-11 DIAGNOSIS — R20.2 PARESTHESIAS: ICD-10-CM

## 2024-12-11 DIAGNOSIS — M79.7 FIBROMYALGIA: ICD-10-CM

## 2024-12-11 DIAGNOSIS — M79.7 FIBROMYALGIA: Primary | ICD-10-CM

## 2024-12-11 DIAGNOSIS — H53.9 VISION CHANGES: ICD-10-CM

## 2024-12-11 DIAGNOSIS — R11.0 NAUSEA: ICD-10-CM

## 2024-12-11 LAB
ALBUMIN SERPL-MCNC: 4.2 G/DL (ref 3.2–4.8)
ALBUMIN/GLOB SERPL: 1 {RATIO} (ref 1–2)
ALP LIVER SERPL-CCNC: 74 U/L
ALT SERPL-CCNC: 14 U/L
ANION GAP SERPL CALC-SCNC: 7 MMOL/L (ref 0–18)
AST SERPL-CCNC: 23 U/L (ref ?–34)
BASOPHILS # BLD AUTO: 0.05 X10(3) UL (ref 0–0.2)
BASOPHILS NFR BLD AUTO: 0.9 %
BILIRUB SERPL-MCNC: <0.2 MG/DL (ref 0.3–1.2)
BILIRUB UR QL STRIP.AUTO: NEGATIVE
BUN BLD-MCNC: 8 MG/DL (ref 9–23)
C3 SERPL-MCNC: 135.4 MG/DL (ref 90–170)
C4 SERPL-MCNC: 50.2 MG/DL (ref 12–36)
CALCIUM BLD-MCNC: 9.3 MG/DL (ref 8.7–10.4)
CHLORIDE SERPL-SCNC: 103 MMOL/L (ref 98–112)
CHOLEST SERPL-MCNC: 151 MG/DL (ref ?–200)
CLARITY UR REFRACT.AUTO: CLEAR
CO2 SERPL-SCNC: 29 MMOL/L (ref 21–32)
COLOR UR AUTO: COLORLESS
CREAT BLD-MCNC: 0.86 MG/DL
CREAT UR-SCNC: 19.4 MG/DL
CRP SERPL-MCNC: <0.4 MG/DL (ref ?–0.5)
EGFRCR SERPLBLD CKD-EPI 2021: 84 ML/MIN/1.73M2 (ref 60–?)
EOSINOPHIL # BLD AUTO: 0.26 X10(3) UL (ref 0–0.7)
EOSINOPHIL NFR BLD AUTO: 4.7 %
ERYTHROCYTE [DISTWIDTH] IN BLOOD BY AUTOMATED COUNT: 15.4 %
ERYTHROCYTE [SEDIMENTATION RATE] IN BLOOD: 52 MM/HR
EST. AVERAGE GLUCOSE BLD GHB EST-MCNC: 128 MG/DL (ref 68–126)
ESTRADIOL SERPL-MCNC: 301.5 PG/ML
FASTING PATIENT LIPID ANSWER: NO
FASTING STATUS PATIENT QL REPORTED: NO
FSH SERPL-ACNC: 8.7 MIU/ML
GLOBULIN PLAS-MCNC: 4.4 G/DL (ref 2–3.5)
GLUCOSE BLD-MCNC: 87 MG/DL (ref 70–99)
GLUCOSE UR STRIP.AUTO-MCNC: NORMAL MG/DL
HBA1C MFR BLD: 6.1 % (ref ?–5.7)
HCT VFR BLD AUTO: 37.4 %
HDLC SERPL-MCNC: 53 MG/DL (ref 40–59)
HGB BLD-MCNC: 12 G/DL
IGA SERPL-MCNC: 287.4 MG/DL (ref 40–350)
IGM SERPL-MCNC: 160.5 MG/DL (ref 50–300)
IMM GRANULOCYTES # BLD AUTO: 0.01 X10(3) UL (ref 0–1)
IMM GRANULOCYTES NFR BLD: 0.2 %
IMMUNOGLOBULIN PNL SER-MCNC: 1944 MG/DL (ref 650–1600)
KETONES UR STRIP.AUTO-MCNC: NEGATIVE MG/DL
LDLC SERPL CALC-MCNC: 73 MG/DL (ref ?–100)
LEUKOCYTE ESTERASE UR QL STRIP.AUTO: NEGATIVE
LEVETIRACETAM LVL: 33.3 UG/ML
LH SERPL-ACNC: 24.2 MIU/ML
LYMPHOCYTES # BLD AUTO: 1.95 X10(3) UL (ref 1–4)
LYMPHOCYTES NFR BLD AUTO: 35.1 %
MCH RBC QN AUTO: 27.3 PG (ref 26–34)
MCHC RBC AUTO-ENTMCNC: 32.1 G/DL (ref 31–37)
MCV RBC AUTO: 85.2 FL
MONOCYTES # BLD AUTO: 0.43 X10(3) UL (ref 0.1–1)
MONOCYTES NFR BLD AUTO: 7.7 %
NEUTROPHILS # BLD AUTO: 2.85 X10 (3) UL (ref 1.5–7.7)
NEUTROPHILS # BLD AUTO: 2.85 X10(3) UL (ref 1.5–7.7)
NEUTROPHILS NFR BLD AUTO: 51.4 %
NITRITE UR QL STRIP.AUTO: NEGATIVE
NONHDLC SERPL-MCNC: 98 MG/DL (ref ?–130)
OSMOLALITY SERPL CALC.SUM OF ELEC: 286 MOSM/KG (ref 275–295)
PH UR STRIP.AUTO: 7 [PH] (ref 5–8)
PLATELET # BLD AUTO: 329 10(3)UL (ref 150–450)
POTASSIUM SERPL-SCNC: 4.2 MMOL/L (ref 3.5–5.1)
PROT SERPL-MCNC: 8.6 G/DL (ref 5.7–8.2)
PROT UR STRIP.AUTO-MCNC: NEGATIVE MG/DL
PROT UR-MCNC: <6 MG/DL (ref ?–14)
RBC # BLD AUTO: 4.39 X10(6)UL
SODIUM SERPL-SCNC: 139 MMOL/L (ref 136–145)
SP GR UR STRIP.AUTO: <1.005 (ref 1–1.03)
TRIGL SERPL-MCNC: 147 MG/DL (ref 30–149)
TSI SER-ACNC: 1.48 UIU/ML (ref 0.55–4.78)
UROBILINOGEN UR STRIP.AUTO-MCNC: NORMAL MG/DL
VIT B12 SERPL-MCNC: 338 PG/ML (ref 211–911)
VIT D+METAB SERPL-MCNC: 33.8 NG/ML (ref 30–100)
VIT D+METAB SERPL-MCNC: 35.8 NG/ML (ref 30–100)
VLDLC SERPL CALC-MCNC: 23 MG/DL (ref 0–30)
WBC # BLD AUTO: 5.6 X10(3) UL (ref 4–11)

## 2024-12-11 PROCEDURE — 83001 ASSAY OF GONADOTROPIN (FSH): CPT | Performed by: INTERNAL MEDICINE

## 2024-12-11 PROCEDURE — 80061 LIPID PANEL: CPT | Performed by: FAMILY MEDICINE

## 2024-12-11 PROCEDURE — 83036 HEMOGLOBIN GLYCOSYLATED A1C: CPT | Performed by: FAMILY MEDICINE

## 2024-12-11 PROCEDURE — 86334 IMMUNOFIX E-PHORESIS SERUM: CPT | Performed by: INTERNAL MEDICINE

## 2024-12-11 PROCEDURE — 82670 ASSAY OF TOTAL ESTRADIOL: CPT | Performed by: INTERNAL MEDICINE

## 2024-12-11 PROCEDURE — 86160 COMPLEMENT ANTIGEN: CPT | Performed by: INTERNAL MEDICINE

## 2024-12-11 PROCEDURE — 99214 OFFICE O/P EST MOD 30 MIN: CPT | Performed by: INTERNAL MEDICINE

## 2024-12-11 PROCEDURE — 82570 ASSAY OF URINE CREATININE: CPT | Performed by: INTERNAL MEDICINE

## 2024-12-11 PROCEDURE — 86694 HERPES SIMPLEX NES ANTBDY: CPT | Performed by: INTERNAL MEDICINE

## 2024-12-11 PROCEDURE — 83521 IG LIGHT CHAINS FREE EACH: CPT | Performed by: INTERNAL MEDICINE

## 2024-12-11 PROCEDURE — 86696 HERPES SIMPLEX TYPE 2 TEST: CPT | Performed by: INTERNAL MEDICINE

## 2024-12-11 PROCEDURE — 86235 NUCLEAR ANTIGEN ANTIBODY: CPT | Performed by: INTERNAL MEDICINE

## 2024-12-11 PROCEDURE — 87522 HEPATITIS C REVRS TRNSCRPJ: CPT | Performed by: INTERNAL MEDICINE

## 2024-12-11 PROCEDURE — 84165 PROTEIN E-PHORESIS SERUM: CPT | Performed by: INTERNAL MEDICINE

## 2024-12-11 PROCEDURE — 82306 VITAMIN D 25 HYDROXY: CPT | Performed by: INTERNAL MEDICINE

## 2024-12-11 PROCEDURE — 80053 COMPREHEN METABOLIC PANEL: CPT | Performed by: FAMILY MEDICINE

## 2024-12-11 PROCEDURE — 81001 URINALYSIS AUTO W/SCOPE: CPT | Performed by: INTERNAL MEDICINE

## 2024-12-11 PROCEDURE — 84443 ASSAY THYROID STIM HORMONE: CPT | Performed by: FAMILY MEDICINE

## 2024-12-11 PROCEDURE — 86225 DNA ANTIBODY NATIVE: CPT | Performed by: INTERNAL MEDICINE

## 2024-12-11 PROCEDURE — 3074F SYST BP LT 130 MM HG: CPT | Performed by: INTERNAL MEDICINE

## 2024-12-11 PROCEDURE — 3008F BODY MASS INDEX DOCD: CPT | Performed by: INTERNAL MEDICINE

## 2024-12-11 PROCEDURE — 85652 RBC SED RATE AUTOMATED: CPT | Performed by: FAMILY MEDICINE

## 2024-12-11 PROCEDURE — 85025 COMPLETE CBC W/AUTO DIFF WBC: CPT | Performed by: FAMILY MEDICINE

## 2024-12-11 PROCEDURE — 82607 VITAMIN B-12: CPT | Performed by: INTERNAL MEDICINE

## 2024-12-11 PROCEDURE — 86695 HERPES SIMPLEX TYPE 1 TEST: CPT | Performed by: INTERNAL MEDICINE

## 2024-12-11 PROCEDURE — 83002 ASSAY OF GONADOTROPIN (LH): CPT | Performed by: INTERNAL MEDICINE

## 2024-12-11 PROCEDURE — 84156 ASSAY OF PROTEIN URINE: CPT | Performed by: INTERNAL MEDICINE

## 2024-12-11 PROCEDURE — 82784 ASSAY IGA/IGD/IGG/IGM EACH: CPT | Performed by: INTERNAL MEDICINE

## 2024-12-11 PROCEDURE — 3079F DIAST BP 80-89 MM HG: CPT | Performed by: INTERNAL MEDICINE

## 2024-12-11 RX ORDER — HYDROCODONE BITARTRATE AND ACETAMINOPHEN 5; 325 MG/1; MG/1
1 TABLET ORAL 2 TIMES DAILY PRN
Qty: 60 TABLET | Refills: 0 | Status: SHIPPED | OUTPATIENT
Start: 2024-12-11

## 2024-12-11 RX ORDER — HYDROXYCHLOROQUINE SULFATE 200 MG/1
200 TABLET, FILM COATED ORAL DAILY
Qty: 30 TABLET | Refills: 3 | Status: SHIPPED | OUTPATIENT
Start: 2024-12-11

## 2024-12-11 RX ORDER — ONDANSETRON 4 MG/1
8 TABLET, FILM COATED ORAL EVERY 8 HOURS PRN
Qty: 90 TABLET | Refills: 0 | Status: SHIPPED | OUTPATIENT
Start: 2024-12-11

## 2024-12-11 NOTE — PROGRESS NOTES
[facility], [address] [place] [address]      Consult     Marielos Reid Patient Status:  No patient class for patient encounter    1980 MRN [id number]   Location [facility], [address] [place] [address] Attending No att. providers found   Hosp Day # 0 PCP Giovanna Parker DO     Referring Provider: PCP    Reason for Consultation: Early Sjogren's syndrome; fibromyalgia        Component  Ref Range & Units 24  7:55 AM   Anti-SSA Antibody, IGG  <7 U/mL 28.1 High        Subjective:    Marielos Reid is a 44 year old female with for further evaluation for chronic back pain after  and incidental positive SSA antibody.     She states she has history of chronic joint pain since her teenager    Elevated esr for many years. Pain is described subjective swelling feeling of tightness and spasm. Shooting no burning. All over pain generalized and ankles.     Denies any pain in her shoulders, elbows , wrists or hands (sporadic pain; worse in winter) recently more pain and summer time     Has some knee pain; low back; hip pain and off chronic neck (tightness)     Over the years (has seen few rheumatologist with the last 1) Dr Quarles; tried hydroxychloroquine (more that 6 months) no change in symptoms     Had issues with rotator cuff tendonitis (hematoma) reattachment (2019)    Tried lyrica at that time and helped; not gabapentin; cymbalta tried (helped when she was on it)    Tried antiinflammatories (naproxen; mobic celebrex) diclfenac    Tried cyclobenzaprine (tizadine) ? Others no significant benefit    (Tried norco; good temporary) Taking tramadol now as needed some days 50mg (takes 3 per day) (chronically)     Denies any history of DVT PE TIA CVA seizures migraines or headaches (not very often)     States no shortness of breath ,chest pain ,fevers ,chills (last stress test was a few years ago)     States no urinary or bowel symptoms; bloody stools (chronic abdominal pain in  CT  abdomen; did colonoscopy negative and EGD negative) had some bacterial infection and treated for 6 weeks; according to records she has IBS she was not aware of this diagnosis    Still has some nausea and vomiting (bowel movement is good) ; recently hemmoroids (internal) bulging outside     States no jaw pain, vision changes (grinding teeth) (no mouth guards)     States no history of pericardial, pleural effusions    States no significant dry eyes or dry mouth (no dryness that significant)     States no history of uveitis iritis scleritis (eye infection; viral) antiviral (herpes) few years ago     ? Raynaud's (whitish; reddish; numbness) worse in winter; NO  digital ulcerations    States no major weight changes; night sweats    Her weight has been stable    States no history of photosensitive or malar rash.    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     Time to time exercise moving around     Emg was done a year ago  had carpal tunnel surgery (both sides) DR Ross; improvement symptoms     States her mother has issues with chronic pain    Seen as a new patient June 2024    Diagnosed with early Sjogren syndrome fibromyalgia and osteoarthritis    Offered hydroxychloroquine trial patient declined since she was been on it before and did not notice benefit    She continues to use tramadol 100 mg daily but recent hospitalization with possible seizures and switch to Norco unclear whether this was drug-induced from narcotics    Or any sort of withdrawal patient states she takes it as needed which is concerning since this could cause some sort of withdrawal seizures as a possibility will defer to neurology for further workup the EEG apparently was done showing abnormal brainwave imaging    Patient's MRI of the brain CT was normal and not concerning for vasculitis or connective tissue disease    Also  had an episode abdominal pain abdominal CT imaging normal CT and colonoscopy unrevealing reviewed pathology reports were normal with no evidence of inflammatory bowel disease    Patient is scheduled to see a neurologist will defer to them whether or not EMG nerve conduction studies or skin biopsy needed to rule out small fiber neuropathy    Continues to have generalized pain neuropathy has failed multiple medications over the years including Lyrica pregabalin and Cymbalta      History/Other:      Past Medical History:  Past Medical History:    Abdominal pain    Arthritis    Back pain    Back problem    low back pain    Blood in urine    Chronic pelvic pain in female    Encounter for insertion of ParaGard IUD    Lot# [id number] Exp 01/2022    Endometriosis determined by laparoscopy    2/19/2020 Laparoscopic right salpingo-oophorectomy. Endometrioma of right ovary. Fallopian tube with endometriosis and acute and chronic salpingitis. Dr Jazmyne Whittaker    General counseling for prescription of oral contraceptives    Heartburn    Hemorrhoids    Irritable bowel syndrome    Nausea    Other screening mammogram    Pain in joints    Prediabetes    Routine Papanicolaou smear    Visual impairment    glasses and contacts    Vomiting    Wears glasses        Past Surgical History:   Past Surgical History:   Procedure Laterality Date    Colonoscopy N/A 03/01/2021    Procedure: COLONOSCOPY;  Surgeon: Louis Pereira MD;  Location:  ENDOSCOPY    Colonoscopy N/A 06/27/2024    with biopsy - done for Abdominal pain, abnormal CT scan with enteritis and colitis. Alexis Thornton DO;  Location:  ENDOSCOPY    Egd  06/27/2024 6/27/24 EGD with biopsy -for abdominal pain - 2 cm Hill grade 3 hiatal hernia, gastritis, normal appearing duodenum & esophagus.    Oophorectomy Right 02/19/2020 2/19/2020 Laparoscopic right salpingo-oophorectomy. Endometrioma of right ovary. Fallopian tube with endometriosis and acute and chronic salpingitis.   Jazmyne Whittaker    Other surgical history  04/08/2014    cystoscopy- Dr. Abbott    Salpingectomy Right 02/19/2020 2/19/2020 Laparoscopic right salpingo-oophorectomy. Endometrioma of right ovary. Fallopian tube with endometriosis and acute and chronic salpingitis. Dr Jazmyne Whittaker    Shoulder arthroscopy Left 12/05/2019    Dr Lozoya       Social History:  reports that she has never smoked. She has never used smokeless tobacco. She reports that she does not drink alcohol and does not use drugs.    Family History:   Family History   Problem Relation Age of Onset    Diabetes Father     Hypertension Mother        Allergies:   Allergies   Allergen Reactions    Advil [Ibuprofen] SWELLING    Ketorolac SWELLING     Patient received a subacromial right shoulder corticosteroid and ketorolac injection and developed right eye a mild swelling that was self-limited over the course of 24 hours.       Current Medications:  Current Outpatient Medications   Medication Sig Dispense Refill    hydroxychloroquine (PLAQUENIL) 200 MG Oral Tab Take 1 tablet (200 mg total) by mouth daily. With food 30 tablet 3    levetiracetam 750 MG Oral Tab Take 1 tablet (750 mg total) by mouth 2 (two) times daily. 180 tablet 0    HYDROcodone-acetaminophen 5-325 MG Oral Tab Take 1 tablet by mouth every 8 (eight) hours as needed for Pain. 12 tablet 0    ondansetron (ZOFRAN) 4 mg tablet Take 1 tablet (4 mg total) by mouth every 8 (eight) hours as needed for Nausea. 30 tablet 2    ondansetron (ZOFRAN) 4 mg tablet Take 2 tablets (8 mg total) by mouth every 8 (eight) hours as needed for Nausea. 90 tablet 0    HYDROcodone-acetaminophen (NORCO) 5-325 MG Oral Tab Take 1 tablet by mouth 2 (two) times daily as needed for Pain. 60 tablet 0      No current facility-administered medications for this visit.     (Not in a hospital admission)      Review of Systems:     Constitutional: Negative for chills, ,+ fatigue, fever and unexpected weight change.    HENT: Negative for  congestion, and mouth sores.    Eyes: Negative for photophobia, pain, redness and visual disturbance.    Respiratory: Negative for apnea, cough, chest tightness, shortness of breath, wheezing and stridor.    Cardiovascular: Negative for chest pain, palpitations and leg swelling.    Gastrointestinal: Negative for abdominal distention, abdominal pain, blood in stool, constipation, diarrhea and nausea.    Endocrine: Negative.     Genitourinary: Negative for decreased urine volume, difficulty urinating, dyspareunia, dysuria, flank pain, and frequency.    Musculoskeletal: + arthralgias, no gait problem and subjective swelling.    Skin: Negative for color change, pallor and rash. No raynauds or digital ulcerations no sclerodactly.    Allergic/Immunologic: Negative.    Neurological: Negative for dizziness, tremors, seizures, syncope, speech difficulty, weakness, light-headedness,+ numbness and occasional headaches.    Hematological: Does not bruise/bleed easily.    Psychiatric/Behavioral: Negative for confusion, decreased concentration, hallucinations, self-injury, +sleep disturbance and no suicidal ideas or depression.    Objective:   Vitals:    12/11/24 0954   BP: 120/80   Pulse: 75   Resp: 16   Temp: 98 °F (36.7 °C)          Constitutional: is oriented to person, place, and time. Appears well-developed and well-nourished. No distress.    HEENT: Normocephalic; EOMI; no jvd; no LAD; no oral or nasal ulcers.     Eyes: Conjunctivae and EOM are normal. Pupils are equal, round, and reactive to light.     Neck: Normal range of motion. No thyromegaly present.    Cardiovascular: RRR, no murmurs.    Lungs: Clear, Bilateral air entry, no wheezes.    Abdominal: Soft.    Musculoskeletal:         Joint Exam 12/11/2024        Right  Left   Sternoclavicular   Tender   Tender   Acromioclavicular   Tender   Tender   Glenohumeral   Tender   Tender   Thoracic Spine   Tender      Lumbar Spine   Tender      Sacroiliac   Tender   Tender   Hip    Tender   Tender   Knee   Tender   Tender        Swollen: 0      Tender: 14          Right shoulder: Exhibits normal range of motion on abduction and internal rotation, no tenderness, no bony tenderness, no deformity, no laceration, no pain and no spasm.        Left shoulder: Exhibits normal range of motion on abduction and internal and external rotation.  no tenderness, no bony tenderness, no swelling, no effusion, no deformity, no pain, no spasm and normal strength.        Right elbow:  Exhibits normal range of motion, no swelling, no effusion and no deformity. No tenderness found. No medial epicondyle, no lateral epicondyle and no olecranon process tenderness noted. There are no contractures or tophi or nodules.        Left elbow:  Normal range of motion, no swelling, no effusion and no deformity. No medial epicondyle, no lateral epicondyle and no olecranon process tenderness noted. There are no contractures or tophi or nodules.        Right wrist:  Exhibits normal range of motion, no tenderness, no bony tenderness, no swelling, no effusion and no crepitus. Flexion and extension intact w/o limitation.        Left wrist: Exhibits normal range of motion, no tenderness, no bony tenderness, no swelling, no effusion, no crepitus and no deformity. Flexion and extension intact without limitation.        Right hip: Exhibits normal range of motion, normal strength, no tenderness, no bony tenderness, no swelling and no crepitus.        Right hand: No synovitis of MCP,PIP or DIP joints; very small scattered Bouchards very small scattered Heberden nodules noted;  strength: 100%.  Mild squaring first CMC joint        Left hand: No synovitis of MCP,PIP or DIP joints; very small scattered Bouchards very small scattered Heberden nodules noted;  strength: 100%.  Mild squaring first CMC joint        Left hip: Exhibits normal range of motion, normal strength, no tenderness, no bony tenderness, No swelling and no  crepitus.        Right knee: Exhibits normal range of motion, no swelling, no effusion, no ecchymosis, no deformity and no erythema. No tenderness found. No medial joint line, no lateral joint line, no MCL and no LCL tenderness noted. No crepitation on flexion of knee and extension normal.        Left knee:  Exhibits normal range of motion, no swelling, no effusion, no ecchymosis and no erythema. No tenderness found. No medial joint line, no lateral joint line and no patellar tendon tenderness noted. No crepitation on flexion of the knee. Extension intact and normal.        Right ankle: No swelling, no deformity. No tenderness. Dorsiflexion and plantar flexion intact without limitation in range of motion.        Left ankle: Exhibits no swelling. No tenderness. No lateral malleolus and no medial malleolus tenderness found. Achilles tendon normal. Achilles tendon exhibits no pain, no defect and normal Burris's test results.  Dorsiflexion and plantar flexion intact without limitation in range of motion.        Cervical back: Exhibits normal range of motion, no tenderness, no bony tenderness, no swelling, no pain and + spasm.        Thoracic back: Exhibits normal range of motion, no tenderness, no bony tenderness and + spasm.        Lumbar back:  Exhibits normal range of motion, no tenderness, no bony tenderness, no pain and + spasm.    Full range of motion of the hips internal/external Tatian tenderness of the trochanteric bursa no erythema warmth    Tightness hamstrings bilaterally on extension        Right foot: normal. There is normal range of motion, no tenderness, no bony tenderness, no crepitus and no laceration. There is no synovitis or tenderness of the MTP joints to palpation.        Left foot: normal. There is normal range of motion, no tenderness, no bony tenderness and no crepitus. There is no synovitis or tenderness of the MTP joints to palpation.    Lymphadenopathy: No submental, no submandibular, and  no occipital adenopathy present, has no cervical adenopathy or axillary lympadenopathy.    Neurological: Alert and oriented. No focal motor or sensory abnormalities. Strength is 5/5 Upper Extremities/Lower Extremities proximally and distally.    Skin: Skin is warm, dry and intact.  Mild Raynaud's without digital ulcerations    Psychiatric: Normal behavior.    Results:    Labs:      Lab Results   Component Value Date    WBC 8.9 12/04/2024    RBC 4.55 12/04/2024    HGB 12.2 12/04/2024    HCT 37.6 12/04/2024    MCV 82.6 12/04/2024    MCH 26.8 12/04/2024    MCHC 32.4 12/04/2024    RDW 15.4 12/04/2024    .0 12/04/2024    MPV 9.9 01/06/2011       No components found for: \"RELY\", \"NMET\", \"MYEL\", \"PROMY\", \"ALMA\", \"ABSNEUTS\", \"ABSBANDS\", \"ABMM\", \"ABMY\", \"ABPM\", \"ABBL\"      Lab Results   Component Value Date     12/04/2024    K 3.5 12/04/2024    CO2 23.0 12/04/2024    BUN 11 12/04/2024    GFR 83 07/30/2016    ALB 3.7 12/07/2024    AST 19 12/07/2024    ALT 11 12/07/2024          No components found for: \"ESRWESTERGRN\"       Lab Results   Component Value Date    CRP <0.40 12/07/2024         Lab Results   Component Value Date    CLARITY Clear 06/23/2024    UROBILINOGEN Normal 06/23/2024     @LABRCNTIP(RF,B12)@      [unfilled]        Component  Ref Range & Units 4/20/24  7:55 AM   Anti-SSA Antibody, IGG  <7 U/mL 28.1 High    Comment: Detection of SS-A/Ro antibodies is of interest and significance for the clinical diagnosis of SLE (prevalence 40-50%) and Sjogren's syndrome (prevalence 60-75% for primary Sjogren's syndrome).   Anti-SSB Antibody, IGG  <7 U/mL 0.5   Anti-Smith Antibody, IGG  <7 U/mL <0.7   Anti-U1RNP Antibody, IGG  <5 U/mL 2.1   Anti-RNP70 Antibody, IGG  <7 U/mL 4.5   Anti-Centromere Antibody, IGG  <7 U/mL 0.5   Anti-SCL70 Antibody, IGG  <7 U/mL 1.4   Anti-Yu-1 Antibody, IGG  <7 U/mL 0.3   Resulting Agency [facility] ([facility])              Narrative  Performed by: [facility] ([facility])  Interpretive  Ranges:    Anti-U1RNP Antibody:  Equivocal: 5-10 U/mL  Positive: >10 U/mL            Component  Ref Range & Units 4/20/24  7:55 AM   Expanded FABIO Antibody Screen, IGG  <0.7 ug/l 5.30 High    Anti-dsDNA antibody  <10 IU/mL 3.4   Connective Tissue Disease Screen Interpretation  Negative Positive Abnormal    Resulting Agency [facility] ([facility])              Narrative  Performed by: [facility] ([facility])  SHANA screen determined using PerMicro Cristal by fluorescent enzyme immunoassay. Both a specific dsDNA test along with an FABIO screen detecting antibodies to recombinant U1RNP (RNP 70, A, C), SS-A/Ro, SS-B/La, Centromere B, Scl-70, Yu-1 proteins and a synthetic SmD3 peptide are utilized to determine SHANA screen results.        omponent  Ref Range & Units 4/20/24  7:55 AM   C-Reactive Protein  <1.00 mg/dL 0.90         Component  Ref Range & Units 4/20/24  7:55 AM   Sed Rate  0 - 20 mm/Hr 68 High               Component  Ref Range & Units 4/20/24  7:55 AM   C-Citrullinated Peptide IgG AB  0.0 - 6.9 U/mL 2.6       Result Notes      Component  Ref Range & Units 4/20/24  7:55 AM   Uric Acid  3.1 - 7.8 mg/dL 7.2        omponent  Ref Range & Units 4/20/24  7:55 AM   Rheumatoid Factor  <14 IU/mL <10   2 Result Notes       1 HM Topic      Component  Ref Range & Units 4/20/24  7:55 AM   Glucose  70 - 99 mg/dL 94   Sodium  136 - 145 mmol/L 141   Potassium  3.5 - 5.1 mmol/L 3.8   Chloride  98 - 112 mmol/L 104   CO2  21.0 - 32.0 mmol/L 27.0   Anion Gap  0 - 18 mmol/L 10   BUN  9 - 23 mg/dL 9   Creatinine  0.55 - 1.02 mg/dL 0.95   BUN/CREA Ratio  10.0 - 20.0 9.5 Low    Calcium, Total  8.7 - 10.4 mg/dL 9.5   Calculated Osmolality  275 - 295 mOsm/kg 290   eGFR-Cr  >=60 mL/min/1.73m2 75   ALT  10 - 49 U/L 9 Low    AST  <=34 U/L 19   Alkaline Phosphatase  37 - 98 U/L 71   Bilirubin, Total  0.3 - 1.2 mg/dL 0.5   Total Protein  5.7 - 8.2 g/dL 8.5 High    Albumin  3.2 - 4.8 g/dL 4.3   Globulin  2.8 - 4.4 g/dL 4.2   A/G Ratio  1.0 -  2.0 1.0   Patient Fasting for CMP? Yes        Imaging:  MRI BRAIN (W+WO) (CPT=70553)    Result Date: 12/6/2024  CONCLUSION:  No acute intracranial abnormality.   LOCATION:  [facility]    Dictated by (CST): Cody Boyer MD on 12/06/2024 at 8:46 PM     Finalized by (CST): Cody Boyer MD on 12/06/2024 at 8:51 PM       XR CHEST AP PORTABLE  (CPT=71045)    Result Date: 12/4/2024  CONCLUSION:  See above.   LOCATION:  [facility]      Dictated by (CST): Sree Dallas MD on 12/04/2024 at 8:10 AM     Finalized by (CST): Sree Dallas MD on 12/04/2024 at 8:12 AM       CT BRAIN OR HEAD (CPT=70450)    Result Date: 12/4/2024  CONCLUSION:  No acute process or significant disease identified. The preliminary report was reviewed.     LOCATION:  [facility]   Dictated by (CST): Bill Love DO on 12/04/2024 at 7:02 AM     Finalized by (CST): Bill Love DO on 12/04/2024 at 7:04 AM       MRI ENTEROGRAPHY ABD/PLV (W+WO)(CPT=72197/36619)    Result Date: 10/3/2024  CONCLUSION:  1. Image degradation by patient related motion artifact.  Otherwise negative MR enterography. 2. Large amount of stool in the colon suggests constipation.    Dictated by (CST): Gregorio Richardson MD on 10/03/2024 at 1:45 PM     Finalized by (CST): Gregorio Richardson MD on 10/03/2024 at 1:48 PM         TECHNIQUE:  Unilateral 2 to 3 views of the hip and pelvis if performed.     COMPARISON:  None.     INDICATIONS:  M25.552 Left hip pain     PATIENT STATED HISTORY: (As transcribed by Technologist)  Patient is having an orthopedic evaluation.  Patient complains of severe pain in her Left hip.  She stated she had an MRI of the hip in 2019 that showed a tear in it.  Patient denies injury.         FINDINGS:    BONES:  The hip joint spaces are symmetric bilaterally.  There is no fracture, subluxation or dislocation identified.  No focal bony destructive lesion is seen.  SOFT TISSUES:  No visible soft tissue swelling.  EFFUSION:  None visible.  OTHER:  Negative.                   Impression    CONCLUSION:  No fracture, subluxation or dislocation in the left hip.  Further management of the hip pain including the need for additional imaging should be based clinically.           Narrative   PROCEDURE:  MRI BRAIN/MRA BRAIN (ZRM=58595/13345)     LOCATION:  [facility]       COMPARISON:  MR DAJU[facility], BRAIN W WO CONTRAST MRI, 4/19/2011, 2:28 PM.     INDICATIONS:  R51.9 Sudden onset of severe headache     TECHNIQUE:  MRI of the brain was performed with multi-planar T1, T2-weighted images with FLAIR sequences and diffusion weighted images without infusion.  MR angiography of the brain without infusion was performed using 3D time of flight, multi-planar and   3D reconstructed images.  All measurements obtained in this exam were performed using NASCET criteria.     PATIENT STATED HISTORY: (As transcribed by Technologist)  Patient says she had sudden onset of headache and vomiting a few weeks ago.         FINDINGS:  Motion degraded examination.     MRI brain findings:     The left lateral ventricle is not enlarged but mildly larger than the right lateral ventricle.  This finding is not significantly changed since 4/19/2011 and likely developmental.  There is no midline shift or mass effect.  The basal cisterns are patent.         There is no acute intracranial hemorrhage or extra-axial fluid collection identified.  There is no parenchymal signal abnormality identified.  There is no restricted diffusion to suggest acute ischemia/infarction.     The visualized paranasal sinuses and mastoid air cells are unremarkable.  The expected major intracranial flow voids are present.     MRA BRAIN  The MRA head is limited secondary to motion degradation.     The upper cervical, petrous, cavernous, and supraclinoid internal carotid arteries are patent.  An anterior communicating artery is seen.  The branches of the anterior cerebral and middle cerebral arteries are patent.  A moderate-sized left posterior  communicating artery is  identified.  The branches of the posterior cerebral and superior cerebellar arteries are patent.  The basilar artery has a normal course and caliber.  The bilateral vertebral arteries are unremarkable.  The origins of the  bilateral PICA are seen.                     Impression   CONCLUSION:    1. No acute intracranial abnormality.  2. No high-grade stenosis, occlusion, or aneurysm is identified within the major intracranial arterial vasculature within the limitations of the motion degradation.           Narrative   PROCEDURE:  CTA ABDOMEN PELVIS (INCLUDES ILIAC ARTERIES)(CPT=74175)     LOCATION:  [facility]       COMPARISON:  [facility], US, US ABDOMEN COMPLETE WITH DOPPLER(CPT=76700/72575), 3/20/2023, 9:34 AM.  [facility] , CT, CT ABDOMEN PELVIS IV CONTRAST, NO ORAL (ER), 7/13/2021, 6:45 PM.     INDICATIONS:  R93.5 Abnormal abdominal ultrasound     TECHNIQUE:  CT images of the abdomen and pelvis were obtained pre- and post- injection of non-ionic intravenous contrast material. Multi-planar reformatted/3-D images were created to optimize visualization of vascular anatomy.  Dose reduction techniques  were used. Dose information is transmitted to the ACR (American College of Radiology) NRDR (National Radiology Data Registry) which includes the Dose Index Registry.     PATIENT STATED HISTORY:(As transcribed by Technologist)  Patient had abnormal Ultrasound of Abdomen.      CONTRAST USED:  80cc of Isovue 370     FINDINGS:    LUNG BASES:  Dependent atelectasis bilaterally.  LIVER:  Normal in shape and contour.  BILIARY:  Gallbladder is contracted.  No intrahepatic biliary dilatation..  SPLEEN:  Normal.  No enlargement or focal lesion.  PANCREAS:  No russ-pancreatic inflammatory stranding  ADRENALS:  Normal.  No mass or enlargement.    KIDNEYS:  Kidneys are symmetrical in size without evidence of hydronephrosis.  BOWEL/MESENTERY:  Bowel is normal in caliber. No evidence of obstruction.  The appendix is normal in  appearance.  PELVIS:  Bladder is unremarkable in appearance.  Calcified phleboliths within the pelvis.  Trace amount of free pelvic fluid.  Multiple cysts noted within the left adnexa, largest measuring 2.6 x 2.1 cm.  AORTA/VASCULAR:  Aorta is normal in caliber.  The common hepatic artery originates off the aorta with mild narrowing within the origin of less than 50%.  The splenic artery originates off the superior mesenteric artery.  No significant stenosis  identified within the superior mesenteric artery.  Renal arteries are patent.  The inferior mesenteric artery is patent.  Bifurcations unremarkable in appearance.  BONES:  No acute fractures.                   Impression   CONCLUSION:    1. No significant stenosis identified within the superior mesenteric artery.  The splenic artery originates off the superior mesenteric artery.  2. Common hepatic artery originates off the aorta with mild less than 50% narrowing noted within the proximal portion.  3. Multiple cysts noted within the left adnexa likely representing ovarian cysts.  If further evaluation is needed, consider ultrasound.  4. Trace amount of free pelvic fluid.          Narrative   PROCEDURE:  MRI SPINE LUMBAR (W+WO) (CPT=72158)     COMPARISON:  [address] [place] [address], , MRI SPINE LUMBAR (CPT=72148), 12/09/2021, 1:50 PM.     INDICATIONS:  R52 Uncontrolled pain F11.29 Opioid dependence with unspecified opioid-induced disorder (HCC) G89.29 Chronic bilateral low back pain without sciatica M54.50 Ch*     TECHNIQUE:  Multiplanar T1 and T2 weighted images including fat suppression sequences.  Images acquired in sagittal and axial planes. Intravenous gadolinium was administered followed by multiplanar post-infusion T1 weighted sequences.       PATIENT STATED HISTORY:(As transcribed by Technologist)  Patient complains of chronic lower back pain which has become worse within the past few months      CONTRAST USED:  13 mL of Dotarem     FINDINGS:       There is mild  levoscoliosis of the thoracolumbar spine.  There is lumbar lordosis.  The vertebral body heights and disc heights are maintained.  No significant disc desiccation.  No suspicious focal osseous lesion is evident.  The conus and cauda equina  nerve roots are unremarkable in caliber and signal.  At L5-S1, there is asymmetric mild facet hypertrophy, greater on the right.  No spinal canal or foraminal narrowing is identified at L5-S1.  No pathologic intrathecal enhancement is identified within  the lumbar spinal canal.                   Impression   CONCLUSION:    1. Mild facet arthropathy at L5-S1, right greater than left.  No spinal canal or foraminal narrowing is identified in the lumbar spine.  2. Mild levoscoliosis of the thoracolumbar spine.           Assessment & Plan:      43-year-old woman comes in for further evaluation for incidental positive SSA antibody elevated ESR CRP likely early Sjogren's syndrome with minimal to no sicca symptoms    Likely early Sjogren's syndrome with minimal sicca symptoms no systemic organ involvement evident  Mild osteoarthritis multiple joints  Fibromyalgia with multiple tender points nonrestorative sleep likely diagnosis of IBS chronic neuropathy  History of carpal tunnel syndrome status post bilateral carpal tunnel surgery per patient  Cervical lumbar spasm tightness    Patient has no obvious synovitis on exam  Her sicca symptoms related to Sjogren's are minimal  X-rays unrevealing  She has tried multiple anti-inflammatories also in the past has been on Lyrica Cymbalta on tramadol 100 mg every 8 hours through PCP/pain management    Encouraged weaning tramadol to lower doses she states she takes consistently at least 50 mg 3 times daily    Patient with recent seizure unclear whether this was withdrawal seizure from narcotics now switched to [place] through pain specialist PCP    CT MRI of the brain unrevealing this is not related to connective tissue disease there is no evidence of  vasculitis    Discussed the diagnosis of fibromyalgia in detail and trying to wean off narcotics which may help her bowel symptoms    Colonoscopy showed no evidence of inflammatory bowel disease path reports reviewed.      Enterography showed constipation likely related to IBS    We will continue to monitor for systemic organ involvement in terms of her Sjogren syndrome    Recent labs stable shows no evidence of this offered hydroxychloroquine again 200 mg daily as prevention    And is open to this risk side effects discussed and rare retinal toxicity need for baseline eye exam    Agree with neurology evaluation;   Defer need for EMG again upper lower extremities to neurology     refer to neurology to see if she is a candidate for skin biopsy or other options of treatment including IVIG    If she develops overt swelling of her joints we may need to consider methotrexate but she is open to hydroxychloroquine low-dose trial again    States she was on this for 6 months from previous rheumatologist with no benefit but she is willing to be on it for prevention    Have baseline sed rate CRP and repeat inflammation markers 6 months on hydroxychloroquine    She is with Sjogren syndrome generally have high inflammatory markers    Offered gabapentin but she did not stay on this appear to have side effects;     she does have nonrestorative sleep and fatigue.    If she gets off narcotics could consider addition of Cymbalta through her PCP or at some point Lyrica or Savella/amitriptyline; nortriptyline or retrial of gabapentin    Discussed I will see her every 6 months to monitor for evolving connective tissue disease or systemic organ involvement from her underlying Sjogren's syndrome.      But otherwise she will need to see her PCP in between visits for chronic pain syndrome fibromyalgia.        Current exercise strengthening soco chi yoga aquatic therapy information given to the patient on the diagnosis of fibromyalgia also  incidental Raynaud's likely related to her Sjogren's very minimal symptoms.      Core measures discussed written information given to the patient    If her back and neck pain come back would suggest going back to pain management to get updating imaging.      She is also tried nerve ablations and epidural injections in the past through them.  Would recommend reassessing if this becomes a problem.    Education and counseling provided to patient.  Instructed patient to call my office or seek medical attention immediately if symptoms worsen. Risks and side effects of medications and diagnosis discussed in detail and patient was given written information on new prescribed medications.    Return to clinic:  Return in about 6 months (around 6/11/2025).    An Gallego MD  6/11/2024

## 2024-12-12 ENCOUNTER — TELEPHONE (OUTPATIENT)
Dept: NEUROLOGY | Facility: CLINIC | Age: 44
End: 2024-12-12

## 2024-12-12 LAB
DSDNA IGG SERPL IA-ACNC: 3.1 IU/ML
ENA RNP IGG SER IA-ACNC: 3.3 U/ML
ENA SM IGG SER IA-ACNC: <0.7 U/ML
KAPPA LC FREE SER-MCNC: 2.84 MG/DL (ref 0.33–1.94)
KAPPA LC FREE/LAMBDA FREE SER NEPH: 1.37 {RATIO} (ref 0.26–1.65)
LAMBDA LC FREE SERPL-MCNC: 2.08 MG/DL (ref 0.57–2.63)
U1 SNRNP IGG SER IA-ACNC: 2.3 U/ML

## 2024-12-12 NOTE — TELEPHONE ENCOUNTER
Routing to  to call pt to offer noted appt times.     ----- Message from Angel Daniel sent at 12/12/2024 10:39 AM CST -----  Could we move this patient's visit up?  We can use seizure clinic slot at 4 pm 1/6 or 5 pm 1/8.    Thanks

## 2024-12-12 NOTE — PROGRESS NOTES
Dear Marielos,    Your blood work shows you are perimenopausal.  Your A1c is stable.  Your blood counts, cholesterol and electrolytes are stable.  Your thyroid and vitamin D are within normal limits.      Sincerely,  Dr. Parker

## 2024-12-12 NOTE — PROGRESS NOTES
Subjective:   Marielos Reid is a 44 year old female who presents for hospital follow up.   She was discharged from [facility] to Home or [facility]  Admission Date: 12/4/24   Discharge Date: 12/8/24  Hospital Discharge Diagnosis: seizure disorder    Interactive contact within 2 business days post discharge first initiated on Date: 12/10/2024    I accessed [facility] and/or Care Everywhere and personally reviewed the following for the recent hospitalization: provider notes, consults, summaries, labs and other test results and the pertinent findings are documented below.     HPI: Patient is here for hospital follow-up.  Unfortunately she experienced a near death episode where it was concluded that most likely she had a seizure.  Her EEG is abnormal.  There is a concern that the seizure was due to tramadol.  She was changed over to [place] for chronic pain.  We discussed on Norco can decrease the seizure threshold.  She has not started the Keppra yet and wonders if she should start it.  We had a long discussion on the importance of starting the medication.  Pt. Feels her vision is off since having the episode and has to wear her glasses to see normally.  I reviewed hospital records at length with her.  Her and her  was very frustrated that she was treated like antibiotic emergency room by a nurse but most otherwise the staff was good to her.  Patient states that she was given Zofran and Compazine for nausea while in the hospital.  She states that Compazine works better but was advised against using it as an outpatient because it decreases the seizure threshold.  She states that the 4 mg of Zofran did not work well for her.  We discussed to try the higher dose.  She denies chest pain, shortness of breath.  She is currently not able to drive.  Pt. Saw rheum today.  Pt. States she has had hot flashes and 3 months with no period and wonders if she is in menopause.  Her mother hit menopause at age 50.    Pt. Complains of a  numbness and tingling in the back of her skull and goes up the back of her head.      History/Other:   Current Medications:  Medication Reconciliation:  I am aware of an inpatient discharge within the last 30 days.  The discharge medication list has been reconciled with the patient's current medication list and reviewed by me. See medication list for additions of new medication, and changes to current doses of medications and discontinued medications.  Outpatient Medications Marked as Taking for the 12/11/24 encounter (Office Visit) with Giovanna Parker, DO   Medication Sig    hydroxychloroquine (PLAQUENIL) 200 MG Oral Tab Take 1 tablet (200 mg total) by mouth daily. With food    ondansetron (ZOFRAN) 4 mg tablet Take 2 tablets (8 mg total) by mouth every 8 (eight) hours as needed for Nausea.    HYDROcodone-acetaminophen (NORCO) 5-325 MG Oral Tab Take 1 tablet by mouth 2 (two) times daily as needed for Pain.    levetiracetam 750 MG Oral Tab Take 1 tablet (750 mg total) by mouth 2 (two) times daily.    HYDROcodone-acetaminophen 5-325 MG Oral Tab Take 1 tablet by mouth every 8 (eight) hours as needed for Pain.    ondansetron (ZOFRAN) 4 mg tablet Take 1 tablet (4 mg total) by mouth every 8 (eight) hours as needed for Nausea.       Review of Systems:  GENERAL: weight stable, energy stable, no sweating  SKIN: denies any unusual skin lesions  EYES: blurred vision; no double vision  HEENT: denies nasal congestion, sinus pain or ST  LUNGS: denies shortness of breath with exertion  CARDIOVASCULAR: denies chest pain on exertion or palpitations  GI: denies abdominal pain, denies heartburn, denies diarrhea  MUSCULOSKELETAL: denies pain, normal range of motion of extremities  NEURO: denies headaches, denies dizziness, denies weakness; paresthesia on the back of her head  PSYCHE: denies depression or anxiety  HEMATOLOGIC: denies hx of anemia, or bruising, denies bleeding  ENDOCRINE: denies thyroid history  ALL/ASTHMA: denies hx of  allergy or asthma    Objective:   Patient's last menstrual period was 09/30/2024 (exact date).  Estimated body mass index is 20.99 kg/m² as calculated from the following:    Height as of this encounter: 5' 3.5\" (1.613 m).    Weight as of this encounter: 120 lb 6 oz (54.6 kg).   /72 (BP Location: Left arm, Patient Position: Sitting, Cuff Size: adult)   Pulse 81   Resp 16   Ht 5' 3.5\" (1.613 m)   Wt 120 lb 6 oz (54.6 kg)   LMP 09/30/2024 (Exact Date)   SpO2 98%   BMI 20.99 kg/m²    GENERAL: well developed, well nourished, in no apparent distress  SKIN: no rashes, no suspicious lesions  HEENT: atraumatic, normocephalic  EYES: EOMI, conjunctiva are clear; wearing glasses  NECK: supple, no adenopathy, no bruits  CHEST: no chest tenderness  LUNGS: clear to auscultation  CARDIO: RRR without murmur  GI: good BS's, no masses, HSM or tenderness  MUSCULOSKELETAL: back is not tender, FROM of the extremities  EXTREMITIES: no cyanosis, clubbing or edema  NEURO: Oriented times three, cranial nerves are intact, motor and sensory are grossly intact    Assessment & Plan:   1. Hospital discharge follow-up (Primary)  2. Seizure disorder (HCC)  -     OPHTHALMOLOGY - INTERNAL  3. Hot flashes  -     FSH; Future; Expected date: 12/11/2024  -     LH (Luteinizing Hormone); Future; Expected date: 12/11/2024  -     Estradiol; Future; Expected date: 12/11/2024  4. Narcotic dependence (HCC)  -     Herpesvirus 6 Antibody, IgM  5. Amenorrhea  -     FSH; Future; Expected date: 12/11/2024  -     LH (Luteinizing Hormone); Future; Expected date: 12/11/2024  -     Estradiol; Future; Expected date: 12/11/2024  -     FSH  -     LH (Luteinizing Hormone)  -     Estradiol  6. Nausea  -     Ondansetron HCl; Take 2 tablets (8 mg total) by mouth every 8 (eight) hours as needed for Nausea.  Dispense: 90 tablet; Refill: 0  7. Vision changes  -     OPHTHALMOLOGY - INTERNAL  8. Chronic pain syndrome  -     HYDROcodone-Acetaminophen; Take 1 tablet by  mouth 2 (two) times daily as needed for Pain.  Dispense: 60 tablet; Refill: 0  9. Laboratory examination ordered as part of a routine general medical examination  -     CBC With Differential With Platelet  -     Comp Metabolic Panel (14)  -     TSH W Reflex To Free T4  -     Lipid Panel  -     Cancel: Vitamin D, 25-Hydroxy  -     Hemoglobin A1C  10. Fibromyalgia  -     Protein Electrophoresis w/ DOROTHY & IgA,IgG, IgM Serum  -     Anti-RNP Antibodies, IgG  -     Anti-dsDNA Antibody, IgG  -     Anti-Smith Antibody, IgG  -     Complement C3, Serum  -     Complement C4, Serum  -     Vitamin D  -     Vitamin B12  -     Urinalysis with Culture Reflex  -     Protein/Creatinine Ratio, Urine Random  -     Hepatitis C RNA Qnt w/ Reflex to Genotype  -     HSV 1 & 2 Glycoprotein Specific AB,IGG  -     Herpesvirus 6 Antibody, IgM  -     Cancel: Anti-dsDNA Antibody, IgG  -     Cancel: Anti-Smith Antibody, IgG  -     Cancel: Complement C3, Serum  -     Cancel: Complement C4, Serum  -     Cancel: Protein Electrophoresis w/ DOROTHY & IgA,IgG, IgM Serum  -     Cancel: Immunofixation (DOROTHY)  -     Cancel: Urinalysis with Culture Reflex  -     Cancel: Protein/Creatinine Ratio, Urine Random  -     Vitamin D  -     Sed Rate, Westergren (Automated)  11. Paresthesias  12. Sjogren's syndrome, with unspecified organ involvement (HCC)  -     Urinalysis with Culture Reflex  -     Herpesvirus 6 Antibody, IgM  13. Primary osteoarthritis involving multiple joints  -     Urinalysis with Culture Reflex  14. Medication management    Labs ordered today.  Discussed how she may try [place]ro[place] for hot flash symptoms.  Patient does not want to do birth control pills or prescription medication for it at this time.  Patient would like to see if she can see neurology sooner.  Patient will start Keppra.  Discussed the importance of trying to avoid Norco as much as possible and try using Tylenol for pain.  We discussed how tramadol has a rare side effect of seizures.   We also discussed her [place] decreases the seizure threshold.  Patient did see rheumatology today and discussed fibromyalgia with her.  Patient has blood orders from her.  Patient is aware she currently cannot drive.  Advised to see ophthalmology for her vision changes.  Advised to monitor the paresthesias that she feels at the back of her head.  Advised Zofran 8 mg 3 times daily as needed nausea.  Patient states that the 4 mg did not work for her in the hospital.          Return in 3 months (on 3/11/2025) for med check 30.

## 2024-12-13 LAB
ALBUMIN SERPL ELPH-MCNC: 4.1 G/DL (ref 3.75–5.21)
ALBUMIN/GLOB SERPL: 1.05 {RATIO} (ref 1–2)
ALPHA1 GLOB SERPL ELPH-MCNC: 0.26 G/DL (ref 0.19–0.46)
ALPHA2 GLOB SERPL ELPH-MCNC: 0.7 G/DL (ref 0.48–1.05)
B-GLOBULIN SERPL ELPH-MCNC: 1.02 G/DL (ref 0.68–1.23)
GAMMA GLOB SERPL ELPH-MCNC: 1.91 G/DL (ref 0.62–1.7)
HSV 1 GLYCOPROTEIN G, IGG: POSITIVE
HSV 2 GLYCOPROTEIN G, IGG: NEGATIVE
PROT SERPL-MCNC: 8 G/DL (ref 5.7–8.2)

## 2024-12-13 NOTE — TELEPHONE ENCOUNTER
Patient has been scheduled.    Future Appointments   Date Time Provider Department Center   1/8/2025  5:00 PM Angel Daniel MD [facility]

## 2024-12-16 ENCOUNTER — TELEPHONE (OUTPATIENT)
Dept: RHEUMATOLOGY | Facility: CLINIC | Age: 44
End: 2024-12-16

## 2024-12-16 NOTE — TELEPHONE ENCOUNTER
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

## 2024-12-18 NOTE — TELEPHONE ENCOUNTER
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

## 2024-12-27 ENCOUNTER — HOSPITAL ENCOUNTER (OUTPATIENT)
Dept: MRI IMAGING | Facility: HOSPITAL | Age: 44
Discharge: HOME | End: 2024-12-27
Attending: FAMILY MEDICINE
Payer: COMMERCIAL

## 2024-12-27 DIAGNOSIS — R20.2 PARESTHESIAS: ICD-10-CM

## 2024-12-27 PROCEDURE — 72141 MRI NECK SPINE W/O DYE: CPT | Performed by: FAMILY MEDICINE

## 2025-01-08 ENCOUNTER — OFFICE VISIT (OUTPATIENT)
Facility: CLINIC | Age: 45
End: 2025-01-08
Payer: COMMERCIAL

## 2025-01-08 VITALS — DIASTOLIC BLOOD PRESSURE: 80 MMHG | SYSTOLIC BLOOD PRESSURE: 128 MMHG | RESPIRATION RATE: 16 BRPM | HEART RATE: 80 BPM

## 2025-01-08 DIAGNOSIS — G89.4 CHRONIC PAIN SYNDROME: ICD-10-CM

## 2025-01-08 DIAGNOSIS — R40.20 LOSS OF CONSCIOUSNESS (HCC): Primary | ICD-10-CM

## 2025-01-08 PROCEDURE — 99215 OFFICE O/P EST HI 40 MIN: CPT | Performed by: OTHER

## 2025-01-08 PROCEDURE — 3079F DIAST BP 80-89 MM HG: CPT | Performed by: OTHER

## 2025-01-08 PROCEDURE — 3074F SYST BP LT 130 MM HG: CPT | Performed by: OTHER

## 2025-01-08 NOTE — PATIENT INSTRUCTIONS
Instructions from Dr. Daniel:       Decrease keppra to 375 mg in morning and 750 mg in evening x 1 week, then 375 mg twice a day x 1 week, then 375 at night only x 1 week, then stop.    Go ahead now and schedule the EEG at [facility] for 4-5 weeks from now.    See Dr. Daniel in 6 weeks.    ~~~~~~~~~~~~~~~~~~~~~~~     After your visit at the [facility] office  today,  please direct any follow up questions or medication needs to the staff in our  [place] office so that your concerns may be promptly addressed.  We are available through [employer] or at the numbers below:    The phone number is:   [phone] option #1    The fax number is:  [phone]    Your pharmacy should also send any requests electronically to the [place] office.     Refill policies:    Allow 2-3 business days for refills; controlled substances may take longer.  Contact your pharmacy at least 5 days prior to running out of medication and have them send an electronic request or submit request through the “request refill” option in your [employer] account.  Refills are not addressed on weekends; covering physicians do not authorize routine medications on weekends.  No narcotics or controlled substances are refilled after noon on Fridays or by on call physicians.  By law, narcotics must be electronically prescribed.  A 30 day supply with no refills is the maximum allowed.  If your prescription is due for a refill, you may be due for a follow up appointment.  To best provide you care, patients receiving routine medications need to be seen at least once a year.  Patients receiving narcotic/controlled substance medications need to be seen at least once every 3 months.  In the event that your preferred pharmacy does not have the requested medication in stock (e.g. Backordered), it is your responsibility to find another pharmacy that has the requested medication available.  We will gladly send a new prescription to that pharmacy at your  request.    Scheduling Tests:    If your physician has ordered radiology tests such as MRI or CT scans, please contact Central Scheduling at [phone] right away to schedule the test.  Once scheduled, the [facility] Centralized Referral Team will work with your insurance carrier to obtain pre-certification or prior authorization.  Depending on your insurance carrier, approval may take 3-10 days.  It is highly recommended patients assure they have received an authorization before having a test performed.  If test is done without insurance authorization, patient may be responsible for the entire amount billed.      Precertification and Prior Authorizations:  If your physician has recommended that you have a procedure or additional testing performed the [facility] Centralized Referral Team will contact your insurance carrier to obtain pre-certification or prior authorization.    You are strongly encouraged to contact your insurance carrier to verify that your procedure/test has been approved and is a COVERED benefit.  Although the [facility] Centralized Referral Team does its due diligence, the insurance carrier gives the disclaimer that \"Although the procedure is authorized, this does not guarantee payment.\"    Ultimately the patient is responsible for payment.   Thank you for your understanding in this matter.  Paperwork Completion:  If you require FMLA or disability paperwork for your recovery, please make sure to either drop it off or have it faxed to our office at [phone]. Be sure the form has your name and date of birth on it.  The form will be faxed to our Forms Department and they will complete it for you.  There is a 25$ fee for all forms that need to be filled out.  Please be aware there is a 10-14 day turnaround time.  You will need to sign a release of information (MAGI) form if your paperwork does not come with one.  You may call the Forms Department with any questions at [phone].  Their fax number is [phone].

## 2025-01-08 NOTE — PROGRESS NOTES
Neurology History & Physical     ASSESSMENT & PLAN:      ICD-10-CM    1. Loss of consciousness (HCC)  R40.20         I do not believe the clinical history or the EEG clearly support a diagnosis of seizure/epilepsy.  Additionally, LEV is poorly tolerated (fatigue, anxiety and PTSD-like symptoms due to the episode itself).  I recommend tapering off LEV and then repeating 24 hr aEEG.  She should not drive until next visit with me.  We discussed the real risk of seizure and withdrawal with medication taper, which they accepted.    We discussed medication side effects and activity precautions. We discussed symptoms that would warrant urgent/emergent evaluation. Patient verbalized understanding and agreement.    Total time I spent caring for the patient was 45 minutes on the day of the encounter related to this visit, including chart review and documentation before and after the visit, including time spent during the visit, but not including separately reported activities or procedures.    Return in about 6 weeks (around 2/19/2025).       ~~~~~~~~~~~~~~~~~~~~~~~~~~~    CHIEF COMPLAINT / REASON FOR VISIT:    Chief Complaint   Patient presents with    Seizures     On Keppra. No seizure episodes since being out of the hospital. Here with .      HISTORY OBTAINED FROM:  Patient and others as above  Chart review    HISTORY:  Marielos Reid is a 44 year old female with DM2, who had altered mentation after taking 3 tramadol tablets (though that is not necessarily more than she usually takes, per discussion today).  There was suspicion for seizure, and EEG was abnormal so started on LEV 1g BID, reduced to 750 mg BID due to ? nausea.    Per patient/, she went to bed but no memory of the event.   her a scream, found patient in bed with eyes wide open, supine, no movement but had rapid shallow breathing, no gurgling or noisy respiration.  He tried to wake her up, she went very stiff only briefly and she went  fully limp with no breathing and no movement.   started CPR, 911 was called, after 1-2 minutes, she suddenly took a deep breath and awoke once paramedics came.  She felt \"out of her body\" afterward, and she was able to sit up once paramedics arrived.  Apparently she didn't remember but she did answer questions correctly.  Was put in chair, taken downstairs to stretcher, and then next thing she remembers is being in hospital.  Only next day was she able to think back and remember all of this.    She is having considerable anxiety and fatigue, since LEV (didn't have it before).  Has had numbness in occiput intermittently in past 6 months, worse when more anxious.    Epilepsy risk factors:  Normal birth and development   No febrile or childhood seizures   No meningitis/encephalitis  No head trauma with prolonged LOC/amnesia   No known structural brain lesions  No FH of seizures    Previous medication trials:  none    Works in medical coding  Driving status:  NOT Driving    DATA REVIEWED:  As documented in the history    Dec 2024  MRI brain unremarkable  MRI C spine unremarkable  PCP note  Neuro notes  EEG x 3 (I independently analyzed and interpreted ~36 hrs of EEG, and I believe it only shows intermittent generalized slowing / FIRDA / delta waves with triphasic morphology, as well as HH and atypical vertex waves, but no definite epileptiform discharges.)    PHYSICAL EXAMINATION:  /80   Pulse 80   Resp 16   LMP 09/30/2024 (Exact Date)     Gen: in NAD  MSE: AAOx3, nl language, nl attn/conc, nl fund of knowledge  CN: PERRL, VFF; EOMI, no nystagmus; nl facial mvmt bilaterally; nl hearing bilaterally; nl palate elevation bilaterally; nl voice; nl shoulder shrug b/I; nl tongue movement  Motor: 5/5 x4; no drift; normal tone; no abnormal movements  Sensory: nl vibration x4  Coord: nl FTN b/I  Reflex: 2+ BUE and BLE  Gait: normal    Allergies[1]    Current medications:   hydroxychloroquine (PLAQUENIL) 200 MG  Oral Tab Take 1 tablet (200 mg total) by mouth daily. With food 30 tablet 3    ondansetron (ZOFRAN) 4 mg tablet Take 2 tablets (8 mg total) by mouth every 8 (eight) hours as needed for Nausea. 90 tablet 0    HYDROcodone-acetaminophen (NORCO) 5-325 MG Oral Tab Take 1 tablet by mouth 2 (two) times daily as needed for Pain. 60 tablet 0    levetiracetam 750 MG Oral Tab Take 1 tablet (750 mg total) by mouth 2 (two) times daily. 180 tablet 0       Past Medical History:    Abdominal pain    Arthritis    Back pain    Back problem    low back pain    Blood in urine    Chronic pelvic pain in female    Encounter for insertion of ParaGard IUD    Lot# [id number] Exp 01/2022    Endometriosis determined by laparoscopy    2/19/2020 Laparoscopic right salpingo-oophorectomy. Endometrioma of right ovary. Fallopian tube with endometriosis and acute and chronic salpingitis. Dr Jazmyne Whittaker    General counseling for prescription of oral contraceptives    Heartburn    Hemorrhoids    Irritable bowel syndrome    Nausea    Other screening mammogram    Pain in joints    Prediabetes    Routine Papanicolaou smear    Visual impairment    glasses and contacts    Vomiting    Wears glasses       Past Surgical History:   Procedure Laterality Date    Colonoscopy N/A 03/01/2021    Procedure: COLONOSCOPY;  Surgeon: Louis Pereira MD;  Location:  ENDOSCOPY    Colonoscopy N/A 06/27/2024    with biopsy - done for Abdominal pain, abnormal CT scan with enteritis and colitis. Alexis Thornton, ;  Location:  ENDOSCOPY    Egd  06/27/2024 6/27/24 EGD with biopsy -for abdominal pain - 2 cm Hill grade 3 hiatal hernia, gastritis, normal appearing duodenum & esophagus.    Oophorectomy Right 02/19/2020 2/19/2020 Laparoscopic right salpingo-oophorectomy. Endometrioma of right ovary. Fallopian tube with endometriosis and acute and chronic salpingitis. Dr Jazmyne Whittaker    Other surgical history  04/08/2014    cystoscopy- Dr. Abbott    Salpingectomy Right  02/19/2020 2/19/2020 Laparoscopic right salpingo-oophorectomy. Endometrioma of right ovary. Fallopian tube with endometriosis and acute and chronic salpingitis. Dr Jazmyne Whittaker    Shoulder arthroscopy Left 12/05/2019    Dr Lozoya       Social History     Socioeconomic History    Marital status:    Tobacco Use    Smoking status: Never    Smokeless tobacco: Never   Vaping Use    Vaping status: Never Used   Substance and Sexual Activity    Alcohol use: No    Drug use: No    Sexual activity: Yes     Partners: Male   Other Topics Concern    Caffeine Concern No     Comment: 2 cups tea or coffee/day    Exercise Yes     Comment: rare    Seat Belt Yes       Family History   Problem Relation Age of Onset    Diabetes Father     Hypertension Mother        Angel Daniel MD, FAES, FAAN  Board-Certified in Neurology, Epilepsy, and Clinical Neurophysiology  [facility]         [1]   Allergies  Allergen Reactions    Advil [Ibuprofen] SWELLING    Ketorolac SWELLING     Patient received a subacromial right shoulder corticosteroid and ketorolac injection and developed right eye a mild swelling that was self-limited over the course of 24 hours.

## 2025-01-09 RX ORDER — HYDROCODONE BITARTRATE AND ACETAMINOPHEN 5; 325 MG/1; MG/1
1 TABLET ORAL 2 TIMES DAILY PRN
Qty: 60 TABLET | Refills: 0 | Status: SHIPPED | OUTPATIENT
Start: 2025-01-09

## 2025-01-09 NOTE — TELEPHONE ENCOUNTER
Last office visit: 12/17/24   Protocol: fail  Requested medication(s) are due for refill today: yes  Requested medication(s) are on the active medication list same strength, form, dose/ sig: yes  Requested medication(s) are managed by provider: yes  Patient has already received a courtsey refill: no    NOV: 3/3/25    Asked to Return: 1/14/25

## 2025-01-20 ENCOUNTER — NURSE ONLY (OUTPATIENT)
Dept: ELECTROPHYSIOLOGY | Facility: HOSPITAL | Age: 45
End: 2025-01-20
Attending: Other
Payer: COMMERCIAL

## 2025-01-20 DIAGNOSIS — R40.20 LOSS OF CONSCIOUSNESS (HCC): ICD-10-CM

## 2025-01-21 ENCOUNTER — NURSE ONLY (OUTPATIENT)
Dept: ELECTROPHYSIOLOGY | Facility: HOSPITAL | Age: 45
End: 2025-01-21
Attending: Other
Payer: COMMERCIAL

## 2025-01-21 PROBLEM — R40.20 LOSS OF CONSCIOUSNESS (HCC): Status: ACTIVE | Noted: 2025-01-21

## 2025-01-27 ENCOUNTER — OFFICE VISIT (OUTPATIENT)
Facility: CLINIC | Age: 45
End: 2025-01-27
Payer: COMMERCIAL

## 2025-01-27 VITALS — SYSTOLIC BLOOD PRESSURE: 126 MMHG | DIASTOLIC BLOOD PRESSURE: 78 MMHG | RESPIRATION RATE: 16 BRPM | HEART RATE: 80 BPM

## 2025-01-27 DIAGNOSIS — R40.20 LOSS OF CONSCIOUSNESS (HCC): Primary | ICD-10-CM

## 2025-01-27 PROCEDURE — 99213 OFFICE O/P EST LOW 20 MIN: CPT | Performed by: OTHER

## 2025-01-27 PROCEDURE — 3074F SYST BP LT 130 MM HG: CPT | Performed by: OTHER

## 2025-01-27 PROCEDURE — 3078F DIAST BP <80 MM HG: CPT | Performed by: OTHER

## 2025-01-27 NOTE — PROGRESS NOTES
Neurology History & Physical     ASSESSMENT & PLAN:      ICD-10-CM    1. Loss of consciousness (HCC)  R40.20         LOC.  I do not believe the clinical history or the EEG clearly support a diagnosis of seizure/epilepsy.  Unremarkable aEEG off LEV.  May resume driving once off meds x 1 month (and only if no recurrence of symptoms).  I advised she must call at that time (~2 weeks from now) to document this.    Return for concerns as needed.       ~~~~~~~~~~~~~~~~~~~~~~~~~~~    CHIEF COMPLAINT / REASON FOR VISIT:    Chief Complaint   Patient presents with    Seizures     No episodes since last visit. Off Keppra      HISTORY OBTAINED FROM:  Patient and others as above  Chart review    HISTORY:  Marielos Reid is a 44 year old female with DM2, who had altered mentation after taking 3 tramadol tablets (though that is not necessarily more than she usually takes, per discussion today).  There was suspicion for seizure, and EEG was abnormal so started on LEV 1g BID, reduced to 750 mg BID due to ? nausea.    Per patient/, she went to bed but no memory of the event.   her a scream, found patient in bed with eyes wide open, supine, no movement but had rapid shallow breathing, no gurgling or noisy respiration.  He tried to wake her up, she went very stiff only briefly and she went fully limp with no breathing and no movement.   started CPR, 911 was called, after 1-2 minutes, she suddenly took a deep breath and awoke once paramedics came.  She felt \"out of her body\" afterward, and she was able to sit up once paramedics arrived.  Apparently she didn't remember but she did answer questions correctly.  Was put in chair, taken downstairs to stretcher, and then next thing she remembers is being in hospital.  Only next day was she able to think back and remember all of this.    She is having considerable anxiety and fatigue, since LEV (didn't have it before).  Has had numbness in occiput intermittently in past 6  months, worse when more anxious.    Epilepsy risk factors:  Normal birth and development   No febrile or childhood seizures   No meningitis/encephalitis  No head trauma with prolonged LOC/amnesia   No known structural brain lesions  No FH of seizures    Previous medication trials:  none    INTERIM HISTORY:  Off LEV for 2 weeks.  Occ WFD but eventually does find the words.  No headaches, LOC, confusion, disorientation events.      Works in medical coding  Driving status:  NOT Driving    DATA REVIEWED:  As documented in the history    Jan 2025  24 hr aEEG (me) unremarkable    Dec 2024  MRI brain unremarkable  MRI C spine unremarkable  PCP note  Neuro notes  EEG x 3 (I independently analyzed and interpreted ~36 hrs of EEG, and I believe it only shows intermittent generalized slowing / FIRDA / delta waves with triphasic morphology, as well as HH and atypical vertex waves, but no definite epileptiform discharges.)    PHYSICAL EXAMINATION:  /78   Pulse 80   Resp 16   LMP 09/30/2024 (Exact Date)     Gen: in NAD  MSE: nl attn/conc, nl language, nl fund of knowledge  CN: EOMI, VFF, nl facial mvmt, nl palate, nl tongue  Motor: 5/5 x4, no drift  DTR: 2+ BUE and BLE  Coord: nl FTN b/I  Gait: normal    Allergies[1]    Current medications:   HYDROcodone-acetaminophen (NORCO) 5-325 MG Oral Tab Take 1 tablet by mouth 2 (two) times daily as needed for Pain. 60 tablet 0    hydroxychloroquine (PLAQUENIL) 200 MG Oral Tab Take 1 tablet (200 mg total) by mouth daily. With food 30 tablet 3    ondansetron (ZOFRAN) 4 mg tablet Take 2 tablets (8 mg total) by mouth every 8 (eight) hours as needed for Nausea. 90 tablet 0       Past Medical History:    Abdominal pain    Arthritis    Back pain    Back problem    low back pain    Blood in urine    Chronic pelvic pain in female    Encounter for insertion of ParaGard IUD    Lot# [id number] Exp 01/2022    Endometriosis determined by laparoscopy    2/19/2020 Laparoscopic right  salpingo-oophorectomy. Endometrioma of right ovary. Fallopian tube with endometriosis and acute and chronic salpingitis. Dr Jazmyne Whittaker    General counseling for prescription of oral contraceptives    Heartburn    Hemorrhoids    Irritable bowel syndrome    Nausea    Other screening mammogram    Pain in joints    Prediabetes    Routine Papanicolaou smear    Visual impairment    glasses and contacts    Vomiting    Wears glasses       Past Surgical History:   Procedure Laterality Date    Colonoscopy N/A 03/01/2021    Procedure: COLONOSCOPY;  Surgeon: Louis Pereira MD;  Location:  ENDOSCOPY    Colonoscopy N/A 06/27/2024    with biopsy - done for Abdominal pain, abnormal CT scan with enteritis and colitis. Alexis Thornton DO;  Location:  ENDOSCOPY    Egd  06/27/2024 6/27/24 EGD with biopsy -for abdominal pain - 2 cm Hill grade 3 hiatal hernia, gastritis, normal appearing duodenum & esophagus.    Oophorectomy Right 02/19/2020 2/19/2020 Laparoscopic right salpingo-oophorectomy. Endometrioma of right ovary. Fallopian tube with endometriosis and acute and chronic salpingitis. Dr Jazmyne Whittaker    Other surgical history  04/08/2014    cystoscopy- Dr. Abbott    Salpingectomy Right 02/19/2020 2/19/2020 Laparoscopic right salpingo-oophorectomy. Endometrioma of right ovary. Fallopian tube with endometriosis and acute and chronic salpingitis. Dr Jazmyne Whittaker    Shoulder arthroscopy Left 12/05/2019    Dr Lozoya       Social History     Socioeconomic History    Marital status:    Tobacco Use    Smoking status: Never    Smokeless tobacco: Never   Vaping Use    Vaping status: Never Used   Substance and Sexual Activity    Alcohol use: No    Drug use: No    Sexual activity: Yes     Partners: Male   Other Topics Concern    Caffeine Concern No     Comment: tea/coffee 1-2 cups per day    Exercise Yes     Comment: 1-2x per week    Seat Belt Yes       Family History   Problem Relation Age of Onset    Diabetes Father      Hypertension Mother        Angel Daniel MD, FAES, FAAN  Board-Certified in Neurology, Epilepsy, and Clinical Neurophysiology  [facility]         [1]   Allergies  Allergen Reactions    Advil [Ibuprofen] SWELLING    Ketorolac SWELLING     Patient received a subacromial right shoulder corticosteroid and ketorolac injection and developed right eye a mild swelling that was self-limited over the course of 24 hours.

## 2025-01-27 NOTE — PATIENT INSTRUCTIONS
After your visit at the [facility] office  today,  please direct any follow up questions or medication needs to the staff in our  [place] office so that your concerns may be promptly addressed.  We are available through [employer] or at the numbers below:    The phone number is:   [phone] option #1    The fax number is:  [phone]    Your pharmacy should also send any requests electronically to the [place] office.     Refill policies:    Allow 2-3 business days for refills; controlled substances may take longer.  Contact your pharmacy at least 5 days prior to running out of medication and have them send an electronic request or submit request through the “request refill” option in your [employer] account.  Refills are not addressed on weekends; covering physicians do not authorize routine medications on weekends.  No narcotics or controlled substances are refilled after noon on Fridays or by on call physicians.  By law, narcotics must be electronically prescribed.  A 30 day supply with no refills is the maximum allowed.  If your prescription is due for a refill, you may be due for a follow up appointment.  To best provide you care, patients receiving routine medications need to be seen at least once a year.  Patients receiving narcotic/controlled substance medications need to be seen at least once every 3 months.  In the event that your preferred pharmacy does not have the requested medication in stock (e.g. Backordered), it is your responsibility to find another pharmacy that has the requested medication available.  We will gladly send a new prescription to that pharmacy at your request.    Scheduling Tests:    If your physician has ordered radiology tests such as MRI or CT scans, please contact Central Scheduling at [phone] right away to schedule the test.  Once scheduled, the [facility] Centralized Referral Team will work with your insurance carrier to obtain pre-certification or prior  authorization.  Depending on your insurance carrier, approval may take 3-10 days.  It is highly recommended patients assure they have received an authorization before having a test performed.  If test is done without insurance authorization, patient may be responsible for the entire amount billed.      Precertification and Prior Authorizations:  If your physician has recommended that you have a procedure or additional testing performed the [facility] Centralized Referral Team will contact your insurance carrier to obtain pre-certification or prior authorization.    You are strongly encouraged to contact your insurance carrier to verify that your procedure/test has been approved and is a COVERED benefit.  Although the [facility] Centralized Referral Team does its due diligence, the insurance carrier gives the disclaimer that \"Although the procedure is authorized, this does not guarantee payment.\"    Ultimately the patient is responsible for payment.   Thank you for your understanding in this matter.  Paperwork Completion:  If you require FMLA or disability paperwork for your recovery, please make sure to either drop it off or have it faxed to our office at [phone]. Be sure the form has your name and date of birth on it.  The form will be faxed to our Forms Department and they will complete it for you.  There is a 25$ fee for all forms that need to be filled out.  Please be aware there is a 10-14 day turnaround time.  You will need to sign a release of information (MAGI) form if your paperwork does not come with one.  You may call the Forms Department with any questions at [phone].  Their fax number is [phone].

## 2025-02-17 DIAGNOSIS — G89.4 CHRONIC PAIN SYNDROME: ICD-10-CM

## 2025-02-17 RX ORDER — HYDROCODONE BITARTRATE AND ACETAMINOPHEN 5; 325 MG/1; MG/1
1 TABLET ORAL 2 TIMES DAILY PRN
Qty: 60 TABLET | Refills: 0 | Status: SHIPPED | OUTPATIENT
Start: 2025-02-17

## 2025-02-17 NOTE — TELEPHONE ENCOUNTER
Pt takes Melvin for chronic pain. Dr Parker, I've not seen the pt. I sent a refill once since I was covering. Are you ok with refill?

## 2025-03-03 ENCOUNTER — OFFICE VISIT (OUTPATIENT)
Dept: FAMILY MEDICINE CLINIC | Facility: CLINIC | Age: 45
End: 2025-03-03
Payer: COMMERCIAL

## 2025-03-03 VITALS
DIASTOLIC BLOOD PRESSURE: 72 MMHG | HEIGHT: 63.5 IN | RESPIRATION RATE: 16 BRPM | SYSTOLIC BLOOD PRESSURE: 124 MMHG | OXYGEN SATURATION: 98 % | HEART RATE: 66 BPM | WEIGHT: 118.38 LBS | BODY MASS INDEX: 20.72 KG/M2

## 2025-03-03 DIAGNOSIS — Z79.899 MEDICATION MANAGEMENT: ICD-10-CM

## 2025-03-03 DIAGNOSIS — F41.9 ANXIETY: ICD-10-CM

## 2025-03-03 DIAGNOSIS — E78.5 DYSLIPIDEMIA: ICD-10-CM

## 2025-03-03 DIAGNOSIS — G40.909 SEIZURE DISORDER (HCC): ICD-10-CM

## 2025-03-03 DIAGNOSIS — R23.2 HOT FLASHES: ICD-10-CM

## 2025-03-03 DIAGNOSIS — E11.9 TYPE 2 DIABETES MELLITUS WITHOUT COMPLICATION, WITHOUT LONG-TERM CURRENT USE OF INSULIN (HCC): Primary | ICD-10-CM

## 2025-03-03 DIAGNOSIS — I73.00 RAYNAUD'S PHENOMENON WITHOUT GANGRENE: ICD-10-CM

## 2025-03-03 DIAGNOSIS — Z13.89 SCREENING FOR GENITOURINARY CONDITION: ICD-10-CM

## 2025-03-03 DIAGNOSIS — I10 ESSENTIAL HYPERTENSION: ICD-10-CM

## 2025-03-03 DIAGNOSIS — R93.3 ABNORMAL CT SCAN, SMALL BOWEL: ICD-10-CM

## 2025-03-03 DIAGNOSIS — R53.83 OTHER FATIGUE: ICD-10-CM

## 2025-03-03 DIAGNOSIS — G89.4 CHRONIC PAIN SYNDROME: ICD-10-CM

## 2025-03-03 LAB
ALBUMIN SERPL-MCNC: 4.7 G/DL (ref 3.2–4.8)
ALBUMIN/GLOB SERPL: 1.2 {RATIO} (ref 1–2)
ALP LIVER SERPL-CCNC: 69 U/L
ALT SERPL-CCNC: 12 U/L
ANION GAP SERPL CALC-SCNC: 9 MMOL/L (ref 0–18)
AST SERPL-CCNC: 28 U/L (ref ?–34)
BASOPHILS # BLD AUTO: 0.07 X10(3) UL (ref 0–0.2)
BASOPHILS NFR BLD AUTO: 1.1 %
BILIRUB SERPL-MCNC: 0.3 MG/DL (ref 0.3–1.2)
BILIRUB UR QL STRIP.AUTO: NEGATIVE
BUN BLD-MCNC: 12 MG/DL (ref 9–23)
CALCIUM BLD-MCNC: 9.7 MG/DL (ref 8.7–10.6)
CHLORIDE SERPL-SCNC: 102 MMOL/L (ref 98–112)
CLARITY UR REFRACT.AUTO: CLEAR
CO2 SERPL-SCNC: 28 MMOL/L (ref 21–32)
COLOR UR AUTO: YELLOW
CREAT BLD-MCNC: 1.14 MG/DL
CREAT UR-SCNC: 285.3 MG/DL
CRP SERPL-MCNC: <0.4 MG/DL (ref ?–0.5)
EGFRCR SERPLBLD CKD-EPI 2021: 60 ML/MIN/1.73M2 (ref 60–?)
EOSINOPHIL # BLD AUTO: 0.39 X10(3) UL (ref 0–0.7)
EOSINOPHIL NFR BLD AUTO: 5.9 %
ERYTHROCYTE [DISTWIDTH] IN BLOOD BY AUTOMATED COUNT: 12.4 %
FASTING STATUS PATIENT QL REPORTED: YES
GLOBULIN PLAS-MCNC: 4 G/DL (ref 2–3.5)
GLUCOSE BLD-MCNC: 91 MG/DL (ref 70–99)
GLUCOSE UR STRIP.AUTO-MCNC: NORMAL MG/DL
HCT VFR BLD AUTO: 37.6 %
HGB BLD-MCNC: 12.7 G/DL
IMM GRANULOCYTES # BLD AUTO: 0.01 X10(3) UL (ref 0–1)
IMM GRANULOCYTES NFR BLD: 0.2 %
KETONES UR STRIP.AUTO-MCNC: NEGATIVE MG/DL
LEUKOCYTE ESTERASE UR QL STRIP.AUTO: NEGATIVE
LYMPHOCYTES # BLD AUTO: 2.92 X10(3) UL (ref 1–4)
LYMPHOCYTES NFR BLD AUTO: 44.3 %
MCH RBC QN AUTO: 29 PG (ref 26–34)
MCHC RBC AUTO-ENTMCNC: 33.8 G/DL (ref 31–37)
MCV RBC AUTO: 85.8 FL
MICROALBUMIN UR-MCNC: 1.9 MG/DL
MICROALBUMIN/CREAT 24H UR-RTO: 6.7 UG/MG (ref ?–30)
MONOCYTES # BLD AUTO: 0.5 X10(3) UL (ref 0.1–1)
MONOCYTES NFR BLD AUTO: 7.6 %
NEUTROPHILS # BLD AUTO: 2.7 X10 (3) UL (ref 1.5–7.7)
NEUTROPHILS # BLD AUTO: 2.7 X10(3) UL (ref 1.5–7.7)
NEUTROPHILS NFR BLD AUTO: 40.9 %
NITRITE UR QL STRIP.AUTO: NEGATIVE
OSMOLALITY SERPL CALC.SUM OF ELEC: 287 MOSM/KG (ref 275–295)
PH UR STRIP.AUTO: 5.5 [PH] (ref 5–8)
PLATELET # BLD AUTO: 341 10(3)UL (ref 150–450)
POTASSIUM SERPL-SCNC: 4.9 MMOL/L (ref 3.5–5.1)
PROT SERPL-MCNC: 8.7 G/DL (ref 5.7–8.2)
RBC # BLD AUTO: 4.38 X10(6)UL
RBC #/AREA URNS AUTO: >10 /HPF
SODIUM SERPL-SCNC: 139 MMOL/L (ref 136–145)
SP GR UR STRIP.AUTO: 1.03 (ref 1–1.03)
T4 FREE SERPL-MCNC: 1.4 NG/DL (ref 0.8–1.7)
TSI SER-ACNC: 3.87 UIU/ML (ref 0.55–4.78)
UROBILINOGEN UR STRIP.AUTO-MCNC: NORMAL MG/DL
WBC # BLD AUTO: 6.6 X10(3) UL (ref 4–11)

## 2025-03-03 PROCEDURE — 86140 C-REACTIVE PROTEIN: CPT

## 2025-03-03 PROCEDURE — 3078F DIAST BP <80 MM HG: CPT | Performed by: FAMILY MEDICINE

## 2025-03-03 PROCEDURE — 80053 COMPREHEN METABOLIC PANEL: CPT

## 2025-03-03 PROCEDURE — 82043 UR ALBUMIN QUANTITATIVE: CPT | Performed by: FAMILY MEDICINE

## 2025-03-03 PROCEDURE — 82570 ASSAY OF URINE CREATININE: CPT | Performed by: FAMILY MEDICINE

## 2025-03-03 PROCEDURE — 81001 URINALYSIS AUTO W/SCOPE: CPT | Performed by: FAMILY MEDICINE

## 2025-03-03 PROCEDURE — 3008F BODY MASS INDEX DOCD: CPT | Performed by: FAMILY MEDICINE

## 2025-03-03 PROCEDURE — 3074F SYST BP LT 130 MM HG: CPT | Performed by: FAMILY MEDICINE

## 2025-03-03 PROCEDURE — 84439 ASSAY OF FREE THYROXINE: CPT

## 2025-03-03 PROCEDURE — 85025 COMPLETE CBC W/AUTO DIFF WBC: CPT | Performed by: FAMILY MEDICINE

## 2025-03-03 PROCEDURE — 84443 ASSAY THYROID STIM HORMONE: CPT

## 2025-03-03 PROCEDURE — 99214 OFFICE O/P EST MOD 30 MIN: CPT | Performed by: FAMILY MEDICINE

## 2025-03-03 RX ORDER — HYDROCODONE BITARTRATE AND ACETAMINOPHEN 5; 325 MG/1; MG/1
1 TABLET ORAL EVERY 8 HOURS PRN
Qty: 90 TABLET | Refills: 0 | Status: SHIPPED | OUTPATIENT
Start: 2025-03-03 | End: 2025-04-02

## 2025-03-03 RX ORDER — FEZOLINETANT 45 MG/1
1 TABLET, FILM COATED ORAL DAILY
Qty: 30 TABLET | Refills: 3 | Status: SHIPPED | OUTPATIENT
Start: 2025-03-03

## 2025-03-03 RX ORDER — NIFEDIPINE 30 MG
30 TABLET, EXTENDED RELEASE ORAL DAILY PRN
Qty: 30 TABLET | Refills: 1 | Status: SHIPPED | OUTPATIENT
Start: 2025-03-03

## 2025-03-03 RX ORDER — HYDROCODONE BITARTRATE AND ACETAMINOPHEN 5; 325 MG/1; MG/1
1 TABLET ORAL EVERY 8 HOURS PRN
Qty: 90 TABLET | Refills: 0 | Status: SHIPPED | OUTPATIENT
Start: 2025-04-03 | End: 2025-05-03

## 2025-03-03 NOTE — PROGRESS NOTES
Marielos Reid is a 44 year old female.  HPI:   Patient is here for follow-up.  Patient complains of several things today.  She states she is constantly tired.  She states that she cannot deal with her hot flashes.  She did buy something from [employer] and tried Estroven which she feels both are not helping.  She would like to know if there is something for the hot flashes.  She feels like her pain in her back is not controlled and she would like to go up to her [place] tens.  She states she doubles her dose plenty of times because of her pain.  Patient also complains of her Raynaud's in her fingers and in her toes.  She would like to know if there is something for that.  Patient complains of general fatigue and does not know why.  She is not concerned about being pregnant today.  Medications reviewed.    Current Outpatient Medications   Medication Sig Dispense Refill    Fezolinetant (VEOZAH) 45 MG Oral Tab Take 1 tablet by mouth daily. 30 tablet 3    NIFEdipine ER 30 MG Oral Tablet 24 Hr Take 1 tablet (30 mg total) by mouth daily as needed. 30 tablet 1    HYDROcodone-acetaminophen (NORCO) 5-325 MG Oral Tab Take 1 tablet by mouth every 8 (eight) hours as needed for Pain. 90 tablet 0    [START ON 4/3/2025] HYDROcodone-acetaminophen (NORCO) 5-325 MG Oral Tab Take 1 tablet by mouth every 8 (eight) hours as needed for Pain. 90 tablet 0    HYDROcodone-acetaminophen (NORCO) 5-325 MG Oral Tab Take 1 tablet by mouth 2 (two) times daily as needed for Pain. 60 tablet 0    ondansetron (ZOFRAN) 4 mg tablet Take 2 tablets (8 mg total) by mouth every 8 (eight) hours as needed for Nausea. 90 tablet 0     No current facility-administered medications for this visit.      Allergies[1]   Past Medical History:    Abdominal pain    Arthritis    Back pain    Back problem    low back pain    Blood in urine    Chronic pelvic pain in female    Encounter for insertion of ParaGard IUD    Lot# [id number] Exp 01/2022    Endometriosis determined by  laparoscopy    2/19/2020 Laparoscopic right salpingo-oophorectomy. Endometrioma of right ovary. Fallopian tube with endometriosis and acute and chronic salpingitis. Dr Jazmyne Whittaker    General counseling for prescription of oral contraceptives    Heartburn    Hemorrhoids    Irritable bowel syndrome    Nausea    Other screening mammogram    Pain in joints    Prediabetes    Routine Papanicolaou smear    Visual impairment    glasses and contacts    Vomiting    Wears glasses      Social History:  Social History     Socioeconomic History    Marital status:    Tobacco Use    Smoking status: Never    Smokeless tobacco: Never   Vaping Use    Vaping status: Never Used   Substance and Sexual Activity    Alcohol use: No    Drug use: No    Sexual activity: Yes     Partners: Male   Other Topics Concern    Caffeine Concern No     Comment: tea/coffee 1-2 cups per day    Exercise Yes     Comment: 1-2x per week    Seat Belt Yes     Social Drivers of Health     Food Insecurity: No Food Insecurity (3/3/2025)    NCSS - Food Insecurity     Worried About Running Out of Food in the Last Year: No     Ran Out of Food in the Last Year: No   Transportation Needs: No Transportation Needs (3/3/2025)    NCSS - Transportation     Lack of Transportation: No   Housing Stability: Not At Risk (3/3/2025)    NCSS - Housing/Utilities     Has Housing: Yes     Worried About Losing Housing: No     Unable to Get Utilities: No        Results for orders placed or performed in visit on 12/11/24   CBC With Differential With Platelet    Collection Time: 12/11/24 12:35 PM   Result Value Ref Range    WBC 5.6 4.0 - 11.0 x10(3) uL    RBC 4.39 3.80 - 5.30 x10(6)uL    HGB 12.0 12.0 - 16.0 g/dL    HCT 37.4 35.0 - 48.0 %    .0 150.0 - 450.0 10(3)uL    MCV 85.2 80.0 - 100.0 fL    MCH 27.3 26.0 - 34.0 pg    MCHC 32.1 31.0 - 37.0 g/dL    RDW 15.4 %    Neutrophil Absolute Prelim 2.85 1.50 - 7.70 x10 (3) uL    Neutrophil Absolute 2.85 1.50 - 7.70 x10(3) uL     Lymphocyte Absolute 1.95 1.00 - 4.00 x10(3) uL    Monocyte Absolute 0.43 0.10 - 1.00 x10(3) uL    Eosinophil Absolute 0.26 0.00 - 0.70 x10(3) uL    Basophil Absolute 0.05 0.00 - 0.20 x10(3) uL    Immature Granulocyte Absolute 0.01 0.00 - 1.00 x10(3) uL    Neutrophil % 51.4 %    Lymphocyte % 35.1 %    Monocyte % 7.7 %    Eosinophil % 4.7 %    Basophil % 0.9 %    Immature Granulocyte % 0.2 %   Comp Metabolic Panel (14)    Collection Time: 12/11/24 12:35 PM   Result Value Ref Range    Glucose 87 70 - 99 mg/dL    Sodium 139 136 - 145 mmol/L    Potassium 4.2 3.5 - 5.1 mmol/L    Chloride 103 98 - 112 mmol/L    CO2 29.0 21.0 - 32.0 mmol/L    Anion Gap 7 0 - 18 mmol/L    BUN 8 (L) 9 - 23 mg/dL    Creatinine 0.86 0.55 - 1.02 mg/dL    Calcium, Total 9.3 8.7 - 10.4 mg/dL    Calculated Osmolality 286 275 - 295 mOsm/kg    eGFR-Cr 84 >=60 mL/min/1.73m2    AST 23 <34 U/L    ALT 14 10 - 49 U/L    Alkaline Phosphatase 74 39 - 100 U/L    Bilirubin, Total <0.2 (L) 0.3 - 1.2 mg/dL    Total Protein 8.6 (H) 5.7 - 8.2 g/dL    Albumin 4.2 3.2 - 4.8 g/dL    Globulin  4.4 (H) 2.0 - 3.5 g/dL    A/G Ratio 1.0 1.0 - 2.0    Patient Fasting for CMP? No    TSH W Reflex To Free T4    Collection Time: 12/11/24 12:35 PM   Result Value Ref Range    TSH 1.476 0.550 - 4.780 uIU/mL   Lipid Panel    Collection Time: 12/11/24 12:35 PM   Result Value Ref Range    Cholesterol, Total 151 <200 mg/dL    HDL Cholesterol 53 40 - 59 mg/dL    Triglycerides 147 30 - 149 mg/dL    LDL Cholesterol 73 <100 mg/dL    VLDL 23 0 - 30 mg/dL    Non HDL Chol 98 <130 mg/dL    Patient Fasting for Lipid? No    Hemoglobin A1C    Collection Time: 12/11/24 12:35 PM   Result Value Ref Range    HgbA1C 6.1 (H) <5.7 %    Estimated Average Glucose 128 (H) 68 - 126 mg/dL   Anti-RNP Antibodies, IgG    Collection Time: 12/11/24 12:35 PM   Result Value Ref Range    Anti-U1RNP Antibody, IGG 2.3 <5 U/mL    Anti-RNP70 Antibody, IGG 3.3 <7 U/mL   Anti-dsDNA Antibody, IgG    Collection Time:  12/11/24 12:35 PM   Result Value Ref Range    Anti-dsDNA antibody 3.1 <10 IU/mL   Anti-Smith Antibody, IgG    Collection Time: 12/11/24 12:35 PM   Result Value Ref Range    Anti-Smith Antibody, IGG <0.7 <7 U/mL   Complement C3, Serum    Collection Time: 12/11/24 12:35 PM   Result Value Ref Range    Complement C3 135.4 90.0 - 170.0 mg/dL   Complement C4, Serum    Collection Time: 12/11/24 12:35 PM   Result Value Ref Range    Complement C4 50.2 (H) 12.0 - 36.0 mg/dL   Vitamin B12    Collection Time: 12/11/24 12:35 PM   Result Value Ref Range    Vitamin B12 338 211 - 911 pg/mL   Urinalysis with Culture Reflex    Collection Time: 12/11/24 12:35 PM    Specimen: Urine, clean catch   Result Value Ref Range    Urine Color Colorless (A) Yellow    Clarity Urine Clear Clear    Spec Gravity <1.005 (L) 1.005 - 1.030    Glucose Urine Normal Normal mg/dL    Bilirubin Urine Negative Negative    Ketones Urine Negative Negative mg/dL    Blood Urine 1+ (A) Negative    pH Urine 7.0 5.0 - 8.0    Protein Urine Negative Negative mg/dL    Urobilinogen Urine Normal Normal mg/dL    Nitrite Urine Negative Negative    Leukocyte Esterase Urine Negative Negative    WBC Urine 1-5 0 - 5 /HPF    RBC Urine 0-2 0 - 2 /HPF    Bacteria Urine 1+ (A) None Seen /HPF    Squamous Epi. Cells Few (A) None Seen /HPF    Renal Tubular Epithelial Cells None Seen None Seen /HPF    Transitional Cells None Seen None Seen /HPF    Ca Oxalate Crystals Few (A) None Seen /HPF    Yeast Urine None Seen None Seen /HPF   Protein/Creatinine Ratio, Urine Random    Collection Time: 12/11/24 12:35 PM   Result Value Ref Range    Total Protein Urine Random <6.0 <14.0 mg/dL    Creatinine Ur Random 19.40 mg/dL    Urine Protein/Creatinine Ratio, Random     Hepatitis C RNA Qnt w/ Reflex to Genotype    Collection Time: 12/11/24 12:35 PM   Result Value Ref Range    HCV Test Information Comment     HCV Genotype COMMENT     HCV log10 COMMENT log10 IU/mL    Hepatitis C Quantitation HCV Not  Detected IU/mL   HSV 1 & 2 Glycoprotein Specific AB,IGG    Collection Time: 12/11/24 12:35 PM   Result Value Ref Range    HSV 1 Glycoprotein G, IgG Positive (A) Negative    HSV 2 Glycoprotein G, IgG Negative Negative   Herpesvirus 6 Antibody, IgM    Collection Time: 12/11/24 12:35 PM   Result Value Ref Range    HHV 6 IgM <1:10 Neg:<1:10   Sed Rate, W[place]gren (Automated)    Collection Time: 12/11/24 12:35 PM   Result Value Ref Range    Sed Rate 52 (H) 0 - 20 mm/Hr   FSH    Collection Time: 12/11/24 12:35 PM   Result Value Ref Range    FSH 8.7 No established range for female sex mIU/mL   LH (Luteinizing Hormone)    Collection Time: 12/11/24 12:35 PM   Result Value Ref Range    LH 24.2 No established range for female sex mIU/mL   Estradiol    Collection Time: 12/11/24 12:35 PM   Result Value Ref Range    Estradiol 301.5 No established range for female sex pg/mL   Serum Protein Electrophoresis    Collection Time: 12/11/24 12:35 PM   Result Value Ref Range    Total Protein 8.0 5.7 - 8.2 g/dL    Albumin 4.10 3.75 - 5.21 g/dL    Alpha-1-Globulins 0.26 0.19 - 0.46 g/dL    Alpha-2-Globulins 0.70 0.48 - 1.05 g/dL    Beta Globulins 1.02 0.68 - 1.23 g/dL    Gamma Globulins 1.91 (H) 0.62 - 1.70 g/dL    Albumin/Globulin Ratio 1.05 1.00 - 2.00    SPE Interpretation       Polyclonal hypergammaglobulinemia. No apparent monoclonal  protein on serum electrophoresis.       Reviewed By:       Reviewed by Grabiel Funk M.D. Pathology 12/13/24 at 8:09 AM   Immunoglobulin A/G/M, Quant    Collection Time: 12/11/24 12:35 PM   Result Value Ref Range    Immunoglobulin A 287.40 40.00 - 350.00 mg/dL    Immunoglobulin G 1,944 (H) 650 - 1,600 mg/dL    Immunoglobulin M 160.5 50.0 - 300.0 mg/dL   Immunofixation (DOROTHY)    Collection Time: 12/11/24 12:35 PM   Result Value Ref Range    Immunofixation       No monoclonal protein detected by immunofixation.      Reviewed By:       Reviewed by Grabiel Funk M.D. Pathology 12/13/24 at 8:09 AM      Immunoglobulin free LT chains blood    Collection Time: 12/11/24 12:35 PM   Result Value Ref Range    Kappa Free Light Chain 2.841 (H) 0.330 - 1.940 mg/dL    Lambda Free Light Chain 2.078 0.571 - 2.630 mg/dL    Kappa/Lambda Flc Ratio 1.37 0.26 - 1.65   Vitamin D, 25-Hydroxy    Collection Time: 12/11/24 12:35 PM   Result Value Ref Range    Vitamin D, 25OH, Total 33.8 30.0 - 100.0 ng/mL   Vitamin D, 25-Hydroxy    Collection Time: 12/11/24 12:35 PM   Result Value Ref Range    Vitamin D, 25OH, Total 35.8 30.0 - 100.0 ng/mL     *Note: Due to a large number of results and/or encounters for the requested time period, some results have not been displayed. A complete set of results can be found in Results Review.       REVIEW OF SYSTEMS:   GENERAL: feels well otherwise  SKIN: denies any unusual skin lesions  LUNGS: denies shortness of breath with exertion  CARDIOVASCULAR: denies chest pain on exertion  GI: denies abdominal pain,denies heartburn  MUSCULOSKELETAL: chronic back pain  ENDO: chronic fatigue; hot flashes and raynaud  EXTREMITIES:  No pain or numbness    EXAM:   /72 (BP Location: Left arm, Patient Position: Sitting, Cuff Size: adult)   Pulse 66   Resp 16   Ht 5' 3.5\" (1.613 m)   Wt 118 lb 6 oz (53.7 kg)   LMP 02/03/2025 (Exact Date)   SpO2 98%   BMI 20.64 kg/m²   GENERAL: well developed, well nourished,in no apparent distress  SKIN: no rashes,no suspicious lesions  HEENT: atraumatic, normocephalic  NECK: supple,no adenopathy,no bruits  LUNGS: clear to auscultation  CARDIO: RRR without murmur  GI: soft, good BS's; no HSM  EXTREMITIES: no cyanosis, clubbing or edema; fingers and toes cold -- nail polish on the nails    ASSESSMENT AND PLAN:     Assessment & Plan  Type 2 diabetes mellitus without complication, without long-term current use of insulin (HCC)  Due for labs in 3 months.  Orders:    Hemoglobin A1C [E]; Future    Microalb/Creat Ratio, Random Urine; Future    Dyslipidemia  Deferred statin.  Due  for labs in 3 months  Orders:    Lipid Panel; Future    Essential hypertension  Stable; CPM  No meds       Chronic pain syndrome  Increase norco 5m g from BID to TID.  Discussed risk of increasing her meds.  Pain contract signed.    Orders:    HYDROcodone-acetaminophen (NORCO) 5-325 MG Oral Tab; Take 1 tablet by mouth every 8 (eight) hours as needed for Pain.    HYDROcodone-acetaminophen (NORCO) 5-325 MG Oral Tab; Take 1 tablet by mouth every 8 (eight) hours as needed for Pain.    Anxiety  Stable; CPM  No meds       Seizure disorder (HCC)  Stable; CPM  No meds       Medication management  Labs ordered.  Orders:    Comp Metabolic Panel (14); Future    Hepatic Function Panel (7) [E]; Future    Hepatic Function Panel (7) [E]; Future    Hot flashes  Will try veozah and discussed using discount card.  Orders:    Hepatic Function Panel (7) [E]; Future    Hepatic Function Panel (7) [E]; Future    Raynaud's phenomenon without gangrene  Will try nifedipine prn and advised to monitor BP.  Orders:    NIFEdipine ER 30 MG Oral Tablet 24 Hr; Take 1 tablet (30 mg total) by mouth daily as needed.    Other fatigue  Labs drawn today.  Orders:    CBC With Differential With Platelet; Future    Comp Metabolic Panel (14); Future    Assay, Thyroid Stim Hormone; Future    Free T4, (Free Thyroxine); Future    Abnormal CT scan, small bowel  CRP ordered  Orders:    C-Reactive Protein (non cardiac) (4420)    Screening for genitourinary condition  UA ordered  Orders:    Urinalysis, Routine         Orders Placed This Encounter   Procedures    Comp Metabolic Panel (14)    Hemoglobin A1C [E]    Lipid Panel    Microalb/Creat Ratio, Random Urine    Hepatic Function Panel (7) [E]    Hepatic Function Panel (7) [E]    CBC With Differential With Platelet    Comp Metabolic Panel (14)    Assay, Thyroid Stim Hormone    Free T4, (Free Thyroxine)       Meds & Refills for this Visit:  Requested Prescriptions     Signed Prescriptions Disp Refills     Fezolinetant (VEOZAH) 45 MG Oral Tab 30 tablet 3     Sig: Take 1 tablet by mouth daily.    NIFEdipine ER 30 MG Oral Tablet 24 Hr 30 tablet 1     Sig: Take 1 tablet (30 mg total) by mouth daily as needed.    HYDROcodone-acetaminophen (NORCO) 5-325 MG Oral Tab 90 tablet 0     Sig: Take 1 tablet by mouth every 8 (eight) hours as needed for Pain.    HYDROcodone-acetaminophen (NORCO) 5-325 MG Oral Tab 90 tablet 0     Sig: Take 1 tablet by mouth every 8 (eight) hours as needed for Pain.       Imaging & Consults:  None  The patient indicates understanding of these issues and agrees to the plan.  No follow-ups on file.         [1]   Allergies  Allergen Reactions    Advil [Ibuprofen] SWELLING    Ketorolac SWELLING     Patient received a subacromial right shoulder corticosteroid and ketorolac injection and developed right eye a mild swelling that was self-limited over the course of 24 hours.

## 2025-03-03 NOTE — ASSESSMENT & PLAN NOTE
Due for labs in 3 months.  Orders:    Hemoglobin A1C [E]; Future    Microalb/Creat Ratio, Random Urine; Future

## 2025-03-04 ENCOUNTER — TELEPHONE (OUTPATIENT)
Dept: FAMILY MEDICINE CLINIC | Facility: CLINIC | Age: 45
End: 2025-03-04

## 2025-03-04 DIAGNOSIS — R77.9 ELEVATED SERUM PROTEIN LEVEL: Primary | ICD-10-CM

## 2025-03-04 RX ORDER — VENLAFAXINE HYDROCHLORIDE 37.5 MG/1
37.5 CAPSULE, EXTENDED RELEASE ORAL DAILY
Qty: 90 CAPSULE | Refills: 0 | Status: SHIPPED | OUTPATIENT
Start: 2025-03-04

## 2025-03-04 NOTE — TELEPHONE ENCOUNTER
While reviewing medications with patient, she noted she tried picking up the Veozah with 's coupon but the pharmacy told her they couldn't use it  Called pharmacy to clarify, they stated the 's coupon only works if insurance will cover part of medication and her insurance doesn't cover it at all   Spoke with pt, explained why the coupon didn't work and advised to call her insurance to see if they may cover another medication   Fyi/do you want to try another medication for hot flashes?

## 2025-03-15 NOTE — ANESTHESIA PREPROCEDURE EVALUATION
PRE-OP EVALUATION    Patient Name: Minal Ruvalcaba    Pre-op Diagnosis: Cyst of right ovary [N83.201]    Procedure(s):  Operative laparoscopy, right ovarian cystectomy possible right salpingo-oophorectomy, possible exploratory laparotomy    Surgeon(s) and DIYA The cause of his encephalopathy is not clear. He is not manifesting any evidence of a toxidrome or acute drug effect and he's been here 2 days now, at which point acute medication of drug effect would typically be resolving.   Wernicke's encephalopathy can always be considered in patient's with alcohol use. Therefore, high dose thiamine 500mg IV every 8 hours can be considered.   He is on valproic acid chronically. Therefore, valproate induced hyperammonemic encephalopathy is also considered. I have ordered an ammonia level and will follow up on that. If significantly elevated, will start carnitine.   Otherwise, nothing more specific is required from a med tox standpoint. Continue supportive care.    cystoscopy- Dr. Shanice Ontiveros   • SHOULDER ARTHROSCOPY Left 12/05/2019    Dr Dionicio Minor   • SHOULDER ARTHROSCOPY ROTATOR CUFF REPAIR Left 3/15/2019    Performed by Vernal Paget, MD at [facility] MAIN OR     Social History    Tobacco Use      Smoking status: Never Smoker      S

## 2025-04-24 RX ORDER — VENLAFAXINE HYDROCHLORIDE 37.5 MG/1
37.5 CAPSULE, EXTENDED RELEASE ORAL DAILY
Qty: 90 CAPSULE | Refills: 0 | OUTPATIENT
Start: 2025-04-24

## 2025-04-28 ENCOUNTER — OFFICE VISIT (OUTPATIENT)
Dept: FAMILY MEDICINE CLINIC | Facility: CLINIC | Age: 45
End: 2025-04-28
Payer: COMMERCIAL

## 2025-04-28 VITALS
DIASTOLIC BLOOD PRESSURE: 72 MMHG | WEIGHT: 116.38 LBS | RESPIRATION RATE: 18 BRPM | BODY MASS INDEX: 20.37 KG/M2 | OXYGEN SATURATION: 100 % | HEIGHT: 63.5 IN | SYSTOLIC BLOOD PRESSURE: 120 MMHG | HEART RATE: 100 BPM

## 2025-04-28 DIAGNOSIS — N95.1 PERIMENOPAUSE: ICD-10-CM

## 2025-04-28 DIAGNOSIS — M54.50 CHRONIC BILATERAL LOW BACK PAIN WITHOUT SCIATICA: Primary | ICD-10-CM

## 2025-04-28 DIAGNOSIS — Z12.31 SCREENING MAMMOGRAM FOR BREAST CANCER: ICD-10-CM

## 2025-04-28 DIAGNOSIS — R23.2 HOT FLASHES: ICD-10-CM

## 2025-04-28 DIAGNOSIS — M79.7 FIBROMYALGIA: ICD-10-CM

## 2025-04-28 DIAGNOSIS — G89.29 CHRONIC BILATERAL LOW BACK PAIN WITHOUT SCIATICA: Primary | ICD-10-CM

## 2025-04-28 DIAGNOSIS — Z79.899 MEDICATION MANAGEMENT: ICD-10-CM

## 2025-04-28 DIAGNOSIS — G89.4 CHRONIC PAIN SYNDROME: ICD-10-CM

## 2025-04-28 RX ORDER — TRAMADOL HYDROCHLORIDE 50 MG/1
100 TABLET ORAL EVERY 8 HOURS PRN
Qty: 180 TABLET | Refills: 2 | Status: SHIPPED | OUTPATIENT
Start: 2025-04-28 | End: 2025-05-28

## 2025-04-28 NOTE — PROGRESS NOTES
The following individual(s) verbally consented to be recorded using ambient AI listening technology and understand that they can each withdraw their consent to this listening technology at any point by asking the clinician to turn off or pause the recording:    Patient name: Marielos Reid  Additional names:  Chinyere MIDDLETON student

## 2025-04-28 NOTE — PROGRESS NOTES
Subjective:   Marielos Reid is a 44 year old female who presents for Low Back Pain (Has been ongoing for a while now but has gotten to the point where it is extremely uncomfortable now. Describes the pain as dull and achy, does not shoot down either leg )         History/Other:   History of Present Illness  Marielos Reid is a 44 year old female who presents with worsening back pain.    She has been experiencing worsening back pain over the past few days, localized to the middle of her back without radiation to her legs. She is currently taking Norco 5 mg, but finds it wears off quickly, leading her to sometimes take two pills. Tramadol provided better pain control in the past, as it lasted longer, but she last used it a couple of months ago. She has tried stretching and Advil for relief, but the pain persists. She has a history of seeing a physiatrist and a chiropractor for her back pain.    She mentions experiencing puffiness in her face and eyes a few days ago, not related to Advil use. She has tried Tylenol and Advil for pain relief, but spent a day in bed due to severe pain.    She has not had a menstrual period since February and is experiencing symptoms suggestive of perimenopause, including hot flashes. She was previously prescribed Veozah, which did not alleviate her symptoms, and is now taking venlafaxine daily, which she finds more effective for managing hot flashes. She has had difficulty with insurance coverage for Veozah but managed to fill it with a coupon. She has not taken it recently due to potential liver issues.    She is not concerned about the possibility of pregnancy due to the absence of her period, although she has not experienced nausea or other typical pregnancy symptoms. She had a heavy period in February. Her mother experienced menopause around the age of 50.       Chief Complaint Reviewed and Verified  Nursing Notes Reviewed and   Verified  Tobacco Reviewed  Allergies Reviewed   Medications Reviewed    Problem List Reviewed  Medical History Reviewed  Surgical History   Reviewed  OB Status Reviewed  Family History Reviewed         Tobacco:  She has never smoked tobacco.    Current Medications[1]      Review of Systems:  Review of Systems  A comprehensive 10 point review of systems was completed.  Pertinent positives and negatives noted in the the HPI.     Objective:   /72 (BP Location: Left arm, Patient Position: Sitting, Cuff Size: adult)   Pulse 100   Resp 18   Ht 5' 3.5\" (1.613 m)   Wt 116 lb 6 oz (52.8 kg)   LMP 02/04/2025 (Exact Date)   SpO2 100%   BMI 20.29 kg/m²  Estimated body mass index is 20.29 kg/m² as calculated from the following:    Height as of this encounter: 5' 3.5\" (1.613 m).    Weight as of this encounter: 116 lb 6 oz (52.8 kg).  Physical Exam      GENERAL: well developed, well nourished,in no apparent distress  PSYCHE: normal mood and affect  SKIN: no rashes,no suspicious lesions  LUNGS: clear to auscultation  CARDIO: RRR without murmur  GI: good BS's,no masses, HSM or tenderness  EXTREMITIES: no cyanosis, clubbing or edema; ant. Right sacrum; tight right parapspinals    Results  LABS  Estradiol: <32 (12/2024)  LH: 24 (12/2024)      Assessment & Plan:   1. Chronic bilateral low back pain without sciatica (Primary)  -     Physiatry Referral - In Network  2. Chronic pain syndrome  -     Physiatry Referral - In Network  3. Hot flashes  -     traMADol HCl; Take 2 tablets (100 mg total) by mouth every 8 (eight) hours as needed for Pain.  Dispense: 180 tablet; Refill: 2  4. Fibromyalgia  -     Physiatry Referral - In Network  5. Perimenopause  -     traMADol HCl; Take 2 tablets (100 mg total) by mouth every 8 (eight) hours as needed for Pain.  Dispense: 180 tablet; Refill: 2  6. Screening mammogram for breast cancer  -     [facility] 2D+3D SCREENING BILAT (CPT=77067/88091); Future; Expected date: 04/28/2025  7. Medication management  -     Physiatry Referral -  In Network    Assessment & Plan  Perimenopause  Perimenopause confirmed by hormone levels. Venlafaxine effectively manages hot flashes. Discussed low pregnancy likelihood and nature of perimenopause. Venlafaxine preferred over Veozah due to efficacy and liver safety.  - Continue venlafaxine for hot flashes.  - Discontinue Veozah due to insurance and liver concerns.  - Perform pregnancy test.  - Educated on perimenopause and irregular periods.    Chronic back pain  Chronic back pain worsening. Norco less effective, considering tramadol. No radicular symptoms. Previous physiatry consultation. Referred to Dr. Coates. Low serotonin syndrome risk with tramadol and venlafaxine.  - Switch to tramadol 100 mg TID, then wean to 50 mg TID.  - Alternate tramadol with acetaminophen 1000 mg up to TID.  - Refer to Dr. Coates for further evaluation.  - Consider chiropractic intervention.      No follow-ups on file.      Giovanna Parker DO, 4/28/2025, 9:42 AM             [1]   Current Outpatient Medications   Medication Sig Dispense Refill    traMADol 50 MG Oral Tab Take 2 tablets (100 mg total) by mouth every 8 (eight) hours as needed for Pain. 180 tablet 2    venlafaxine ER 37.5 MG Oral Capsule SR 24 Hr Take 1 capsule (37.5 mg total) by mouth daily. 90 capsule 0    Fezolinetant (VEOZAH) 45 MG Oral Tab Take 1 tablet by mouth daily. 30 tablet 3    NIFEdipine ER 30 MG Oral Tablet 24 Hr Take 1 tablet (30 mg total) by mouth daily as needed. 30 tablet 1    HYDROcodone-acetaminophen (NORCO) 5-325 MG Oral Tab Take 1 tablet by mouth every 8 (eight) hours as needed for Pain. 90 tablet 0    HYDROcodone-acetaminophen (NORCO) 5-325 MG Oral Tab Take 1 tablet by mouth 2 (two) times daily as needed for Pain. 60 tablet 0    ondansetron (ZOFRAN) 4 mg tablet Take 2 tablets (8 mg total) by mouth every 8 (eight) hours as needed for Nausea. 90 tablet 0

## 2025-05-16 ENCOUNTER — OFFICE VISIT (OUTPATIENT)
Dept: FAMILY MEDICINE CLINIC | Facility: CLINIC | Age: 45
End: 2025-05-16
Payer: COMMERCIAL

## 2025-05-16 VITALS
HEART RATE: 100 BPM | HEIGHT: 63.5 IN | SYSTOLIC BLOOD PRESSURE: 118 MMHG | RESPIRATION RATE: 16 BRPM | OXYGEN SATURATION: 100 % | BODY MASS INDEX: 21.04 KG/M2 | DIASTOLIC BLOOD PRESSURE: 66 MMHG | WEIGHT: 120.25 LBS

## 2025-05-16 DIAGNOSIS — T78.40XA ALLERGIC REACTION, INITIAL ENCOUNTER: ICD-10-CM

## 2025-05-16 DIAGNOSIS — G89.4 CHRONIC PAIN SYNDROME: Primary | ICD-10-CM

## 2025-05-16 DIAGNOSIS — E11.9 TYPE 2 DIABETES MELLITUS WITHOUT COMPLICATION, WITHOUT LONG-TERM CURRENT USE OF INSULIN (HCC): ICD-10-CM

## 2025-05-16 DIAGNOSIS — T78.3XXA ANGIOEDEMA, INITIAL ENCOUNTER: ICD-10-CM

## 2025-05-16 DIAGNOSIS — G89.29 CENTRAL SENSITIZATION TO PAIN: ICD-10-CM

## 2025-05-16 DIAGNOSIS — M54.50 CHRONIC BILATERAL LOW BACK PAIN WITHOUT SCIATICA: ICD-10-CM

## 2025-05-16 DIAGNOSIS — Z79.899 MEDICATION MANAGEMENT: ICD-10-CM

## 2025-05-16 DIAGNOSIS — G89.29 CHRONIC BILATERAL LOW BACK PAIN WITHOUT SCIATICA: ICD-10-CM

## 2025-05-16 LAB
AMPHET UR QL SCN: NEGATIVE
BENZODIAZ UR QL SCN: NEGATIVE
CANNABINOIDS UR QL SCN: NEGATIVE
COCAINE UR QL: NEGATIVE
CREAT UR-SCNC: 47 MG/DL
FENTANYL UR QL SCN: NEGATIVE
HEMOGLOBIN A1C: 5.7 % (ref 4.3–5.6)
MDMA UR QL SCN: NEGATIVE
OPIATES UR QL SCN: NEGATIVE
OXYCODONE UR QL SCN: NEGATIVE

## 2025-05-16 PROCEDURE — 80307 DRUG TEST PRSMV CHEM ANLYZR: CPT | Performed by: FAMILY MEDICINE

## 2025-05-16 RX ORDER — EPINEPHRINE 0.3 MG/.3ML
0.3 INJECTION SUBCUTANEOUS AS NEEDED
Qty: 1 EACH | Refills: 0 | Status: SHIPPED | OUTPATIENT
Start: 2025-05-16 | End: 2026-05-16

## 2025-05-16 RX ORDER — HYDROCODONE BITARTRATE AND ACETAMINOPHEN 10; 325 MG/1; MG/1
1 TABLET ORAL 2 TIMES DAILY PRN
Qty: 60 TABLET | Refills: 0 | Status: SHIPPED | OUTPATIENT
Start: 2025-05-16 | End: 2025-06-15

## 2025-05-16 RX ORDER — HYDROCODONE BITARTRATE AND ACETAMINOPHEN 10; 325 MG/1; MG/1
1 TABLET ORAL 2 TIMES DAILY PRN
Qty: 60 TABLET | Refills: 0 | Status: SHIPPED | OUTPATIENT
Start: 2025-05-16 | End: 2025-05-16

## 2025-05-16 NOTE — PROGRESS NOTES
The following individual(s) verbally consented to be recorded using ambient AI listening technology and understand that they can each withdraw their consent to this listening technology at any point by asking the clinician to turn off or pause the recording:    Patient name: Marielos Crookstony

## 2025-05-16 NOTE — PROGRESS NOTES
Subjective:   Marielos Reid is a 44 year old female who presents for Medication Follow-Up         History/Other:   History of Present Illness  Marielos Reid is a 44 year old female with chronic back pain who presents for a medication check.    She has experienced chronic back pain since her early thirties, characterized by cycles of improvement and worsening. Stretching and walking initially feel manageable but result in severe pain afterwards. She can bend and perform squats, but these activities are painful, leading her to avoid them. She has been seeing a chiropractor and has previously undergone radiotherapy and injections, but the pain persists.    She has been on tramadol 100 mg and was attempting to wean to 50 mg. She alternates with Tylenol and has used Norco in the past, maintaining a four-hour window between doses. She recently ran out of Norco and has been taking tramadol.    She experienced an episode of lip swelling after consuming blair, which she suspects might be an allergic reaction. She took Allegra, which gradually reduced the swelling. She has a history of angioedema with Advil.    She reports a recent increase in cravings for sweets, particularly [place] chocolate, despite her A1c being under control. She is concerned about these cravings and their impact on her health.    Her son, Gerry, is experiencing ongoing stomach issues, including bloody stools, and is undergoing evaluation with a GI specialist. She is concerned about his condition and is awaiting further diagnostic results.       Chief Complaint Reviewed and Verified  No Further Nursing Notes to   Review  Tobacco Reviewed  Allergies Reviewed  Medications Reviewed    Problem List Reviewed  Medical History Reviewed  Surgical History   Reviewed  OB Status Reviewed  Family History Reviewed         Tobacco:  She has never smoked tobacco.    Current Medications[1]      Review of Systems:  Review of Systems  A comprehensive 10 point  review of systems was completed.  Pertinent positives and negatives noted in the the HPI.     Objective:   /66 (BP Location: Left arm, Patient Position: Sitting, Cuff Size: adult)   Pulse 100   Resp 16   Ht 5' 3.5\" (1.613 m)   Wt 120 lb 4 oz (54.5 kg)   LMP 02/04/2025 (Exact Date)   SpO2 100%   BMI 20.97 kg/m²  Estimated body mass index is 20.97 kg/m² as calculated from the following:    Height as of this encounter: 5' 3.5\" (1.613 m).    Weight as of this encounter: 120 lb 4 oz (54.5 kg).  Physical Exam      GENERAL: well developed, well nourished,in no apparent distress  PSYCHE: normal mood and affect  SKIN: no rashes,no suspicious lesions  LUNGS: clear to auscultation  CARDIO: RRR without murmur  GI: good BS's,no masses, HSM; mild general  tenderness  EXTREMITIES: no cyanosis, clubbing or edema    Results  LABS  Estradiol: <32 (12/2024)  LH: 24 (12/2024)      LABS  A1c: 6.1% (12/2024)    RADIOLOGY  Radiofrequency ablation of the right lumbar medial branches: No plan for follow-up (03/2022)      Assessment & Plan:   1. Chronic pain syndrome (Primary)  -     Discontinue: HYDROcodone-Acetaminophen; Take 1 tablet by mouth 2 (two) times daily as needed for Pain.  Dispense: 60 tablet; Refill: 0  -     HYDROcodone-Acetaminophen; Take 1 tablet by mouth 2 (two) times daily as needed for Pain.  Dispense: 60 tablet; Refill: 0  -     Drug Screen 8 W/confirmation, urine; Future; Expected date: 05/16/2025  -     Drug Screen 8 W/confirmation, urine  2. 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 Chronic bilateral low back pain without sciatica  -     Discontinue: HYDROcodone-Acetaminophen; Take 1 tablet by mouth 2 (two) times daily as needed for Pain.  Dispense: 60 tablet; Refill: 0  -     HYDROcodone-Acetaminophen; Take 1 tablet by mouth 2 (two) times daily as needed for Pain.  Dispense: 60 tablet; Refill: 0  -     Drug Screen 8 W/confirmation, urine; Future; Expected date: 05/16/2025  -     Drug Screen 8 W/confirmation,  urine  4. Angioedema, initial encounter  5. Allergic reaction, initial encounter  -     EPINEPHrine; Inject 0.3 mL (1 each total) as directed as needed.  Dispense: 1 each; Refill: 0  6. Type 2 diabetes mellitus without complication, without long-term current use of insulin (HCC)  -     POC Hemoglobin A1C  7. Medication management    Assessment & Plan    Chronic bilateral low back pain without sciatica  Chronic low back pain exacerbated, complicated by opioid ensitization syndrome. Previous treatments ineffective. Current opioid regimen unsustainable long-term.  - Continue tramadol 100 mg as prescribed.  - Prescribe hydrocodone-acetaminophen 10 mg twice daily for severe pain.  - Consult with pain clinic nurse practitioner for alternative pain management.  - Advise disposal of excess tramadol to prevent overlapping narcotic prescriptions.  - discussed how chronic narcotic uses creates a vicious cycle with pain  -- sensitization syndrome  - reconsider duloxetine and adding amytriptlline for pain control  - Mandy Hurd will get back to me on Monday for options for this patient  - advised to leave a urine for drug testing    Allergic reaction with angioedema  Possible allergic reaction to blair and Advil, with lip swelling. History of sensitivity to various substances.  - Prescribe EpiPen for emergency use.  - Recommend allergy testing.  - Instruct to avoid blair and monitor for further reactions.      Return in about 2 weeks (around 5/30/2025) for med check 30.    Giovanna Parker DO FAAFP    Length of charting/visit -- greater than 40 minutes              [1]   Current Outpatient Medications   Medication Sig Dispense Refill    EPINEPHrine (EPIPEN 2-DIANNE) 0.3 MG/0.3ML Injection Solution Auto-injector Inject 0.3 mL (1 each total) as directed as needed. 1 each 0    HYDROcodone-acetaminophen  MG Oral Tab Take 1 tablet by mouth 2 (two) times daily as needed for Pain. 60 tablet 0    traMADol 50 MG Oral Tab Take 2  tablets (100 mg total) by mouth every 8 (eight) hours as needed for Pain. 180 tablet 2    venlafaxine ER 37.5 MG Oral Capsule SR 24 Hr Take 1 capsule (37.5 mg total) by mouth daily. 90 capsule 0    Fezolinetant (VEOZAH) 45 MG Oral Tab Take 1 tablet by mouth daily. 30 tablet 3    NIFEdipine ER 30 MG Oral Tablet 24 Hr Take 1 tablet (30 mg total) by mouth daily as needed. 30 tablet 1    HYDROcodone-acetaminophen (NORCO) 5-325 MG Oral Tab Take 1 tablet by mouth 2 (two) times daily as needed for Pain. 60 tablet 0    ondansetron (ZOFRAN) 4 mg tablet Take 2 tablets (8 mg total) by mouth every 8 (eight) hours as needed for Nausea. 90 tablet 0

## 2025-06-13 DIAGNOSIS — G89.4 CHRONIC PAIN SYNDROME: ICD-10-CM

## 2025-06-13 DIAGNOSIS — M54.50 CHRONIC BILATERAL LOW BACK PAIN WITHOUT SCIATICA: ICD-10-CM

## 2025-06-13 DIAGNOSIS — G89.29 CHRONIC BILATERAL LOW BACK PAIN WITHOUT SCIATICA: ICD-10-CM

## 2025-06-13 RX ORDER — HYDROCODONE BITARTRATE AND ACETAMINOPHEN 10; 325 MG/1; MG/1
1 TABLET ORAL 2 TIMES DAILY PRN
Qty: 60 TABLET | Refills: 0 | Status: SHIPPED | OUTPATIENT
Start: 2025-06-13 | End: 2025-07-13

## 2025-06-13 NOTE — TELEPHONE ENCOUNTER
Last office visit: 5/16/2025   Labs last completed: 5/16/2025  Requested medication(s) are due for refill today: yes  Requested medication(s) are on the active medication list same strength, form, dose/ sig: yes  Requested medication(s) are managed by provider: yes  Patient has already received a courtsey refill: no    NOV: 8/19/2025  Asked to Return: 5/30/2025?

## 2025-07-11 DIAGNOSIS — G89.4 CHRONIC PAIN SYNDROME: ICD-10-CM

## 2025-07-11 DIAGNOSIS — G89.29 CHRONIC BILATERAL LOW BACK PAIN WITHOUT SCIATICA: ICD-10-CM

## 2025-07-11 DIAGNOSIS — M54.50 CHRONIC BILATERAL LOW BACK PAIN WITHOUT SCIATICA: ICD-10-CM

## 2025-07-11 NOTE — TELEPHONE ENCOUNTER
Last office visit: 05/16/2025   Protocol: PASS    Requested medication(s) are due for refill today: Yes    Requested medication(s) are on the active medication list same strength, form, dose/ sig: Yes    Requested medication(s) are managed by provider: Yes    Patient has already received a courtsey refill: No    LABS: 03/03/2025  NOV: 08/19/2025  Asked to Return: 5/30/2025

## 2025-07-13 RX ORDER — HYDROCODONE BITARTRATE AND ACETAMINOPHEN 10; 325 MG/1; MG/1
1 TABLET ORAL 2 TIMES DAILY PRN
Qty: 60 TABLET | Refills: 0 | Status: SHIPPED | OUTPATIENT
Start: 2025-07-13 | End: 2025-08-12

## 2025-07-22 RX ORDER — TRAMADOL HYDROCHLORIDE 50 MG/1
TABLET ORAL
COMMUNITY
Start: 2025-06-24 | End: 2025-07-23 | Stop reason: SDUPTHER

## 2025-07-23 ENCOUNTER — PATIENT MESSAGE (OUTPATIENT)
Dept: FAMILY MEDICINE CLINIC | Facility: CLINIC | Age: 45
End: 2025-07-23

## 2025-07-23 ENCOUNTER — OFFICE VISIT (OUTPATIENT)
Dept: FAMILY MEDICINE CLINIC | Facility: CLINIC | Age: 45
End: 2025-07-23
Payer: COMMERCIAL

## 2025-07-23 VITALS
DIASTOLIC BLOOD PRESSURE: 70 MMHG | HEART RATE: 85 BPM | OXYGEN SATURATION: 100 % | BODY MASS INDEX: 22.07 KG/M2 | WEIGHT: 126.13 LBS | RESPIRATION RATE: 18 BRPM | HEIGHT: 63.5 IN | SYSTOLIC BLOOD PRESSURE: 118 MMHG

## 2025-07-23 DIAGNOSIS — M25.521 RIGHT ELBOW PAIN: Primary | ICD-10-CM

## 2025-07-23 DIAGNOSIS — R10.2 PELVIC PAIN: ICD-10-CM

## 2025-07-23 DIAGNOSIS — G89.4 CHRONIC PAIN SYNDROME: ICD-10-CM

## 2025-07-23 DIAGNOSIS — M79.7 FIBROMYALGIA: ICD-10-CM

## 2025-07-23 DIAGNOSIS — Z86.39 HISTORY OF DIABETES MELLITUS: ICD-10-CM

## 2025-07-23 DIAGNOSIS — M54.50 CHRONIC BILATERAL LOW BACK PAIN WITHOUT SCIATICA: ICD-10-CM

## 2025-07-23 DIAGNOSIS — I73.00 RAYNAUD'S PHENOMENON WITHOUT GANGRENE: ICD-10-CM

## 2025-07-23 DIAGNOSIS — G89.29 CENTRAL SENSITIZATION TO PAIN: ICD-10-CM

## 2025-07-23 DIAGNOSIS — G89.29 CHRONIC BILATERAL LOW BACK PAIN WITHOUT SCIATICA: ICD-10-CM

## 2025-07-23 DIAGNOSIS — Z79.899 MEDICATION MANAGEMENT: ICD-10-CM

## 2025-07-23 PROBLEM — E11.9 TYPE 2 DIABETES MELLITUS WITHOUT COMPLICATION, WITHOUT LONG-TERM CURRENT USE OF INSULIN (HCC): Chronic | Status: RESOLVED | Noted: 2024-12-04 | Resolved: 2025-07-23

## 2025-07-23 PROCEDURE — 80307 DRUG TEST PRSMV CHEM ANLYZR: CPT | Performed by: FAMILY MEDICINE

## 2025-07-23 RX ORDER — TRAMADOL HYDROCHLORIDE 50 MG/1
100 TABLET ORAL 2 TIMES DAILY PRN
Qty: 40 TABLET | Refills: 0 | Status: SHIPPED | OUTPATIENT
Start: 2025-07-23 | End: 2025-08-02

## 2025-07-23 RX ORDER — TRAMADOL HYDROCHLORIDE 50 MG/1
50 TABLET ORAL 2 TIMES DAILY PRN
Qty: 20 TABLET | Refills: 0 | Status: SHIPPED | OUTPATIENT
Start: 2025-07-23 | End: 2025-07-23

## 2025-07-23 NOTE — PROGRESS NOTES
Subjective:   Marielos Reid is a 44 year old female who presents for Elbow Pain (Right elbow pain for the past week, has been wearing a brace as she thought it was tennis elbow but it does not seem to be helping./) and Back Pain         History/Other:   History of Present Illness  Marielos Reid is a 44 year old female with fibromyalgia and Raynaud's syndrome who presents with new right elbow pain.    She has been experiencing new pain in her right elbow for about a month, described as swollen and tender, with possible involvement of the muscle. She has used a tennis elbow brace in the past with relief, but it is not effective this time. No specific injury or activity is identified as a cause.    She reports increased back pain, lower abdominal pain, and knee pain over the past month. Her back pain is worse than usual, exacerbated during a recent trip to [place]. Current medications include Norco twice daily for back pain. She inquires about a tramadol refill for additional pain management.    Symptoms consistent with Raynaud's syndrome include fingers turning white and cold, lasting about ten minutes. She has been prescribed nifedipine but did not take it during a recent episode due to being in a meeting. Her hands and feet feel very cold during these episodes.    She has a history of heavy menstrual periods and recent hormonal changes, including increased appetite and weight gain, raising concerns about regaining previously lost weight. She has not taken metformin for over a year and is not on diabetic medications.   Chief Complaint Reviewed and Verified  Nursing Notes Reviewed and   Verified  Tobacco Reviewed  Allergies Reviewed  Medications Reviewed    Problem List Reviewed  Medical History Reviewed  Surgical History   Reviewed  OB Status Reviewed  Family History Reviewed         Tobacco:  She has never smoked tobacco.    Current Medications[1]      Review of Systems:  Review of Systems  A  comprehensive 10 point review of systems was completed.  Pertinent positives and negatives noted in the the HPI.     Objective:   /70 (BP Location: Left arm, Patient Position: Sitting, Cuff Size: adult)   Pulse 85   Resp 18   Ht 5' 3.5\" (1.613 m)   Wt 126 lb 2 oz (57.2 kg)   LMP 02/04/2025 (Exact Date)   SpO2 100%   BMI 21.99 kg/m²  Estimated body mass index is 21.99 kg/m² as calculated from the following:    Height as of this encounter: 5' 3.5\" (1.613 m).    Weight as of this encounter: 126 lb 2 oz (57.2 kg).  Physical Exam  GENERAL: well developed, well nourished,in no apparent distress  PSYCHE: normal mood and affect  SKIN: no rashes,no suspicious lesions  HEENT: atraumatic, normocephalic  NECK: supple,no adenopathy,no bruits, no thyromegaly or nodule.  LUNGS: clear to auscultation  CARDIO: RRR without murmur  GI: good BS's,no masses, HSM; general tenderness  -- worse in lower abdomen  EXTREMITIES: no cyanosis, clubbing or edema; swelling around lateral right elow with warmth and tender over the right lateral epicondyle        Results  A1c: 6.5% (2023)      Assessment & Plan:   1. Right elbow pain (Primary)  -     Pain Management Drug Panel, Urine; Future; Expected date: 07/23/2025  -     traMADol HCl; Take 1 tablet (50 mg total) by mouth 2 (two) times daily as needed for Pain.  Dispense: 20 tablet; Refill: 0  2. Chronic pain syndrome  -     Pain Management Drug Panel, Urine; Future; Expected date: 07/23/2025  -     traMADol HCl; Take 1 tablet (50 mg total) by mouth 2 (two) times daily as needed for Pain.  Dispense: 20 tablet; Refill: 0  3. Chronic bilateral low back pain without sciatica  -     Pain Management Drug Panel, Urine; Future; Expected date: 07/23/2025  -     traMADol HCl; Take 1 tablet (50 mg total) by mouth 2 (two) times daily as needed for Pain.  Dispense: 20 tablet; Refill: 0  4. Central sensitization to pain  -     Pain Management Drug Panel, Urine; Future; Expected date:  07/23/2025  -     traMADol HCl; Take 1 tablet (50 mg total) by mouth 2 (two) times daily as needed for Pain.  Dispense: 20 tablet; Refill: 0  5. Fibromyalgia  6. Medication management  7. Raynaud's phenomenon without gangrene  8. History of diabetes mellitus  9. Pelvic pain  -     US PELVIS W DOPPLER (EJR=[id number]); Future; Expected date: 07/23/2025  Other orders  -     [facility] PCP or Registry Removal Request    Assessment & Plan  Right Elbow Pain  New right elbow pain with swelling and tenderness, likely acute tendonitis flare. Low suspicion for cellulitis.  - Advise icing the right elbow.  - Prescribe diclofenac topical gel.  - Recommend acetaminophen for pain relief.  - Monitor for signs of infection.    Pelvic Pain  Suspected ovarian cysts due to lower abdominal pain. Pelvic ultrasound needed for evaluation.  - Order pelvic ultrasound to evaluate for ovarian cysts.    Raynaud's Syndrome  Recurrent Raynaud's episodes confirmed. Discussed avoidance of triggers and pathophysiology.  - Advise keeping nifedipine for emergency use during Raynaud's episodes.  - Consider daily nifedipine if episodes become frequent.    Fibromyalgia  Widespread pain consistent with fibromyalgia. Discussed alternating Norco and Tramadol for pain management.  - Prescribe Tramadol for additional pain management.  - Advise alternating Norco and Tramadol every six hours.  - Monitor for side effects of pain medications.    Diabetes  History of diabetes with good control, not on medications. Updated records to reflect history rather than active diabetes.  - Update medical records to reflect history of diabetes.    General Health Maintenance  Emphasized monitoring narcotic use and adherence to pain management protocols.  - Reinforce adherence to pain management contract.    Follow-up  Recommended physiatrist consultation for pain management.  - Provide referral to Dr. Hopper for physiatry consultation.      Return in about 4 weeks (around  8/20/2025) for med check 30.      Giovanna Parker DO, 7/23/2025, 9:08 AM               [1]   Current Outpatient Medications   Medication Sig Dispense Refill    traMADol 50 MG Oral Tab Take 1 tablet (50 mg total) by mouth 2 (two) times daily as needed for Pain. 20 tablet 0    HYDROcodone-acetaminophen  MG Oral Tab Take 1 tablet by mouth 2 (two) times daily as needed for Pain. 60 tablet 0    EPINEPHrine (EPIPEN 2-DIANNE) 0.3 MG/0.3ML Injection Solution Auto-injector Inject 0.3 mL (1 each total) as directed as needed. 1 each 0    venlafaxine ER 37.5 MG Oral Capsule SR 24 Hr Take 1 capsule (37.5 mg total) by mouth daily. 90 capsule 0    NIFEdipine ER 30 MG Oral Tablet 24 Hr Take 1 tablet (30 mg total) by mouth daily as needed. 30 tablet 1    ondansetron (ZOFRAN) 4 mg tablet Take 2 tablets (8 mg total) by mouth every 8 (eight) hours as needed for Nausea. 90 tablet 0

## 2025-07-23 NOTE — TELEPHONE ENCOUNTER
Seen today:   Fibromyalgia  Widespread pain consistent with fibromyalgia. Discussed alternating Norco and Tramadol for pain management.  - Prescribe Tramadol for additional pain management.  - Advise alternating Norco and Tramadol every six hours.  - Monitor for side effects of pain medications.    Pls confirm Rx per pt request   Thank you    New Rx today: qty 20    2 tablets (100 mg total) by mouth every 8 (eight) hours as needed for Pain. - Oral    Sent to pharmacy as: traMADol HCl 50 MG Oral Tablet      VS    Last refilled:  6/28/25  qty 180    2 tablets (100 mg total) by mouth every 8 (eight) hours as needed for Pain. - Oral    Sent to pharmacy as: traMADol HCl 50 MG Oral Tablet

## 2025-07-29 ENCOUNTER — HOSPITAL ENCOUNTER (OUTPATIENT)
Dept: ULTRASOUND IMAGING | Age: 45
Discharge: HOME | End: 2025-07-29
Attending: FAMILY MEDICINE

## 2025-07-29 DIAGNOSIS — R10.2 PELVIC PAIN: ICD-10-CM

## 2025-07-29 PROCEDURE — 76856 US EXAM PELVIC COMPLETE: CPT | Performed by: FAMILY MEDICINE

## 2025-07-29 PROCEDURE — 93975 VASCULAR STUDY: CPT | Performed by: FAMILY MEDICINE

## 2025-07-30 ENCOUNTER — PATIENT MESSAGE (OUTPATIENT)
Dept: FAMILY MEDICINE CLINIC | Facility: CLINIC | Age: 45
End: 2025-07-30

## 2025-07-30 ENCOUNTER — TELEPHONE (OUTPATIENT)
Dept: OBGYN CLINIC | Facility: CLINIC | Age: 45
End: 2025-07-30

## 2025-07-30 DIAGNOSIS — G89.29 CHRONIC BILATERAL LOW BACK PAIN WITHOUT SCIATICA: ICD-10-CM

## 2025-07-30 DIAGNOSIS — G89.29 CENTRAL SENSITIZATION TO PAIN: ICD-10-CM

## 2025-07-30 DIAGNOSIS — G89.4 CHRONIC PAIN SYNDROME: ICD-10-CM

## 2025-07-30 DIAGNOSIS — M54.50 CHRONIC BILATERAL LOW BACK PAIN WITHOUT SCIATICA: ICD-10-CM

## 2025-07-30 DIAGNOSIS — M25.521 RIGHT ELBOW PAIN: ICD-10-CM

## 2025-07-30 RX ORDER — TRAMADOL HYDROCHLORIDE 50 MG/1
100 TABLET ORAL 2 TIMES DAILY PRN
Qty: 40 TABLET | Refills: 0 | OUTPATIENT
Start: 2025-07-30 | End: 2025-08-09

## 2025-08-01 ENCOUNTER — OFFICE VISIT (OUTPATIENT)
Dept: FAMILY MEDICINE CLINIC | Facility: CLINIC | Age: 45
End: 2025-08-01

## 2025-08-01 ENCOUNTER — TELEPHONE (OUTPATIENT)
Dept: FAMILY MEDICINE CLINIC | Facility: CLINIC | Age: 45
End: 2025-08-01

## 2025-08-01 VITALS
WEIGHT: 126 LBS | DIASTOLIC BLOOD PRESSURE: 72 MMHG | RESPIRATION RATE: 16 BRPM | BODY MASS INDEX: 22.05 KG/M2 | HEART RATE: 77 BPM | OXYGEN SATURATION: 99 % | HEIGHT: 63.5 IN | SYSTOLIC BLOOD PRESSURE: 114 MMHG

## 2025-08-01 DIAGNOSIS — G89.4 CHRONIC PAIN SYNDROME: Primary | ICD-10-CM

## 2025-08-01 DIAGNOSIS — N83.202 CYST OF LEFT OVARY: Primary | ICD-10-CM

## 2025-08-01 DIAGNOSIS — M25.521 RIGHT ELBOW PAIN: ICD-10-CM

## 2025-08-01 DIAGNOSIS — M79.7 FIBROMYALGIA: ICD-10-CM

## 2025-08-01 DIAGNOSIS — M79.644 PAIN OF RIGHT THUMB: ICD-10-CM

## 2025-08-01 DIAGNOSIS — Z79.899 MEDICATION MANAGEMENT: ICD-10-CM

## 2025-08-01 DIAGNOSIS — R11.0 NAUSEA: ICD-10-CM

## 2025-08-01 DIAGNOSIS — M77.11 LATERAL EPICONDYLITIS OF RIGHT ELBOW: ICD-10-CM

## 2025-08-01 DIAGNOSIS — G89.29 CENTRAL SENSITIZATION TO PAIN: ICD-10-CM

## 2025-08-01 PROCEDURE — 99214 OFFICE O/P EST MOD 30 MIN: CPT | Performed by: FAMILY MEDICINE

## 2025-08-01 PROCEDURE — 3074F SYST BP LT 130 MM HG: CPT | Performed by: FAMILY MEDICINE

## 2025-08-01 PROCEDURE — 3008F BODY MASS INDEX DOCD: CPT | Performed by: FAMILY MEDICINE

## 2025-08-01 PROCEDURE — 80307 DRUG TEST PRSMV CHEM ANLYZR: CPT | Performed by: FAMILY MEDICINE

## 2025-08-01 PROCEDURE — 3078F DIAST BP <80 MM HG: CPT | Performed by: FAMILY MEDICINE

## 2025-08-01 RX ORDER — ONDANSETRON 4 MG/1
8 TABLET, FILM COATED ORAL EVERY 8 HOURS PRN
Qty: 120 TABLET | Refills: 2 | Status: SHIPPED | OUTPATIENT
Start: 2025-08-01

## 2025-08-01 RX ORDER — TRAMADOL HYDROCHLORIDE 50 MG/1
100 TABLET ORAL EVERY 8 HOURS PRN
Qty: 180 TABLET | Refills: 1 | Status: SHIPPED | OUTPATIENT
Start: 2025-08-01 | End: 2025-08-31

## 2025-08-04 ENCOUNTER — OFFICE VISIT (OUTPATIENT)
Dept: OBGYN CLINIC | Facility: CLINIC | Age: 45
End: 2025-08-04

## 2025-08-04 VITALS
DIASTOLIC BLOOD PRESSURE: 70 MMHG | WEIGHT: 123 LBS | SYSTOLIC BLOOD PRESSURE: 116 MMHG | BODY MASS INDEX: 21 KG/M2 | HEIGHT: 64 IN | HEART RATE: 78 BPM

## 2025-08-04 DIAGNOSIS — N83.209 CYST OF OVARY, UNSPECIFIED LATERALITY: Primary | ICD-10-CM

## 2025-08-04 DIAGNOSIS — N80.9 ENDOMETRIOSIS: ICD-10-CM

## 2025-08-19 ENCOUNTER — OFFICE VISIT (OUTPATIENT)
Dept: FAMILY MEDICINE CLINIC | Facility: CLINIC | Age: 45
End: 2025-08-19

## 2025-08-19 VITALS
WEIGHT: 122.38 LBS | OXYGEN SATURATION: 98 % | BODY MASS INDEX: 20.89 KG/M2 | RESPIRATION RATE: 16 BRPM | HEIGHT: 64 IN | HEART RATE: 91 BPM | DIASTOLIC BLOOD PRESSURE: 72 MMHG | SYSTOLIC BLOOD PRESSURE: 120 MMHG

## 2025-08-19 DIAGNOSIS — M25.521 RIGHT ELBOW PAIN: ICD-10-CM

## 2025-08-19 DIAGNOSIS — G89.29 CENTRAL SENSITIZATION TO PAIN: ICD-10-CM

## 2025-08-19 DIAGNOSIS — Z86.39 HISTORY OF DIABETES MELLITUS: ICD-10-CM

## 2025-08-19 DIAGNOSIS — G89.4 CHRONIC PAIN SYNDROME: Primary | ICD-10-CM

## 2025-08-19 DIAGNOSIS — M79.644 PAIN OF RIGHT THUMB: ICD-10-CM

## 2025-08-19 DIAGNOSIS — N83.202 CYST OF LEFT OVARY: ICD-10-CM

## 2025-08-19 DIAGNOSIS — M79.7 FIBROMYALGIA: ICD-10-CM

## 2025-08-19 DIAGNOSIS — F11.20 NARCOTIC DEPENDENCE (HCC): ICD-10-CM

## 2025-08-19 DIAGNOSIS — M54.50 CHRONIC BILATERAL LOW BACK PAIN WITHOUT SCIATICA: ICD-10-CM

## 2025-08-19 DIAGNOSIS — G89.29 CHRONIC BILATERAL LOW BACK PAIN WITHOUT SCIATICA: ICD-10-CM

## 2025-08-19 DIAGNOSIS — R11.0 NAUSEA: ICD-10-CM

## 2025-08-19 DIAGNOSIS — M77.11 LATERAL EPICONDYLITIS OF RIGHT ELBOW: ICD-10-CM

## 2025-08-19 RX ORDER — PREDNISONE 20 MG/1
40 TABLET ORAL DAILY
Qty: 10 TABLET | Refills: 0 | Status: SHIPPED | OUTPATIENT
Start: 2025-08-19 | End: 2025-08-24

## 2025-08-24 ENCOUNTER — PATIENT MESSAGE (OUTPATIENT)
Dept: FAMILY MEDICINE CLINIC | Facility: CLINIC | Age: 45
End: 2025-08-24

## 2025-08-24 DIAGNOSIS — G89.29 CHRONIC BILATERAL LOW BACK PAIN WITHOUT SCIATICA: ICD-10-CM

## 2025-08-24 DIAGNOSIS — M54.50 CHRONIC BILATERAL LOW BACK PAIN WITHOUT SCIATICA: ICD-10-CM

## 2025-08-24 DIAGNOSIS — G89.4 CHRONIC PAIN SYNDROME: ICD-10-CM

## 2025-08-24 DIAGNOSIS — M77.11 LATERAL EPICONDYLITIS OF RIGHT ELBOW: Primary | ICD-10-CM

## 2025-08-25 RX ORDER — HYDROCODONE BITARTRATE AND ACETAMINOPHEN 10; 325 MG/1; MG/1
1 TABLET ORAL 2 TIMES DAILY PRN
Qty: 60 TABLET | Refills: 0
Start: 2025-08-25 | End: 2025-09-24

## 2025-08-27 ENCOUNTER — TELEPHONE (OUTPATIENT)
Dept: OBGYN CLINIC | Facility: CLINIC | Age: 45
End: 2025-08-27

## (undated) DIAGNOSIS — M51.36 DDD (DEGENERATIVE DISC DISEASE), LUMBAR: Primary | ICD-10-CM

## (undated) DIAGNOSIS — N83.202 LEFT OVARIAN CYST: Primary | ICD-10-CM

## (undated) DEVICE — SHOULDER ARTHROSCOPY CDS-LF: Brand: MEDLINE INDUSTRIES, INC.

## (undated) DEVICE — 1200CC GUARDIAN II: Brand: GUARDIAN

## (undated) DEVICE — KIT VLV 5 PC AIR H2O SUCT BX ENDOGATOR CONN

## (undated) DEVICE — REMOVER DURAPREP 3M

## (undated) DEVICE — TROCAR: Brand: KII SHIELDED BLADED ACCESS SYSTEM

## (undated) DEVICE — 3M™ TEGADERM™ +PAD FILM DRESSING WITH NON-ADHERENT PAD, 3587, 3-1/2 IN X 4-1/8 IN (9 CM X 10.5 CM), 25/CAR, 4 CAR/CS: Brand: 3M™ TEGADERM™

## (undated) DEVICE — GLOVE SURG SENSICARE SZ 7-1/2

## (undated) DEVICE — 3M™ RED DOT™ MONITORING ELECTRODE WITH FOAM TAPE AND STICKY GEL, 50/BAG, 20/CASE, 72/PLT 2570: Brand: RED DOT™

## (undated) DEVICE — MINI-BLADE®: Brand: BEAVER®

## (undated) DEVICE — 3M™ STERI-STRIP™ REINFORCED ADHESIVE SKIN CLOSURES, R1547, 1/2 IN X 4 IN (12 MM X 100 MM), 6 STRIPS/ENVELOPE: Brand: 3M™ STERI-STRIP™

## (undated) DEVICE — 10FT COMBINED O2 DELIVERY/CO2 MONITORING. FILTER WITH MICROSTREAM TYPE LUER: Brand: DUAL ADULT NASAL CANNULA

## (undated) DEVICE — SHEET,DRAPE,70X100,STERILE: Brand: MEDLINE

## (undated) DEVICE — GOWN,SIRUS,FABRIC-REINFORCED,X-LARGE: Brand: MEDLINE

## (undated) DEVICE — DRAPE,U/SHT,SPLIT,FILM,60X84,STERILE: Brand: MEDLINE

## (undated) DEVICE — DRAPE HALF 40X58 DYNJP2410

## (undated) DEVICE — ALCOHOL 70% 4 OZ

## (undated) DEVICE — Device

## (undated) DEVICE — RF CANNULA, CVD: Brand: VENOM

## (undated) DEVICE — MARKER SKIN 2 TIP

## (undated) DEVICE — REM POLYHESIVE ADULT PATIENT RETURN ELECTRODE: Brand: VALLEYLAB

## (undated) DEVICE — STERILE POLYISOPRENE POWDER-FREE SURGICAL GLOVES: Brand: PROTEXIS

## (undated) DEVICE — NEEDLE SCORPION AR-13995N

## (undated) DEVICE — BANDAID COVERLET 1X3

## (undated) DEVICE — LOW PROFILE (LP) BLADE ASSEMBLY 6PK: Brand: MICROAIRE®

## (undated) DEVICE — SUT MONOCRYL 5-0 P-3 Y493G

## (undated) DEVICE — SOL NACL IRRIG 0.9% 1000ML BTL

## (undated) DEVICE — STANDARD HYPODERMIC NEEDLE,POLYPROPYLENE HUB: Brand: MONOJECT

## (undated) DEVICE — STERILE POLYISOPRENE POWDER-FREE SURGICAL GLOVES WITH EMOLLIENT COATING: Brand: PROTEXIS

## (undated) DEVICE — ENDOSCOPY PACK - LOWER: Brand: MEDLINE INDUSTRIES, INC.

## (undated) DEVICE — 3M™ IOBAN™ 2 ANTIMICROBIAL INCISE DRAPE 6648EZ: Brand: IOBAN™ 2

## (undated) DEVICE — DISPOSABLE TOURNIQUET CUFF SINGLE BLADDER, DUAL PORT AND QUICK CONNECT CONNECTOR: Brand: COLOR CUFF

## (undated) DEVICE — TUBING DW OUTFLOW

## (undated) DEVICE — TROCAR: Brand: KII® SLEEVE

## (undated) DEVICE — ADHESIVE MASTISOL 2/3CC VL

## (undated) DEVICE — INSUFFLATION NEEDLE TO ESTABLISH PNEUMOPERITONEUM.: Brand: INSUFFLATION NEEDLE

## (undated) DEVICE — GOWN SURG AERO CHROME XXL

## (undated) DEVICE — GLOVE SURG SENSICARE SZ 6-1/2

## (undated) DEVICE — 1.5 MM TENDON DRILL PIN: Brand: BICEPTOR

## (undated) DEVICE — BITEBLOCK ENDOSCP 60FR MAXI STRP

## (undated) DEVICE — SOL  .9 1000ML BTL

## (undated) DEVICE — GIJAW SINGLE-USE BIOPSY FORCEPS WITH NEEDLE: Brand: GIJAW

## (undated) DEVICE — FILTERLINE NASAL ADULT O2/CO2

## (undated) DEVICE — UPPER EXTREMITY CDS-LF: Brand: MEDLINE INDUSTRIES, INC.

## (undated) DEVICE — DERMABOND CLOSURE 0.7ML TOPICL

## (undated) DEVICE — CANNULA 7MM TWIST AR-6570

## (undated) DEVICE — KENDALL SCD EXPRESS SLEEVES, KNEE LENGTH, MEDIUM: Brand: KENDALL SCD

## (undated) DEVICE — GAUZE TRAY STERILE 4X4 12PLY

## (undated) DEVICE — SUTURE TAPE

## (undated) DEVICE — KIT CUSTOM ENDOPROCEDURE STERIS

## (undated) DEVICE — ABDOMINAL PAD: Brand: DERMACEA

## (undated) DEVICE — NEEDLE SPINAL 22X3-1/2 BLK

## (undated) DEVICE — GLOVE SURG TRIUMPH SZ 6-1/2

## (undated) DEVICE — TISSUE RETRIEVAL SYSTEM: Brand: INZII RETRIEVAL SYSTEM

## (undated) DEVICE — BANDAGE FLEX ADH CURITY 2X3

## (undated) DEVICE — #11 STERILE BLADE: Brand: POLYMER COATED BLADES

## (undated) DEVICE — GYN LAP/ROBOTIC: Brand: MEDLINE INDUSTRIES, INC.

## (undated) DEVICE — SUTURE ETHILON 3-0 FSL

## (undated) DEVICE — VIOLET BRAIDED (POLYGLACTIN 910), SYNTHETIC ABSORBABLE SUTURE: Brand: COATED VICRYL

## (undated) DEVICE — DERMA+FLEX TISSUE ADHESIVE

## (undated) DEVICE — PAIN TRAY: Brand: MEDLINE INDUSTRIES, INC.

## (undated) DEVICE — ENDOSCOPY PACK UPPER: Brand: MEDLINE INDUSTRIES, INC.

## (undated) DEVICE — SUTURE VICRYL 3-0 X-1

## (undated) DEVICE — DISPOSABLE BIPOLAR FORCEPS 4" (10.2CM) JEWELERS, STRAIGHT 0.4MM TIP AND 12 FT. (3.6M) CABLE: Brand: KIRWAN

## (undated) DEVICE — LIGASURE L-HOOK 37CM

## (undated) DEVICE — FORCEP BIOPSY RJ4 LG CAP W/ND

## (undated) DEVICE — 2.4 MM X 15 INCH DRILL TIP PASSING                                    PIN, STERILE: Brand: ENDOBUTTON

## (undated) DEVICE — TUBING IRR 16FT CNT WV 3 ASCP

## (undated) DEVICE — CONVERTORS STOCKINETTE: Brand: CONVERTORS

## (undated) DEVICE — SOLUTION ANTIFOG W/ SPONGE

## (undated) DEVICE — 4.5 MM FULL RADIUS ELITE STRAIGHT                                    DISPOSABLE BLADES, MAROON,PACKAGED 6                                    PER BOX, STERILE

## (undated) DEVICE — SHEET, T, LAPAROTOMY, STERILE: Brand: MEDLINE

## (undated) DEVICE — Device: Brand: DEFENDO AIR/WATER/SUCTION AND BIOPSY VALVE

## (undated) DEVICE — SYRINGE 50ML LL TIP

## (undated) DEVICE — SOL  .9 3000ML

## (undated) DEVICE — 4.0 CM ACROMIOBLASTER STRAIGHT                                    BURRS, POWER/EP-1, SAGE GREEN, 8000                                    MAXIMUM RPM, PACKAGED 6 PER BOX, STERILE

## (undated) DEVICE — 3.5 MM STARVAC 90 INTEGRATED CABLE                                    WAND ICW, 90 DEGREE: Brand: COBLATION

## (undated) DEVICE — GAMMEX® PI HYBRID SIZE 6, STERILE POWDER-FREE SURGICAL GLOVE, POLYISOPRENE AND NEOPRENE BLEND: Brand: GAMMEX

## (undated) DEVICE — OCCLUSIVE GAUZE STRIP OVERWRAP,3% BISMUTH TRIBROMOPHENATE IN PETROLATUM BLEND: Brand: XEROFORM

## (undated) NOTE — MR AVS SNAPSHOT
[facility], [address] [address] [phone]               Thank you for choosing us for your health care visit with Charo Rdz DO.   We are glad to serve you and happy to provide you with this summary These medications were sent to [address] [place] [address]  [address], [address] [facility]  [address] [phone], [phone] [address], [address] [facility]     Phone:  [phone]    - Diclofenac Sodium 75 MG Tbec            MyChart     Visit MyChart  You

## (undated) NOTE — LETTER
Patient Name: Myles Allison  YOB: 1980          MRN :  [id number]  Date:  2/9/2020  Referring Physician:  Carolann Carlton    Progress Summary  Pt has attended 7 visits in Physical Therapy.      Dx: Neck Pain, S/P biceps tenodesis revision 12/5 · Pt will improve shoulder abduction AROM to >120 degrees to improve ability to don deodorant, don/doff shirts, and wash hair MET  · Pt will improve shoulder strength throughout to > 4+/5 to improve function with ADLs including household chores and cooking

## (undated) NOTE — MR AVS SNAPSHOT
[facility], [address] [address] [phone]               Thank you for choosing us for your health care visit with Ahmad Aase, DO.   We are glad to serve you and happy to provide you with this summary Commonly known as:  ULTRAM                Where to Get Your Medications      These medications were sent to [address] [place] [address] [address], [address] [facility]  [address] [phone], [phone] [address], [place] [phone]     Phone:  [phone] view more details from this visit by going to [email or web address]. If you've recently had a stay at the Hospital you can access your discharge instructions in [place] by going to Visits < Admission Summaries.  If you've been to the Emergency Depar

## (undated) NOTE — LETTER
[address]  [address]  Authorization for Surgical Operation and Procedure     Date:___________                                                                                                         Time:__________  I hereby authorize Surgeon(s):  Alexis Thornton DO, my physician and his/her assistants (if applicable), which may include medical students, residents, and/or fellows, to perform the following surgical operation/ procedure and administer such anesthesia as may be determined necessary by my physician:  Operation/Procedure name (s) Esophagogastroduodenoscopy with possible biopsy, possible control of bleeding  on Mraielos Reid   2.   I recognize that during the surgical operation/procedure, unforeseen conditions may necessitate additional or different procedures than those listed above.  I, therefore, further authorize and request that the above-named surgeon, assistants, or designees perform such procedures as are, in their judgment, necessary and desirable.    3.   My surgeon/physician has discussed prior to my surgery the potential benefits, risks and side effects of this procedure; the likelihood of achieving goals; and potential problems that might occur during recuperation.  They also discussed reasonable alternatives to the procedure, including risks, benefits, and side effects related to the alternatives and risks related to not receiving this procedure.  I have had all my questions answered and I acknowledge that no guarantee has been made as to the result that may be obtained.    4.   Should the need arise during my operation/procedure, which includes change of level of care prior to discharge, I also consent to the administration of blood and/or blood products.  Further, I understand that despite careful testing and screening of blood or blood products by collecting agencies, I may still be subject to ill effects as a result of receiving a blood  transfusion and/or blood products.  The following are some, but not all, of the potential risks that can occur: fever and allergic reactions, hemolytic reactions, transmission of diseases such as Hepatitis, AIDS and Cytomegalovirus (CMV) and fluid overload.  In the event that I wish to have an autologous transfusion of my own blood, or a directed donor transfusion, I will discuss this with my physician.  Check only if Refusing Blood or Blood Products  I understand refusal of blood or blood products as deemed necessary by my physician may have serious consequences to my condition to include possible death. I hereby assume responsibility for my refusal and release the hospital, its personnel, and my physicians from any responsibility for the consequences of my refusal.          o  Refuse      5.   I authorize the use of any specimen, organs, tissues, body parts or foreign objects that may be removed from my body during the operation/procedure for diagnosis, research or teaching purposes and their subsequent disposal by hospital authorities.  I also authorize the release of specimen test results and/or written reports to my treating physician on the hospital medical staff or other referring or consulting physicians involved in my care, at the discretion of the Pathologist or my treating physician.    6.   I consent to the photographing or videotaping of the operations or procedures to be performed, including appropriate portions of my body for medical, scientific, or educational purposes, provided my identity is not revealed by the pictures or by descriptive texts accompanying them.  If the procedure has been photographed/videotaped, the surgeon will obtain the original picture, image, videotape or CD.  The hospital will not be responsible for storage, release or maintenance of the picture, image, tape or CD.    7.   I consent to the presence of a  or observers in the operating room as deemed  necessary by my physician or their designees.    8.   I recognize that in the event my procedure results in extended X-Ray/fluoroscopy time, I may develop a skin reaction.    9. If I have a Do Not Attempt Resuscitation (DNAR) order in place, that status will be suspended while in the operating room, procedural suite, and during the recovery period unless otherwise explicitly stated by me (or a person authorized to consent on my behalf). The surgeon or my attending physician will determine when the applicable recovery period ends for purposes of reinstating the DNAR order.  10. Patients having a sterilization procedure: I understand that if the procedure is successful the results will be permanent and it will therefore be impossible for me to inseminate, conceive, or bear children.  I also understand that the procedure is intended to result in sterility, although the result has not been guaranteed.   11. I acknowledge that my physician has explained sedation/analgesia administration to me including the risk and benefits I consent to the administration of sedation/analgesia as may be necessary or desirable in the judgment of my physician.    I CERTIFY THAT I HAVE READ AND FULLY UNDERSTAND THE ABOVE CONSENT TO OPERATION and/or OTHER PROCEDURE.    _________________________________________  __________________________________  Signature of Patient     Signature of Responsible Person         ___________________________________         Printed Name of Responsible Person           _________________________________                 Relationship to Patient  _________________________________________  ______________________________  Signature of Witness          Date  Time      Patient Name: Marielos Reid     : 1980                 Printed: 2024     Medical Record #: [id number]                     Page 1 of 2                                    [address]  [address]    Consent  for Anesthesia    I, Marielos Reid agree to be cared for by an anesthesiologist, who is specially trained to monitor me and give me medicine to put me to sleep or keep me comfortable during my procedure    I understand that my anesthesiologist is not an employee or agent of [facility] or [employer]. He or she works for [employer].    As the patient asking for anesthesia services, I agree to:  Allow the anesthesiologist (anesthesia doctor) to give me medicine and do additional procedures as necessary. Some examples are: Starting or using an “IV” to give me medicine, fluids or blood during my procedure, and having a breathing tube placed to help me breathe when I’m asleep (intubation). In the event that my heart stops working properly, I understand that my anesthesiologist will make every effort to sustain my life, unless otherwise directed by [facility] Do Not Resuscitate documents.  Tell my anesthesia doctor before my procedure:  If I am pregnant.  The last time that I ate or drank.  All of the medicines I take (including prescriptions, herbal supplements, and pills I can buy without a prescription (including street drugs/illegal medications). Failure to inform my anesthesiologist about these medicines may increase my risk of anesthetic complications.  If I am allergic to anything or have had a reaction to anesthesia before.  I understand how the anesthesia medicine will help me (benefits).  I understand that with any type of anesthesia medicine there are risks:  The most common risks are: nausea, vomiting, sore throat, muscle soreness, damage to my eyes, mouth, or teeth (from breathing tube placement).  Rare risks include: remembering what happened during my procedure, allergic reactions to medications, injury to my airway, heart, lungs, vision, nerves, or muscles and in extremely rare instances death.  My doctor has explained to me other choices available to me for my care  (alternatives).  Pregnant Patients (“epidural”):  I understand that the risks of having an epidural (medicine given into my back to help control pain during labor), include itching, low blood pressure, difficulty urinating, headache or slowing of the baby’s heart. Very rare risks include infection, bleeding, seizure, irregular heart rhythms and nerve injury.  Regional Anesthesia (“spinal”, “epidural”, & “nerve blocks”):  I understand that rare but potential complications include headache, bleeding, infection, seizure, irregular heart rhythms, and nerve injury.    I can change my mind about having anesthesia services at any time before I get the medicine.    _____________________________________________________________________________  Patient (or Representative) Signature/Relationship to Patient  Date   Time    _____________________________________________________________________________   Name (if used)    Language/Organization   Time    _____________________________________________________________________________  Anesthesiologist Signature     Date   Time  I have discussed the procedure and information above with the patient (or patient’s representative) and answered their questions. The patient or their representative has agreed to have anesthesia services.    _____________________________________________________________________________  Witness        Date   Time  I have verified that the signature is that of the patient or patient’s representative, and that it was signed before the procedure  Patient Name: Marielos Reid     : 1980                 Printed: 2024     Medical Record #: [id number]                     Page 2 of 2

## (undated) NOTE — LETTER
[address]  [address]  Authorization for Surgical Operation and Procedure     Date:___________                                                                                                         Time:__________  I hereby authorize Surgeon(s):  Alexis Thornton DO, my physician and his/her assistants (if applicable), which may include medical students, residents, and/or fellows, to perform the following surgical operation/ procedure and administer such anesthesia as may be determined necessary by my physician:  Operation/Procedure name (s) Colonoscopy with possible biopsy, possible control of bleeding  on Marielos Reid   2.   I recognize that during the surgical operation/procedure, unforeseen conditions may necessitate additional or different procedures than those listed above.  I, therefore, further authorize and request that the above-named surgeon, assistants, or designees perform such procedures as are, in their judgment, necessary and desirable.    3.   My surgeon/physician has discussed prior to my surgery the potential benefits, risks and side effects of this procedure; the likelihood of achieving goals; and potential problems that might occur during recuperation.  They also discussed reasonable alternatives to the procedure, including risks, benefits, and side effects related to the alternatives and risks related to not receiving this procedure.  I have had all my questions answered and I acknowledge that no guarantee has been made as to the result that may be obtained.    4.   Should the need arise during my operation/procedure, which includes change of level of care prior to discharge, I also consent to the administration of blood and/or blood products.  Further, I understand that despite careful testing and screening of blood or blood products by collecting agencies, I may still be subject to ill effects as a result of receiving a blood transfusion and/or  blood products.  The following are some, but not all, of the potential risks that can occur: fever and allergic reactions, hemolytic reactions, transmission of diseases such as Hepatitis, AIDS and Cytomegalovirus (CMV) and fluid overload.  In the event that I wish to have an autologous transfusion of my own blood, or a directed donor transfusion, I will discuss this with my physician.  Check only if Refusing Blood or Blood Products  I understand refusal of blood or blood products as deemed necessary by my physician may have serious consequences to my condition to include possible death. I hereby assume responsibility for my refusal and release the hospital, its personnel, and my physicians from any responsibility for the consequences of my refusal.          o  Refuse      5.   I authorize the use of any specimen, organs, tissues, body parts or foreign objects that may be removed from my body during the operation/procedure for diagnosis, research or teaching purposes and their subsequent disposal by hospital authorities.  I also authorize the release of specimen test results and/or written reports to my treating physician on the hospital medical staff or other referring or consulting physicians involved in my care, at the discretion of the Pathologist or my treating physician.    6.   I consent to the photographing or videotaping of the operations or procedures to be performed, including appropriate portions of my body for medical, scientific, or educational purposes, provided my identity is not revealed by the pictures or by descriptive texts accompanying them.  If the procedure has been photographed/videotaped, the surgeon will obtain the original picture, image, videotape or CD.  The hospital will not be responsible for storage, release or maintenance of the picture, image, tape or CD.    7.   I consent to the presence of a  or observers in the operating room as deemed necessary by my physician  or their designees.    8.   I recognize that in the event my procedure results in extended X-Ray/fluoroscopy time, I may develop a skin reaction.    9. If I have a Do Not Attempt Resuscitation (DNAR) order in place, that status will be suspended while in the operating room, procedural suite, and during the recovery period unless otherwise explicitly stated by me (or a person authorized to consent on my behalf). The surgeon or my attending physician will determine when the applicable recovery period ends for purposes of reinstating the DNAR order.  10. Patients having a sterilization procedure: I understand that if the procedure is successful the results will be permanent and it will therefore be impossible for me to inseminate, conceive, or bear children.  I also understand that the procedure is intended to result in sterility, although the result has not been guaranteed.   11. I acknowledge that my physician has explained sedation/analgesia administration to me including the risk and benefits I consent to the administration of sedation/analgesia as may be necessary or desirable in the judgment of my physician.    I CERTIFY THAT I HAVE READ AND FULLY UNDERSTAND THE ABOVE CONSENT TO OPERATION and/or OTHER PROCEDURE.    _________________________________________  __________________________________  Signature of Patient     Signature of Responsible Person         ___________________________________         Printed Name of Responsible Person           _________________________________                 Relationship to Patient  _________________________________________  ______________________________  Signature of Witness          Date  Time      Patient Name: Marielos Reid     : 1980                 Printed: 2024     Medical Record #: [id number]                     Page [address] of [address] [place] [address]  [address]    Consent for Anesthesia    I,  Marielos Reid agree to be cared for by an anesthesiologist, who is specially trained to monitor me and give me medicine to put me to sleep or keep me comfortable during my procedure    I understand that my anesthesiologist is not an employee or agent of [facility] or [employer]. He or she works for [employer].    As the patient asking for anesthesia services, I agree to:  Allow the anesthesiologist (anesthesia doctor) to give me medicine and do additional procedures as necessary. Some examples are: Starting or using an “IV” to give me medicine, fluids or blood during my procedure, and having a breathing tube placed to help me breathe when I’m asleep (intubation). In the event that my heart stops working properly, I understand that my anesthesiologist will make every effort to sustain my life, unless otherwise directed by [facility] Do Not Resuscitate documents.  Tell my anesthesia doctor before my procedure:  If I am pregnant.  The last time that I ate or drank.  All of the medicines I take (including prescriptions, herbal supplements, and pills I can buy without a prescription (including street drugs/illegal medications). Failure to inform my anesthesiologist about these medicines may increase my risk of anesthetic complications.  If I am allergic to anything or have had a reaction to anesthesia before.  I understand how the anesthesia medicine will help me (benefits).  I understand that with any type of anesthesia medicine there are risks:  The most common risks are: nausea, vomiting, sore throat, muscle soreness, damage to my eyes, mouth, or teeth (from breathing tube placement).  Rare risks include: remembering what happened during my procedure, allergic reactions to medications, injury to my airway, heart, lungs, vision, nerves, or muscles and in extremely rare instances death.  My doctor has explained to me other choices available to me for my care  (alternatives).  Pregnant Patients (“epidural”):  I understand that the risks of having an epidural (medicine given into my back to help control pain during labor), include itching, low blood pressure, difficulty urinating, headache or slowing of the baby’s heart. Very rare risks include infection, bleeding, seizure, irregular heart rhythms and nerve injury.  Regional Anesthesia (“spinal”, “epidural”, & “nerve blocks”):  I understand that rare but potential complications include headache, bleeding, infection, seizure, irregular heart rhythms, and nerve injury.    I can change my mind about having anesthesia services at any time before I get the medicine.    _____________________________________________________________________________  Patient (or Representative) Signature/Relationship to Patient  Date   Time    _____________________________________________________________________________   Name (if used)    Language/Organization   Time    _____________________________________________________________________________  Anesthesiologist Signature     Date   Time  I have discussed the procedure and information above with the patient (or patient’s representative) and answered their questions. The patient or their representative has agreed to have anesthesia services.    _____________________________________________________________________________  Witness        Date   Time  I have verified that the signature is that of the patient or patient’s representative, and that it was signed before the procedure  Patient Name: Marielos Reid     : 1980                 Printed: 2024     Medical Record #: [id number]                     Page 2 of 2

## (undated) NOTE — Clinical Note
04/25/2017    Dear Opal Juarez,  We got to talking and I forgot to tell you that I did put an order in for your right shoulder to have an x-ray and to start physical therapy to help strengthen the muscles and decrease the pain in that right shoulder.   In the m

## (undated) NOTE — MR AVS SNAPSHOT
After Visit Summary   9/12/2019    Veronica Nova    MRN: [id number]           Visit Information     Date & Time  9/12/2019 10:30 AM Provider  Alison Hernandez DO [facility], [facility] Dept.  Phone  [phone] URINALYSIS, AUTO, W/O SCOPE [id number] CPT(R)]     Future Labs/Procedures Expected by Expires    THINPREP PAP WITH HPV REFLEX REQUEST B [CNP8564 CUSTOM]  9/12/2019 9/12/2020    URINALYSIS WITH CULTURE REFLEX [7371202 CUSTOM]  9/12/2019 9/12/2020    THINPREP P not require immediate attention. Average cost  $70*       VIDEO VISITS  Visit face-to-face with a [facility] physician or SU  using your mobile device or computer   using [employer] (deja mi)      e-VISITS  Communicate with a [facility] physician or SU  online.   The physi

## (undated) NOTE — LETTER
[address] Removal Waiver:    I hereby acknowledge that [facility] staff attempted the following interventions to remove my jewelry:     q  Elevate extremity    q  Ice extremity    q  Use soap & water    q  Use lotions or lubricant    q Robin Emery   :  1980  MRN:  [id number]  CSN:  [id number]  Mariam Phelps Md  [facility] PERIOPERATIVE SVC

## (undated) NOTE — MR AVS SNAPSHOT
[facility], [address] [address] [id number]               Thank you for choosing us for your health care visit with Fernanda Gauthier DO.   We are glad to serve you and happy to provide you with this summary will be drinking contrast at home prior to your appointment  Oral contrast can be picked up at the following locations:  [facility] Reception Desk: Mon-Fri 7:00am-8:00pm and Sat 7:00am-3:00pm [address] [facility], [address] [facility] Assoc Dx:  Acute cystitis with hematuria [N30.01]           EBV VCA(IGG/M) [E]    Complete by:  Jun 02, 2017 (Approximate)    Assoc Dx:   Heart palpitations [R00.2], History of rheumatic fever [Z86.79], Weakness [R53.1], Abnormal CBC [R79.89]           [facility] Scheduling   at [phone].        Friday June 02, 2017     Imaging:  CT ABDOMEN+PELVIS(CPT=74176)    Instructions:  IMPORTANT    Your physician has ordered a radiology test that may require authorization from your insurance compan authorization numbers or be assured that none are required. You can then schedule your appointment. Failure to obtain required authorization numbers can create reimbursement difficulties for you. Assoc Dx:   Intractable vomiting with nausea, unspecifie This list is accurate as of: 6/2/17 11:49 AM.  Always use your most recent med list.                Ciprofloxacin HCl 500 MG Tabs   Take 1 tablet (500 mg total) by mouth 2 (two) times daily.    Commonly known as:  CIPRO           DULoxetine HCl 20 MG Cpep

## (undated) NOTE — LETTER
04/06/21        Nik Reid  [address]      Dear Nik Luke,    [address] records indicate that you have outstanding lab work and or testing that was ordered for you and has not yet been completed:  Orders Placed This Encounter        U

## (undated) NOTE — Clinical Note
Giovanna - I saw [place] Jamieson Dionne today with [facility]. I've recommended bladder testing. I will work to manage her sx. I appreciate the opportunity to participate in her care.  Thanks, Gianfranco Lewis

## (undated) NOTE — LETTER
Patient Name: Susan Dennis  : 1980  MRN: [id number]  Patient Address: [address] [place] [address]      Coronavirus Disease 2019 (COVID-19)     [facility] is committed to the safety and well-being of our patients, members, employ 2. Monitor your symptoms carefully. If your symptoms get worse, call your healthcare provider immediately. 3. Get rest and stay hydrated.    4. If you have a medical appointment, call the healthcare provider ahead of time and tell them that you have or may ? At least 24 hours have passed since recovery defined as resolution of fever without the use of fever-reducing medications; and  · Improvement in respiratory symptoms (e.g., cough, shortness of breath); and  · At least 10 days have passed since symptoms f If you would be interested in donating your plasma to help treat others diagnosed with the virus, please contact [employer] directly on their website: [email or web address]    Who is eligible to donate convalescent plasma?

## (undated) NOTE — LETTER
03/19/21        Cristie Cranker Thahir  [address] [facility]      Dear Cristie Cranker,    [address] records indicate that you have outstanding lab work and or testing that was ordered for you and has not yet been completed:  Orders Placed This Encounter

## (undated) NOTE — LETTER
Date: 8/2/2017    Patient Name: Lucien Conner          To Whom it may concern: This letter has been written at the patient's request. The above patient was seen at the [facility] for treatment of a medical condition.     Zilphia Krabbe was robbyo

## (undated) NOTE — LETTER
Your patient was recently seen at the [facility], [place] for a hospital follow-up visit. The visit note is attached. Please contact the clinic with any questions at [phone].     Thank you,  ELKIN Norton

## (undated) NOTE — MR AVS SNAPSHOT
[facility], [address] [phone]               Thank you for choosing us for your health care visit with Fredi Allison DO.   We are glad to serve you and happy to provide you with this summary Referral Orders      Normal Orders This Visit    OP REFERRAL TO [facility] & Gavi Phan [[id number] CUSTOM]  Order #:  [id number]         **THIS IS NOT A REFERRAL**  Your physician has recommended you to [facility].   If your insurance re 100/60 mmHg 84 64\" 126 lb 21.62 kg/m2         Current Medications          This list is accurate as of: 4/25/17  6:36 PM.  Always use your most recent med list.                DULoxetine HCl 20 MG Cpep   Take 1 capsule (20 mg total) by mouth daily.    Com

## (undated) NOTE — MR AVS SNAPSHOT
[facility], [address] [address] [phone]               Thank you for choosing us for your health care visit with Selin Mcgarry DO.   We are glad to serve you and happy to provide you with this summary If you've recently had a stay at the Hospital you can access your discharge instructions in [employer] by going to Visits < Admission Summaries.  If you've been to the Emergency Department or your doctor's office, you can view your past visit information in My

## (undated) NOTE — LETTER
Winnie Rowley [address] [facility] [address], [address] [place] [address]  Authorization for Surgical Operation and Procedure     Date:_2/28/2021_                                                                                                       Time:__________  1. 4.   Should the need arise during my operation or immediate post-operative period, I also consent to the administration of blood and/or blood products.   Further, I understand that despite careful testing and screening of blood or blood products by emeli 8.   I recognize that in the event my procedure results in extended X-Ray/fluoroscopy time, I may develop a skin reaction. 9.  If I have a Do Not Attempt Resuscitation (DNAR) order in place, that status will be suspended while in the operating room, proc 1. IVeronica agree to be cared for by an anesthesiologist, who is specially trained to monitor me and give me medicine to put me to sleep or keep me comfortable during my procedure    I understand that my anesthesiologist is not an employee or agen 5. My doctor has explained to me other choices available to me for my care (alternatives).   6. Pregnant Patients (“epidural”):  I understand that the risks of having an epidural (medicine given into my back to help control pain during labor), include itchi

## (undated) NOTE — MR AVS SNAPSHOT
After Visit Summary   10/19/2021    Susan Dennis   MRN: [id number]           Visit Information     Date & Time  10/19/2021  3:00 PM Provider  Lonnie Trujillo [facility]  [facility]  Phone  [phone] of breast   [[id number]]    Need for vaccination   [[id number]]             Follow-up    Return in about 3 months (around 1/19/2022) for back pain.      We Ordered the Following     Normal Orders This Visit    FLULAVAL INFLUENZA VACCINE QUAD PRESERVATIVE FREE 0.5 M survey from [employer], please take a few minutes to complete it and provide feedback. We strive to deliver the best patient experience and are looking for ways to make improvements. Your feedback will help us do so.  For more information on [employer], [place]

## (undated) NOTE — LETTER
Patient Name: Glendy Olivia  YOB: 1980          MRN number:  [id number]  Date:  1/17/2020  Referring Physician:  Jaspal Zhou         POST-OP SHOULDER EVALUATION:    Referring Physician: Dr. Kylah Hutchins  Diagnosis: Neck Pain, S/P biceps tenodesi to turn head or look up. Next MD visit: early Feb    Marielos describes prior level of function history of shoulder pain after initial surgery and history of adhesive capsulitis. Pt goals include to be pain free.   Past medical history was reviewed with trapezius, external rotators, biceps brachii, pectorals. Pain with light touch is present to deltoid, along biceps, to elbow.   Sensation: the patient reports some numbness along lateral shoulder, no radiating paresthesias into the upper extremity    AROM: Based on clinical rationale and outcome measures, this evaluation involved Moderate Complexity decision making due to 3+ personal factors/comorbidities, 3 body structures involved/activity limitations, and evolving symptoms including changing pain levels.

## (undated) NOTE — LETTER
Winnie Rowley [address]  [address] [facility] [address], [address] [facility]  Authorization for Surgical Operation and Procedure     Date:___________                                                                                                         Time:__________ 4.   Should the need arise during my operation or immediate post-operative period, I also consent to the administration of blood and/or blood products.   Further, I understand that despite careful testing and screening of blood or blood products by emeli 8.   I recognize that in the event my procedure results in extended X-Ray/fluoroscopy time, I may develop a skin reaction. 9.  If I have a Do Not Attempt Resuscitation (DNAR) order in place, that status will be suspended while in the operating room, proc 1. IJillian agree to be cared for by an anesthesiologist, who is specially trained to monitor me and give me medicine to put me to sleep or keep me comfortable during my procedure    I understand that my anesthesiologist is not an employee or agen 5. My doctor has explained to me other choices available to me for my care (alternatives).   6. Pregnant Patients (“epidural”):  I understand that the risks of having an epidural (medicine given into my back to help control pain during labor), include itchi